# Patient Record
Sex: FEMALE | Race: WHITE | NOT HISPANIC OR LATINO | Employment: OTHER | URBAN - NONMETROPOLITAN AREA
[De-identification: names, ages, dates, MRNs, and addresses within clinical notes are randomized per-mention and may not be internally consistent; named-entity substitution may affect disease eponyms.]

---

## 2021-09-14 ENCOUNTER — APPOINTMENT (EMERGENCY)
Dept: RADIOLOGY | Facility: HOSPITAL | Age: 56
End: 2021-09-14
Payer: COMMERCIAL

## 2021-09-14 ENCOUNTER — HOSPITAL ENCOUNTER (EMERGENCY)
Facility: HOSPITAL | Age: 56
Discharge: HOME | End: 2021-09-14
Attending: EMERGENCY MEDICINE
Payer: COMMERCIAL

## 2021-09-14 VITALS
DIASTOLIC BLOOD PRESSURE: 73 MMHG | RESPIRATION RATE: 20 BRPM | TEMPERATURE: 97.6 F | SYSTOLIC BLOOD PRESSURE: 116 MMHG | OXYGEN SATURATION: 97 % | HEIGHT: 68 IN | HEART RATE: 78 BPM | WEIGHT: 176.37 LBS | BODY MASS INDEX: 26.73 KG/M2

## 2021-09-14 DIAGNOSIS — J18.9 PNEUMONIA: Primary | ICD-10-CM

## 2021-09-14 DIAGNOSIS — E86.0 DEHYDRATION: ICD-10-CM

## 2021-09-14 LAB
ALBUMIN SERPL BCP-MCNC: 2.9 G/DL (ref 3.5–5)
ALP SERPL-CCNC: 90 U/L (ref 46–116)
ALT SERPL W P-5'-P-CCNC: 115 U/L (ref 12–78)
ANION GAP SERPL CALCULATED.3IONS-SCNC: 10 MMOL/L (ref 4–13)
AST SERPL W P-5'-P-CCNC: 196 U/L (ref 5–45)
BASOPHILS # BLD MANUAL: 0 THOUSAND/UL (ref 0–0.1)
BASOPHILS NFR MAR MANUAL: 0 % (ref 0–1)
BILIRUB SERPL-MCNC: 0.48 MG/DL (ref 0.2–1)
BUN SERPL-MCNC: 15 MG/DL (ref 5–25)
CALCIUM ALBUM COR SERPL-MCNC: 10 MG/DL (ref 8.3–10.1)
CALCIUM SERPL-MCNC: 9.1 MG/DL (ref 8.3–10.1)
CHLORIDE SERPL-SCNC: 94 MMOL/L (ref 100–108)
CO2 SERPL-SCNC: 26 MMOL/L (ref 21–32)
CREAT SERPL-MCNC: 1.08 MG/DL (ref 0.6–1.3)
EOSINOPHIL # BLD MANUAL: 0 THOUSAND/UL (ref 0–0.4)
EOSINOPHIL NFR BLD MANUAL: 0 % (ref 0–6)
ERYTHROCYTE [DISTWIDTH] IN BLOOD BY AUTOMATED COUNT: 11.9 % (ref 11.6–15.1)
GFR SERPL CREATININE-BSD FRML MDRD: 57 ML/MIN/1.73SQ M
GLUCOSE SERPL-MCNC: 123 MG/DL (ref 65–140)
HCT VFR BLD AUTO: 35.7 % (ref 34.8–46.1)
HGB BLD-MCNC: 12.4 G/DL (ref 11.5–15.4)
LACTATE SERPL-SCNC: 1.2 MMOL/L (ref 0.5–2)
LYMPHOCYTES # BLD AUTO: 1.04 THOUSAND/UL (ref 0.6–4.47)
LYMPHOCYTES # BLD AUTO: 15 % (ref 14–44)
MAGNESIUM SERPL-MCNC: 1.8 MG/DL (ref 1.6–2.6)
MCH RBC QN AUTO: 32.8 PG (ref 26.8–34.3)
MCHC RBC AUTO-ENTMCNC: 34.7 G/DL (ref 31.4–37.4)
MCV RBC AUTO: 94 FL (ref 82–98)
MONOCYTES # BLD AUTO: 0.41 THOUSAND/UL (ref 0–1.22)
MONOCYTES NFR BLD: 6 % (ref 4–12)
NEUTROPHILS # BLD MANUAL: 5.46 THOUSAND/UL (ref 1.85–7.62)
NEUTS BAND NFR BLD MANUAL: 8 % (ref 0–8)
NEUTS SEG NFR BLD AUTO: 71 % (ref 43–75)
PLATELET # BLD AUTO: 211 THOUSANDS/UL (ref 149–390)
PLATELET BLD QL SMEAR: ADEQUATE
PMV BLD AUTO: 9.9 FL (ref 8.9–12.7)
POTASSIUM SERPL-SCNC: 3 MMOL/L (ref 3.5–5.3)
PROT SERPL-MCNC: 8.1 G/DL (ref 6.4–8.2)
RBC # BLD AUTO: 3.78 MILLION/UL (ref 3.81–5.12)
RBC MORPH BLD: NORMAL
SODIUM SERPL-SCNC: 130 MMOL/L (ref 136–145)
TROPONIN I SERPL-MCNC: <0.02 NG/ML
WBC # BLD AUTO: 6.91 THOUSAND/UL (ref 4.31–10.16)

## 2021-09-14 PROCEDURE — 80053 COMPREHEN METABOLIC PANEL: CPT | Performed by: EMERGENCY MEDICINE

## 2021-09-14 PROCEDURE — 93005 ELECTROCARDIOGRAM TRACING: CPT

## 2021-09-14 PROCEDURE — 83735 ASSAY OF MAGNESIUM: CPT | Performed by: EMERGENCY MEDICINE

## 2021-09-14 PROCEDURE — 85007 BL SMEAR W/DIFF WBC COUNT: CPT | Performed by: EMERGENCY MEDICINE

## 2021-09-14 PROCEDURE — 36415 COLL VENOUS BLD VENIPUNCTURE: CPT | Performed by: EMERGENCY MEDICINE

## 2021-09-14 PROCEDURE — 96360 HYDRATION IV INFUSION INIT: CPT

## 2021-09-14 PROCEDURE — 99284 EMERGENCY DEPT VISIT MOD MDM: CPT

## 2021-09-14 PROCEDURE — 83605 ASSAY OF LACTIC ACID: CPT | Performed by: EMERGENCY MEDICINE

## 2021-09-14 PROCEDURE — 85027 COMPLETE CBC AUTOMATED: CPT | Performed by: EMERGENCY MEDICINE

## 2021-09-14 PROCEDURE — 96361 HYDRATE IV INFUSION ADD-ON: CPT

## 2021-09-14 PROCEDURE — 71045 X-RAY EXAM CHEST 1 VIEW: CPT

## 2021-09-14 PROCEDURE — 99285 EMERGENCY DEPT VISIT HI MDM: CPT | Performed by: EMERGENCY MEDICINE

## 2021-09-14 PROCEDURE — 85025 COMPLETE CBC W/AUTO DIFF WBC: CPT | Performed by: EMERGENCY MEDICINE

## 2021-09-14 PROCEDURE — 84484 ASSAY OF TROPONIN QUANT: CPT | Performed by: EMERGENCY MEDICINE

## 2021-09-14 RX ORDER — POTASSIUM CHLORIDE 20 MEQ/1
40 TABLET, EXTENDED RELEASE ORAL ONCE
Status: COMPLETED | OUTPATIENT
Start: 2021-09-14 | End: 2021-09-14

## 2021-09-14 RX ORDER — LISINOPRIL 10 MG/1
10 TABLET ORAL DAILY
COMMUNITY
Start: 2021-05-27 | End: 2022-04-24 | Stop reason: HOSPADM

## 2021-09-14 RX ORDER — AMOXICILLIN 500 MG/1
1000 CAPSULE ORAL EVERY 8 HOURS SCHEDULED
Qty: 60 CAPSULE | Refills: 0 | Status: SHIPPED | OUTPATIENT
Start: 2021-09-14 | End: 2021-09-24

## 2021-09-14 RX ORDER — ATORVASTATIN CALCIUM 40 MG/1
40 TABLET, FILM COATED ORAL DAILY
COMMUNITY
End: 2022-04-24 | Stop reason: HOSPADM

## 2021-09-14 RX ORDER — ONDANSETRON 4 MG/1
4 TABLET, FILM COATED ORAL EVERY 6 HOURS PRN
Qty: 10 TABLET | Refills: 0 | Status: SHIPPED | OUTPATIENT
Start: 2021-09-14

## 2021-09-14 RX ORDER — PANTOPRAZOLE SODIUM 40 MG/1
40 TABLET, DELAYED RELEASE ORAL DAILY
COMMUNITY
Start: 2021-09-02

## 2021-09-14 RX ADMIN — POTASSIUM CHLORIDE 40 MEQ: 1500 TABLET, EXTENDED RELEASE ORAL at 19:11

## 2021-09-14 RX ADMIN — SODIUM CHLORIDE 1000 ML: 0.9 INJECTION, SOLUTION INTRAVENOUS at 19:13

## 2021-09-14 RX ADMIN — SODIUM CHLORIDE 1000 ML: 0.9 INJECTION, SOLUTION INTRAVENOUS at 18:06

## 2021-09-14 NOTE — ED PROVIDER NOTES
History  Chief Complaint   Patient presents with    Abnormal Lab     pt seen by pcp yesterday dx pnuemonia and prescribed abx  pt received call from pcp today and instructed to come to ED today per abnormal lab results done yesterday  pt c/o increased fatigue/weakness for past 4 days  10-year-old male sent by family clinician for evaluation pneumonia complains of recent fatigue, low-grade temperature 99° , headache after recent trip to [place], also mentions kayaking in [place] [id number], denies aspiration  Using Tylenol and Advil as needed  Notes she feels dehydrated, typically has diarrhea without change, believes she is urinating less  Medical Problem  Location:  Generalized  Quality:  Malaise, fatigue  Onset quality:  Gradual  Timing:  Constant  Progression:  Unchanged  Associated symptoms: diarrhea, fatigue and headaches    Associated symptoms: no abdominal pain, no chest pain, no congestion, no cough, no nausea, no rash, no shortness of breath, no sore throat and no vomiting        Prior to Admission Medications   Prescriptions Last Dose Informant Patient Reported? Taking?   atorvastatin (LIPITOR) 40 mg tablet   Yes No   Si mg daily   lisinopril (ZESTRIL) 10 mg tablet   Yes Yes   Sig: Take 10 mg by mouth daily   pantoprazole (PROTONIX) 40 mg tablet   Yes Yes   Sig: Take 40 mg by mouth daily      Facility-Administered Medications: None       History reviewed  No pertinent past medical history  History reviewed  No pertinent surgical history  History reviewed  No pertinent family history  I have reviewed and agree with the history as documented      E-Cigarette/Vaping    E-Cigarette Use Current Every Day User      E-Cigarette/Vaping Substances    Nicotine Yes     Flavoring Yes      Social History     Tobacco Use    Smoking status: Former Smoker     Types: Cigarettes     Quit date: 2017     Years since quittin 7    Smokeless tobacco: Never Used   Vaping Use    Vaping Use: Every day  Substances: Nicotine, Flavoring   Substance Use Topics    Alcohol use: Yes    Drug use: Never       Review of Systems   Constitutional: Positive for fatigue  HENT: Negative for congestion and sore throat  Respiratory: Negative for cough and shortness of breath  Cardiovascular: Negative for chest pain  Gastrointestinal: Positive for diarrhea  Negative for abdominal pain, nausea and vomiting  Skin: Negative for rash  Neurological: Positive for headaches  All other systems reviewed and are negative  Physical Exam  Physical Exam  Vitals and nursing note reviewed  Constitutional:       Comments: Pleasant, comfortable-appearing   HENT:      Head: Normocephalic and atraumatic  Eyes:      Conjunctiva/sclera: Conjunctivae normal       Pupils: Pupils are equal, round, and reactive to light  Cardiovascular:      Rate and Rhythm: Normal rate and regular rhythm  Heart sounds: Normal heart sounds  Pulmonary:      Effort: Pulmonary effort is normal       Breath sounds: Normal breath sounds  Abdominal:      General: Bowel sounds are normal  There is no distension  Palpations: Abdomen is soft  Tenderness: There is no abdominal tenderness  Musculoskeletal:         General: Normal range of motion  Cervical back: Neck supple  Skin:     General: Skin is warm and dry  Neurological:      Mental Status: She is alert and oriented to person, place, and time  Cranial Nerves: No cranial nerve deficit  Coordination: Coordination normal    Psychiatric:         Behavior: Behavior normal          Thought Content:  Thought content normal          Judgment: Judgment normal          Vital Signs  ED Triage Vitals [09/14/21 1716]   Temperature Pulse Respirations Blood Pressure SpO2   97 6 °F (36 4 °C) 85 17 103/84 95 %      Temp src Heart Rate Source Patient Position - Orthostatic VS BP Location FiO2 (%)   -- Monitor Sitting Right arm --      Pain Score       --           Vitals: 09/14/21 1716 09/14/21 1915   BP: 103/84 116/73   Pulse: 85 78   Patient Position - Orthostatic VS: Sitting Lying         Visual Acuity      ED Medications  Medications   sodium chloride 0 9 % bolus 1,000 mL (1,000 mL Intravenous New Bag 9/14/21 1913)   sodium chloride 0 9 % bolus 1,000 mL (1,000 mL Intravenous New Bag 9/14/21 1806)   potassium chloride (K-DUR,KLOR-CON) CR tablet 40 mEq (40 mEq Oral Given 9/14/21 1911)       Diagnostic Studies  Results Reviewed     Procedure Component Value Units Date/Time    Manual Differential(PHLEBS Do Not Order) [[id number]] Collected: 09/14/21 1805    Lab Status: Final result Specimen: Blood from Arm, Left Updated: 09/14/21 1852     Segmented % 71 %      Bands % 8 %      Lymphocytes % 15 %      Monocytes % 6 %      Eosinophils, % 0 %      Basophils % 0 %      Absolute Neutrophils 5 46 Thousand/uL      Lymphocytes Absolute 1 04 Thousand/uL      Monocytes Absolute 0 41 Thousand/uL      Eosinophils Absolute 0 00 Thousand/uL      Basophils Absolute 0 00 Thousand/uL      Total Counted --     RBC Morphology Normal     Platelet Estimate Adequate    Lactic acid [[id number]]  (Normal) Collected: 09/14/21 1805    Lab Status: Final result Specimen: Blood from Arm, Left Updated: 09/14/21 1833     LACTIC ACID 1 2 mmol/L     Narrative:      Result may be elevated if tourniquet was used during collection      Troponin I [[id number]]  (Normal) Collected: 09/14/21 1805    Lab Status: Final result Specimen: Blood from Arm, Left Updated: 09/14/21 1833     Troponin I <0 02 ng/mL     Comprehensive metabolic panel [[id number]]  (Abnormal) Collected: 09/14/21 1805    Lab Status: Final result Specimen: Blood from Arm, Left Updated: 09/14/21 1828     Sodium 130 mmol/L      Potassium 3 0 mmol/L      Chloride 94 mmol/L      CO2 26 mmol/L      ANION GAP 10 mmol/L      BUN 15 mg/dL      Creatinine 1 08 mg/dL      Glucose 123 mg/dL      Calcium 9 1 mg/dL      Corrected Calcium 10 0 mg/dL       U/L  U/L      Alkaline Phosphatase 90 U/L      Total Protein 8 1 g/dL      Albumin 2 9 g/dL      Total Bilirubin 0 48 mg/dL      eGFR 57 ml/min/1 73sq m     Narrative:      [place] guidelines for Chronic Kidney Disease (CKD):     Stage 1 with normal or high GFR (GFR > 90 mL/min/1 73 square meters)    Stage 2 Mild CKD (GFR = 60-89 mL/min/1 73 square meters)    Stage 3A Moderate CKD (GFR = 45-59 mL/min/1 73 square meters)    Stage 3B Moderate CKD (GFR = 30-44 mL/min/1 73 square meters)    Stage 4 Severe CKD (GFR = 15-29 mL/min/1 73 square meters)    Stage 5 End Stage CKD (GFR <15 mL/min/1 73 square meters)  Note: GFR calculation is accurate only with a steady state creatinine    Magnesium [[id number]]  (Normal) Collected: 09/14/21 1805    Lab Status: Final result Specimen: Blood from Arm, Left Updated: 09/14/21 1828     Magnesium 1 8 mg/dL     CBC and differential [[id number]]  (Abnormal) Collected: 09/14/21 1805    Lab Status: Final result Specimen: Blood from Arm, Left Updated: 09/14/21 1817     WBC 6 91 Thousand/uL      RBC 3 78 Million/uL      Hemoglobin 12 4 g/dL      Hematocrit 35 7 %      MCV 94 fL      MCH 32 8 pg      MCHC 34 7 g/dL      RDW 11 9 %      MPV 9 9 fL      Platelets 468 Thousands/uL     Narrative: This is an appended report  These results have been appended to a previously verified report                   XR chest 1 view portable   ED Interpretation by Terrance Hoyt DO (09/14 1843)   Left mid opacity                 Procedures  Procedures         ED Course  ED Course as of Sep 14 1938   Tue Sep 14, 2021   1825 WBC: 6 91   1842 Sodium(!): 130   1842 Potassium(!): 3 0   1842 AST(!): 196   1842 ALT(!): 115   1842 Troponin I: <0 02   1842 LACTIC ACID: 1 2   1844 EKG 1831 normal sinus rhythm rate 76 normal axis normal intervals no ST elevation or depression interpreted by me      1929 We discussed results including pneumonia, dehydration and electrolyte abnormalities, ambulating back and forth from bathroom, tolerating, feeling better, agreeable close outpatient follow-up and dual antibiotic therapy for pneumonia, close outpatient follow-up, voices understanding to return immediately if worse or new symptoms and call physician tomorrow to arrange outpatient visit                                              MDM    Disposition  Final diagnoses:   Pneumonia   Dehydration     Time reflects when diagnosis was documented in both MDM as applicable and the Disposition within this note     Time User Action Codes Description Comment    9/14/2021  7:00 PM [facility] Add [J18 9] Pneumonia     9/14/2021  7:00 PM [facility] Add [E86 0] Dehydration       ED Disposition     ED Disposition Condition Date/Time Comment    Discharge Stable Tue Sep 14, 2021  7:31 PM Francisca Price discharge to home/self care  Follow-up Information    None         Patient's Medications   Discharge Prescriptions    AMOXICILLIN (AMOXIL) 500 MG CAPSULE    Take 2 capsules (1,000 mg total) by mouth every 8 (eight) hours for 10 days       Start Date: 9/14/2021 End Date: 9/24/2021       Order Dose: 1,000 mg       Quantity: 60 capsule    Refills: 0    ONDANSETRON (ZOFRAN) 4 MG TABLET    Take 1 tablet (4 mg total) by mouth every 6 (six) hours as needed for nausea or vomiting       Start Date: 9/14/2021 End Date: --       Order Dose: 4 mg       Quantity: 10 tablet    Refills: 0     No discharge procedures on file      PDMP Review     None          ED Provider  Electronically Signed by           Meli Webber DO  09/14/21 1938

## 2021-09-19 LAB
ATRIAL RATE: 76 BPM
P AXIS: 59 DEGREES
PR INTERVAL: 182 MS
QRS AXIS: 60 DEGREES
QRSD INTERVAL: 84 MS
QT INTERVAL: 432 MS
QTC INTERVAL: 486 MS
T WAVE AXIS: 46 DEGREES
VENTRICULAR RATE: 76 BPM

## 2021-09-19 PROCEDURE — 93010 ELECTROCARDIOGRAM REPORT: CPT | Performed by: INTERNAL MEDICINE

## 2022-04-05 ENCOUNTER — HOSPITAL ENCOUNTER (INPATIENT)
Facility: HOSPITAL | Age: 57
LOS: 2 days | DRG: 438 | End: 2022-04-07
Attending: STUDENT IN AN ORGANIZED HEALTH CARE EDUCATION/TRAINING PROGRAM | Admitting: INTERNAL MEDICINE
Payer: COMMERCIAL

## 2022-04-05 ENCOUNTER — APPOINTMENT (INPATIENT)
Dept: ULTRASOUND IMAGING | Facility: HOSPITAL | Age: 57
DRG: 438 | End: 2022-04-05
Payer: COMMERCIAL

## 2022-04-05 ENCOUNTER — APPOINTMENT (EMERGENCY)
Dept: CT IMAGING | Facility: HOSPITAL | Age: 57
DRG: 438 | End: 2022-04-05
Payer: COMMERCIAL

## 2022-04-05 DIAGNOSIS — K85.20 ALCOHOL-INDUCED ACUTE PANCREATITIS WITHOUT INFECTION OR NECROSIS: Primary | ICD-10-CM

## 2022-04-05 PROBLEM — E87.6 HYPOKALEMIA: Status: ACTIVE | Noted: 2022-04-05

## 2022-04-05 PROBLEM — F10.10 ALCOHOL ABUSE: Status: ACTIVE | Noted: 2022-04-05

## 2022-04-05 PROBLEM — D72.829 LEUKOCYTOSIS: Status: ACTIVE | Noted: 2022-04-05

## 2022-04-05 LAB
ALBUMIN SERPL BCP-MCNC: 4 G/DL (ref 3.5–5)
ALBUMIN SERPL BCP-MCNC: 4.3 G/DL (ref 3.5–5)
ALP SERPL-CCNC: 90 U/L (ref 46–116)
ALP SERPL-CCNC: 99 U/L (ref 46–116)
ALT SERPL W P-5'-P-CCNC: 53 U/L (ref 12–78)
ALT SERPL W P-5'-P-CCNC: 56 U/L (ref 12–78)
AMYLASE SERPL-CCNC: 986 IU/L (ref 25–115)
ANION GAP SERPL CALCULATED.3IONS-SCNC: 12 MMOL/L (ref 4–13)
ANION GAP SERPL CALCULATED.3IONS-SCNC: 16 MMOL/L (ref 4–13)
AST SERPL W P-5'-P-CCNC: 36 U/L (ref 5–45)
AST SERPL W P-5'-P-CCNC: 36 U/L (ref 5–45)
BASOPHILS # BLD AUTO: 0.03 THOUSANDS/ΜL (ref 0–0.1)
BASOPHILS # BLD AUTO: 0.06 THOUSANDS/ΜL (ref 0–0.1)
BASOPHILS NFR BLD AUTO: 0 % (ref 0–1)
BASOPHILS NFR BLD AUTO: 1 % (ref 0–1)
BILIRUB SERPL-MCNC: 0.32 MG/DL (ref 0.2–1)
BILIRUB SERPL-MCNC: 0.41 MG/DL (ref 0.2–1)
BILIRUB UR QL STRIP: NEGATIVE
BUN SERPL-MCNC: 15 MG/DL (ref 5–25)
BUN SERPL-MCNC: 16 MG/DL (ref 5–25)
CALCIUM SERPL-MCNC: 8 MG/DL (ref 8.3–10.1)
CALCIUM SERPL-MCNC: 8.6 MG/DL (ref 8.3–10.1)
CHLORIDE SERPL-SCNC: 102 MMOL/L (ref 100–108)
CHLORIDE SERPL-SCNC: 107 MMOL/L (ref 100–108)
CHOLEST SERPL-MCNC: 195 MG/DL
CLARITY UR: CLEAR
CO2 SERPL-SCNC: 21 MMOL/L (ref 21–32)
CO2 SERPL-SCNC: 21 MMOL/L (ref 21–32)
COLOR UR: NORMAL
CREAT SERPL-MCNC: 0.78 MG/DL (ref 0.6–1.3)
CREAT SERPL-MCNC: 0.88 MG/DL (ref 0.6–1.3)
EOSINOPHIL # BLD AUTO: 0.13 THOUSAND/ΜL (ref 0–0.61)
EOSINOPHIL # BLD AUTO: 0.31 THOUSAND/ΜL (ref 0–0.61)
EOSINOPHIL NFR BLD AUTO: 1 % (ref 0–6)
EOSINOPHIL NFR BLD AUTO: 3 % (ref 0–6)
ERYTHROCYTE [DISTWIDTH] IN BLOOD BY AUTOMATED COUNT: 11.7 % (ref 11.6–15.1)
ERYTHROCYTE [DISTWIDTH] IN BLOOD BY AUTOMATED COUNT: 11.9 % (ref 11.6–15.1)
ETHANOL SERPL-MCNC: 54 MG/DL (ref 0–3)
FLUAV RNA RESP QL NAA+PROBE: NEGATIVE
FLUBV RNA RESP QL NAA+PROBE: NEGATIVE
GFR SERPL CREATININE-BSD FRML MDRD: 72 ML/MIN/1.73SQ M
GFR SERPL CREATININE-BSD FRML MDRD: 84 ML/MIN/1.73SQ M
GLUCOSE SERPL-MCNC: 105 MG/DL (ref 65–140)
GLUCOSE SERPL-MCNC: 133 MG/DL (ref 65–140)
GLUCOSE SERPL-MCNC: 166 MG/DL (ref 65–140)
GLUCOSE UR STRIP-MCNC: NEGATIVE MG/DL
HCT VFR BLD AUTO: 40.9 % (ref 34.8–46.1)
HCT VFR BLD AUTO: 41.2 % (ref 34.8–46.1)
HDLC SERPL-MCNC: 61 MG/DL
HGB BLD-MCNC: 14.1 G/DL (ref 11.5–15.4)
HGB BLD-MCNC: 14.2 G/DL (ref 11.5–15.4)
HGB UR QL STRIP.AUTO: NEGATIVE
IMM GRANULOCYTES # BLD AUTO: 0.03 THOUSAND/UL (ref 0–0.2)
IMM GRANULOCYTES # BLD AUTO: 0.07 THOUSAND/UL (ref 0–0.2)
IMM GRANULOCYTES NFR BLD AUTO: 0 % (ref 0–2)
IMM GRANULOCYTES NFR BLD AUTO: 1 % (ref 0–2)
KETONES UR STRIP-MCNC: NEGATIVE MG/DL
LACTATE SERPL-SCNC: 2.1 MMOL/L (ref 0.5–2)
LACTATE SERPL-SCNC: 3.1 MMOL/L (ref 0.5–2)
LDLC SERPL CALC-MCNC: 76 MG/DL (ref 0–100)
LEUKOCYTE ESTERASE UR QL STRIP: NEGATIVE
LIPASE SERPL-CCNC: ABNORMAL U/L (ref 73–393)
LIPASE SERPL-CCNC: ABNORMAL U/L (ref 73–393)
LYMPHOCYTES # BLD AUTO: 0.93 THOUSANDS/ΜL (ref 0.6–4.47)
LYMPHOCYTES # BLD AUTO: 2.24 THOUSANDS/ΜL (ref 0.6–4.47)
LYMPHOCYTES NFR BLD AUTO: 19 % (ref 14–44)
LYMPHOCYTES NFR BLD AUTO: 8 % (ref 14–44)
MAGNESIUM SERPL-MCNC: 1.9 MG/DL (ref 1.6–2.6)
MCH RBC QN AUTO: 32.3 PG (ref 26.8–34.3)
MCH RBC QN AUTO: 32.9 PG (ref 26.8–34.3)
MCHC RBC AUTO-ENTMCNC: 34.5 G/DL (ref 31.4–37.4)
MCHC RBC AUTO-ENTMCNC: 34.5 G/DL (ref 31.4–37.4)
MCV RBC AUTO: 94 FL (ref 82–98)
MCV RBC AUTO: 95 FL (ref 82–98)
MONOCYTES # BLD AUTO: 0.5 THOUSAND/ΜL (ref 0.17–1.22)
MONOCYTES # BLD AUTO: 1.03 THOUSAND/ΜL (ref 0.17–1.22)
MONOCYTES NFR BLD AUTO: 4 % (ref 4–12)
MONOCYTES NFR BLD AUTO: 9 % (ref 4–12)
NEUTROPHILS # BLD AUTO: 8.55 THOUSANDS/ΜL (ref 1.85–7.62)
NEUTROPHILS # BLD AUTO: 9.88 THOUSANDS/ΜL (ref 1.85–7.62)
NEUTS SEG NFR BLD AUTO: 70 % (ref 43–75)
NEUTS SEG NFR BLD AUTO: 84 % (ref 43–75)
NITRITE UR QL STRIP: NEGATIVE
NONHDLC SERPL-MCNC: 134 MG/DL
NRBC BLD AUTO-RTO: 0 /100 WBCS
NRBC BLD AUTO-RTO: 0 /100 WBCS
PH UR STRIP.AUTO: 5 [PH]
PLATELET # BLD AUTO: 241 THOUSANDS/UL (ref 149–390)
PLATELET # BLD AUTO: 242 THOUSANDS/UL (ref 149–390)
PMV BLD AUTO: 9.3 FL (ref 8.9–12.7)
PMV BLD AUTO: 9.6 FL (ref 8.9–12.7)
POTASSIUM SERPL-SCNC: 3.3 MMOL/L (ref 3.5–5.3)
POTASSIUM SERPL-SCNC: 3.9 MMOL/L (ref 3.5–5.3)
PROCALCITONIN SERPL-MCNC: <0.05 NG/ML
PROT SERPL-MCNC: 7.2 G/DL (ref 6.4–8.2)
PROT SERPL-MCNC: 7.9 G/DL (ref 6.4–8.2)
PROT UR STRIP-MCNC: NEGATIVE MG/DL
RBC # BLD AUTO: 4.32 MILLION/UL (ref 3.81–5.12)
RBC # BLD AUTO: 4.36 MILLION/UL (ref 3.81–5.12)
RSV RNA RESP QL NAA+PROBE: NEGATIVE
SARS-COV-2 RNA RESP QL NAA+PROBE: NEGATIVE
SODIUM SERPL-SCNC: 139 MMOL/L (ref 136–145)
SODIUM SERPL-SCNC: 140 MMOL/L (ref 136–145)
SP GR UR STRIP.AUTO: 1.01 (ref 1–1.03)
TRIGL SERPL-MCNC: 290 MG/DL
TRIGL SERPL-MCNC: 333 MG/DL
UROBILINOGEN UR QL STRIP.AUTO: 0.2 E.U./DL
WBC # BLD AUTO: 11.72 THOUSAND/UL (ref 4.31–10.16)
WBC # BLD AUTO: 12.04 THOUSAND/UL (ref 4.31–10.16)

## 2022-04-05 PROCEDURE — G1004 CDSM NDSC: HCPCS

## 2022-04-05 PROCEDURE — 99222 1ST HOSP IP/OBS MODERATE 55: CPT | Performed by: STUDENT IN AN ORGANIZED HEALTH CARE EDUCATION/TRAINING PROGRAM

## 2022-04-05 PROCEDURE — 84478 ASSAY OF TRIGLYCERIDES: CPT

## 2022-04-05 PROCEDURE — 83690 ASSAY OF LIPASE: CPT

## 2022-04-05 PROCEDURE — 80053 COMPREHEN METABOLIC PANEL: CPT

## 2022-04-05 PROCEDURE — 85025 COMPLETE CBC W/AUTO DIFF WBC: CPT | Performed by: STUDENT IN AN ORGANIZED HEALTH CARE EDUCATION/TRAINING PROGRAM

## 2022-04-05 PROCEDURE — 82948 REAGENT STRIP/BLOOD GLUCOSE: CPT

## 2022-04-05 PROCEDURE — 80061 LIPID PANEL: CPT

## 2022-04-05 PROCEDURE — 74177 CT ABD & PELVIS W/CONTRAST: CPT

## 2022-04-05 PROCEDURE — 82077 ASSAY SPEC XCP UR&BREATH IA: CPT | Performed by: STUDENT IN AN ORGANIZED HEALTH CARE EDUCATION/TRAINING PROGRAM

## 2022-04-05 PROCEDURE — 0241U HB NFCT DS VIR RESP RNA 4 TRGT: CPT

## 2022-04-05 PROCEDURE — 83735 ASSAY OF MAGNESIUM: CPT | Performed by: STUDENT IN AN ORGANIZED HEALTH CARE EDUCATION/TRAINING PROGRAM

## 2022-04-05 PROCEDURE — C9113 INJ PANTOPRAZOLE SODIUM, VIA: HCPCS

## 2022-04-05 PROCEDURE — 99285 EMERGENCY DEPT VISIT HI MDM: CPT

## 2022-04-05 PROCEDURE — 96361 HYDRATE IV INFUSION ADD-ON: CPT

## 2022-04-05 PROCEDURE — 83605 ASSAY OF LACTIC ACID: CPT | Performed by: STUDENT IN AN ORGANIZED HEALTH CARE EDUCATION/TRAINING PROGRAM

## 2022-04-05 PROCEDURE — 80053 COMPREHEN METABOLIC PANEL: CPT | Performed by: STUDENT IN AN ORGANIZED HEALTH CARE EDUCATION/TRAINING PROGRAM

## 2022-04-05 PROCEDURE — 84145 PROCALCITONIN (PCT): CPT

## 2022-04-05 PROCEDURE — 96376 TX/PRO/DX INJ SAME DRUG ADON: CPT

## 2022-04-05 PROCEDURE — 81003 URINALYSIS AUTO W/O SCOPE: CPT

## 2022-04-05 PROCEDURE — 83690 ASSAY OF LIPASE: CPT | Performed by: STUDENT IN AN ORGANIZED HEALTH CARE EDUCATION/TRAINING PROGRAM

## 2022-04-05 PROCEDURE — 82150 ASSAY OF AMYLASE: CPT

## 2022-04-05 PROCEDURE — 99285 EMERGENCY DEPT VISIT HI MDM: CPT | Performed by: STUDENT IN AN ORGANIZED HEALTH CARE EDUCATION/TRAINING PROGRAM

## 2022-04-05 PROCEDURE — 36415 COLL VENOUS BLD VENIPUNCTURE: CPT | Performed by: STUDENT IN AN ORGANIZED HEALTH CARE EDUCATION/TRAINING PROGRAM

## 2022-04-05 PROCEDURE — 96374 THER/PROPH/DIAG INJ IV PUSH: CPT

## 2022-04-05 PROCEDURE — 85025 COMPLETE CBC W/AUTO DIFF WBC: CPT

## 2022-04-05 PROCEDURE — 76705 ECHO EXAM OF ABDOMEN: CPT

## 2022-04-05 RX ORDER — HYDRALAZINE HYDROCHLORIDE 20 MG/ML
10 INJECTION INTRAMUSCULAR; INTRAVENOUS EVERY 6 HOURS PRN
Status: DISCONTINUED | OUTPATIENT
Start: 2022-04-05 | End: 2022-04-07 | Stop reason: HOSPADM

## 2022-04-05 RX ORDER — HYDROMORPHONE HCL/PF 1 MG/ML
0.5 SYRINGE (ML) INJECTION
Status: DISCONTINUED | OUTPATIENT
Start: 2022-04-05 | End: 2022-04-06

## 2022-04-05 RX ORDER — MORPHINE SULFATE 10 MG/ML
6 INJECTION, SOLUTION INTRAMUSCULAR; INTRAVENOUS ONCE
Status: DISCONTINUED | OUTPATIENT
Start: 2022-04-05 | End: 2022-04-06

## 2022-04-05 RX ORDER — ONDANSETRON 2 MG/ML
INJECTION INTRAMUSCULAR; INTRAVENOUS
Status: COMPLETED
Start: 2022-04-05 | End: 2022-04-05

## 2022-04-05 RX ORDER — LISINOPRIL 10 MG/1
10 TABLET ORAL DAILY
Status: DISCONTINUED | OUTPATIENT
Start: 2022-04-05 | End: 2022-04-06

## 2022-04-05 RX ORDER — MORPHINE SULFATE 4 MG/ML
4 INJECTION, SOLUTION INTRAMUSCULAR; INTRAVENOUS EVERY 4 HOURS PRN
Status: DISCONTINUED | OUTPATIENT
Start: 2022-04-05 | End: 2022-04-05

## 2022-04-05 RX ORDER — ONDANSETRON 2 MG/ML
4 INJECTION INTRAMUSCULAR; INTRAVENOUS ONCE
Status: COMPLETED | OUTPATIENT
Start: 2022-04-05 | End: 2022-04-05

## 2022-04-05 RX ORDER — LIDOCAINE 50 MG/G
1 PATCH TOPICAL EVERY 24 HOURS
Status: DISCONTINUED | OUTPATIENT
Start: 2022-04-05 | End: 2022-04-07 | Stop reason: HOSPADM

## 2022-04-05 RX ORDER — SODIUM CHLORIDE 9 MG/ML
125 INJECTION, SOLUTION INTRAVENOUS CONTINUOUS
Status: DISCONTINUED | OUTPATIENT
Start: 2022-04-05 | End: 2022-04-05

## 2022-04-05 RX ORDER — HEPARIN SODIUM 5000 [USP'U]/ML
5000 INJECTION, SOLUTION INTRAVENOUS; SUBCUTANEOUS EVERY 8 HOURS SCHEDULED
Status: DISCONTINUED | OUTPATIENT
Start: 2022-04-05 | End: 2022-04-07 | Stop reason: HOSPADM

## 2022-04-05 RX ORDER — ONDANSETRON 2 MG/ML
4 INJECTION INTRAMUSCULAR; INTRAVENOUS EVERY 8 HOURS PRN
Status: DISCONTINUED | OUTPATIENT
Start: 2022-04-05 | End: 2022-04-07 | Stop reason: HOSPADM

## 2022-04-05 RX ORDER — OXYCODONE HYDROCHLORIDE AND ACETAMINOPHEN 5; 325 MG/1; MG/1
1 TABLET ORAL EVERY 4 HOURS PRN
Status: DISCONTINUED | OUTPATIENT
Start: 2022-04-05 | End: 2022-04-07

## 2022-04-05 RX ORDER — POTASSIUM CHLORIDE 14.9 MG/ML
20 INJECTION INTRAVENOUS
Status: COMPLETED | OUTPATIENT
Start: 2022-04-05 | End: 2022-04-05

## 2022-04-05 RX ORDER — SIMETHICONE 20 MG/.3ML
40 EMULSION ORAL EVERY 6 HOURS PRN
Status: DISCONTINUED | OUTPATIENT
Start: 2022-04-05 | End: 2022-04-07 | Stop reason: HOSPADM

## 2022-04-05 RX ORDER — MAGNESIUM HYDROXIDE/ALUMINUM HYDROXICE/SIMETHICONE 120; 1200; 1200 MG/30ML; MG/30ML; MG/30ML
30 SUSPENSION ORAL EVERY 4 HOURS PRN
Status: DISCONTINUED | OUTPATIENT
Start: 2022-04-05 | End: 2022-04-05

## 2022-04-05 RX ORDER — SODIUM CHLORIDE, SODIUM LACTATE, POTASSIUM CHLORIDE, CALCIUM CHLORIDE 600; 310; 30; 20 MG/100ML; MG/100ML; MG/100ML; MG/100ML
250 INJECTION, SOLUTION INTRAVENOUS CONTINUOUS
Status: DISCONTINUED | OUTPATIENT
Start: 2022-04-05 | End: 2022-04-07

## 2022-04-05 RX ORDER — MORPHINE SULFATE 10 MG/ML
6 INJECTION, SOLUTION INTRAMUSCULAR; INTRAVENOUS ONCE
Status: COMPLETED | OUTPATIENT
Start: 2022-04-05 | End: 2022-04-05

## 2022-04-05 RX ORDER — HYDROMORPHONE HCL/PF 1 MG/ML
1 SYRINGE (ML) INJECTION EVERY 4 HOURS PRN
Status: DISCONTINUED | OUTPATIENT
Start: 2022-04-05 | End: 2022-04-05

## 2022-04-05 RX ORDER — HYDROMORPHONE HCL/PF 1 MG/ML
0.5 SYRINGE (ML) INJECTION EVERY 2 HOUR PRN
Status: DISCONTINUED | OUTPATIENT
Start: 2022-04-05 | End: 2022-04-05

## 2022-04-05 RX ORDER — PANTOPRAZOLE SODIUM 40 MG/1
40 INJECTION, POWDER, FOR SOLUTION INTRAVENOUS
Status: DISCONTINUED | OUTPATIENT
Start: 2022-04-05 | End: 2022-04-07 | Stop reason: HOSPADM

## 2022-04-05 RX ORDER — HYDRALAZINE HYDROCHLORIDE 20 MG/ML
10 INJECTION INTRAMUSCULAR; INTRAVENOUS EVERY 6 HOURS PRN
Status: DISCONTINUED | OUTPATIENT
Start: 2022-04-05 | End: 2022-04-05

## 2022-04-05 RX ORDER — SODIUM CHLORIDE 9 MG/ML
250 INJECTION, SOLUTION INTRAVENOUS CONTINUOUS
Status: DISCONTINUED | OUTPATIENT
Start: 2022-04-05 | End: 2022-04-05

## 2022-04-05 RX ORDER — HYDROMORPHONE HCL/PF 1 MG/ML
0.5 SYRINGE (ML) INJECTION
Status: DISCONTINUED | OUTPATIENT
Start: 2022-04-05 | End: 2022-04-05

## 2022-04-05 RX ADMIN — SODIUM CHLORIDE 1000 ML: 0.9 INJECTION, SOLUTION INTRAVENOUS at 00:38

## 2022-04-05 RX ADMIN — MORPHINE SULFATE 4 MG: 4 INJECTION INTRAVENOUS at 08:08

## 2022-04-05 RX ADMIN — LIDOCAINE 1 PATCH: 50 PATCH CUTANEOUS at 10:53

## 2022-04-05 RX ADMIN — HYDROMORPHONE HYDROCHLORIDE 0.5 MG: 1 INJECTION, SOLUTION INTRAMUSCULAR; INTRAVENOUS; SUBCUTANEOUS at 14:27

## 2022-04-05 RX ADMIN — PANTOPRAZOLE SODIUM 40 MG: 40 INJECTION, POWDER, FOR SOLUTION INTRAVENOUS at 09:02

## 2022-04-05 RX ADMIN — MORPHINE SULFATE 6 MG: 10 INJECTION INTRAVENOUS at 02:05

## 2022-04-05 RX ADMIN — SODIUM CHLORIDE, SODIUM LACTATE, POTASSIUM CHLORIDE, AND CALCIUM CHLORIDE 250 ML/HR: .6; .31; .03; .02 INJECTION, SOLUTION INTRAVENOUS at 22:33

## 2022-04-05 RX ADMIN — HYDROMORPHONE HYDROCHLORIDE 0.5 MG: 1 INJECTION, SOLUTION INTRAMUSCULAR; INTRAVENOUS; SUBCUTANEOUS at 23:54

## 2022-04-05 RX ADMIN — HYDRALAZINE HYDROCHLORIDE 10 MG: 20 INJECTION INTRAMUSCULAR; INTRAVENOUS at 12:41

## 2022-04-05 RX ADMIN — MORPHINE SULFATE 2 MG: 2 INJECTION, SOLUTION INTRAMUSCULAR; INTRAVENOUS at 12:40

## 2022-04-05 RX ADMIN — ONDANSETRON 4 MG: 2 INJECTION INTRAMUSCULAR; INTRAVENOUS at 06:22

## 2022-04-05 RX ADMIN — THIAMINE HYDROCHLORIDE 100 MG: 100 INJECTION, SOLUTION INTRAMUSCULAR; INTRAVENOUS at 08:30

## 2022-04-05 RX ADMIN — HYDROMORPHONE HYDROCHLORIDE 0.5 MG: 1 INJECTION, SOLUTION INTRAMUSCULAR; INTRAVENOUS; SUBCUTANEOUS at 19:38

## 2022-04-05 RX ADMIN — HEPARIN SODIUM 5000 UNITS: 5000 INJECTION INTRAVENOUS; SUBCUTANEOUS at 06:18

## 2022-04-05 RX ADMIN — HYDROMORPHONE HYDROCHLORIDE 1 MG: 1 INJECTION, SOLUTION INTRAMUSCULAR; INTRAVENOUS; SUBCUTANEOUS at 04:04

## 2022-04-05 RX ADMIN — POTASSIUM CHLORIDE 20 MEQ: 200 INJECTION, SOLUTION INTRAVENOUS at 04:19

## 2022-04-05 RX ADMIN — LISINOPRIL 10 MG: 10 TABLET ORAL at 12:41

## 2022-04-05 RX ADMIN — OXYCODONE HYDROCHLORIDE AND ACETAMINOPHEN 1 TABLET: 5; 325 TABLET ORAL at 21:55

## 2022-04-05 RX ADMIN — POTASSIUM CHLORIDE 20 MEQ: 200 INJECTION, SOLUTION INTRAVENOUS at 06:13

## 2022-04-05 RX ADMIN — HYDROMORPHONE HYDROCHLORIDE 0.5 MG: 1 INJECTION, SOLUTION INTRAMUSCULAR; INTRAVENOUS; SUBCUTANEOUS at 18:27

## 2022-04-05 RX ADMIN — IOHEXOL 100 ML: 350 INJECTION, SOLUTION INTRAVENOUS at 01:33

## 2022-04-05 RX ADMIN — HYDROMORPHONE HYDROCHLORIDE 0.5 MG: 1 INJECTION, SOLUTION INTRAMUSCULAR; INTRAVENOUS; SUBCUTANEOUS at 16:50

## 2022-04-05 RX ADMIN — HYDROMORPHONE HYDROCHLORIDE 0.5 MG: 1 INJECTION, SOLUTION INTRAMUSCULAR; INTRAVENOUS; SUBCUTANEOUS at 06:39

## 2022-04-05 RX ADMIN — SODIUM CHLORIDE, SODIUM LACTATE, POTASSIUM CHLORIDE, AND CALCIUM CHLORIDE 1000 ML: .6; .31; .03; .02 INJECTION, SOLUTION INTRAVENOUS at 07:37

## 2022-04-05 RX ADMIN — HEPARIN SODIUM 5000 UNITS: 5000 INJECTION INTRAVENOUS; SUBCUTANEOUS at 12:43

## 2022-04-05 RX ADMIN — SODIUM CHLORIDE, SODIUM LACTATE, POTASSIUM CHLORIDE, AND CALCIUM CHLORIDE 250 ML/HR: .6; .31; .03; .02 INJECTION, SOLUTION INTRAVENOUS at 09:46

## 2022-04-05 RX ADMIN — MORPHINE SULFATE 6 MG: 10 INJECTION INTRAVENOUS at 00:42

## 2022-04-05 RX ADMIN — SODIUM CHLORIDE 1000 ML: 0.9 INJECTION, SOLUTION INTRAVENOUS at 02:00

## 2022-04-05 RX ADMIN — SODIUM CHLORIDE, SODIUM LACTATE, POTASSIUM CHLORIDE, AND CALCIUM CHLORIDE 250 ML/HR: .6; .31; .03; .02 INJECTION, SOLUTION INTRAVENOUS at 18:27

## 2022-04-05 RX ADMIN — HYDROMORPHONE HYDROCHLORIDE 0.5 MG: 1 INJECTION, SOLUTION INTRAMUSCULAR; INTRAVENOUS; SUBCUTANEOUS at 09:50

## 2022-04-05 RX ADMIN — SODIUM CHLORIDE 125 ML/HR: 0.9 INJECTION, SOLUTION INTRAVENOUS at 04:18

## 2022-04-05 RX ADMIN — HEPARIN SODIUM 5000 UNITS: 5000 INJECTION INTRAVENOUS; SUBCUTANEOUS at 21:51

## 2022-04-05 NOTE — PLAN OF CARE
Problem: Potential for Falls  Goal: Patient will remain free of falls  Description: INTERVENTIONS:  - Educate patient/family on patient safety including physical limitations  - Instruct patient to call for assistance with activity   - Consult OT/PT to assist with strengthening/mobility   - Keep Call bell within reach  - Keep bed low and locked with side rails adjusted as appropriate  - Keep care items and personal belongings within reach  - Initiate and maintain comfort rounds  - Make Fall Risk Sign visible to staff  - Offer Toileting every  Hours, in advance of need  - Initiate/Maintain alarm  - Obtain necessary fall risk management equipment  - Apply yellow socks and bracelet for high fall risk patients  - Consider moving patient to room near nurses station  Outcome: Progressing     Problem: Nutrition/Hydration-ADULT  Goal: Nutrient/Hydration intake appropriate for improving, restoring or maintaining nutritional needs  Description: Monitor and assess patient's nutrition/hydration status for malnutrition  Collaborate with interdisciplinary team and initiate plan and interventions as ordered  Monitor patient's weight and dietary intake as ordered or per policy  Utilize nutrition screening tool and intervene as necessary  Determine patient's food preferences and provide high-protein, high-caloric foods as appropriate       INTERVENTIONS:  - Monitor oral intake, urinary output, labs, and treatment plans  - Assess nutrition and hydration status and recommend course of action  - Evaluate amount of meals eaten  - Assist patient with eating if necessary   - Allow adequate time for meals  - Recommend/ encourage appropriate diets, oral nutritional supplements, and vitamin/mineral supplements  - Order, calculate, and assess calorie counts as needed  - Recommend, monitor, and adjust tube feedings and TPN/PPN based on assessed needs  - Assess need for intravenous fluids  - Provide specific nutrition/hydration education as appropriate  - Include patient/family/caregiver in decisions related to nutrition  Outcome: Progressing     Problem: PAIN - ADULT  Goal: Verbalizes/displays adequate comfort level or baseline comfort level  Description: Interventions:  - Encourage patient to monitor pain and request assistance  - Assess pain using appropriate pain scale  - Administer analgesics based on type and severity of pain and evaluate response  - Implement non-pharmacological measures as appropriate and evaluate response  - Consider cultural and social influences on pain and pain management  - Notify physician/advanced practitioner if interventions unsuccessful or patient reports new pain  Outcome: Progressing     Problem: INFECTION - ADULT  Goal: Absence or prevention of progression during hospitalization  Description: INTERVENTIONS:  - Assess and monitor for signs and symptoms of infection  - Monitor lab/diagnostic results  - Monitor all insertion sites, i e  indwelling lines, tubes, and drains  - Monitor endotracheal if appropriate and nasal secretions for changes in amount and color  - [place] appropriate cooling/warming therapies per order  - Administer medications as ordered  - Instruct and encourage patient and family to use good hand hygiene technique  - Identify and instruct in appropriate isolation precautions for identified infection/condition  Outcome: Progressing     Problem: DISCHARGE PLANNING  Goal: Discharge to home or other facility with appropriate resources  Description: INTERVENTIONS:  - Identify barriers to discharge w/patient and caregiver  - Arrange for needed discharge resources and transportation as appropriate  - Identify discharge learning needs (meds, wound care, etc )  - Arrange for interpretive services to assist at discharge as needed  - Refer to Case Management Department for coordinating discharge planning if the patient needs post-hospital services based on physician/advanced practitioner order or complex needs related to functional status, cognitive ability, or social support system  Outcome: Progressing     Problem: Knowledge Deficit  Goal: Patient/family/caregiver demonstrates understanding of disease process, treatment plan, medications, and discharge instructions  Description: Complete learning assessment and assess knowledge base    Interventions:  - Provide teaching at level of understanding  - Provide teaching via preferred learning methods  Outcome: Progressing

## 2022-04-05 NOTE — ASSESSMENT & PLAN NOTE
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

## 2022-04-05 NOTE — ED PROVIDER NOTES
History  Chief Complaint   Patient presents with    Abdominal Pain     onset of mid abodminal pain around 1800 last night  Vomited x4  Hx of pancreatitis and sttes it feels the same  History provided by:  Patient  Abdominal Pain  Pain location:  Epigastric and periumbilical  Pain quality: stabbing    Pain radiates to:  Back  Onset quality:  Sudden  Duration:  2 hours  Timing:  Constant  Progression:  Worsening  Chronicity:  Recurrent  Context: alcohol use and retching    Context: not awakening from sleep, not previous surgeries, not recent illness and not suspicious food intake    Relieved by:  None tried  Worsened by:  Nothing  Ineffective treatments:  None tried  Associated symptoms: nausea and vomiting    Associated symptoms: no anorexia, no belching, no chest pain, no chills, no constipation, no cough, no diarrhea, no dysuria, no fatigue, no fever, no hematemesis, no hematochezia, no hematuria, no melena, no shortness of breath and no sore throat    Risk factors: alcohol abuse       57-year-old female  History pancreatitis, alcohol abuse  Drinks approximately 4 glasses of whiskey per night  Presents to the emergency department with worsening epigastric/periumbilical pain  She states this discomfort is similar to previous episode of pancreatitis  Her last episode of pancreatitis was approximately 18 months ago  Describes her pain as stabbing in nature and 10/10 severity  The pain radiates into her back  The patient had 4 episodes of vomiting prior to arrival   She was administered IV Zofran 4 mg by EMS  Denies fever/chills  Was recently on vacation where she was binging alcohol  Prior to Admission Medications   Prescriptions Last Dose Informant Patient Reported?  Taking?   atorvastatin (LIPITOR) 40 mg tablet   Yes No   Si mg daily   lisinopril (ZESTRIL) 10 mg tablet   Yes No   Sig: Take 10 mg by mouth daily   ondansetron (ZOFRAN) 4 mg tablet   No No   Sig: Take 1 tablet (4 mg total) by mouth every 6 (six) hours as needed for nausea or vomiting   pantoprazole (PROTONIX) 40 mg tablet   Yes No   Sig: Take 40 mg by mouth daily      Facility-Administered Medications: None       Past Medical History:   Diagnosis Date    High cholesterol     Hypertension     Pancreatitis        Past Surgical History:   Procedure Laterality Date    FOOT SURGERY         History reviewed  No pertinent family history  I have reviewed and agree with the history as documented  E-Cigarette/Vaping    E-Cigarette Use Current Every Day User      E-Cigarette/Vaping Substances    Nicotine Yes     Flavoring Yes      Social History     Tobacco Use    Smoking status: Former Smoker     Types: Cigarettes     Quit date:      Years since quittin 2    Smokeless tobacco: Never Used   Vaping Use    Vaping Use: Every day    Substances: Nicotine, Flavoring   Substance Use Topics    Alcohol use: Yes     Comment: daily - 3-4 drinks daily    Drug use: Never       Review of Systems   Constitutional: Negative for chills, fatigue and fever  HENT: Negative for congestion, rhinorrhea, sinus pressure, sinus pain and sore throat  Respiratory: Negative for cough, chest tightness, shortness of breath and wheezing  Cardiovascular: Negative for chest pain and palpitations  Gastrointestinal: Positive for abdominal pain, nausea and vomiting  Negative for anorexia, constipation, diarrhea, hematemesis, hematochezia and melena  Genitourinary: Negative for decreased urine volume, difficulty urinating, dysuria, hematuria, pelvic pain and urgency  Musculoskeletal: Positive for back pain  Negative for myalgias, neck pain and neck stiffness  Skin: Negative for color change, pallor, rash and wound  Neurological: Negative for dizziness, weakness, light-headedness, numbness and headaches  Hematological: Does not bruise/bleed easily  Psychiatric/Behavioral: Negative for confusion     All other systems reviewed and are negative  Physical Exam  Physical Exam  Vitals and nursing note reviewed  Constitutional:       General: She is in acute distress  Appearance: She is not ill-appearing or toxic-appearing  HENT:      Head: Normocephalic and atraumatic  Eyes:      General: No scleral icterus  Extraocular Movements: Extraocular movements intact  Pupils: Pupils are equal, round, and reactive to light  Cardiovascular:      Rate and Rhythm: Normal rate and regular rhythm  Heart sounds: Normal heart sounds  No murmur heard  Pulmonary:      Effort: Pulmonary effort is normal  No respiratory distress  Breath sounds: Normal breath sounds  No stridor  No wheezing, rhonchi or rales  Chest:      Chest wall: No tenderness  Abdominal:      General: Abdomen is flat  Bowel sounds are normal  There is no distension  Palpations: Abdomen is soft  Tenderness: There is abdominal tenderness in the epigastric area and periumbilical area  There is no guarding or rebound  Negative signs include Lauren's sign and McBurney's sign  Hernia: No hernia is present  Skin:     General: Skin is warm and dry  Capillary Refill: Capillary refill takes less than 2 seconds  Coloration: Skin is not cyanotic, jaundiced, mottled or pale  Findings: No erythema or rash  Neurological:      General: No focal deficit present  Mental Status: She is alert and oriented to person, place, and time  Cranial Nerves: No cranial nerve deficit  Motor: No weakness  Psychiatric:         Mood and Affect: Mood normal  Mood is not anxious or depressed           Behavior: Behavior normal        Vital Signs  ED Triage Vitals   Temperature Pulse Respirations Blood Pressure SpO2   04/05/22 0408 04/05/22 0100 04/05/22 0408 04/05/22 0100 04/05/22 0100   (!) 97 3 °F (36 3 °C) 75 19 135/88 93 %      Temp Source Heart Rate Source Patient Position - Orthostatic VS BP Location FiO2 (%)   04/05/22 0408 04/05/22 0100 04/05/22 0100 04/05/22 0100 --   Temporal Monitor Lying Right arm       Pain Score       04/05/22 0042       10 - Worst Possible Pain         Vitals:    04/05/22 0100 04/05/22 0408   BP: 135/88 146/89   Pulse: 75 84   Patient Position - Orthostatic VS: Lying Lying     ED Medications  Medications   sodium chloride 0 9 % bolus 1,000 mL (0 mL Intravenous Stopped 4/5/22 0200)   morphine (PF) 10 mg/mL injection 6 mg (6 mg Intravenous Given 4/5/22 0042)   ondansetron (ZOFRAN) injection 4 mg (0 mg Intravenous Given to EMS 4/5/22 0037)   iohexol (OMNIPAQUE) 350 MG/ML injection (SINGLE-DOSE) 100 mL (100 mL Intravenous Given 4/5/22 0133)   morphine (PF) 10 mg/mL injection 6 mg (6 mg Intravenous Given 4/5/22 0205)   sodium chloride 0 9 % bolus 1,000 mL (0 mL Intravenous Stopped 4/5/22 0300)       Diagnostic Studies  Results Reviewed     Procedure Component Value Units Date/Time    UA w Reflex to Microscopic w Reflex to Culture [[id number]] Collected: 04/05/22 0411    Lab Status: No result Specimen: Urine, Clean Catch     Lipase [[id number]]     Lab Status: No result Specimen: Blood     Amylase [[id number]]     Lab Status: No result Specimen: Blood     Triglyceride [[id number]]     Lab Status: No result Specimen: Blood     Comprehensive metabolic panel [[id number]]     Lab Status: No result Specimen: Blood     CBC and differential [[id number]]     Lab Status: No result Specimen: Blood     COVID/FLU/RSV [[id number]]     Lab Status: No result Specimen: Nares from Nose     Lactic acid 2 Hours [[id number]] Collected: 04/05/22 0404    Lab Status: No result Specimen: Blood from Line, Venous     Magnesium [[id number]]  (Normal) Collected: 04/05/22 0030    Lab Status: Final result Specimen: Blood from Line, Venous Updated: 04/05/22 0307     Magnesium 1 9 mg/dL     Lipase [[id number]]  (Abnormal) Collected: 04/05/22 0030    Lab Status: Final result Specimen: Blood from Line, Venous Updated: 04/05/22 0143     Lipase 53,700 u/L     Lactic acid, plasma [[id number]]  (Abnormal) Collected: 04/05/22 0030    Lab Status: Final result Specimen: Blood from Line, Venous Updated: 04/05/22 0110     LACTIC ACID 3 1 mmol/L     Narrative:      Result may be elevated if tourniquet was used during collection      Comprehensive metabolic panel [[id number]]  (Abnormal) Collected: 04/05/22 0030    Lab Status: Final result Specimen: Blood from Line, Venous Updated: 04/05/22 0108     Sodium 139 mmol/L      Potassium 3 3 mmol/L      Chloride 102 mmol/L      CO2 21 mmol/L      ANION GAP 16 mmol/L      BUN 16 mg/dL      Creatinine 0 88 mg/dL      Glucose 166 mg/dL      Calcium 8 6 mg/dL      AST 36 U/L      ALT 56 U/L      Alkaline Phosphatase 99 U/L      Total Protein 7 9 g/dL      Albumin 4 3 g/dL      Total Bilirubin 0 41 mg/dL      eGFR 72 ml/min/1 73sq m     Narrative:      [place] guidelines for Chronic Kidney Disease (CKD):     Stage 1 with normal or high GFR (GFR > 90 mL/min/1 73 square meters)    Stage 2 Mild CKD (GFR = 60-89 mL/min/1 73 square meters)    Stage 3A Moderate CKD (GFR = 45-59 mL/min/1 73 square meters)    Stage 3B Moderate CKD (GFR = 30-44 mL/min/1 73 square meters)    Stage 4 Severe CKD (GFR = 15-29 mL/min/1 73 square meters)    Stage 5 End Stage CKD (GFR <15 mL/min/1 73 square meters)  Note: GFR calculation is accurate only with a steady state creatinine    Ethanol [[id number]]  (Abnormal) Collected: 04/05/22 0030    Lab Status: Final result Specimen: Blood from Line, Venous Updated: 04/05/22 0050     Ethanol Lvl 54 mg/dL     CBC and differential [[id number]]  (Abnormal) Collected: 04/05/22 0030    Lab Status: Final result Specimen: Blood from Line, Venous Updated: 04/05/22 0037     WBC 12 04 Thousand/uL      RBC 4 36 Million/uL      Hemoglobin 14 1 g/dL      Hematocrit 40 9 %      MCV 94 fL      MCH 32 3 pg      MCHC 34 5 g/dL      RDW 11 7 %      MPV 9 3 fL      Platelets 658 Thousands/uL      nRBC 0 /100 WBCs Neutrophils Relative 70 %      Immat GRANS % 0 %      Lymphocytes Relative 19 %      Monocytes Relative 9 %      Eosinophils Relative 1 %      Basophils Relative 1 %      Neutrophils Absolute 8 55 Thousands/µL      Immature Grans Absolute 0 03 Thousand/uL      Lymphocytes Absolute 2 24 Thousands/µL      Monocytes Absolute 1 03 Thousand/µL      Eosinophils Absolute 0 13 Thousand/µL      Basophils Absolute 0 06 Thousands/µL              CT abdomen pelvis with contrast   Final Result by Marisa Gitelman, MD (04/05 0315)      Acute pancreatitis, as described above  Please see discussion  No evidence of pancreatic pseudocyst   Follow-up after treatment is recommended  Mild hepatic steatosis  Atherosclerosis  Other nonemergent findings, as described above  Please see discussion  Workstation performed: [id number]                Procedures  Procedures     ED Course       MDM     58-year-old female  History of alcohol abuse, pancreatitis  Presents to the emergency department with epigastric/periumbilical abdominal pain with radiation into her back  On exam, the patient has tenderness to palpation along the epigastrium  Laboratory workup significant for mild leukocytosis, elevated lipase  CT imaging interpretation above  Likely alcohol-induced acute pancreatitis  The patient was administered IV pain medication, IV fluids  Admitted to the internal medicine service for further workup and treatment      Disposition  Final diagnoses:   Alcohol-induced acute pancreatitis without infection or necrosis     Time reflects when diagnosis was documented in both MDM as applicable and the Disposition within this note     Time User Action Codes Description Comment    4/5/2022  3:32 AM Ric Gamble Add [K85 20] Alcohol-induced acute pancreatitis without infection or necrosis       ED Disposition     ED Disposition Condition Date/Time Comment    Admit Stable Tue Apr 5, 2022  3:31 AM Case was discussed with Rainer Borjas and the patient's admission status was agreed to be Admission Status: inpatient status to the service of Dr Darshan Finnegan   Follow-up Information    None         Patient's Medications   Discharge Prescriptions    No medications on file       No discharge procedures on file      PDMP Review     None          ED Provider  Electronically Signed by           Rhiannon Willoughby DO  04/05/22 8703

## 2022-04-05 NOTE — ED NOTES
Pt c/o increasing nausea and mid upper abdominal pain, rates 7/10  Message sent to hospitalist for breakthrough pain management  Pt given Johnathan Ochoa RN  04/05/22 2899

## 2022-04-05 NOTE — ASSESSMENT & PLAN NOTE
· Mild leukocytosis - WBCs 12  · Check COVID/flu/RSV   · Check UA  · Check procalcitonin   · Trend CBC

## 2022-04-05 NOTE — CONSULTS
Consultation - [address] [place] [address] [facility]  Francisca Sveta Mann 60 y o  female MRN: [id number]  Unit/Bed#: -01 Encounter: [id number]        Consults    Reason for Consult / Principal Problem:     Acute pancreatitis          ASSESSMENT AND PLAN:      Acute pancreatitis  Alcohol abuse  -suspect her symptoms are secondary to alcohol  -triglycerides are less than 400  -no obvious gallstones seen on CT imaging  -check right upper quadrant ultrasound, liver enzymes mildly elevated for female  -patient still with persistent pain but no further vomiting  -trial of clear liquid diet  -alcohol cessation  -avoid vaping  -continue with IV fluids LR at 250 mL/hour to maintain urine output of 0 5 mL/kg per hour as well as decrease in hematocrit by tomorrow  -consider changing pain regimen to Dilaudid or meperidine as morphine can cause sphincter of Oddi spasms    ______________________________________________________________________    HPI:  49-year-old female seen in consultation for acute pancreatitis  This is patient's 2nd bout with pancreatitis  First bout was presumed to be secondary to alcohol  She reports sudden onset of epigastric pain radiating to the back associated with nausea and vomiting that progressively worsened over the past few days which prompted her partner to call EMS  She usually has a high pain threshold  She just returned from vacation in [place] and admits to drinking 3-5 drinks per day  She has no family history of pancreatitis or pancreas issues  She denies any new medications  Her CT scan was consistent with acute pancreatitis with an associated lipase level of 53,000  Her triglyceride levels were less than 400  She denies any new medications  She has hyperlipidemia and hypertension as well as GERD  COVID testing negative  REVIEW OF SYSTEMS:    Review of Systems   Constitutional: Negative for fever  Gastrointestinal: Positive for abdominal pain, nausea and vomiting  Historical Information   Past Medical History:   Diagnosis Date    Alcohol abuse     High cholesterol     Hypertension     Pancreatitis      Past Surgical History:   Procedure Laterality Date    FOOT SURGERY       Social History   Social History     Substance and Sexual Activity   Alcohol Use Yes    Comment: daily 4-8 drinks daily     Social History     Substance and Sexual Activity   Drug Use Never     Social History     Tobacco Use   Smoking Status Current Every Day Smoker    Types: Cigarettes    Last attempt to quit: 2017    Years since quittin 2   Smokeless Tobacco Never Used   Tobacco Comment    vape     History reviewed  No pertinent family history      Meds/Allergies     Medications Prior to Admission   Medication    atorvastatin (LIPITOR) 40 mg tablet    lisinopril (ZESTRIL) 10 mg tablet    pantoprazole (PROTONIX) 40 mg tablet    ondansetron (ZOFRAN) 4 mg tablet     Current Facility-Administered Medications   Medication Dose Route Frequency    heparin (porcine) subcutaneous injection 5,000 Units  5,000 Units Subcutaneous Q8H [address]    hydrALAZINE (APRESOLINE) injection 10 mg  10 mg Intravenous Q6H PRN    HYDROmorphone (DILAUDID) injection 0 5 mg  0 5 mg Intravenous Q1H PRN    lactated ringers infusion  250 mL/hr Intravenous Continuous    lidocaine (LIDODERM) 5 % patch 1 patch  1 patch Topical Q24H    lisinopril (ZESTRIL) tablet 10 mg  10 mg Oral Daily    morphine (PF) 10 mg/mL injection 6 mg  6 mg Intravenous Once    morphine injection 2 mg  2 mg Intravenous Q4H PRN    naloxone (NARCAN) 0 04 mg/mL syringe 0 04 mg  0 04 mg Intravenous Q1MIN PRN    ondansetron (ZOFRAN) injection 4 mg  4 mg Intravenous Q8H PRN    pantoprazole (PROTONIX) injection 40 mg  40 mg Intravenous Q24H THIAGO    simethicone (MYLICON) oral suspension 40 mg  40 mg Oral Q6H PRN    thiamine (VITAMIN B1) 100 mg in sodium chloride 0 9 % 50 mL IVPB  100 mg Intravenous Daily       Allergies   Allergen Reactions    Latex Rash    Neomycin-Bacitracin Zn-Polymyx Rash           Objective     Blood pressure (!) 181/120, pulse 87, temperature (!) 96 9 °F (36 1 °C), resp  rate 19, height 5' 8" (1 727 m), weight 86 kg (189 lb 9 5 oz), SpO2 94 %  Body mass index is 28 83 kg/m²  Intake/Output Summary (Last 24 hours) at 4/5/2022 1335  Last data filed at 4/5/2022 6673  Gross per 24 hour   Intake 2662 5 ml   Output 700 ml   Net 1962 5 ml         PHYSICAL EXAM:      Physical Exam  Constitutional:       General: She is not in acute distress  Appearance: She is ill-appearing  HENT:      Head: Normocephalic  Eyes:      General: Scleral icterus present  Cardiovascular:      Pulses: Normal pulses  Pulmonary:      Effort: Pulmonary effort is normal    Abdominal:      General: There is no distension  Palpations: Abdomen is soft  Tenderness: There is abdominal tenderness (Epigastric tenderness)  There is no guarding  Musculoskeletal:      Cervical back: Neck supple  Right lower leg: No edema  Left lower leg: No edema  Skin:     Coloration: Skin is not jaundiced  Neurological:      Mental Status: She is alert and oriented to person, place, and time     Psychiatric:         Mood and Affect: Mood normal              Lab Results:   Admission on 04/05/2022   Component Date Value    WBC 04/05/2022 12 04*    RBC 04/05/2022 4 36     Hemoglobin 04/05/2022 14 1     Hematocrit 04/05/2022 40 9     MCV 04/05/2022 94     MCH 04/05/2022 32 3     MCHC 04/05/2022 34 5     RDW 04/05/2022 11 7     MPV 04/05/2022 9 3     Platelets 18/83/0419 241     nRBC 04/05/2022 0     Neutrophils Relative 04/05/2022 70     Immat GRANS % 04/05/2022 0     Lymphocytes Relative 04/05/2022 19     Monocytes Relative 04/05/2022 9     Eosinophils Relative 04/05/2022 1     Basophils Relative 04/05/2022 1     Neutrophils Absolute 04/05/2022 8 55*    Immature Grans Absolute 04/05/2022 0 03     Lymphocytes Absolute 04/05/2022 2 24  Monocytes Absolute 04/05/2022 1 03     Eosinophils Absolute 04/05/2022 0 13     Basophils Absolute 04/05/2022 0 06     Sodium 04/05/2022 139     Potassium 04/05/2022 3 3*    Chloride 04/05/2022 102     CO2 04/05/2022 21     ANION GAP 04/05/2022 16*    BUN 04/05/2022 16     Creatinine 04/05/2022 0 88     Glucose 04/05/2022 166*    Calcium 04/05/2022 8 6     AST 04/05/2022 36     ALT 04/05/2022 56     Alkaline Phosphatase 04/05/2022 99     Total Protein 04/05/2022 7 9     Albumin 04/05/2022 4 3     Total Bilirubin 04/05/2022 0 41     eGFR 04/05/2022 72     Lipase 04/05/2022 53,700*    LACTIC ACID 04/05/2022 3 1*    Ethanol Lvl 04/05/2022 54*    LACTIC ACID 04/05/2022 2 1*    Magnesium 04/05/2022 1 9     Lipase 04/05/2022 13,119*    Amylase 04/05/2022 986*    Triglycerides 04/05/2022 333*    Sodium 04/05/2022 140     Potassium 04/05/2022 3 9     Chloride 04/05/2022 107     CO2 04/05/2022 21     ANION GAP 04/05/2022 12     BUN 04/05/2022 15     Creatinine 04/05/2022 0 78     Glucose 04/05/2022 133     Calcium 04/05/2022 8 0*    AST 04/05/2022 36     ALT 04/05/2022 53     Alkaline Phosphatase 04/05/2022 90     Total Protein 04/05/2022 7 2     Albumin 04/05/2022 4 0     Total Bilirubin 04/05/2022 0 32     eGFR 04/05/2022 84     WBC 04/05/2022 11 72*    RBC 04/05/2022 4 32     Hemoglobin 04/05/2022 14 2     Hematocrit 04/05/2022 41 2     MCV 04/05/2022 95     MCH 04/05/2022 32 9     MCHC 04/05/2022 34 5     RDW 04/05/2022 11 9     MPV 04/05/2022 9 6     Platelets 84/85/2923 242     nRBC 04/05/2022 0     Neutrophils Relative 04/05/2022 84*    Immat GRANS % 04/05/2022 1     Lymphocytes Relative 04/05/2022 8*    Monocytes Relative 04/05/2022 4     Eosinophils Relative 04/05/2022 3     Basophils Relative 04/05/2022 0     Neutrophils Absolute 04/05/2022 9 88*    Immature Grans Absolute 04/05/2022 0 07     Lymphocytes Absolute 04/05/2022 0 93     Monocytes Absolute 04/05/2022 0 50     Eosinophils Absolute 04/05/2022 0 31     Basophils Absolute 04/05/2022 0 03     Color, UA 04/05/2022 Straw     Clarity, UA 04/05/2022 Clear     Specific Gravity, UA 04/05/2022 1 015     pH, UA 04/05/2022 5 0     Leukocytes, UA 04/05/2022 Negative     Nitrite, UA 04/05/2022 Negative     Protein, UA 04/05/2022 Negative     Glucose, UA 04/05/2022 Negative     Ketones, UA 04/05/2022 Negative     Urobilinogen, UA 04/05/2022 0 2     Bilirubin, UA 04/05/2022 Negative     Blood, UA 04/05/2022 Negative     SARS-CoV-2 04/05/2022 Negative     INFLUENZA A PCR 04/05/2022 Negative     INFLUENZA B PCR 04/05/2022 Negative     RSV PCR 04/05/2022 Negative     Cholesterol 04/05/2022 195     Triglycerides 04/05/2022 290*    HDL, Direct 04/05/2022 61     LDL Calculated 04/05/2022 76     Non-HDL-Chol (CHOL-HDL) 04/05/2022 134     Procalcitonin 04/05/2022 <0 05     POC Glucose 04/05/2022 105        Imaging Studies: I have personally reviewed pertinent imaging studies

## 2022-04-05 NOTE — ASSESSMENT & PLAN NOTE
· Admits to 4-5 whiskey drinks per day  · Ethanol 54  · No prior history of withdrawal/DT's per patient     · CIWA protocol   · Start IV thiamine

## 2022-04-05 NOTE — H&P
[address]  H&P- [address]  [address] 1965, 60 y o  female MRN: [id number]  Unit/Bed#: ED 01 Encounter: [id number]  Primary Care Provider: Genesis Loyd DO   Date and time admitted to hospital: 4/5/2022 12:24 AM    * Alcohol-induced acute pancreatitis  Assessment & Plan  Presents with severe abdominal pain and multiple episodes of vomiting that started the day of admission  Admits to recent heavy drinking 4-5 whiskey drinks per day, just came back from vacation with friends  Prior episode of pancreatitis, has been sober from alcohol intermittently in the past       CT abdomen/pelvis revealing "Acute pancreatitis, no evidence of pancreatic pseudocyst   Mild hepatic steatosis  Atherosclerosis "   Lipase 53,700   TG level 333    Calcium 8 6   Ethanol 54   Mild leukocytosis 12->11 trending down  Lactate 3 1-> 2 1  Afebrile and does not meet sepsis criteria  Will hold antibiotics at this time   Continue IVF hydration , pain control, antiemetics    Keep NPO   Monitor electrolytes and blood sugar closely and correct as indicated  [facility] consulted; recommendations appreciated         Leukocytosis  Assessment & Plan  · Mild leukocytosis - WBCs 12  · Check COVID/flu/RSV   · Check UA  · Check procalcitonin   · Trend CBC    Alcohol abuse  Assessment & Plan  · Admits to 4-5 whiskey drinks per day  · Ethanol 54  · No prior history of withdrawal/DT's per patient  · CIWA protocol   · Start IV thiamine     Hypokalemia  Assessment & Plan  · K 3 3 , replete with 40 IV K+  · Monitor electrolytes closely, trend BMP    Hypertension  Assessment & Plan  · BP's stable  · Hold lisinopril while NPO   · PRN IV hydralazine for SBP> 170    VTE Pharmacologic Prophylaxis: VTE Score: 3 Moderate Risk (Score 3-4) - Pharmacological DVT Prophylaxis Ordered: heparin  Code Status: Level 1 - Full Code   Discussion with family: Patient declined call to        Anticipated Length of Stay: Patient will be admitted on an inpatient basis with an anticipated length of stay of greater than 2 midnights secondary to acute pancreatitis, ivf, npo , gi cx  Total Time for Visit, including Counseling / Coordination of Care: 70 minutes Greater than 50% of this total time spent on direct patient counseling and coordination of care  Chief Complaint: abdominal pain, vomiting    History of Present Illness:  Francisca Jansen is a 60 y o  female with a PMH of alcohol abuse, hypertension, hyperlipidemia who presents with severe mid abdominal pain and multiple episodes of non-bloody vomiting that started the day of admission  Patient reports recent heavy alcohol use about 4 whiskey mixed drinks per day, just came back from vacation with friends as well    Reports she had an episode of pancreatitis in the past  Has had periods of sobriety  Denies recent sick contacts, fevers, chills, chest pain, shortness of breath  Found to have elevated lipase 53,700 with evidence of acute pancreatitis on CT a/p  Review of Systems:  Review of Systems   Constitutional: Positive for appetite change  Negative for chills, diaphoresis, fatigue and fever  Respiratory: Negative for cough and shortness of breath  Cardiovascular: Negative for chest pain, palpitations and leg swelling  Gastrointestinal: Positive for abdominal pain, diarrhea, nausea and vomiting  Negative for constipation  Genitourinary: Negative for difficulty urinating  Musculoskeletal: Positive for arthralgias  Negative for back pain  Neurological: Negative for dizziness, light-headedness, numbness and headaches  All other systems reviewed and are negative        Past Medical and Surgical History:   Past Medical History:   Diagnosis Date    High cholesterol     Hypertension     Pancreatitis        Past Surgical History:   Procedure Laterality Date    FOOT SURGERY         Meds/Allergies:  Prior to Admission medications    Medication Sig Start Date End Date Taking? Authorizing Provider   atorvastatin (LIPITOR) 40 mg tablet 40 mg daily    Historical Provider, MD   lisinopril (ZESTRIL) 10 mg tablet Take 10 mg by mouth daily 21   Historical Provider, MD   ondansetron (ZOFRAN) 4 mg tablet Take 1 tablet (4 mg total) by mouth every 6 (six) hours as needed for nausea or vomiting 21   Jimmy Handley DO   pantoprazole (PROTONIX) 40 mg tablet Take 40 mg by mouth daily 21   Historical Provider, MD     I have reviewed home medications with patient personally  Allergies: Allergies   Allergen Reactions    Latex Rash    Neomycin-Bacitracin Zn-Polymyx Rash       Social History:  Marital Status: Single   Patient Pre-hospital Living Situation: Home  Patient Pre-hospital Level of Mobility: walks  Patient Pre-hospital Diet Restrictions: none  Substance Use History:   Social History     Substance and Sexual Activity   Alcohol Use Yes    Comment: daily - 3-4 drinks daily     Social History     Tobacco Use   Smoking Status Former Smoker    Types: Cigarettes    Quit date:     Years since quittin 2   Smokeless Tobacco Never Used     Social History     Substance and Sexual Activity   Drug Use Never       Family History:  History reviewed  No pertinent family history  Physical Exam:     Vitals:   Blood Pressure: 146/89 (22)  Pulse: 86 (22)  Temperature: (!) 97 3 °F (36 3 °C) (22)  Temp Source: Temporal (22)  Respirations: 19 (22)  Height: 5' 8" (172 7 cm) (22)  Weight - Scale: 86 kg (189 lb 9 5 oz) (22)  SpO2: 93 % (22)    Physical Exam  Vitals and nursing note reviewed  Constitutional:       General: She is not in acute distress  Appearance: Normal appearance  She is ill-appearing  She is not toxic-appearing  HENT:      Head: Normocephalic and atraumatic  Eyes:      Extraocular Movements: Extraocular movements intact        Pupils: Pupils are equal, round, and reactive to light  Cardiovascular:      Rate and Rhythm: Normal rate and regular rhythm  Pulses: Normal pulses  Heart sounds: Normal heart sounds  Pulmonary:      Effort: Pulmonary effort is normal  No respiratory distress  Breath sounds: Normal breath sounds  No wheezing, rhonchi or rales  Abdominal:      General: Bowel sounds are normal  There is no distension  Palpations: Abdomen is soft  Tenderness: There is abdominal tenderness  There is no guarding  Musculoskeletal:         General: Normal range of motion  Cervical back: Normal range of motion  Right lower leg: No edema  Left lower leg: No edema  Skin:     General: Skin is warm and dry  Neurological:      General: No focal deficit present  Mental Status: She is alert  Additional Data:     Lab Results:  Results from last 7 days   Lab Units 04/05/22  0419   WBC Thousand/uL 11 72*   HEMOGLOBIN g/dL 14 2   HEMATOCRIT % 41 2   PLATELETS Thousands/uL 242   NEUTROS PCT % 84*   LYMPHS PCT % 8*   MONOS PCT % 4   EOS PCT % 3     Results from last 7 days   Lab Units 04/05/22  0419   SODIUM mmol/L 140   POTASSIUM mmol/L 3 9   CHLORIDE mmol/L 107   CO2 mmol/L 21   BUN mg/dL 15   CREATININE mg/dL 0 78   ANION GAP mmol/L 12   CALCIUM mg/dL 8 0*   ALBUMIN g/dL 4 0   TOTAL BILIRUBIN mg/dL 0 32   ALK PHOS U/L 90   ALT U/L 53   AST U/L 36   GLUCOSE RANDOM mg/dL 133                 Results from last 7 days   Lab Units 04/05/22  0404 04/05/22  0030   LACTIC ACID mmol/L 2 1* 3 1*       Imaging: Reviewed radiology reports from this admission including: abdominal/pelvic CT  CT abdomen pelvis with contrast   Final Result by Shane Cassidy MD (04/05 0315)      Acute pancreatitis, as described above  Please see discussion  No evidence of pancreatic pseudocyst   Follow-up after treatment is recommended  Mild hepatic steatosis  Atherosclerosis        Other nonemergent findings, as described above   Please see discussion  Workstation performed: [id number]             EKG and Other Studies Reviewed on Admission:   · EKG: No EKG obtained  ** Please Note: This note has been constructed using a voice recognition system   **

## 2022-04-06 PROBLEM — K59.09 OTHER CONSTIPATION: Status: ACTIVE | Noted: 2022-04-06

## 2022-04-06 LAB
ALBUMIN SERPL BCP-MCNC: 3.2 G/DL (ref 3.5–5)
ALP SERPL-CCNC: 69 U/L (ref 46–116)
ALT SERPL W P-5'-P-CCNC: 33 U/L (ref 12–78)
ANION GAP SERPL CALCULATED.3IONS-SCNC: 7 MMOL/L (ref 4–13)
AST SERPL W P-5'-P-CCNC: 24 U/L (ref 5–45)
BILIRUB SERPL-MCNC: 0.82 MG/DL (ref 0.2–1)
BUN SERPL-MCNC: 8 MG/DL (ref 5–25)
CALCIUM ALBUM COR SERPL-MCNC: 8.4 MG/DL (ref 8.3–10.1)
CALCIUM SERPL-MCNC: 7.8 MG/DL (ref 8.3–10.1)
CHLORIDE SERPL-SCNC: 103 MMOL/L (ref 100–108)
CO2 SERPL-SCNC: 26 MMOL/L (ref 21–32)
CREAT SERPL-MCNC: 0.76 MG/DL (ref 0.6–1.3)
ERYTHROCYTE [DISTWIDTH] IN BLOOD BY AUTOMATED COUNT: 12.1 % (ref 11.6–15.1)
GFR SERPL CREATININE-BSD FRML MDRD: 86 ML/MIN/1.73SQ M
GLUCOSE SERPL-MCNC: 113 MG/DL (ref 65–140)
GLUCOSE SERPL-MCNC: 121 MG/DL (ref 65–140)
GLUCOSE SERPL-MCNC: 122 MG/DL (ref 65–140)
GLUCOSE SERPL-MCNC: 122 MG/DL (ref 65–140)
GLUCOSE SERPL-MCNC: 132 MG/DL (ref 65–140)
HCT VFR BLD AUTO: 40.1 % (ref 34.8–46.1)
HGB BLD-MCNC: 14 G/DL (ref 11.5–15.4)
MAGNESIUM SERPL-MCNC: 1.3 MG/DL (ref 1.6–2.6)
MCH RBC QN AUTO: 33.5 PG (ref 26.8–34.3)
MCHC RBC AUTO-ENTMCNC: 34.9 G/DL (ref 31.4–37.4)
MCV RBC AUTO: 96 FL (ref 82–98)
PHOSPHATE SERPL-MCNC: 2.8 MG/DL (ref 2.7–4.5)
PLATELET # BLD AUTO: 169 THOUSANDS/UL (ref 149–390)
PMV BLD AUTO: 9.4 FL (ref 8.9–12.7)
POTASSIUM SERPL-SCNC: 3.6 MMOL/L (ref 3.5–5.3)
PROT SERPL-MCNC: 6.4 G/DL (ref 6.4–8.2)
RBC # BLD AUTO: 4.18 MILLION/UL (ref 3.81–5.12)
SODIUM SERPL-SCNC: 136 MMOL/L (ref 136–145)
WBC # BLD AUTO: 9.8 THOUSAND/UL (ref 4.31–10.16)

## 2022-04-06 PROCEDURE — 85027 COMPLETE CBC AUTOMATED: CPT | Performed by: STUDENT IN AN ORGANIZED HEALTH CARE EDUCATION/TRAINING PROGRAM

## 2022-04-06 PROCEDURE — 84100 ASSAY OF PHOSPHORUS: CPT | Performed by: STUDENT IN AN ORGANIZED HEALTH CARE EDUCATION/TRAINING PROGRAM

## 2022-04-06 PROCEDURE — 82948 REAGENT STRIP/BLOOD GLUCOSE: CPT

## 2022-04-06 PROCEDURE — 80053 COMPREHEN METABOLIC PANEL: CPT | Performed by: STUDENT IN AN ORGANIZED HEALTH CARE EDUCATION/TRAINING PROGRAM

## 2022-04-06 PROCEDURE — 99232 SBSQ HOSP IP/OBS MODERATE 35: CPT

## 2022-04-06 PROCEDURE — C9113 INJ PANTOPRAZOLE SODIUM, VIA: HCPCS

## 2022-04-06 PROCEDURE — 83735 ASSAY OF MAGNESIUM: CPT | Performed by: STUDENT IN AN ORGANIZED HEALTH CARE EDUCATION/TRAINING PROGRAM

## 2022-04-06 RX ORDER — MAGNESIUM SULFATE HEPTAHYDRATE 40 MG/ML
2 INJECTION, SOLUTION INTRAVENOUS ONCE
Status: COMPLETED | OUTPATIENT
Start: 2022-04-06 | End: 2022-04-06

## 2022-04-06 RX ORDER — SENNOSIDES 8.6 MG
1 TABLET ORAL
Status: DISCONTINUED | OUTPATIENT
Start: 2022-04-06 | End: 2022-04-07 | Stop reason: HOSPADM

## 2022-04-06 RX ORDER — LORAZEPAM 2 MG/ML
1 INJECTION INTRAMUSCULAR ONCE
Status: DISCONTINUED | OUTPATIENT
Start: 2022-04-06 | End: 2022-04-06

## 2022-04-06 RX ORDER — LORAZEPAM 2 MG/ML
2 INJECTION INTRAMUSCULAR ONCE
Status: COMPLETED | OUTPATIENT
Start: 2022-04-06 | End: 2022-04-06

## 2022-04-06 RX ORDER — BISACODYL 10 MG
10 SUPPOSITORY, RECTAL RECTAL ONCE
Status: COMPLETED | OUTPATIENT
Start: 2022-04-06 | End: 2022-04-06

## 2022-04-06 RX ORDER — POTASSIUM CHLORIDE 14.9 MG/ML
20 INJECTION INTRAVENOUS ONCE
Status: COMPLETED | OUTPATIENT
Start: 2022-04-06 | End: 2022-04-06

## 2022-04-06 RX ORDER — BISACODYL 10 MG
10 SUPPOSITORY, RECTAL RECTAL DAILY PRN
Status: DISCONTINUED | OUTPATIENT
Start: 2022-04-06 | End: 2022-04-07 | Stop reason: HOSPADM

## 2022-04-06 RX ORDER — CHLORDIAZEPOXIDE HYDROCHLORIDE 5 MG/1
10 CAPSULE, GELATIN COATED ORAL EVERY 8 HOURS SCHEDULED
Status: DISCONTINUED | OUTPATIENT
Start: 2022-04-06 | End: 2022-04-07

## 2022-04-06 RX ORDER — LISINOPRIL 20 MG/1
20 TABLET ORAL DAILY
Status: DISCONTINUED | OUTPATIENT
Start: 2022-04-06 | End: 2022-04-07 | Stop reason: HOSPADM

## 2022-04-06 RX ORDER — AMLODIPINE BESYLATE 10 MG/1
10 TABLET ORAL ONCE
Status: COMPLETED | OUTPATIENT
Start: 2022-04-06 | End: 2022-04-06

## 2022-04-06 RX ORDER — HYDROMORPHONE HCL/PF 1 MG/ML
0.5 SYRINGE (ML) INJECTION
Status: DISCONTINUED | OUTPATIENT
Start: 2022-04-06 | End: 2022-04-07 | Stop reason: HOSPADM

## 2022-04-06 RX ADMIN — SODIUM CHLORIDE, SODIUM LACTATE, POTASSIUM CHLORIDE, AND CALCIUM CHLORIDE 250 ML/HR: .6; .31; .03; .02 INJECTION, SOLUTION INTRAVENOUS at 12:30

## 2022-04-06 RX ADMIN — PANTOPRAZOLE SODIUM 40 MG: 40 INJECTION, POWDER, FOR SOLUTION INTRAVENOUS at 09:04

## 2022-04-06 RX ADMIN — LORAZEPAM 2 MG: 2 INJECTION INTRAMUSCULAR; INTRAVENOUS at 23:03

## 2022-04-06 RX ADMIN — LISINOPRIL 10 MG: 10 TABLET ORAL at 09:04

## 2022-04-06 RX ADMIN — SODIUM CHLORIDE, SODIUM LACTATE, POTASSIUM CHLORIDE, AND CALCIUM CHLORIDE 250 ML/HR: .6; .31; .03; .02 INJECTION, SOLUTION INTRAVENOUS at 06:27

## 2022-04-06 RX ADMIN — POTASSIUM CHLORIDE 20 MEQ: 14.9 INJECTION, SOLUTION INTRAVENOUS at 09:50

## 2022-04-06 RX ADMIN — OXYCODONE HYDROCHLORIDE AND ACETAMINOPHEN 1 TABLET: 5; 325 TABLET ORAL at 16:51

## 2022-04-06 RX ADMIN — HEPARIN SODIUM 5000 UNITS: 5000 INJECTION INTRAVENOUS; SUBCUTANEOUS at 05:00

## 2022-04-06 RX ADMIN — STANDARDIZED SENNA CONCENTRATE 8.6 MG: 8.6 TABLET ORAL at 21:06

## 2022-04-06 RX ADMIN — OXYCODONE HYDROCHLORIDE AND ACETAMINOPHEN 1 TABLET: 5; 325 TABLET ORAL at 09:54

## 2022-04-06 RX ADMIN — MAGNESIUM SULFATE HEPTAHYDRATE 2 G: 40 INJECTION, SOLUTION INTRAVENOUS at 10:01

## 2022-04-06 RX ADMIN — HEPARIN SODIUM 5000 UNITS: 5000 INJECTION INTRAVENOUS; SUBCUTANEOUS at 21:06

## 2022-04-06 RX ADMIN — CHLORDIAZEPOXIDE HYDROCHLORIDE 10 MG: 5 CAPSULE ORAL at 21:06

## 2022-04-06 RX ADMIN — CHLORDIAZEPOXIDE HYDROCHLORIDE 10 MG: 5 CAPSULE ORAL at 13:32

## 2022-04-06 RX ADMIN — HEPARIN SODIUM 5000 UNITS: 5000 INJECTION INTRAVENOUS; SUBCUTANEOUS at 13:32

## 2022-04-06 RX ADMIN — HYDROMORPHONE HYDROCHLORIDE 0.5 MG: 1 INJECTION, SOLUTION INTRAMUSCULAR; INTRAVENOUS; SUBCUTANEOUS at 04:09

## 2022-04-06 RX ADMIN — SODIUM CHLORIDE, SODIUM LACTATE, POTASSIUM CHLORIDE, AND CALCIUM CHLORIDE 250 ML/HR: .6; .31; .03; .02 INJECTION, SOLUTION INTRAVENOUS at 16:51

## 2022-04-06 RX ADMIN — SODIUM CHLORIDE, SODIUM LACTATE, POTASSIUM CHLORIDE, AND CALCIUM CHLORIDE 250 ML/HR: .6; .31; .03; .02 INJECTION, SOLUTION INTRAVENOUS at 22:45

## 2022-04-06 RX ADMIN — LIDOCAINE 1 PATCH: 50 PATCH CUTANEOUS at 10:23

## 2022-04-06 RX ADMIN — THIAMINE HYDROCHLORIDE 100 MG: 100 INJECTION, SOLUTION INTRAMUSCULAR; INTRAVENOUS at 09:10

## 2022-04-06 RX ADMIN — LISINOPRIL 20 MG: 20 TABLET ORAL at 21:06

## 2022-04-06 RX ADMIN — BISACODYL 10 MG: 10 SUPPOSITORY RECTAL at 09:49

## 2022-04-06 RX ADMIN — SODIUM CHLORIDE, SODIUM LACTATE, POTASSIUM CHLORIDE, AND CALCIUM CHLORIDE 250 ML/HR: .6; .31; .03; .02 INJECTION, SOLUTION INTRAVENOUS at 02:28

## 2022-04-06 RX ADMIN — LORAZEPAM 2 MG: 2 INJECTION INTRAMUSCULAR; INTRAVENOUS at 09:49

## 2022-04-06 RX ADMIN — AMLODIPINE BESYLATE 10 MG: 10 TABLET ORAL at 09:04

## 2022-04-06 NOTE — UTILIZATION REVIEW
Initial Clinical Review    Admission: Date/Time/Statement:   Admission Orders (From admission, onward)     Ordered        04/05/22 0332  Inpatient Admission  Once                      Orders Placed This Encounter   Procedures    Inpatient Admission     Standing Status:   Standing     Number of Occurrences:   1     Order Specific Question:   Level of Care     Answer:   Med Surg [16]     Order Specific Question:   Estimated length of stay     Answer:   More than 2 Midnights     Order Specific Question:   Certification     Answer:   I certify that inpatient services are medically necessary for this patient for a duration of greater than two midnights  See H&P and MD Progress Notes for additional information about the patient's course of treatment  ED Arrival Information     Expected Arrival Acuity    - 4/5/2022 00:20 Urgent         Means of arrival Escorted by Service Admission type    Ambulance [facility] Urgent         Arrival complaint            Chief Complaint   Patient presents with    Abdominal Pain     onset of mid abodminal pain around 1800 last night  Vomited x4  Hx of pancreatitis and sttes it feels the same  Initial Presentation: 60 y o  female W/PMHX: HTN hold home while npo, alcohol abuse, 4-5 whiskey drinks/day, to ED from Home via ems, admitted as Inpatient due to Alcohol induced acute pancreatitis  Presented with abdominal pain, multiple episodes of vomiting started on day of arrival   Exam: Ill appearing, abdominal tenderness no guarding  ED work up and treatment: prior h/o pancreatitis, intermittent sobriety from alcohol, recent heavy drinking, alcohol 54, Lipase 53,700, ,  leukocytosis, lactic acidosis, hypokalemia,  CT A/P w/contrast: acute pancreatitis  Mild hepatic steatosis,   Plan: NPO, IVF hydration, IV meds, IV pain meds, IV antiemetics, CIWA, IV thiamine, trend serial labs and repletion as needed  GI consult, DVT ppx       GI Consult: Acute Pancreatitis: Alcohol abuse: s/s 2/2 alcohol abuse, 2nd bout of pancreatitis, first bout 2/2 to alcohol  Sudden onset of epigastric pain,  radiation to the back associated with n/v  That got progressively worse, over the past few days  Just returned from vacation, admits to drinking 3-5 drinks per day  CT c/w acute pancreatitis, Lipase elevated at 53,000, TG <400, Plan: RUQ US, trial clear liquid diet, alcohol cessation, avoid vaping, IVF hydration LR at 250ml/hr to maintain urine output of 0 5ml/kg/hr  as well as decrease Hct, change pain med to dilaudid or meperidine as morphine can cause sphincter of Oddi spasm  EXAM: ill appearing, scleral icterus, abdominal tenderness, epigastric tenderness  Date: 4/6/22   Day 2:Patient very anxious this morning and restless stating she is having significant amount of pain  Pain regimen discussed with patient  Discussed patient's alcohol intake 4- 6 whiskey drinks with approximately 2 shots per drink, patient has not been hospitalized for alcohol withdrawal in the past discussed symptoms of alcohol withdrawal and concern for presents with symptoms at this time  Patient is feeling uncomfortable with abdominal bloating last BM 3 days ago, patient requesting suppository  EXAM: Hypertensive, Bowel sounds decreased abdominal distention, generalized abdominal tenderness, mood is anxious  CIWA 8 "feels skin crawling" hypokalemia  repleted and resolved  Plan:PrN ativan 2mg for CIWA, initiate librium,  maintain urine output 0 5ml/kg/hr, dulcolax supp prn, IV amlodipine x1 today (b/p improved) trend serial labs with repletion as needed  The patient will continue to require additional inpatient hospital stay due to Acute pancreatitis, alcohol withdrawal, electrolyte imbalance  DVT ppx  GI note: pt reports pain worse than yesterday, pt wants to stay NPO, stay npo d/t severe pain, I/S recommended, no epigastric tenderness on palpation    alcohol withdrawal, CIWA 8, Intake/Output Summary (Last 24 hours) at 4/6/2022 0906  Last data filed at 4/6/2022 1060      Gross per 24 hour   Intake 6170 83 ml   Output 300 ml   Net 5870 83 ml       ED Triage Vitals   Temperature Pulse Respirations Blood Pressure SpO2   04/05/22 0408 04/05/22 0100 04/05/22 0408 04/05/22 0100 04/05/22 0100   (!) 97 3 °F (36 3 °C) 75 19 135/88 93 %      Temp Source Heart Rate Source Patient Position - Orthostatic VS BP Location FiO2 (%)   04/05/22 0408 04/05/22 0100 04/05/22 0100 04/05/22 0100 --   Temporal Monitor Lying Right arm       Pain Score       04/05/22 0042       10 - Worst Possible Pain          Wt Readings from Last 1 Encounters:   04/05/22 86 kg (189 lb 9 5 oz)     Additional Vital Signs:   Date/Time Temp Pulse Resp BP MAP (mmHg) SpO2 Calculated FIO2 (%) - Nasal Cannula Nasal Cannula O2 Flow Rate (L/min) O2 Device Patient Position - Orthostatic VS   04/06/22 07:56:19 98 6 °F (37 °C) 96 24 Abnormal  161/103 Abnormal  122 95 % -- -- -- --   04/06/22 07:26:34 98 3 °F (36 8 °C) 112 Abnormal  19 170/117 Abnormal  135 96 % -- -- -- --   04/06/22 0400 -- 98 -- 159/112 Abnormal  -- -- -- -- -- --   04/06/22 0000 -- 111 Abnormal  -- 152/110 Abnormal  -- -- -- -- -- --   04/05/22 23:59:13 98 4 °F (36 9 °C) -- 17 -- 124 92 % -- -- -- --   04/05/22 20:00:19 98 1 °F (36 7 °C) 107 Abnormal  -- 163/113 Abnormal  130 94 % -- -- -- --   04/05/22 2000 -- -- -- -- -- 95 % -- -- None (Room air) --   04/05/22 1700 -- -- -- 164/82 -- -- -- -- -- --   04/05/22 15:05:30 98 6 °F (37 °C) 96 19 167/108 Abnormal  128 95 % 28 2 L/min Nasal cannula --   04/05/22 12:18:50 -- 87 -- 181/120 Abnormal  140 94 % -- -- -- --   04/05/22 11:59:25 96 9 °F (36 1 °C) Abnormal  95 19 180/122 Abnormal  141 94 % -- -- -- --   04/05/22 1100 -- 87 16 194/109 Abnormal  144 94 % 28 2 L/min Nasal cannula Lying   04/05/22 1058 -- -- -- 191/108 Abnormal  -- -- -- -- -- --   04/05/22 0900 -- 88 20 173/106 Abnormal  -- 95 % 28 2 L/min Nasal cannula Lying   04/05/22 0800 -- 87 20 -- -- 94 % 28 2 L/min Nasal cannula --   04/05/22 0630 -- 88 22 155/101 Abnormal  124 95 % -- -- None (Room air) Lying   04/05/22 0429 -- 86 -- 146/89 -- -- -- -- -- --   04/05/22 0408 97 3 °F (36 3 °C) Abnormal  84 19 146/89 112 93 % -- -- None (Room air) Lying     CIWA-Ar Score    Row Name 04/06/22 0800 04/06/22 07:56:19 04/06/22 07:26:34 04/06/22 0400 04/06/22 0000   CIWA-Ar   BP -- 161/103 Abnormal   -/117 Abnormal   -/112 Abnormal   -/110 Abnormal   -EL   Pulse -- 96  - Abnormal   -DI 98  - Abnormal   -EL   Nausea and Vomiting 0  -AL -- -- 0  -EL 0  -EL   Tactile Disturbances 1  -AL -- -- 0  -EL 0  -EL   Tremor 0  -AL -- -- 0  -EL 0  -EL   Auditory Disturbances 0  -AL -- -- 0  -EL 0  -EL   Paroxysmal Sweats 1  -AL -- -- 0  -EL 0  -EL   Visual Disturbances 0  -AL -- -- 0  -EL 0  -EL   Anxiety 4  -AL -- -- 0  -EL 0  -EL   Headache, Fullness in Head 1  -AL -- -- 0  -EL 0  -EL   Agitation 1  -AL -- -- 0  -EL 0  -EL   Orientation and Clouding of Sensorium 0  -AL -- -- 0  -EL 0  -EL   CIWA-Ar Total 8  -AL -- -- 0  -EL 0  -EL   Row Name 04/05/22 23:59:13 04/05/22 20:00:19 04/05/22 1700 04/05/22 15:05:30 04/05/22 12:18:50   CIWA-Ar   BP --  -/113 Abnormal   -/82  -/108 Abnormal   -/120 Abnormal   -DI   Pulse --  - Abnormal   -DI -- 96  -DI 87  -DI   Nausea and Vomiting -- 0  -EL 1  -AL -- --   Tactile Disturbances -- 0  -EL 0  -AL -- --   Tremor -- 0  -EL 0  -AL -- --   Auditory Disturbances -- 0  -EL 0  -AL -- --   Paroxysmal Sweats -- 0  -EL 0  -AL -- --   Visual Disturbances -- 0  -EL 0  -AL -- --   Anxiety -- 0  -EL 0  -AL -- --   Headache, Fullness in Head -- 0  -EL 0  -AL -- --   Agitation -- 0  -EL 0  -AL -- --   Orientation and Clouding of Sensorium -- 0  -EL 0  -AL -- --   CIWA-Ar Total -- 0  -EL 1  -AL -- --   Row Name 04/05/22 1200 04/05/22 11:59:25 04/05/22 1100 04/05/22 1058 04/05/22 0900   TROYWA-Ar   BP -- 180/122 Abnormal   -/109 Abnormal   -/108 Abnormal   -/106 Abnormal   -AC   Pulse -- 95  -DI 87  -JN -- 88  -AC   Nausea and Vomiting 0  -AL -- -- -- 0  -AC   Tactile Disturbances 0  -AL -- -- -- 0  -AC   Tremor 0  -AL -- -- -- 0  -AC   Auditory Disturbances 0  -AL -- -- -- 0  -AC   Paroxysmal Sweats 0  -AL -- -- -- 0  -AC   Visual Disturbances 0  -AL -- -- -- 0  -AC   Anxiety 0  -AL -- -- -- 0  -AC   Headache, Fullness in Head 0  -AL -- -- -- 0  -AC   Agitation 0  -AL -- -- -- 0  -AC   Orientation and Clouding of Sensorium 0  -AL -- -- -- 0  -AC   CIWA-Ar Total 0  -AL -- -- -- 0  -AC   Row Name 04/05/22 0800 04/05/22 0630 04/05/22 0429 04/05/22 0408 04/05/22 0100   CIWA-Ar   BP -- 155/101 Abnormal   -/89  -/89  -/88  -SZ   Pulse 87  -AC 88  -SM 86  -SM 84  -SM 75  -SZ   Nausea and Vomiting -- -- 0  -SM -- --   Tactile Disturbances -- -- 0  -SM -- --   Tremor -- -- 0  -SM -- --   Auditory Disturbances -- -- 0  -SM -- --   Paroxysmal Sweats -- -- 0  -SM -- --   Visual Disturbances -- -- 0  -SM -- --   Anxiety -- -- 0  -SM -- --   Headache, Fullness in Head -- -- 0  -SM -- --   Agitation -- -- 0  -SM -- --   Orientation and Clouding of Sensorium -- -- 0  -SM -- --   CIWA-Ar Total -- -- 0  -SM -- --       Pertinent Labs/Diagnostic Test Results:   US right upper quadrant   Final Result by Malgorzata Villegas MD (04/06 0809)      1  Moderate hepatic steatosis with mild perihepatic ascites  2   Slight gallbladder wall thickening, likely reactive  No stones or Lauren's sign  Workstation performed: [id number]         CT abdomen pelvis with contrast   Final Result by Scott Golden MD (04/05 0315)      Acute pancreatitis, as described above  Please see discussion  No evidence of pancreatic pseudocyst   Follow-up after treatment is recommended  Mild hepatic steatosis  Atherosclerosis  Other nonemergent findings, as described above  Please see discussion  Workstation performed: [id number]           Results from last 7 days   Lab Units 04/05/22  0421   SARS-COV-2  Negative     Results from last 7 days   Lab Units 04/06/22  0458 04/05/22  0419 04/05/22  0030   WBC Thousand/uL 9 80 11 72* 12 04*   HEMOGLOBIN g/dL 14 0 14 2 14 1   HEMATOCRIT % 40 1 41 2 40 9   PLATELETS Thousands/uL 169 242 241   NEUTROS ABS Thousands/µL  --  9 88* 8 55*         Results from last 7 days   Lab Units 04/06/22  0458 04/05/22  0419 04/05/22  0030   SODIUM mmol/L 136 140 139   POTASSIUM mmol/L 3 6 3 9 3 3*   CHLORIDE mmol/L 103 107 102   CO2 mmol/L 26 21 21   ANION GAP mmol/L 7 12 16*   BUN mg/dL 8 15 16   CREATININE mg/dL 0 76 0 78 0 88   EGFR ml/min/1 73sq m 86 84 72   CALCIUM mg/dL 7 8* 8 0* 8 6   MAGNESIUM mg/dL 1 3*  --  1 9   PHOSPHORUS mg/dL 2 8  --   --      Results from last 7 days   Lab Units 04/06/22  0458 04/05/22  0419 04/05/22  0030   AST U/L 24 36 36   ALT U/L 33 53 56   ALK PHOS U/L 69 90 99   TOTAL PROTEIN g/dL 6 4 7 2 7 9   ALBUMIN g/dL 3 2* 4 0 4 3   TOTAL BILIRUBIN mg/dL 0 82 0 32 0 41     Results from last 7 days   Lab Units 04/06/22  0605 04/05/22  2341 04/05/22  0612   POC GLUCOSE mg/dl 132 122 105     Results from last 7 days   Lab Units 04/06/22  0458 04/05/22  0419 04/05/22  0030   GLUCOSE RANDOM mg/dL 121 133 166*           Results from last 7 days   Lab Units 04/05/22  0419   PROCALCITONIN ng/ml <0 05     Results from last 7 days   Lab Units 04/05/22  0404 04/05/22  0030   LACTIC ACID mmol/L 2 1* 3 1*           Results from last 7 days   Lab Units 04/05/22  0419 04/05/22  0030   LIPASE u/L 13,119* 53,700*   AMYLASE IU/L 986*  --                  Results from last 7 days   Lab Units 04/05/22  0411   CLARITY UA  Clear   COLOR UA  Straw   SPEC GRAV UA  1 015   PH UA  5 0   GLUCOSE UA mg/dl Negative   KETONES UA mg/dl Negative   BLOOD UA  Negative   PROTEIN UA mg/dl Negative   NITRITE UA  Negative   BILIRUBIN UA  Negative   UROBILINOGEN UA E U /dl 0 2   LEUKOCYTES UA  Negative     Results from last 7 days   Lab Units 04/05/22  0421   INFLUENZA A PCR  Negative   INFLUENZA B PCR  Negative   RSV PCR  Negative             Results from last 7 days   Lab Units 04/05/22  0030   ETHANOL LVL mg/dL 54*       ED Treatment:   Medication Administration from 04/05/2022 0020 to 04/05/2022 1152       Date/Time Order Dose Route Action     04/05/2022 0038 sodium chloride 0 9 % bolus 1,000 mL 1,000 mL Intravenous New Bag     04/05/2022 0042 morphine (PF) 10 mg/mL injection 6 mg 6 mg Intravenous Given     04/05/2022 0037 ondansetron (ZOFRAN) injection 4 mg 0 mg Intravenous Given to EMS     04/05/2022 0033 ondansetron (ZOFRAN) injection 4 mg 0 mg Intravenous Given to EMS     04/05/2022 0133 iohexol (OMNIPAQUE) 350 MG/ML injection (SINGLE-DOSE) 100 mL 100 mL Intravenous Given     04/05/2022 0205 morphine (PF) 10 mg/mL injection 6 mg 6 mg Intravenous Given     04/05/2022 0200 sodium chloride 0 9 % bolus 1,000 mL 1,000 mL Intravenous New Bag     04/05/2022 0404 HYDROmorphone (DILAUDID) injection 1 mg 1 mg Intravenous Given     04/05/2022 0622 ondansetron (ZOFRAN) injection 4 mg 4 mg Intravenous Given     04/05/2022 0902 pantoprazole (PROTONIX) injection 40 mg 40 mg Intravenous Given     04/05/2022 0418 sodium chloride 0 9 % infusion 125 mL/hr Intravenous New Bag     04/05/2022 0618 heparin (porcine) subcutaneous injection 5,000 Units 5,000 Units Subcutaneous Given     04/05/2022 0613 potassium chloride 20 mEq IVPB (premix) 20 mEq Intravenous New Bag     04/05/2022 0419 potassium chloride 20 mEq IVPB (premix) 20 mEq Intravenous New Bag     04/05/2022 0830 thiamine (VITAMIN B1) 100 mg in sodium chloride 0 9 % 50 mL IVPB 100 mg Intravenous New Bag     04/05/2022 0808 morphine (PF) 4 mg/mL injection 4 mg 4 mg Intravenous Given     04/05/2022 0950 HYDROmorphone (DILAUDID) injection 0 5 mg 0 5 mg Intravenous Given     04/05/2022 0639 HYDROmorphone (DILAUDID) injection 0 5 mg 0 5 mg Intravenous Given 04/05/2022 0737 lactated ringers bolus 1,000 mL 1,000 mL Intravenous New Bag     04/05/2022 0946 lactated ringers infusion 250 mL/hr Intravenous New Bag     04/05/2022 1053 lidocaine (LIDODERM) 5 % patch 1 patch 1 patch Topical Medication Applied        Past Medical History:   Diagnosis Date    Alcohol abuse     High cholesterol     Hypertension     Pancreatitis          Admitting Diagnosis: Abdominal pain [R10 9]  Alcohol-induced acute pancreatitis without infection or necrosis [K85 20]  Age/Sex: 60 y o  female  Admission Orders:I/O, scd, ciwa, continuous pulse ox, aqua k,   Scheduled Medications:  heparin (porcine), 5,000 Units, Subcutaneous, Q8H THIAGO  lidocaine, 1 patch, Topical, Q24H  lisinopril, 10 mg, Oral, Daily  magnesium sulfate, 2 g, Intravenous, Once  morphine injection, 6 mg, Intravenous, Once  pantoprazole, 40 mg, Intravenous, Q24H [facility]  potassium chloride, 20 mEq, Intravenous, Once  senna, 1 tablet, Oral, HS  thiamine, 100 mg, Intravenous, Daily    LORazepam (ATIVAN) injection 2 mg  Dose: 2 mg  Freq: Once Route: IV  Indications of Use: ALCOHOL WITHDRAWAL SYNDROME  Start: 04/06/22 1000 End: 04/06/22      lactated ringers, 250 mL/hr, Intravenous, Continuous      PRN Meds:  bisacodyl, 10 mg, Rectal, Daily PRN  hydrALAZINE, 10 mg, Intravenous, Q6H PRN 4/5 x1  HYDROmorphone, 0 5 mg, Intravenous, Q1H PRN 4/5 x8  4/6 x1  naloxone, 0 04 mg, Intravenous, Q1MIN PRN  ondansetron, 4 mg, Intravenous, Q8H PRN 4/5 x2  oxyCODONE-acetaminophen, 1 tablet, Oral, Q4H PRN 4/5 x1, 4/6 x1   simethicone, 40 mg, Oral, Q6H PRN        IP CONSULT TO GASTROENTEROLOGY  IP CONSULT TO ED CRISIS WORKER    Network Utilization Review Department  ATTENTION: Please call with any questions or concerns to [phone] and carefully listen to the prompts so that you are directed to the right person   All voicemails are confidential   Vashti Dillon all requests for admission clinical reviews, approved or denied determinations and any other requests to dedicated fax number below belonging to the campus where the patient is receiving treatment   List of dedicated fax numbers for the Facilities:  [address] [place] [address] DENIALS (Administrative/Medical Necessity) [phone]   [address]  (Maternity/NICU/Pediatrics) [phone]   [address]  [address] [place] [address] [id number] [place] [address] [address] [facility] [phone]

## 2022-04-06 NOTE — PROGRESS NOTES
[address]  Progress Note - Francisca Adler 1965, 60 y o  female MRN: [id number]  Unit/Bed#: -01 Encounter: [id number]  Primary Care Provider: Zaid Cooley DO   Date and time admitted to hospital: 4/5/2022 12:24 AM    * Alcohol-induced acute pancreatitis  Assessment & Plan  Presents with severe abdominal pain and multiple episodes of vomiting that started the day of admission  Admits to recent heavy drinking 4-6 whiskey drinks per day with est  2 shots per drink, just came back from vacation with friends  Prior episode of pancreatitis, has been sober from alcohol intermittently in the past       CT abdomen/pelvis revealing "Acute pancreatitis, no evidence of pancreatic pseudocyst   Mild hepatic steatosis  Atherosclerosis "   Lipase 53,700   TG level 333    Calcium 8 6   Ethanol 54   Mild leukocytosis 12->11 trending down  Lactate 3 1-> 2 1  Afebrile and does not meet sepsis criteria  Will hold antibiotics at this time   Continue IVF hydration , pain control, antiemetics    Keep NPO   Monitor electrolytes and blood sugar closely and correct as indicated  [facility] G[facility]enter[facility] consulted; recommendations appreciated -maintain urine outputs 0 5 milliliters/kilogram hr        Other constipation  Assessment & Plan  · Patient reports no BM x3 days, typically daily  · Abdomen distended, bloated  · ducolax suppository, Senna scheduled    Hypertension  Assessment & Plan  · BP's stable  · Hold lisinopril while NPO   · PRN IV hydralazine for SBP> 170  ·  1X dose amlodipine 10 mg with improvement of blood pressure    Hypokalemia  Assessment & Plan  · K 3 3 , replete with 40 IV K+  · Monitor electrolytes closely, trend BMP  · K 3 6, 20 mEq IV repleted      Alcohol abuse  Assessment & Plan  · Admits to 4-6 whiskey drinks per day, pt est  2 shots per drink  · Ethanol 54  · No prior history of withdrawal/DT's per patient     · CIWA protocol- CIWA 8 this morning, with anxiety, restlessness, "feels skin crawling"- given x1 2mg ativan  · Start IV thiamine   · Start Librium 10mg Q8    Leukocytosis  Assessment & Plan  · Mild leukocytosis - WBCs 12  · Check COVID/flu/RSV - negative  · Check UA- negative  · Check procalcitonin - negative  · Trend CBC        VTE Pharmacologic Prophylaxis: VTE Score: 3 Moderate Risk (Score 3-4) - Pharmacological DVT Prophylaxis Ordered: heparin  Patient Centered Rounds: I performed bedside rounds with nursing staff today  Discussions with Specialists or Other Care Team Provider: Alfonso Vela, Dr Anthony Green, case management    Education and Discussions with Family / Patient: Updated  (significant other) at bedside  Time Spent for Care: 45 minutes  More than 50% of total time spent on counseling and coordination of care as described above  Current Length of Stay: 1 day(s)  Current Patient Status: Inpatient   Certification Statement: The patient will continue to require additional inpatient hospital stay due to Acute pancreatitis, alcohol withdrawal, electrolyte imbalance  Discharge Plan: Anticipate discharge in 48-72 hrs to home  Code Status: Level 1 - Full Code    Subjective:   Patient very anxious this morning and restless stating she is having significant amount of pain in his on the able to get comfortable  Pain regimen discussed with patient  Discussed patient's alcohol intake 4- 6 whiskey drinks with approximately 2 shots per drink, patient has not been hospitalized for alcohol withdrawal in the past discussed symptoms of alcohol withdrawal and concern for presents with symptoms at this time  Patient is feeling uncomfortable with abdominal bloating last BM 3 days ago, patient requesting suppository  Patient denies chest pain, shortness a breath, dysuria       Objective:     Vitals:   Temp (24hrs), Av 4 °F (36 9 °C), Min:98 1 °F (36 7 °C), Max:98 6 °F (37 °C)    Temp:  [98 1 °F (36 7 °C)-98 6 °F (37 °C)] 98 6 °F (37 °C)  HR:  [] 96  Resp:  [17-24] 24  BP: (152-170)/() 154/96  SpO2:  [92 %-96 %] 95 %  Body mass index is 28 83 kg/m²  Input and Output Summary (last 24 hours): Intake/Output Summary (Last 24 hours) at 4/6/2022 1322  Last data filed at 4/6/2022 5250  Gross per 24 hour   Intake 5170 83 ml   Output 300 ml   Net 4870 83 ml       Physical Exam:   Physical Exam  Vitals and nursing note reviewed  Constitutional:       General: She is not in acute distress  Appearance: She is well-developed  HENT:      Head: Normocephalic and atraumatic  Mouth/Throat:      Mouth: Mucous membranes are moist       Pharynx: Oropharynx is clear  Eyes:      General: No scleral icterus  Conjunctiva/sclera: Conjunctivae normal       Pupils: Pupils are equal, round, and reactive to light  Cardiovascular:      Rate and Rhythm: Normal rate and regular rhythm  Pulses: Normal pulses  Heart sounds: Normal heart sounds  No murmur heard  Pulmonary:      Effort: Pulmonary effort is normal  No respiratory distress  Breath sounds: Normal breath sounds  No wheezing, rhonchi or rales  Abdominal:      General: Bowel sounds are decreased  There is distension  Palpations: Abdomen is soft  Tenderness: There is generalized abdominal tenderness  Musculoskeletal:      Cervical back: Neck supple  Right lower leg: No edema  Left lower leg: No edema  Skin:     General: Skin is warm and dry  Capillary Refill: Capillary refill takes less than 2 seconds  Neurological:      General: No focal deficit present  Mental Status: She is alert  Motor: No weakness  Psychiatric:         Attention and Perception: Attention normal          Mood and Affect: Mood is anxious  Behavior: Behavior is not agitated  Behavior is cooperative           Cognition and Memory: Cognition normal           Additional Data:     Labs:  Results from last 7 days   Lab Units 04/06/22  0458 04/05/22  0419 04/05/22  0419   WBC Thousand/uL 9 80   < > 11 72*   HEMOGLOBIN g/dL 14 0   < > 14 2   HEMATOCRIT % 40 1   < > 41 2   PLATELETS Thousands/uL 169   < > 242   NEUTROS PCT %  --   --  84*   LYMPHS PCT %  --   --  8*   MONOS PCT %  --   --  4   EOS PCT %  --   --  3    < > = values in this interval not displayed       Results from last 7 days   Lab Units 04/06/22  0458   SODIUM mmol/L 136   POTASSIUM mmol/L 3 6   CHLORIDE mmol/L 103   CO2 mmol/L 26   BUN mg/dL 8   CREATININE mg/dL 0 76   ANION GAP mmol/L 7   CALCIUM mg/dL 7 8*   ALBUMIN g/dL 3 2*   TOTAL BILIRUBIN mg/dL 0 82   ALK PHOS U/L 69   ALT U/L 33   AST U/L 24   GLUCOSE RANDOM mg/dL 121         Results from last 7 days   Lab Units 04/06/22  1259 04/06/22  0605 04/05/22  2341 04/05/22  0612   POC GLUCOSE mg/dl 122 132 122 105         Results from last 7 days   Lab Units 04/05/22  0419 04/05/22  0404 04/05/22  0030   LACTIC ACID mmol/L  --  2 1* 3 1*   PROCALCITONIN ng/ml <0 05  --   --        Lines/Drains:  Invasive Devices  Report    Peripheral Intravenous Line            Peripheral IV 04/05/22 Left Forearm 1 day                      Imaging: Reviewed radiology reports from this admission including: abdominal/pelvic CT and ultrasound(s)    Recent Cultures (last 7 days):         Last 24 Hours Medication List:   Current Facility-Administered Medications   Medication Dose Route Frequency Provider Last Rate    bisacodyl  10 mg Rectal Daily PRN Kailash [place], CRNP      chlordiazePOXIDE  10 mg Oral Q8H [address] Kailash [place], CRNP      heparin (porcine)  5,000 Units Subcutaneous [facility] Bartholome TETE Cleary      hydrALAZINE  10 mg Intravenous Q6H PRN Pandi Todhe, DO      HYDROmorphone  0 5 mg Intravenous Q1H PRN Pandi Todhe, DO      lactated ringers  250 mL/hr Intravenous Continuous Pandi Todhe,  mL/hr (04/06/22 1230)    lidocaine  1 patch Topical Q24H Pandi Todhe, DO      lisinopril  10 mg Oral Daily Pandi Todhe, DO      naloxone  0 04 mg Intravenous Q1MIN PRN TETE Brown      ondansetron  4 mg Intravenous Q8H PRN TETE Brown      oxyCODONE-acetaminophen  1 tablet Oral Q4H PRN Reinaldo Tomichaele, DO      pantoprazole  40 mg Intravenous Q24H [address] TETE Brown      senna  1 tablet Oral HS TETE Bueno      simethicone  40 mg Oral Q6H PRN Keni Tomichaele, DO      thiamine  100 mg Intravenous Daily TETE Brown 100 mg (04/06/22 0910)        Today, Patient Was Seen By: TETE Bueno    **Please Note: This note may have been constructed using a voice recognition system  **

## 2022-04-06 NOTE — ASSESSMENT & PLAN NOTE
· Mild leukocytosis - WBCs 12  · Check COVID/flu/RSV - negative  · Check UA- negative  · Check procalcitonin - negative  · Trend CBC

## 2022-04-06 NOTE — PLAN OF CARE
Problem: Potential for Falls  Goal: Patient will remain free of falls  Description: INTERVENTIONS:  - Educate patient/family on patient safety including physical limitations  - Instruct patient to call for assistance with activity   - Consult OT/PT to assist with strengthening/mobility   - Keep Call bell within reach  - Keep bed low and locked with side rails adjusted as appropriate  - Keep care items and personal belongings within reach  - Initiate and maintain comfort rounds  - Make Fall Risk Sign visible to staff  - Offer Toileting every  Hours, in advance of need  - Initiate/Maintain  alarm  - Obtain necessary fall risk management equipment:   - Apply yellow socks and bracelet for high fall risk patients  - Consider moving patient to room near nurses station  Outcome: Progressing     Problem: Nutrition/Hydration-ADULT  Goal: Nutrient/Hydration intake appropriate for improving, restoring or maintaining nutritional needs  Description: Monitor and assess patient's nutrition/hydration status for malnutrition  Collaborate with interdisciplinary team and initiate plan and interventions as ordered  Monitor patient's weight and dietary intake as ordered or per policy  Utilize nutrition screening tool and intervene as necessary  Determine patient's food preferences and provide high-protein, high-caloric foods as appropriate       INTERVENTIONS:  - Monitor oral intake, urinary output, labs, and treatment plans  - Assess nutrition and hydration status and recommend course of action  - Evaluate amount of meals eaten  - Assist patient with eating if necessary   - Allow adequate time for meals  - Recommend/ encourage appropriate diets, oral nutritional supplements, and vitamin/mineral supplements  - Order, calculate, and assess calorie counts as needed  - Recommend, monitor, and adjust tube feedings and TPN/PPN based on assessed needs  - Assess need for intravenous fluids  - Provide specific nutrition/hydration education as appropriate  - Include patient/family/caregiver in decisions related to nutrition  Outcome: Progressing     Problem: PAIN - ADULT  Goal: Verbalizes/displays adequate comfort level or baseline comfort level  Description: Interventions:  - Encourage patient to monitor pain and request assistance  - Assess pain using appropriate pain scale  - Administer analgesics based on type and severity of pain and evaluate response  - Implement non-pharmacological measures as appropriate and evaluate response  - Consider cultural and social influences on pain and pain management  - Notify physician/advanced practitioner if interventions unsuccessful or patient reports new pain  Outcome: Progressing     Problem: INFECTION - ADULT  Goal: Absence or prevention of progression during hospitalization  Description: INTERVENTIONS:  - Assess and monitor for signs and symptoms of infection  - Monitor lab/diagnostic results  - Monitor all insertion sites, i e  indwelling lines, tubes, and drains  - Monitor endotracheal if appropriate and nasal secretions for changes in amount and color  - [place] appropriate cooling/warming therapies per order  - Administer medications as ordered  - Instruct and encourage patient and family to use good hand hygiene technique  - Identify and instruct in appropriate isolation precautions for identified infection/condition  Outcome: Progressing     Problem: DISCHARGE PLANNING  Goal: Discharge to home or other facility with appropriate resources  Description: INTERVENTIONS:  - Identify barriers to discharge w/patient and caregiver  - Arrange for needed discharge resources and transportation as appropriate  - Identify discharge learning needs (meds, wound care, etc )  - Arrange for interpretive services to assist at discharge as needed  - Refer to Case Management Department for coordinating discharge planning if the patient needs post-hospital services based on physician/advanced practitioner order or complex needs related to functional status, cognitive ability, or social support system  Outcome: Progressing     Problem: Knowledge Deficit  Goal: Patient/family/caregiver demonstrates understanding of disease process, treatment plan, medications, and discharge instructions  Description: Complete learning assessment and assess knowledge base    Interventions:  - Provide teaching at level of understanding  - Provide teaching via preferred learning methods  Outcome: Progressing

## 2022-04-06 NOTE — ASSESSMENT & PLAN NOTE
· K 3 3 , replete with 40 IV K+  · Monitor electrolytes closely, trend BMP  · K 3 6, 20 mEq IV repleted

## 2022-04-06 NOTE — ASSESSMENT & PLAN NOTE
· Admits to 4-6 whiskey drinks per day, pt est  2 shots per drink  · Ethanol 54  · No prior history of withdrawal/DT's per patient     · CIWA protocol- CIWA 8 this morning, with anxiety, restlessness, "feels skin crawling"- given x1 2mg ativan  · Start IV thiamine   · Start Librium 10mg Q8

## 2022-04-06 NOTE — ASSESSMENT & PLAN NOTE
· BP's stable  · Hold lisinopril while NPO   · PRN IV hydralazine for SBP> 170  ·  1X dose amlodipine 10 mg with improvement of blood pressure

## 2022-04-06 NOTE — PLAN OF CARE
Problem: Potential for Falls  Goal: Patient will remain free of falls  Description: INTERVENTIONS:  - Educate patient/family on patient safety including physical limitations  - Instruct patient to call for assistance with activity   - Consult OT/PT to assist with strengthening/mobility   - Keep Call bell within reach  - Keep bed low and locked with side rails adjusted as appropriate  - Keep care items and personal belongings within reach  - Initiate and maintain comfort rounds    - Apply yellow socks and bracelet for high fall risk patients  - Consider moving patient to room near nurses station  Outcome: Progressing     Problem: INFECTION - ADULT  Goal: Absence or prevention of progression during hospitalization  Description: INTERVENTIONS:  - Assess and monitor for signs and symptoms of infection  - Monitor lab/diagnostic results  - Monitor all insertion sites, i e  indwelling lines, tubes, and drains  - Monitor endotracheal if appropriate and nasal secretions for changes in amount and color  - [place] appropriate cooling/warming therapies per order  - Administer medications as ordered  - Instruct and encourage patient and family to use good hand hygiene technique  - Identify and instruct in appropriate isolation precautions for identified infection/condition  Outcome: Progressing

## 2022-04-06 NOTE — PROGRESS NOTES
[facility]  Progress Note - Francisca Duffy 60 y o  female MRN: [id number]    Unit/Bed#: -01 Encounter: [id number]    Assessment/Plan:  Acute pancreatitis  Alcohol abuse  Moderate hepatic steatosis  -suspect her symptoms are secondary to alcohol  -3rd bout of pancreatitis in past 5 years  -triglycerides are less than 400  -no obvious gallstones seen on CT imaging  -check right upper quadrant ultrasound, liver enzymes mildly elevated for female with evidence of moderate hepatic steatosis  -patient still with persistent pain, which she says is worse than yesterday, but no further vomiting  -recommend keeping NPO given severe pain  -alcohol cessation  -avoid vaping  -continue with IV fluids LR at 250 mL/hour to maintain urine output of 0 5 mL/kg per hour as well as decrease in hematocrit, recommend continuing unless worsening respiratory status given cont NPO status  -consider changing pain regimen to Dilaudid or meperidine as morphine can cause sphincter of Oddi spasms  -?alcohol withdrawal today-discussed with hospitalist team  -recommend incentive spiromtery    Susie AYAD  [facility]  Gastroenterology    Patient plan of care formulated with Dr Anum Pena  Subjective:   Reports her pain is worse today than yesterday  Says she does not want to eat anything at this point  Accompanied by her significant other today  Mentions this is her 3rd bout of pancreatitis in 5 years, and each one has been progressively worse  Says she thinks if she moves her bowels, she would feel much better  Objective:     Vitals: Blood pressure (!) 161/103, pulse 96, temperature 98 6 °F (37 °C), resp  rate (!) 24, height 5' 8" (1 727 m), weight 86 kg (189 lb 9 5 oz), SpO2 95 %  ,Body mass index is 28 83 kg/m²        Intake/Output Summary (Last 24 hours) at 4/6/2022 0906  Last data filed at 4/6/2022 7028  Gross per 24 hour   Intake 6170 83 ml   Output 300 ml   Net 5870 83 ml       Review of Systems: as per HPI  Review of Systems   Constitutional:        Per HPI   All other systems reviewed and are negative  Physical Exam:     Physical Exam  Vitals and nursing note reviewed  Constitutional:       General: She is not in acute distress  Appearance: Normal appearance  She is well-developed  She is not ill-appearing, toxic-appearing or diaphoretic  HENT:      Head: Normocephalic and atraumatic  Mouth/Throat:      Mouth: Mucous membranes are moist       Pharynx: Oropharynx is clear  Eyes:      General: No scleral icterus  Conjunctiva/sclera: Conjunctivae normal    Cardiovascular:      Rate and Rhythm: Normal rate  Heart sounds: No murmur heard  Pulmonary:      Effort: Pulmonary effort is normal  No respiratory distress  Abdominal:      Palpations: Abdomen is soft  Tenderness: There is no abdominal tenderness  Skin:     General: Skin is warm and dry  Coloration: Skin is not jaundiced  Neurological:      General: No focal deficit present  Mental Status: She is alert and oriented to person, place, and time  Psychiatric:         Mood and Affect: Mood normal          Behavior: Behavior normal          Thought Content:  Thought content normal          Judgment: Judgment normal            Invasive Devices  Report    Peripheral Intravenous Line            Peripheral IV 04/05/22 Left Forearm 1 day                        CBC:   Lab Results   Component Value Date    WBC 9 80 04/06/2022    HGB 14 0 04/06/2022    HCT 40 1 04/06/2022    MCV 96 04/06/2022     04/06/2022    MCH 33 5 04/06/2022    MCHC 34 9 04/06/2022    RDW 12 1 04/06/2022    MPV 9 4 04/06/2022   ,   CMP:   Lab Results   Component Value Date    K 3 6 04/06/2022     04/06/2022    CO2 26 04/06/2022    BUN 8 04/06/2022    CREATININE 0 76 04/06/2022    CALCIUM 7 8 (L) 04/06/2022    AST 24 04/06/2022    ALT 33 04/06/2022    ALKPHOS 69 04/06/2022    EGFR 86 04/06/2022   ,   Lipase: No results found for: LIPASE,  PT/INR: No results found for: PT, INR,   Troponin: No results found for: TROPONINI,   Magnesium: No components found for: MAG,   Phosphorous:   Lab Results   Component Value Date    PHOS 2 8 04/06/2022     Imaging Studies: I have personally reviewed pertinent reports  US RUQ 04/05/2022:  RIGHT UPPER QUADRANT ULTRASOUND     INDICATION:     Pancreatitis     COMPARISON:  4/5/2022     TECHNIQUE:   Real-time ultrasound of the right upper quadrant was performed with a curvilinear transducer with both volumetric sweeps and still imaging techniques      FINDINGS:     PANCREAS:  Visualized portions of the pancreas are within normal limits      AORTA AND IVC:  Visualized portions are normal for patient age      LIVER:  Size:  Within normal range  The liver measures 16 6 cm in the midclavicular line  Contour:  Surface contour is smooth  Parenchyma: There is moderate diffuse increased echogenicity with smooth echotexture and acoustic beam attenuation  Most consistent with moderate hepatic steatosis  No liver mass identified  Limited imaging of the main portal vein shows it to be patent and hepatopetal      BILIARY:  The gallbladder is normal in caliber  Slight wall thickening without pericholecystic fluid  No stones or sludge identified  No sonographic Lauren sign  No intrahepatic biliary dilatation  CBD measures 3 0 mm  No choledocholithiasis      KIDNEY:   Right kidney measures 10 6 x 5 1 x 5 2 cm  Volume 149 6 mL  Kidney within normal limits      ASCITES:   Mild perihepatic ascites      IMPRESSION:     1  Moderate hepatic steatosis with mild perihepatic ascites      2   Slight gallbladder wall thickening, likely reactive  No stones or Lauren's sign  CT abd pelvis with contrast 04/05/2022:  CT ABDOMEN AND PELVIS WITH IV CONTRAST     INDICATION:   Epigastric pain, mid abdominal pain, nausea, vomiting  Prior history of pancreatitis    Lipase 53,700      COMPARISON:  None available      TECHNIQUE: CT examination of the abdomen and pelvis was performed  Axial, sagittal, and coronal 2D reformatted images were created from the source data and submitted for interpretation      Radiation dose length product (DLP) for this visit:  650 mGy-cm   This examination, like all CT scans performed in the [facility], was performed utilizing techniques to minimize radiation dose exposure, including the use of iterative   reconstruction and automated exposure control      IV Contrast:  100 mL of iohexol (OMNIPAQUE)  Enteric Contrast:  Enteric contrast was not administered      FINDINGS:     ABDOMEN     LOWER CHEST:  Dependent changes are present  There is a small hiatal hernia      LIVER/BILIARY TREE:  Mild hepatic steatosis      GALLBLADDER:  No calcified gallstones  No pericholecystic inflammatory change      SPLEEN:  Unremarkable      PANCREAS:  There is peripancreatic inflammation and there is fluid adjacent to the pancreas and tracking down the mesenteric root  Fluid also tracks posteriorly adjacent to the spleen  There is no evidence of pancreatic pseudocyst      ADRENAL GLANDS:  Unremarkable      KIDNEYS/URETERS:  Unremarkable  No hydronephrosis      STOMACH AND BOWEL:  No bowel obstruction  Mild colonic diverticulosis without evidence of acute diverticulitis      APPENDIX:  No findings to suggest appendicitis      ABDOMINOPELVIC CAVITY:  As described above  No pneumoperitoneum      VESSELS:  Atherosclerosis  No abdominal aortic aneurysm      PELVIS     REPRODUCTIVE ORGANS:  Unremarkable for patient's age      URINARY BLADDER:  Unremarkable      ABDOMINAL WALL/INGUINAL REGIONS:  Unremarkable      OSSEOUS STRUCTURES:  No acute fracture or destructive osseous lesion      IMPRESSION:     Acute pancreatitis, as described above  Please see discussion    No evidence of pancreatic pseudocyst   Follow-up after treatment is recommended      Mild hepatic steatosis      Atherosclerosis      Other nonemergent findings, as described above  Please see discussion      Counseling / Coordination of Care  Total time spent today  15 minutes  Greater than 50% of total time was spent with the patient and / or family counseling and / or coordination of care

## 2022-04-06 NOTE — ASSESSMENT & PLAN NOTE
Presents with severe abdominal pain and multiple episodes of vomiting that started the day of admission  Admits to recent heavy drinking 4-6 whiskey drinks per day with est  2 shots per drink, just came back from vacation with friends  Prior episode of pancreatitis, has been sober from alcohol intermittently in the past       CT abdomen/pelvis revealing "Acute pancreatitis, no evidence of pancreatic pseudocyst   Mild hepatic steatosis  Atherosclerosis "   Lipase 53,700   TG level 333    Calcium 8 6   Ethanol 54   Mild leukocytosis 12->11 trending down  Lactate 3 1-> 2 1  Afebrile and does not meet sepsis criteria  Will hold antibiotics at this time   Continue IVF hydration , pain control, antiemetics    Keep NPO   Monitor electrolytes and blood sugar closely and correct as indicated     [facility] consulted; recommendations appreciated -maintain urine outputs 0 5 milliliters/kilogram hr

## 2022-04-06 NOTE — ASSESSMENT & PLAN NOTE
· Patient reports no BM x3 days, typically daily  · Abdomen distended, bloated  · ducolax suppository, Senna scheduled

## 2022-04-07 ENCOUNTER — APPOINTMENT (INPATIENT)
Dept: NON INVASIVE DIAGNOSTICS | Facility: HOSPITAL | Age: 57
DRG: 438 | End: 2022-04-07
Payer: COMMERCIAL

## 2022-04-07 ENCOUNTER — APPOINTMENT (INPATIENT)
Dept: RADIOLOGY | Facility: HOSPITAL | Age: 57
DRG: 438 | End: 2022-04-07
Payer: COMMERCIAL

## 2022-04-07 ENCOUNTER — APPOINTMENT (INPATIENT)
Dept: CT IMAGING | Facility: HOSPITAL | Age: 57
DRG: 896 | End: 2022-04-07
Payer: COMMERCIAL

## 2022-04-07 ENCOUNTER — HOSPITAL ENCOUNTER (INPATIENT)
Facility: HOSPITAL | Age: 57
LOS: 1 days | DRG: 896 | End: 2022-04-08
Attending: EMERGENCY MEDICINE | Admitting: EMERGENCY MEDICINE
Payer: COMMERCIAL

## 2022-04-07 VITALS
WEIGHT: 189 LBS | RESPIRATION RATE: 20 BRPM | DIASTOLIC BLOOD PRESSURE: 94 MMHG | BODY MASS INDEX: 28.64 KG/M2 | HEART RATE: 125 BPM | TEMPERATURE: 97.8 F | OXYGEN SATURATION: 92 % | HEIGHT: 68 IN | SYSTOLIC BLOOD PRESSURE: 135 MMHG

## 2022-04-07 DIAGNOSIS — J96.01 ACUTE RESPIRATORY FAILURE WITH HYPOXIA (HCC): ICD-10-CM

## 2022-04-07 DIAGNOSIS — K85.20 ALCOHOL-INDUCED ACUTE PANCREATITIS WITHOUT INFECTION OR NECROSIS: Primary | ICD-10-CM

## 2022-04-07 PROBLEM — E78.5 HYPERLIPIDEMIA: Status: ACTIVE | Noted: 2022-04-07

## 2022-04-07 PROBLEM — K76.0 HEPATIC STEATOSIS: Status: ACTIVE | Noted: 2022-04-07

## 2022-04-07 PROBLEM — Z72.0 CURRENT EVERY DAY NICOTINE VAPING: Status: ACTIVE | Noted: 2022-04-07

## 2022-04-07 PROBLEM — R27.0 ATAXIA: Status: ACTIVE | Noted: 2022-04-07

## 2022-04-07 PROBLEM — K21.9 GASTROESOPHAGEAL REFLUX DISEASE WITHOUT ESOPHAGITIS: Status: ACTIVE | Noted: 2022-04-07

## 2022-04-07 PROBLEM — F10.231 ALCOHOL WITHDRAWAL SYNDROME, WITH DELIRIUM (HCC): Status: ACTIVE | Noted: 2022-04-05

## 2022-04-07 PROBLEM — F10.20 ALCOHOL USE DISORDER, SEVERE, DEPENDENCE (HCC): Status: ACTIVE | Noted: 2022-04-07

## 2022-04-07 LAB
ALBUMIN SERPL BCP-MCNC: 2.8 G/DL (ref 3.5–5)
ALP SERPL-CCNC: 65 U/L (ref 46–116)
ALT SERPL W P-5'-P-CCNC: 31 U/L (ref 12–78)
ANION GAP SERPL CALCULATED.3IONS-SCNC: 7 MMOL/L (ref 4–13)
AORTIC ROOT: 3.2 CM
APICAL FOUR CHAMBER EJECTION FRACTION: 61 %
AST SERPL W P-5'-P-CCNC: 33 U/L (ref 5–45)
ATRIAL RATE: 130 BPM
BILIRUB SERPL-MCNC: 1.15 MG/DL (ref 0.2–1)
BUN SERPL-MCNC: 7 MG/DL (ref 5–25)
CALCIUM ALBUM COR SERPL-MCNC: 9 MG/DL (ref 8.3–10.1)
CALCIUM SERPL-MCNC: 8 MG/DL (ref 8.3–10.1)
CARDIAC TROPONIN I PNL SERPL HS: 5 NG/L (ref 8–18)
CHLORIDE SERPL-SCNC: 101 MMOL/L (ref 100–108)
CO2 SERPL-SCNC: 28 MMOL/L (ref 21–32)
CREAT SERPL-MCNC: 0.64 MG/DL (ref 0.6–1.3)
ERYTHROCYTE [DISTWIDTH] IN BLOOD BY AUTOMATED COUNT: 12.2 % (ref 11.6–15.1)
FRACTIONAL SHORTENING: 41 % (ref 28–44)
GFR SERPL CREATININE-BSD FRML MDRD: 98 ML/MIN/1.73SQ M
GLUCOSE SERPL-MCNC: 112 MG/DL (ref 65–140)
GLUCOSE SERPL-MCNC: 118 MG/DL (ref 65–140)
GLUCOSE SERPL-MCNC: 120 MG/DL (ref 65–140)
GLUCOSE SERPL-MCNC: 123 MG/DL (ref 65–140)
GLUCOSE SERPL-MCNC: 94 MG/DL (ref 65–140)
HCT VFR BLD AUTO: 40.5 % (ref 34.8–46.1)
HGB BLD-MCNC: 13.9 G/DL (ref 11.5–15.4)
INTERVENTRICULAR SEPTUM IN DIASTOLE (PARASTERNAL SHORT AXIS VIEW): 0.9 CM
INTERVENTRICULAR SEPTUM: 0.9 CM (ref 0.54–1.01)
LEFT ATRIUM SIZE: 2.8 CM
LEFT INTERNAL DIMENSION IN SYSTOLE: 2.3 CM (ref 3.06–4.64)
LEFT VENTRICULAR INTERNAL DIMENSION IN DIASTOLE: 3.9 CM (ref 5.04–7.51)
LEFT VENTRICULAR POSTERIOR WALL IN END DIASTOLE: 1 CM (ref 0.52–0.99)
LEFT VENTRICULAR STROKE VOLUME: 50 ML
LIPASE SERPL-CCNC: 1466 U/L (ref 23–300)
LVSV (TEICH): 50 ML
MAGNESIUM SERPL-MCNC: 1.7 MG/DL (ref 1.6–2.6)
MCH RBC QN AUTO: 33.2 PG (ref 26.8–34.3)
MCHC RBC AUTO-ENTMCNC: 34.3 G/DL (ref 31.4–37.4)
MCV RBC AUTO: 97 FL (ref 82–98)
NT-PROBNP SERPL-MCNC: 391 PG/ML
P AXIS: 47 DEGREES
PHOSPHATE SERPL-MCNC: 2 MG/DL (ref 2.7–4.5)
PLATELET # BLD AUTO: 172 THOUSANDS/UL (ref 149–390)
PMV BLD AUTO: 9.5 FL (ref 8.9–12.7)
POTASSIUM SERPL-SCNC: 3.4 MMOL/L (ref 3.5–5.3)
PR INTERVAL: 154 MS
PROT SERPL-MCNC: 6.4 G/DL (ref 6.4–8.2)
QRS AXIS: 25 DEGREES
QRSD INTERVAL: 76 MS
QT INTERVAL: 292 MS
QTC INTERVAL: 429 MS
RBC # BLD AUTO: 4.19 MILLION/UL (ref 3.81–5.12)
SL CV PED ECHO LEFT VENTRICLE DIASTOLIC VOLUME (MOD BIPLANE) 2D: 68 ML
SL CV PED ECHO LEFT VENTRICLE SYSTOLIC VOLUME (MOD BIPLANE) 2D: 17 ML
SODIUM SERPL-SCNC: 136 MMOL/L (ref 136–145)
T WAVE AXIS: 24 DEGREES
VENTRICULAR RATE: 130 BPM
WBC # BLD AUTO: 12.23 THOUSAND/UL (ref 4.31–10.16)
Z-SCORE OF INTERVENTRICULAR SEPTUM IN END DIASTOLE: 1.06
Z-SCORE OF LEFT VENTRICULAR DIMENSION IN END DIASTOLE: -4.55
Z-SCORE OF LEFT VENTRICULAR DIMENSION IN END SYSTOLE: -3.9
Z-SCORE OF LEFT VENTRICULAR POSTERIOR WALL IN END DIASTOLE: 2

## 2022-04-07 PROCEDURE — 94664 DEMO&/EVAL PT USE INHALER: CPT

## 2022-04-07 PROCEDURE — 83880 ASSAY OF NATRIURETIC PEPTIDE: CPT | Performed by: NURSE PRACTITIONER

## 2022-04-07 PROCEDURE — 94760 N-INVAS EAR/PLS OXIMETRY 1: CPT

## 2022-04-07 PROCEDURE — 93306 TTE W/DOPPLER COMPLETE: CPT

## 2022-04-07 PROCEDURE — 85027 COMPLETE CBC AUTOMATED: CPT | Performed by: STUDENT IN AN ORGANIZED HEALTH CARE EDUCATION/TRAINING PROGRAM

## 2022-04-07 PROCEDURE — 82948 REAGENT STRIP/BLOOD GLUCOSE: CPT

## 2022-04-07 PROCEDURE — 94640 AIRWAY INHALATION TREATMENT: CPT

## 2022-04-07 PROCEDURE — 99291 CRITICAL CARE FIRST HOUR: CPT | Performed by: PHYSICIAN ASSISTANT

## 2022-04-07 PROCEDURE — 93005 ELECTROCARDIOGRAM TRACING: CPT

## 2022-04-07 PROCEDURE — HZ2ZZZZ DETOXIFICATION SERVICES FOR SUBSTANCE ABUSE TREATMENT: ICD-10-PCS | Performed by: EMERGENCY MEDICINE

## 2022-04-07 PROCEDURE — NC001 PR NO CHARGE: Performed by: PHYSICIAN ASSISTANT

## 2022-04-07 PROCEDURE — 83735 ASSAY OF MAGNESIUM: CPT | Performed by: STUDENT IN AN ORGANIZED HEALTH CARE EDUCATION/TRAINING PROGRAM

## 2022-04-07 PROCEDURE — 99239 HOSP IP/OBS DSCHRG MGMT >30: CPT

## 2022-04-07 PROCEDURE — 83690 ASSAY OF LIPASE: CPT | Performed by: PHYSICIAN ASSISTANT

## 2022-04-07 PROCEDURE — 80053 COMPREHEN METABOLIC PANEL: CPT | Performed by: STUDENT IN AN ORGANIZED HEALTH CARE EDUCATION/TRAINING PROGRAM

## 2022-04-07 PROCEDURE — 84484 ASSAY OF TROPONIN QUANT: CPT | Performed by: NURSE PRACTITIONER

## 2022-04-07 PROCEDURE — NC001 PR NO CHARGE

## 2022-04-07 PROCEDURE — 71045 X-RAY EXAM CHEST 1 VIEW: CPT

## 2022-04-07 PROCEDURE — C9113 INJ PANTOPRAZOLE SODIUM, VIA: HCPCS

## 2022-04-07 PROCEDURE — 84100 ASSAY OF PHOSPHORUS: CPT | Performed by: STUDENT IN AN ORGANIZED HEALTH CARE EDUCATION/TRAINING PROGRAM

## 2022-04-07 RX ORDER — POTASSIUM CHLORIDE 14.9 MG/ML
20 INJECTION INTRAVENOUS
Status: COMPLETED | OUTPATIENT
Start: 2022-04-07 | End: 2022-04-07

## 2022-04-07 RX ORDER — ONDANSETRON 2 MG/ML
4 INJECTION INTRAMUSCULAR; INTRAVENOUS EVERY 6 HOURS PRN
Status: DISCONTINUED | OUTPATIENT
Start: 2022-04-07 | End: 2022-04-08 | Stop reason: HOSPADM

## 2022-04-07 RX ORDER — DIAZEPAM 5 MG/ML
5 INJECTION, SOLUTION INTRAMUSCULAR; INTRAVENOUS
Status: DISCONTINUED | OUTPATIENT
Start: 2022-04-07 | End: 2022-04-07 | Stop reason: HOSPADM

## 2022-04-07 RX ORDER — LEVALBUTEROL INHALATION SOLUTION 1.25 MG/3ML
1.25 SOLUTION RESPIRATORY (INHALATION)
Status: DISCONTINUED | OUTPATIENT
Start: 2022-04-07 | End: 2022-04-08 | Stop reason: HOSPADM

## 2022-04-07 RX ORDER — IPRATROPIUM BROMIDE AND ALBUTEROL SULFATE 2.5; .5 MG/3ML; MG/3ML
3 SOLUTION RESPIRATORY (INHALATION) ONCE
Status: COMPLETED | OUTPATIENT
Start: 2022-04-07 | End: 2022-04-07

## 2022-04-07 RX ORDER — ONDANSETRON 2 MG/ML
4 INJECTION INTRAMUSCULAR; INTRAVENOUS EVERY 6 HOURS PRN
Status: CANCELLED | OUTPATIENT
Start: 2022-04-07

## 2022-04-07 RX ORDER — HYDROMORPHONE HCL/PF 1 MG/ML
0.5 SYRINGE (ML) INJECTION
Status: CANCELLED | OUTPATIENT
Start: 2022-04-07

## 2022-04-07 RX ORDER — FUROSEMIDE 10 MG/ML
20 INJECTION INTRAMUSCULAR; INTRAVENOUS ONCE
Status: COMPLETED | OUTPATIENT
Start: 2022-04-07 | End: 2022-04-07

## 2022-04-07 RX ORDER — LIDOCAINE 50 MG/G
1 PATCH TOPICAL EVERY 24 HOURS
Status: CANCELLED | OUTPATIENT
Start: 2022-04-08

## 2022-04-07 RX ORDER — PHENOBARBITAL SODIUM 130 MG/ML
130 INJECTION INTRAMUSCULAR ONCE
Status: COMPLETED | OUTPATIENT
Start: 2022-04-07 | End: 2022-04-07

## 2022-04-07 RX ORDER — IPRATROPIUM BROMIDE AND ALBUTEROL SULFATE 2.5; .5 MG/3ML; MG/3ML
3 SOLUTION RESPIRATORY (INHALATION)
Status: DISCONTINUED | OUTPATIENT
Start: 2022-04-07 | End: 2022-04-07

## 2022-04-07 RX ORDER — NICOTINE 21 MG/24HR
1 PATCH, TRANSDERMAL 24 HOURS TRANSDERMAL DAILY
Status: DISCONTINUED | OUTPATIENT
Start: 2022-04-07 | End: 2022-04-08 | Stop reason: HOSPADM

## 2022-04-07 RX ORDER — ONDANSETRON 2 MG/ML
4 INJECTION INTRAMUSCULAR; INTRAVENOUS EVERY 8 HOURS PRN
Status: CANCELLED | OUTPATIENT
Start: 2022-04-07

## 2022-04-07 RX ORDER — HYDRALAZINE HYDROCHLORIDE 20 MG/ML
10 INJECTION INTRAMUSCULAR; INTRAVENOUS EVERY 6 HOURS PRN
Status: CANCELLED | OUTPATIENT
Start: 2022-04-07

## 2022-04-07 RX ORDER — LISINOPRIL 10 MG/1
10 TABLET ORAL DAILY
Status: DISCONTINUED | OUTPATIENT
Start: 2022-04-07 | End: 2022-04-07

## 2022-04-07 RX ORDER — OLANZAPINE 10 MG/1
10 INJECTION, POWDER, LYOPHILIZED, FOR SOLUTION INTRAMUSCULAR ONCE
Status: COMPLETED | OUTPATIENT
Start: 2022-04-07 | End: 2022-04-07

## 2022-04-07 RX ORDER — BISACODYL 10 MG
10 SUPPOSITORY, RECTAL RECTAL DAILY PRN
Status: CANCELLED | OUTPATIENT
Start: 2022-04-07

## 2022-04-07 RX ORDER — DEXMEDETOMIDINE HYDROCHLORIDE 4 UG/ML
.1-1.1 INJECTION, SOLUTION INTRAVENOUS
Status: DISCONTINUED | OUTPATIENT
Start: 2022-04-07 | End: 2022-04-08 | Stop reason: HOSPADM

## 2022-04-07 RX ORDER — HEPARIN SODIUM 5000 [USP'U]/ML
5000 INJECTION, SOLUTION INTRAVENOUS; SUBCUTANEOUS EVERY 8 HOURS SCHEDULED
Status: CANCELLED | OUTPATIENT
Start: 2022-04-07

## 2022-04-07 RX ORDER — LORAZEPAM 2 MG/ML
2 INJECTION INTRAMUSCULAR ONCE
Status: COMPLETED | OUTPATIENT
Start: 2022-04-07 | End: 2022-04-07

## 2022-04-07 RX ORDER — PANTOPRAZOLE SODIUM 40 MG/1
40 INJECTION, POWDER, FOR SOLUTION INTRAVENOUS
Status: CANCELLED | OUTPATIENT
Start: 2022-04-08

## 2022-04-07 RX ORDER — AMLODIPINE BESYLATE 5 MG/1
5 TABLET ORAL DAILY
Status: DISCONTINUED | OUTPATIENT
Start: 2022-04-07 | End: 2022-04-07 | Stop reason: HOSPADM

## 2022-04-07 RX ORDER — PANTOPRAZOLE SODIUM 40 MG/1
40 INJECTION, POWDER, FOR SOLUTION INTRAVENOUS
Status: DISCONTINUED | OUTPATIENT
Start: 2022-04-08 | End: 2022-04-08 | Stop reason: HOSPADM

## 2022-04-07 RX ORDER — IPRATROPIUM BROMIDE AND ALBUTEROL SULFATE 2.5; .5 MG/3ML; MG/3ML
3 SOLUTION RESPIRATORY (INHALATION)
Status: CANCELLED | OUTPATIENT
Start: 2022-04-07

## 2022-04-07 RX ORDER — DIAZEPAM 5 MG/ML
5 INJECTION, SOLUTION INTRAMUSCULAR; INTRAVENOUS
Status: CANCELLED | OUTPATIENT
Start: 2022-04-07

## 2022-04-07 RX ORDER — NICOTINE 21 MG/24HR
14 PATCH, TRANSDERMAL 24 HOURS TRANSDERMAL DAILY
Status: CANCELLED | OUTPATIENT
Start: 2022-04-08

## 2022-04-07 RX ORDER — SENNOSIDES 8.6 MG
1 TABLET ORAL
Status: CANCELLED | OUTPATIENT
Start: 2022-04-07

## 2022-04-07 RX ORDER — DIAZEPAM 5 MG/ML
10 INJECTION, SOLUTION INTRAMUSCULAR; INTRAVENOUS
Status: CANCELLED | OUTPATIENT
Start: 2022-04-07

## 2022-04-07 RX ORDER — IPRATROPIUM BROMIDE AND ALBUTEROL SULFATE 2.5; .5 MG/3ML; MG/3ML
3 SOLUTION RESPIRATORY (INHALATION) 3 TIMES DAILY PRN
Status: DISCONTINUED | OUTPATIENT
Start: 2022-04-07 | End: 2022-04-08 | Stop reason: HOSPADM

## 2022-04-07 RX ORDER — NICOTINE 21 MG/24HR
14 PATCH, TRANSDERMAL 24 HOURS TRANSDERMAL DAILY
Status: DISCONTINUED | OUTPATIENT
Start: 2022-04-07 | End: 2022-04-07 | Stop reason: HOSPADM

## 2022-04-07 RX ORDER — POLYETHYLENE GLYCOL 3350 17 G/17G
17 POWDER, FOR SOLUTION ORAL DAILY PRN
Status: DISCONTINUED | OUTPATIENT
Start: 2022-04-07 | End: 2022-04-08 | Stop reason: HOSPADM

## 2022-04-07 RX ORDER — DIAZEPAM 5 MG/ML
10 INJECTION, SOLUTION INTRAMUSCULAR; INTRAVENOUS
Status: DISCONTINUED | OUTPATIENT
Start: 2022-04-07 | End: 2022-04-07 | Stop reason: HOSPADM

## 2022-04-07 RX ORDER — ALBUTEROL SULFATE 2.5 MG/3ML
2.5 SOLUTION RESPIRATORY (INHALATION) EVERY 6 HOURS PRN
Status: CANCELLED | OUTPATIENT
Start: 2022-04-07

## 2022-04-07 RX ORDER — HYDROMORPHONE HCL/PF 1 MG/ML
0.2 SYRINGE (ML) INJECTION
Status: DISCONTINUED | OUTPATIENT
Start: 2022-04-07 | End: 2022-04-08 | Stop reason: HOSPADM

## 2022-04-07 RX ORDER — NICOTINE 21 MG/24HR
1 PATCH, TRANSDERMAL 24 HOURS TRANSDERMAL DAILY
Status: CANCELLED | OUTPATIENT
Start: 2022-04-07

## 2022-04-07 RX ORDER — LISINOPRIL 20 MG/1
20 TABLET ORAL DAILY
Status: CANCELLED | OUTPATIENT
Start: 2022-04-08

## 2022-04-07 RX ORDER — AMLODIPINE BESYLATE 5 MG/1
5 TABLET ORAL DAILY
Status: CANCELLED | OUTPATIENT
Start: 2022-04-08

## 2022-04-07 RX ORDER — IPRATROPIUM BROMIDE AND ALBUTEROL SULFATE 2.5; .5 MG/3ML; MG/3ML
3 SOLUTION RESPIRATORY (INHALATION)
Status: DISCONTINUED | OUTPATIENT
Start: 2022-04-07 | End: 2022-04-07 | Stop reason: HOSPADM

## 2022-04-07 RX ORDER — ALBUTEROL SULFATE 2.5 MG/3ML
2.5 SOLUTION RESPIRATORY (INHALATION) EVERY 6 HOURS PRN
Status: DISCONTINUED | OUTPATIENT
Start: 2022-04-07 | End: 2022-04-07 | Stop reason: HOSPADM

## 2022-04-07 RX ORDER — LIDOCAINE 50 MG/G
1 PATCH TOPICAL EVERY 24 HOURS
Status: DISCONTINUED | OUTPATIENT
Start: 2022-04-08 | End: 2022-04-08 | Stop reason: HOSPADM

## 2022-04-07 RX ORDER — DOCUSATE SODIUM 100 MG/1
100 CAPSULE, LIQUID FILLED ORAL 2 TIMES DAILY
Status: DISCONTINUED | OUTPATIENT
Start: 2022-04-07 | End: 2022-04-08 | Stop reason: HOSPADM

## 2022-04-07 RX ORDER — SIMETHICONE 20 MG/.3ML
40 EMULSION ORAL EVERY 6 HOURS PRN
Status: CANCELLED | OUTPATIENT
Start: 2022-04-07

## 2022-04-07 RX ORDER — OLANZAPINE 10 MG/1
INJECTION, POWDER, LYOPHILIZED, FOR SOLUTION INTRAMUSCULAR
Status: COMPLETED
Start: 2022-04-07 | End: 2022-04-07

## 2022-04-07 RX ADMIN — IPRATROPIUM BROMIDE AND ALBUTEROL SULFATE 3 ML: 2.5; .5 SOLUTION RESPIRATORY (INHALATION) at 04:36

## 2022-04-07 RX ADMIN — ALBUTEROL SULFATE 2.5 MG: 2.5 SOLUTION RESPIRATORY (INHALATION) at 08:25

## 2022-04-07 RX ADMIN — PHENOBARBITAL SODIUM 130 MG: 130 INJECTION INTRAMUSCULAR; INTRAVENOUS at 18:46

## 2022-04-07 RX ADMIN — CHLORDIAZEPOXIDE HYDROCHLORIDE 10 MG: 5 CAPSULE ORAL at 05:04

## 2022-04-07 RX ADMIN — THIAMINE HYDROCHLORIDE 100 MG: 100 INJECTION, SOLUTION INTRAMUSCULAR; INTRAVENOUS at 09:49

## 2022-04-07 RX ADMIN — NICOTINE 14 MG: 14 PATCH, EXTENDED RELEASE TRANSDERMAL at 10:57

## 2022-04-07 RX ADMIN — THIAMINE HYDROCHLORIDE 500 MG: 100 INJECTION, SOLUTION INTRAMUSCULAR; INTRAVENOUS at 17:39

## 2022-04-07 RX ADMIN — DEXMEDETOMIDINE HYDROCHLORIDE 0.1 MCG/KG/HR: 400 INJECTION INTRAVENOUS at 19:46

## 2022-04-07 RX ADMIN — FUROSEMIDE 20 MG: 10 INJECTION, SOLUTION INTRAMUSCULAR; INTRAVENOUS at 05:04

## 2022-04-07 RX ADMIN — LORAZEPAM 2 MG: 2 INJECTION INTRAMUSCULAR; INTRAVENOUS at 02:06

## 2022-04-07 RX ADMIN — POTASSIUM CHLORIDE 20 MEQ: 14.9 INJECTION, SOLUTION INTRAVENOUS at 10:57

## 2022-04-07 RX ADMIN — LORAZEPAM 2 MG: 2 INJECTION INTRAMUSCULAR; INTRAVENOUS at 03:27

## 2022-04-07 RX ADMIN — Medication 650 MG: at 16:26

## 2022-04-07 RX ADMIN — PANTOPRAZOLE SODIUM 40 MG: 40 INJECTION, POWDER, FOR SOLUTION INTRAVENOUS at 09:49

## 2022-04-07 RX ADMIN — POTASSIUM CHLORIDE 20 MEQ: 14.9 INJECTION, SOLUTION INTRAVENOUS at 09:48

## 2022-04-07 RX ADMIN — PHENOBARBITAL SODIUM 130 MG: 130 INJECTION INTRAMUSCULAR; INTRAVENOUS at 19:29

## 2022-04-07 RX ADMIN — HEPARIN SODIUM 5000 UNITS: 5000 INJECTION INTRAVENOUS; SUBCUTANEOUS at 05:04

## 2022-04-07 RX ADMIN — OLANZAPINE 10 MG: 10 INJECTION, POWDER, LYOPHILIZED, FOR SOLUTION INTRAMUSCULAR at 21:22

## 2022-04-07 RX ADMIN — DIAZEPAM 5 MG: 10 INJECTION, SOLUTION INTRAMUSCULAR; INTRAVENOUS at 12:05

## 2022-04-07 RX ADMIN — HEPARIN SODIUM 5000 UNITS: 5000 INJECTION INTRAVENOUS; SUBCUTANEOUS at 13:49

## 2022-04-07 RX ADMIN — SODIUM CHLORIDE, SODIUM LACTATE, POTASSIUM CHLORIDE, AND CALCIUM CHLORIDE 250 ML/HR: .6; .31; .03; .02 INJECTION, SOLUTION INTRAVENOUS at 03:03

## 2022-04-07 RX ADMIN — FUROSEMIDE 20 MG: 10 INJECTION, SOLUTION INTRAVENOUS at 17:40

## 2022-04-07 RX ADMIN — IPRATROPIUM BROMIDE AND ALBUTEROL SULFATE 3 ML: 2.5; .5 SOLUTION RESPIRATORY (INHALATION) at 13:02

## 2022-04-07 RX ADMIN — OLANZAPINE 10 MG: 10 INJECTION, POWDER, FOR SOLUTION INTRAMUSCULAR at 21:22

## 2022-04-07 RX ADMIN — PHENOBARBITAL SODIUM 130 MG: 130 INJECTION INTRAMUSCULAR; INTRAVENOUS at 18:11

## 2022-04-07 RX ADMIN — LIDOCAINE 1 PATCH: 50 PATCH CUTANEOUS at 10:57

## 2022-04-07 RX ADMIN — LISINOPRIL 20 MG: 20 TABLET ORAL at 09:48

## 2022-04-07 RX ADMIN — IPRATROPIUM BROMIDE AND ALBUTEROL SULFATE 3 ML: 2.5; .5 SOLUTION RESPIRATORY (INHALATION) at 18:11

## 2022-04-07 NOTE — H&P
[facility]  H&P- Francisca Adler 1965, 60 y o  female MRN: [id number]  Unit/Bed#: 5T DETOX 739-32 Encounter: [id number]  Primary Care Provider: Zaid Cooley DO   Date and time admitted to hospital: 4/7/2022  3:36 PM     HISTORY & PHYSICAL EXAM  DEPARTMENT OF MEDICAL TOXICOLOGY  LEVEL 4 MEDICAL DETOX UNIT  Francisca Adler 60 y o  female MRN: [id number]  Unit/Bed#: 5T DETOX 518-01 Encounter: [id number]    Reason for Admission/Principal Problem: Ethanol withdrawal, Ethanol use disorder  Admitting Provider: Kenyetta Milian PA-C  Attending Provider: Katerina Be MD   4/7/2022  3:36 PM    * Delirium tremens [facility])  Assessment & Plan  H/o chronic daily alcohol consumption, denies h/o withdrawal seizures/DTs  Admitted to [facility] med surg unit x 2 days for acute alcoholic pancreatitis  · During that admission, patient managed on CIWA protocol: received Librium 30 mg total, Ativan 8 mg total in Valium 5 mg total  · Per chart review, patient became agitated, combative, and confused overnight in setting of alcohol withdrawal  · Transfer to [facility] detox unit for further management of severe alcohol withdrawal  Per patient, Last drink was Friday 04/01/2022  Ethanol 54 (04/05/2022, 12:30 a m )  Continue 1:1 observation for safety  Follow SEWS protocol with symptom-triggered phenobarbital for medically-assisted alcohol withdrawal  Current symptoms include disorientation, mild intention tremor, anxiety, tachycardia, hypertension  Administer 650 mg phenobarbital  Continue to monitor vitals, symptoms, sedation status  Consider adjunctive Precedex or ketamine drip as indicated  **ADDENDUM** - patient has thus far received 910 mg total phenobarbital   Despite treatment, patient increasingly disoriented, impulsive, repeatedly trying to get out of bed to leave, ataxic gait   · Initiate precedex drip and titrate for goal RASS -2  · Per discussion with pharmacy- unable to further concentrate precedex formulation  · Monitor closely for fluid overload/respiratory distress  · Continue 1:1 observation for safety   · Place 2-point soft restraints for safety (b/l UEs)  · Continue to monitor vitals, symptoms, sedation     Acute respiratory failure with hypoxia (HCC)  Assessment & Plan  · Per chart review, overnight patient had episode of hypoxia with increased work of breathing  · Subsequently placed on 6 L O2 via NC  · CXR 4/7/2022: "Low lung volumes with bibasilar atelectasis "  · ECHO 4/7/2022: "Left Ventricle: Normal left ventricular wall thickness, cavity size, and systolic function  Estimated ejection fraction 55-60%  No clear wall motion abnormality identified    Diastolic characterization was not performed "  · Given Lasix 20 mg IV for suspected pulmonary congestion, fluid stopped, DuoNebs  · Upon admission, SpO2 89% on room air  · Bibasilar expiratory wheezing on exam, diminished breath sounds  · Subsequently placed on supplemental O2 via NC- currently maintained on 3 L   · Additional 20 mg Lasix IV administered  · Respiratory protocol, incentive spirometry  · Respiratory therapy contacted for nebulizer treatment  · Continue nebulizer treatment  · Continue to monitor vitals, symptoms    Alcohol-induced acute pancreatitis  Assessment & Plan  · Patient originally presented to [facility] ED 04/05/2022 for evaluation of severe abdominal pain and multiple episodes of vomiting in setting of chronic frequent alcohol consumption  · CT abdomen/pelvis in the ED revealed acute pancreatitis  · Other pertinent labs  · Amylase 96  · Triglycerides 333 -> 290  · Patient subsequently admitted to [facility] med surge unit for treatment of acute pancreatitis  · GI was consulted during that admission- per most recent recommendations, hold fluids in light of respiratory status, keep NPO until mental status improves  · Continue to hold fluids in setting of respiratory failure  · NPO diet- advance as tolerated  · Initial lipase 53,700, trended down to 13,119 (4/5/2022)  · Rechecked lipase upon admission:  Improved to 1466  · Continue to trend lipase  · Supportive care- Zofran as needed, Dilaudid as needed  · PRN Narcan ordered for respiratory depression (remain cautious with Dilaudid especially while on Precedex)    Ataxia  Assessment & Plan  · Patient gait notably ataxic on admission  · Finger-to-nose ataxia appreciated as well  · Likely secondary to severe alcohol withdrawal versus Wernicke's  · Initiate high-dose thiamine  · Head CT ordered however patient uncooperative with procedure  · Consider brain MRI once patient more cooperative  · Fall precautions  · 1:1 continues observation for safety    Alcohol use disorder, severe, dependence ([facility] [address] )  Assessment & Plan  Patient with h/o chronic daily alcohol use  Reports currently consuming 4-5 mixed drinks daily (1 shot whiskey per drink)  · Per reports from significant other-patient consumes 6-7 doubles daily  Patient states last drink was Friday 04/01/2022  Reports most recently sober for approx  1 yr until relapse 1 5 yrs ago  Pertinent labs:  CBC 4/7/2022: hgb 13 9, MCV 97, platelets 669  CMP 3/3/5917: sodium 136, K+ 3 4  anion gap 7  LFTs- AST 33, ALT 31   Ethanol 54 (04/05/2022, 12:30 a m )  Initiate thiamine/folic acid supplementation/multivitamin   Follow SEWS protocol for medically-assisted alcohol withdrawal  Consult case management for assistance with rehab resources    Hypertension  Assessment & Plan  · Patient with history of hypertension  · Outpatient regimen includes lisinopril 10 mg daily  · BP on admission 161/82  · Will hold antihypertensives in setting of acute alcohol withdrawal and treatment with phenobarbital  · Continue to monitor vitals, BP  · Resume home antihypertensive regimen once alcohol withdrawal resolved    Hypokalemia  Assessment & Plan  · CMP is a m   Revealed potassium of 3 4  · Magnesium 1 7  · Potassium supplementation administered  · Continue to monitor electrolytes and replete as necessary    Leukocytosis  Assessment & Plan  · CBC 04/07/2022: WBCs 12 23  · CBC 04/05/2022:  WBCs 12 4, ANC 8 55  · Likely secondary to systemic stress reaction secondary to acute pancreatitis  · Afebrile on admission  · Continue to monitor CBC, repeat in AM    Hyperlipidemia  Assessment & Plan  · Outpatient regimen includes Lipitor 40 mg daily  · Resume outpatient regimen once able to tolerate PO    Gastroesophageal reflux disease without esophagitis  Assessment & Plan  · Continue Protonix    Hepatic steatosis  Assessment & Plan  · CT abd/pelvis 4/5/2022: "Mild hepatic steatosis "  · Likely secondary to chronic alcohol consumption  · CMP this a m  Revealed stable LFTs:  AST 33, ALT 31, alk-phos 65  · Encourage alcohol cessation     Current every day nicotine vaping  Assessment & Plan  · Reports daily nicotine vaping  · Encourage cessation  · Offer nicotine replacement    Other constipation  Assessment & Plan  · Per chart review- patient reported no BM x 3 days  · CT abdomen/pelvis: "No bowel obstruction  Mild colonic diverticulosis without evidence of acute diverticulitis "  · On exam: +BS x 4, distension  · Scheduled Colace b i d , MiraLax as needed  · Continue to monitor       VTE Prophylaxis: Enoxaparin (Lovenox)  / sequential compression device   Code Status: level 1 full code    Anticipated Length of Stay:  Patient will be admitted on an Inpatient basis with an anticipated length of stay of  >2  midnights  Justification for Hospital Stay: ongoing management of medically-assisted alcohol withdrawal      For any questions or concerns, please Tiger Text the advanced practitioner in the role of [facility]-DETOX-AP On Call      This patient qualifies for Level IV medically managed intensive inpatient services under the criteria set by the American Society of Addiction Medicine, including dimensions 1-3   The patient is in withdrawal (or is intoxicated with high risk of withdrawal), with severe and unstable medical and/or psychiatric (dual diagnosis) problems, requiring requires 24-hour medical and nursing care and the full resources of a [address] [place] patient to medical detox unit and continue supportive care and stabilization of acute ethanol withdrawal per medical toxicology/detox treatment pathway  Monitor ethanol withdrawal severity via the Severity of Ethanol Withdrawal Scale (SEWS) Q4 hours and then hourly if/when SEWS > 6  Treat withdrawal per pathway and reassess Q30-60 minutes  Mild SEWS Score 1-6  Administer medications* (IV or PO; PO preferred):   If initial SEWS score: diazepam 10mg PO/IV x 1 AND phenobarbital 65 mg PO/IV x 1   If repeat SEWS score 1-6: phenobarbital 65 mg PO/IV q1 hour x 5 doses maximum   Reassessment:    SEWS q1 hour after each dose until SEWS 0 x 2 hours   VS q1 hours (until SEWS 0, then q4 hours)   Notify provider for bedside evaluation if 5-dose maximum is reached, RASS of -3 to -5, or SEWS score escalates to moderate or severe  Moderate SEWS Score 7-12  Administer medications* (IV):   If initial SEWS score: diazepam 10mg IV x 1 AND phenobarbital 260 mg IV x 1   If repeat SEWS score 7-12 or score escalated from mild: phenobarbital 130 mg IV q30 minutes x 5 doses maximum   Reassessment:   SEWS q30 minutes after each dose until SEWS < 7 (then hourly until SEWS 0 x 2 hours)   VS q30 minutes until SEWS < 7 (then hourly until SEWS 0, then q4 hours)   Notify provider for bedside evaluation if 5-dose maximum is reached, RASS of -3 to -5, or SEWS score escalates to severe  Severe SEWS Score ? 13  Administer medications* (IV):   If initial SEWS score: Diazepam 10 mg IV x 1 AND phenobarbital 650 mg IV piggyback x 1 over 15-30 minutes   If repeat SEWS score ?  13 or score escalated from mild or moderate: phenobarbital 130 mg IV q30 minutes x 5 doses maximum Reassessment:   SEWS q30 minutes after each dose until SEWS < 7 (then hourly until SEWS 0 x 2 hours)    VS q30 minutes until SEWS < 7 (then hourly until SEWS 0, then q4 hours)   Notify provider for bedside evaluation if 5-dose maximum is reached or RASS of -3 to -5   *Hold medications and notify provider if CNS depression, respirations < 10/min, or RASS of -3 to -5  Medications to be administered adjunctively if more than 2 grams of phenobarbital is needed for stabilization of withdrawal; require attending approval     Dexmedetomidine infusion 0 1-1mcg/kg/hr IV infusion, titratable to reduced agitation (Goal: RASS -2)   Ketamine   o Acute agitated delirium: 1-2 mg/kg IV or 4-5 mg/kg IM  o Refractory withdrawal: 0 1-1mg/kg/hr IV infusion, titratable to reduced agitation (Goal: RASS -2)    Further evaluation, screening and treatment:  Evaluate complete metabolic panel, transaminases, INR, and lipase  Assess hepatic ultrasound for any sign of alcoholic liver disease or cirrhosis, and ultimately refer for further hepatic evaluation and care as/if indicated  Additional medications for ethanol associated malnutrition: Thiamine 100 mg IV daily, increase to 500 mg TID for signs/symptoms of Wernicke's Encephalopathy or Wernicke Korsakoff Syndrome   Folic acid 1 mg IV daily   Multivitamin PO daily      Will offer first monthly injection of Naltrexone 380 mg IM, once patient is stabilized, as it has been shown to assist in decreasing cravings for ethanol  Evaluate and treat for coexisting substance use, such as opioids and nicotine  Discuss risk factors for infectious disease, such as history of intravenous drug abuse, and offer hepatitis and HIV screening if indicated  Case management consultation to assist with coordination of subsequent treatment after discharge        Hx and PE limited by: delirium     HPI: Francisca Adler is a 60y o  year old female PMH HTN, HLD, history of alcohol induced pancreatitis, hepatic steatosis AUD who presents with complicated alcohol withdrawal   Patient originally presented to [facility] ED 04/05/2022 for evaluation of severe abdominal pain and multiple episodes of vomiting in setting of chronic frequent alcohol consumption  Patient was diagnosed with acute pancreatitis (initial lipase greater than 50,000) and subsequently admitted to the med surge unit at [facility]  for further treatment  GI was consulted for assistance with management of acute pancreatitis  Managed with p r n  Dilaudid, diet NPO  CIWA protocol was followed during this admission  In total, patient received 30 mg every a m , 8 mg Ativan, and 5 mg Valium  Patient was noted to have acute worsening of alcohol withdrawal in 24 hours preceding transfer to [facility] detox Unit  Per chart review, patient experienced agitation, disorientation, impulsive behavior  Additionally, patient had acute respiratory failure in setting of fluid overload overnight; IV fluids were subsequently discontinued, patient was placed on supplemental O2, received nebulizer treatment, echo overall negative, chest x-ray suggesting pulmonary congestion, Lasix administered  Patient was subsequently transferred to [facility] detox unit for further management of alcohol withdrawa  Upon admission, patient presents confused, and disoriented to place and situation, exhibiting mild intention tremors  Presentation concerning for delirium tremens  SEWS protocol was initiated with symptom triggered phenobarbital   Patient also noted to be hypoxic on admission, patient placed on supplemental O2 via nasal cannula and respiratory therapy came to see the patient to administer nebulizer treatment  Later in the evening, patient was increasingly disoriented and impulsive, attempting to get out of bed to leave, notably ataxic gait  Patient with limited response to phenobarbital treatment (received 910 mg total so far)    Precedex subsequently initiated  During initial encounter, patient confused as to why she is currently here  Unable to tell this provider why she recently presented to the hospital in the 1st place 2 days ago  Reports that she typically consumes 4-6 mixed drinks made with 1-2 shots of whiskey daily for 8 years  Reports intermittent periods of sobriety during that time, most recently for approximately 1 year a year and a half ago  Preferred alcoholic beverage(s): whiskey  Quantity and frequency of alcohol intake: 4-6 mixed drinks daily (1-2 shots per drink); per significant other, 6-7 "doubles" daily   Use of any ethanol substitutes (toxic alcohols): no  Date/Time of last alcohol intake: per patient- 2022  Current signs and symptoms of ethanol withdrawal: anxiety, tremor, tachycardia, hypertension, disorientation and delirium    SEWS Total Score: 14 (2022  4:00 PM)    Ethanol Withdrawal History  Previous ethanol withdrawal? yes  Prior inpatient treatment for ethanol withdrawal? no  Prior outpatient treatment for ethanol withdrawal? no  History of seizures with prior ethanol withdrawal? no  Prior treatment with naltrexone (Vivitrol)? no  Current treatment with naltrexone (Vivitrol)? no  Other current treatment for ethanol use disorder? no  Co-existing substance use? No- denies current use of marijuana, meth, cocaine, heroin, opioids  Denies history of IV drug use      Review of PDMP: no     Social History     Substance and Sexual Activity   Alcohol Use Yes    Alcohol/week: 12 0 standard drinks    Types: 12 Shots of liquor per week    Comment: daily 4-8 whiskey drinks (doubles)     Social History     Substance and Sexual Activity   Drug Use Never     Social History     Tobacco Use   Smoking Status Current Every Day Smoker    Types: Cigarettes    Last attempt to quit: 2017    Years since quittin 2   Smokeless Tobacco Never Used   Tobacco Comment    vape       Review of Systems   Unable to perform ROS: Acuity of condition (confusion, delirium)   Constitutional: Negative for appetite change, chills and fever  HENT: Negative for congestion, ear pain, sneezing and sore throat  Eyes: Negative for pain and visual disturbance  Respiratory: Positive for shortness of breath and wheezing  Negative for cough  Cardiovascular: Negative for chest pain and palpitations  Gastrointestinal: Positive for abdominal pain (initially denied abominal pain during H&P; later reported to nursing that she was having abdominal discomfort and bloating )  Negative for abdominal distention, constipation, diarrhea, nausea and vomiting  Genitourinary: Negative for difficulty urinating, dysuria and hematuria  Musculoskeletal: Negative for arthralgias and back pain  Skin: Negative for color change and rash  Neurological: Negative for tremors, seizures, syncope, light-headedness and headaches  Psychiatric/Behavioral: Positive for confusion (unsure of why she is here and unsure why she originally presented to the hospital 2 day ago )  The patient is nervous/anxious  All other systems reviewed and are negative  Historical Information   Past Medical History:   Diagnosis Date    Alcohol abuse     High cholesterol     Hypertension     Pancreatitis      Past Surgical History:   Procedure Laterality Date    FOOT SURGERY       No family history on file  Social History   Marital Status: Single   Occupation: unemployed  Patient Pre-hospital Living Situation: home with daughter and significant other  Patient Pre-hospital Level of Mobility: independent   Patient Pre-hospital Diet Restrictions: none    Allergies   Allergen Reactions    Latex Rash    Neomycin-Bacitracin Zn-Polymyx Rash       Prior to Admission medications    Medication Sig Start Date End Date Taking?  Authorizing Provider   atorvastatin (LIPITOR) 40 mg tablet 40 mg daily    Historical Provider, MD   lisinopril (ZESTRIL) 10 mg tablet Take 10 mg by mouth daily 5/27/21   Historical Provider, MD   ondansetron (ZOFRAN) 4 mg tablet Take 1 tablet (4 mg total) by mouth every 6 (six) hours as needed for nausea or vomiting 9/14/21   Apolonio Leyden, DO   pantoprazole (PROTONIX) 40 mg tablet Take 40 mg by mouth daily 9/2/21   Historical Provider, MD       Current Facility-Administered Medications   Medication Dose Route Frequency    dexmedeTOMIDine (Precedex) 400 mcg in sodium chloride 0 9% 100 mL  0 1-0 7 mcg/kg/hr Intravenous Titrated    docusate sodium (COLACE) capsule 100 mg  100 mg Oral BID    enoxaparin (LOVENOX) subcutaneous injection 40 mg  40 mg Subcutaneous Daily    [START ON 7/7/4616] folic acid 1 mg in sodium chloride 0 9 % 50 mL IVPB  1 mg Intravenous Daily    HYDROmorphone (DILAUDID) injection 0 2 mg  0 2 mg Intravenous Q3H PRN    ipratropium (ATROVENT) 0 02 % inhalation solution 0 5 mg  0 5 mg Nebulization TID    ipratropium-albuterol (DUO-NEB) 0 5-2 5 mg/3 mL inhalation solution 3 mL  3 mL Nebulization TID PRN    levalbuterol (XOPENEX) inhalation solution 1 25 mg  1 25 mg Nebulization TID    [START ON 4/8/2022] lidocaine (LIDODERM) 5 % patch 1 patch  1 patch Topical Q24H    multivitamin-minerals (CENTRUM) tablet 1 tablet  1 tablet Oral Daily    naloxone (NARCAN) 0 04 mg/mL syringe 0 04 mg  0 04 mg Intravenous Q1MIN PRN    nicotine (NICODERM CQ) 14 mg/24hr TD 24 hr patch 1 patch  1 patch Transdermal Daily    ondansetron (ZOFRAN) injection 4 mg  4 mg Intravenous Q6H PRN    [START ON 4/8/2022] pantoprazole (PROTONIX) injection 40 mg  40 mg Intravenous Q24H [address]    polyethylene glycol (MIRALAX) packet 17 g  17 g Oral Daily PRN    thiamine (VITAMIN B1) 500 mg in sodium chloride 0 9 % 50 mL IVPB  500 mg Intravenous Q8H       Continuous Infusions:dexmedetomidine, 0 1-0 7 mcg/kg/hr, Last Rate: 0 3 mcg/kg/hr (04/07/22 2031)       Objective     No intake or output data in the 24 hours ending 04/07/22 2037    Invasive Devices:   Peripheral IV 04/06/22 Right;Ventral (anterior) Forearm (Active)   Site Assessment WDL 04/07/22 1400   Dressing Status Clean;Dry; Intact 04/07/22 1400       Vitals   Vitals:    04/07/22 1540   BP: 161/82   TempSrc: Temporal   Pulse: (!) 129   Resp: 20   Patient Position - Orthostatic VS: Lying   Temp: 98 8 °F (37 1 °C)       Physical Exam  Vitals reviewed  Constitutional:       General: She is not in acute distress  Appearance: Normal appearance  She is normal weight  She is not ill-appearing or diaphoretic  HENT:      Head: Normocephalic and atraumatic  Nose: Nose normal  No rhinorrhea  Mouth/Throat:      Mouth: Mucous membranes are moist       Pharynx: Oropharynx is clear  Eyes:      General: No scleral icterus  Extraocular Movements: Extraocular movements intact  Right eye: No nystagmus  Left eye: No nystagmus  Conjunctiva/sclera: Conjunctivae normal       Pupils: Pupils are equal, round, and reactive to light  Cardiovascular:      Rate and Rhythm: Regular rhythm  Tachycardia present  Pulses: Normal pulses  Heart sounds: Normal heart sounds  No murmur heard  No friction rub  No gallop  Pulmonary:      Effort: Pulmonary effort is normal  No respiratory distress  Breath sounds: Examination of the right-lower field reveals decreased breath sounds, wheezing and rales  Examination of the left-lower field reveals decreased breath sounds, wheezing and rales  Decreased breath sounds, wheezing and rales present  No rhonchi  Abdominal:      General: Abdomen is flat  Bowel sounds are normal  There is distension  Palpations: Abdomen is soft  Tenderness: There is no abdominal tenderness  There is no guarding  Musculoskeletal:         General: No swelling  Normal range of motion  Cervical back: Normal range of motion  Right lower leg: No edema  Left lower leg: No edema  Skin:     General: Skin is warm and dry        Capillary Refill: Capillary refill takes less than 2 seconds  Neurological:      General: No focal deficit present  Mental Status: She is alert  Mental status is at baseline  Sensory: No sensory deficit  Motor: Tremor (mild intention tremor) present  Coordination: Finger-Nose-Finger Test abnormal (mild ataxia)  Gait: Gait abnormal (Notably ataxic)  Comments: Oriented to person and time, disoriented to location and situation   Psychiatric:         Attention and Perception: Attention normal          Mood and Affect: Mood is anxious  Speech: Speech normal          Behavior: Behavior is cooperative  Judgment: Judgment is impulsive  Comments: Appears confused, unsure why she is here       Data:    EKG, Pathology, and Other Studies: I have personally reviewed pertinent reports  · EKG: " Sinus tachycardia  Cannot rule out Anterior infarct , age undetermined  Nonspecific T wave changes  Abnormal ECG  When compared with ECG of 14-SEP-2021 18:31, Vent  rate has increased BY  54 BPM  Minimal criteria for Anterior infarct are now Present " (Confirmed by Catheline Dial ([id number]) on 4/7/2022 8:40:07 AM)  · ECHO 4/7/2022: "Left Ventricle: Normal left ventricular wall thickness, cavity size, and systolic function  Estimated ejection fraction 55-60%  No clear wall motion abnormality identified  Diastolic characterization was not performed "  · CXR 4/7/2022: "Low lung volumes with bibasilar atelectasis "  · RUQ U/S 4/5/2022: "1  Moderate hepatic steatosis with mild perihepatic ascites  2   Slight gallbladder wall thickening, likely reactive  No stones or Lauren's sign "  · CT abd/pelvis 4/5/2022: "peripancreatic inflammation and there is fluid adjacent to the pancreas and tracking down the mesenteric root  Fluid also tracks posteriorly adjacent to the spleen    There is no evidence of pancreatic pseudocyst  Acute pancreatitis, as described above "    Lab Results:  CBC ETOH     Lab Results   Component Value Date    WBC 12 23 (H) 04/07/2022    RBC 4 19 04/07/2022    HGB 13 9 04/07/2022    HCT 40 5 04/07/2022    MCV 97 04/07/2022    MCH 33 2 04/07/2022    MCHC 34 3 04/07/2022    RDW 12 2 04/07/2022     04/07/2022    MPV 9 5 04/07/2022      Lab Results   Component Value Date    LACTICACID 2 1 (HH) 04/05/2022      CMP UA         Component Value Date/Time    K 3 4 (L) 04/07/2022 0502     04/07/2022 0502    CO2 28 04/07/2022 0502    BUN 7 04/07/2022 0502    CREATININE 0 64 04/07/2022 0502         Component Value Date/Time    CALCIUM 8 0 (L) 04/07/2022 0502    ALKPHOS 65 04/07/2022 0502    AST 33 04/07/2022 0502    ALT 31 04/07/2022 0502      Lab Results   Component Value Date    CLARITYU Clear 04/05/2022    COLORU Straw 04/05/2022    SPECGRAV 1 015 04/05/2022    PHUR 5 0 04/05/2022    GLUCOSEU Negative 04/05/2022    KETONESU Negative 04/05/2022    BLOODU Negative 04/05/2022    PROTEIN UA Negative 04/05/2022    NITRITE Negative 04/05/2022    BILIRUBINUR Negative 04/05/2022    UROBILINOGEN 0 2 04/05/2022    LEUKOCYTESUR Negative 04/05/2022        Liver Function Test: ASA     Lab Results   Component Value Date    TBILI 1 15 (H) 04/07/2022    ALKPHOS 65 04/07/2022    AST 33 04/07/2022    ALT 31 04/07/2022    TP 6 4 04/07/2022    ALB 2 8 (L) 04/07/2022      No results found for: SALICYLATE   Troponin APAP     Lab Results   Component Value Date    TROPONINI <0 02 09/14/2021      No results found for: ACTMNPHEN   VBG HCG     No results found for: PHVEN, PUN8RIZ, PO2VEN, ZFP7NFS, BEVEN, T0JXPKBAH, J9LRMIR   No results found for: HCGQUANT   ABG Urine Drug Screen     No results found for: PHART, XXH4YYE, PO2ART, RJO7CWP, BEART, Y1QUQLOJM, O2HGB, SOURC, KEY, VTAC, ACRATE, INSPIREDAIR, PEEP   No results found for: AMPMETHUR, BARBTUR, BDZUR, COCAINEUR, METHADONEUR, OPIATEUR, PCPUR, THCUR, OXYCODONEUR   Lactate INR     Lab Results   Component Value Date    LACTICACID 2 1 ([facility]) 04/05/2022      No results found for: INR   PTT Protime     No results found for: PTT     No results found for: PROTIME       Imaging Studies: I have personally reviewed pertinent reports  Counseling / Coordination of Care  Total floor / unit time spent today 85 minutes  Greater than 50% of total time was spent with the patient and / or family counseling and / or coordination of care  Minutes of critical care time 39  -Critical care time was exclusive of separately billable procedures and teaching time    -Critical care was necessary to treat or prevent imminent or life-threatening deterioration of the following condition: CNS failure/compromise, toxidrome (ethanol withdrawal),  withdrawal and respiratory failure  -Critical care time was spent personally by me on the following activities as well as the above as per the course and rest of chart: obtaining history from patient/surrogate, development of a treatment plan, discussions with referring provider(s), evaluation of patient's response to the treatment, examination of the patient, performing treatments and interventions, re-evaluation of the patient's condition, review of old charts, ordering/interpreting laboratory studies, ordering/interpreting of radiographic studies  ** Please Note: This note has been constructed using a voice recognition system   **

## 2022-04-07 NOTE — ASSESSMENT & PLAN NOTE
· CMP is a m   Revealed potassium of 3 4  · Magnesium 1 7  · Potassium supplementation administered  · Continue to monitor electrolytes and replete as necessary

## 2022-04-07 NOTE — NURSING NOTE
This RN was notified that pt had been walking in the hallway with blood everywhere  Upon assessment, pt had ripped IV tubing and blood coming out of new IV site  Pt disoriented to situation, no anxiety, however, slightly agitated  CIWA score assessed and is a 5 at this time  Pt originally independent in the room, now pt is bed alarmed with call bell in reach  Will continue to monitor

## 2022-04-07 NOTE — ASSESSMENT & PLAN NOTE
Presents with severe abdominal pain and multiple episodes of vomiting that started the day of admission  Admits to recent heavy drinking 4-6 whiskey drinks per day with est  2 shots per drink, just came back from vacation with heavy drinking  Prior episode of pancreatitis, has been sober from alcohol intermittently in the past       CT abdomen/pelvis revealing "Acute pancreatitis, no evidence of pancreatic pseudocyst   Mild hepatic steatosis  Atherosclerosis "   Lipase 53,700   TG level 333    Calcium 8 6   Ethanol 54   Mild leukocytosis 12->11 trending down  Lactate 3 1-> 2 1  Afebrile and does not meet sepsis criteria  Will hold antibiotics at this time   IVF hydration- D/c'd CXR with pulmonary vascular congestion, given 20 mg IV Lasix 4/7   pain control, antiemetics    Keep NPO   Monitor electrolytes and blood sugar closely and correct as indicated     [facility] consulted; recommendations appreciated -maintain urine outputs 0 5 milliliters/kilogram hr

## 2022-04-07 NOTE — ASSESSMENT & PLAN NOTE
· CBC 04/07/2022: WBCs 12 23  · CBC 04/05/2022:  WBCs 12 4, ANC 8 55  · Likely secondary to systemic stress reaction secondary to acute pancreatitis  · Afebrile on admission  · Continue to monitor CBC, repeat in AM

## 2022-04-07 NOTE — QUICK NOTE
Called to see patient with respiratory distress, hypoxia, tachycardia, wheezing diffusely throughout all lung fields  Intake/output patient is positive 6 L  Appears very hypervolemic  Unknown EF in setting of alcohol abuse possible cardiomyopathy  Discontinue IV fluids  Check chest x-ray  Check EKG, troponin, BNP  Check echocardiogram  DuoNeb x1  Lasix 20 mg IV x1  Place Mccullough catheter if needed as patient is extremely high risk for fall due to alcohol withdrawal and high doses of benzodiazepines

## 2022-04-07 NOTE — ASSESSMENT & PLAN NOTE
· Patient originally presented to [facility] ED 04/05/2022 for evaluation of severe abdominal pain and multiple episodes of vomiting in setting of chronic frequent alcohol consumption  · CT abdomen/pelvis in the ED revealed acute pancreatitis  · Other pertinent labs  · Amylase 96  · Triglycerides 333 -> 290  · Patient subsequently admitted to [facility] surge unit for treatment of acute pancreatitis  · GI was consulted during that admission- per most recent recommendations, hold fluids in light of respiratory status, keep NPO until mental status improves  · Continue to hold fluids in setting of respiratory failure  · NPO diet- advance as tolerated  · Initial lipase 53,700, trended down to 13,119 (4/5/2022)  · Rechecked lipase upon admission:  Improved to 1466  · Continue to trend lipase  · Supportive care- Zofran as needed, Dilaudid as needed  · PRN Narcan ordered for respiratory depression (remain cautious with Dilaudid especially while on Precedex)

## 2022-04-07 NOTE — PLAN OF CARE
Problem: Potential for Falls  Goal: Patient will remain free of falls  Description: INTERVENTIONS:  - Educate patient/family on patient safety including physical limitations  - Instruct patient to call for assistance with activity   - Consult OT/PT to assist with strengthening/mobility   - Keep Call bell within reach  - Keep bed low and locked with side rails adjusted as appropriate  - Keep care items and personal belongings within reach  - Initiate and maintain comfort rounds  - Make Fall Risk Sign visible to staff    - Apply yellow socks and bracelet for high fall risk patients  - Consider moving patient to room near nurses station  Outcome: Progressing     Problem: Nutrition/Hydration-ADULT  Goal: Nutrient/Hydration intake appropriate for improving, restoring or maintaining nutritional needs  Description: Monitor and assess patient's nutrition/hydration status for malnutrition  Collaborate with interdisciplinary team and initiate plan and interventions as ordered  Monitor patient's weight and dietary intake as ordered or per policy  Utilize nutrition screening tool and intervene as necessary  Determine patient's food preferences and provide high-protein, high-caloric foods as appropriate       INTERVENTIONS:  - Monitor oral intake, urinary output, labs, and treatment plans  - Assess nutrition and hydration status and recommend course of action  - Evaluate amount of meals eaten  - Assist patient with eating if necessary   - Allow adequate time for meals  - Recommend/ encourage appropriate diets, oral nutritional supplements, and vitamin/mineral supplements  - Order, calculate, and assess calorie counts as needed  - Recommend, monitor, and adjust tube feedings and TPN/PPN based on assessed needs  - Assess need for intravenous fluids  - Provide specific nutrition/hydration education as appropriate  - Include patient/family/caregiver in decisions related to nutrition  Outcome: Progressing     Problem: PAIN - ADULT  Goal: Verbalizes/displays adequate comfort level or baseline comfort level  Description: Interventions:  - Encourage patient to monitor pain and request assistance  - Assess pain using appropriate pain scale  - Administer analgesics based on type and severity of pain and evaluate response  - Implement non-pharmacological measures as appropriate and evaluate response  - Consider cultural and social influences on pain and pain management  - Notify physician/advanced practitioner if interventions unsuccessful or patient reports new pain  Outcome: Progressing     Problem: INFECTION - ADULT  Goal: Absence or prevention of progression during hospitalization  Description: INTERVENTIONS:  - Assess and monitor for signs and symptoms of infection  - Monitor lab/diagnostic results  - Monitor all insertion sites, i e  indwelling lines, tubes, and drains  - Monitor endotracheal if appropriate and nasal secretions for changes in amount and color  - Slade appropriate cooling/warming therapies per order  - Administer medications as ordered  - Instruct and encourage patient and family to use good hand hygiene technique  - Identify and instruct in appropriate isolation precautions for identified infection/condition  Outcome: Progressing     Problem: DISCHARGE PLANNING  Goal: Discharge to home or other facility with appropriate resources  Description: INTERVENTIONS:  - Identify barriers to discharge w/patient and caregiver  - Arrange for needed discharge resources and transportation as appropriate  - Identify discharge learning needs (meds, wound care, etc )  - Arrange for interpretive services to assist at discharge as needed  - Refer to Case Management Department for coordinating discharge planning if the patient needs post-hospital services based on physician/advanced practitioner order or complex needs related to functional status, cognitive ability, or social support system  Outcome: Progressing     Problem: Knowledge Deficit  Goal: Patient/family/caregiver demonstrates understanding of disease process, treatment plan, medications, and discharge instructions  Description: Complete learning assessment and assess knowledge base    Interventions:  - Provide teaching at level of understanding  - Provide teaching via preferred learning methods  Outcome: Progressing

## 2022-04-07 NOTE — ASSESSMENT & PLAN NOTE
· Per chart review- patient reported no BM x 3 days  · CT abdomen/pelvis: "No bowel obstruction    Mild colonic diverticulosis without evidence of acute diverticulitis "  · On exam: +BS x 4, distension  · Scheduled Colace b i d , MiraLax as needed  · Continue to monitor

## 2022-04-07 NOTE — RESPIRATORY THERAPY NOTE
RT Protocol Note  Francisca Jansen 60 y o  female MRN: [id number]  Unit/Bed#: 5T DETOX 518-01 Encounter: [id number]    Assessment    Principal Problem:    Alcohol withdrawal syndrome, with delirium ([address] )  Active Problems:    Alcohol-induced acute pancreatitis    Other constipation    Acute respiratory failure with hypoxia (HCC)    Alcohol use disorder, severe, dependence (HCC)      Home Pulmonary Medications:  No home resp treatments at home       Past Medical History:   Diagnosis Date    Alcohol abuse     High cholesterol     Hypertension     Pancreatitis      Social History     Socioeconomic History    Marital status: Single     Spouse name: Not on file    Number of children: Not on file    Years of education: Not on file    Highest education level: Not on file   Occupational History    Not on file   Tobacco Use    Smoking status: Current Every Day Smoker     Types: Cigarettes     Last attempt to quit: 2017     Years since quittin 2    Smokeless tobacco: Never Used    Tobacco comment: vape   Vaping Use    Vaping Use: Every day    Substances: Nicotine, Flavoring   Substance and Sexual Activity    Alcohol use:  Yes     Alcohol/week: 12 0 standard drinks     Types: 12 Shots of liquor per week     Comment: daily 4-8 whiskey drinks (doubles)    Drug use: Never    Sexual activity: Not on file   Other Topics Concern    Not on file   Social History Narrative    Not on file     Social Determinants of Health     Financial Resource Strain: Not on file   Food Insecurity: Not on file   Transportation Needs: Not on file   Physical Activity: Not on file   Stress: Not on file   Social Connections: Not on file   Intimate Partner Violence: Not on file   Housing Stability: Not on file       Subjective  Pt very confused at this time insp and exp wheeze noted       Objective    Physical Exam:   Assessment Type: Pre-treatment  General Appearance: Drowsy  Respiratory Pattern: Labored  Chest Assessment: Chest expansion symmetrical  Bilateral Breath Sounds: Expiratory wheezes,Inspiratory wheezes    Vitals:  Blood pressure 148/78, pulse (!) 123, temperature 100 °F (37 8 °C), temperature source Oral, resp  rate 20, height 5' 8" (1 727 m), weight 92 1 kg (203 lb 0 7 oz), SpO2 92 %  Imaging and other studies: I have personally reviewed pertinent reports              Plan  Changed resp meds to xopenex and atrovent tid monitor o2 needs, advise to quit smoking  Respiratory Plan: Mild Distress pathway        Resp Comments: pt very confused at this time

## 2022-04-07 NOTE — ASSESSMENT & PLAN NOTE
Presents with severe abdominal pain and multiple episodes of vomiting that started the day of admission  Admits to recent heavy drinking 4-6 whiskey drinks per day with est  2 shots per drink, just came back from vacation with heavy drinking  Prior episode of pancreatitis, has been sober from alcohol intermittently in the past       CT abdomen/pelvis revealing "Acute pancreatitis, no evidence of pancreatic pseudocyst   Mild hepatic steatosis  Atherosclerosis "   Lipase 53,700   TG level 333    Calcium 8 6   Ethanol 54   Mild leukocytosis 12->11 trending down  Lactate 3 1-> 2 1  Afebrile and does not meet sepsis criteria  Will hold antibiotics at this time   IVF hydration- D/c'd CXR with pulmonary vascular congestion, given 20 mg IV Lasix 4/7   pain control, antiemetics    Keep NPO   Monitor electrolytes and blood sugar closely and correct as indicated     [facility] consulted; recommendations appreciated -maintain urine outputs 0 5 milliliters/kilogram hr

## 2022-04-07 NOTE — ASSESSMENT & PLAN NOTE
· Patient reports no BM x3 days, typically daily  · Abdomen distended, bloated  · ducolax suppository, Senna scheduled- x1 BM

## 2022-04-07 NOTE — TRANSPORTATION MEDICAL NECESSITY
Section I - General Information    Name of Patient: Leigh Dangelo                 : 1965    Medicare #: [id number]  Transport Date: 22 (PCS is valid for round trips on this date and for all repetitive trips in the 60-day range as noted below )  Origin: [facility]                                                         Destination: [address] [place] [address]  Is the pt's stay covered under Medicare Part A (PPS/DRG)   []     Closest appropriate facility? If no, why is transport to more distant facility required? Yes  If hospice pt, is this transport related to pt's terminal illness? NA       Section II - Medical Necessity Questionnaire  Ambulance transportation is medically necessary only if other means of transport are contraindicated or would be potentially harmful to the patient  To meet this requirement, the patient must either be "bed confined" or suffer from a condition such that transport by means other than ambulance is contraindicated by the patient's condition  The following questions must be answered by the medical professional signing below for this form to be valid:    1)  Describe the MEDICAL CONDITION (physical and/or mental) of this patient AT [address] [place] [address] that requires the patient to be transported in an ambulance and why transport by other means is contraindicated by the patient's condition: Pt in need of higher level of care, for medical management of etoh withdrawal  Pt confused    2) Is the patient "bed confined" as defined below? No  To be "be confined" the patient must satisfy all three of the following conditions: (1) unable to get up from bed without Assistance; AND (2) unable to ambulate; AND (3) unable to sit in a chair or wheelchair  3) Can this patient safely be transported by car or wheelchair van (i e , seated during transport without a medical attendant or monitoring)?    No    4) In addition to completing questions 1-3 above, please check any of the following conditions that apply*:   *Note: supporting documentation for any boxes checked must be maintained in the patient's medical records  If hosp-hosp transfer, describe services needed at 2nd facility not available at 1st facility? Patient is confused  Medical attendant required       Section III - Signature of Physician or Healthcare Professional  I certify that the above information is true and correct based on my evaluation of this patient, and represent that the patient requires transport by ambulance and that other forms of transport are contraindicated  I understand that this information will be used by the Centers for Medicare and Medicaid Services (CMS) to support the determination of medical necessity for ambulance services, and I represent that I have personal knowledge of the patient's condition at time of transport  [x]  If this box is checked, I also certify that the patient is physically or mentally incapable of signing the ambulance service's claim and that the institution with which I am affiliated has furnished care, services, or assistance to the patient  My signature below is made on behalf of the patient pursuant to 42 CFR §424 36(b)(4)  In accordance with 42 CFR §424 37, the specific reason(s) that the patient is physically or mentally incapable of signing the claim form is as follows: Cece Mckeon of Physician* or Healthcare Professional______________________________________________________________  Signature Date 04/07/22 (For scheduled repetitive transports, this form is not valid for transports performed more than 60 days after this date)    Printed Name & Credentials of Physician or Healthcare Professional (MD, DO, RN, etc )________________________________  *Form must be signed by patient's attending physician for scheduled, repetitive transports   For non-repetitive, unscheduled ambulance transports, if unable to obtain the signature of the attending physician, any of the following may sign (choose appropriate option below)  [] Physician Assistant []  Clinical Nurse Specialist []  Registered Nurse  []  Nurse Practitioner  [x] Discharge Planner

## 2022-04-07 NOTE — DISCHARGE SUMMARY
[address]  Discharge- Francisca Humphrey 1965, 60 y o  female MRN: [id number]  Unit/Bed#: -01 Encounter: [id number]  Primary Care Provider: Irma Craft DO   Date and time admitted to hospital: 4/5/2022 12:24 AM    * Alcohol-induced acute pancreatitis  Assessment & Plan  Presents with severe abdominal pain and multiple episodes of vomiting that started the day of admission  Admits to recent heavy drinking 4-6 whiskey drinks per day with est  2 shots per drink, just came back from vacation with heavy drinking  Prior episode of pancreatitis, has been sober from alcohol intermittently in the past       CT abdomen/pelvis revealing "Acute pancreatitis, no evidence of pancreatic pseudocyst   Mild hepatic steatosis  Atherosclerosis "   Lipase 53,700   TG level 333    Calcium 8 6   Ethanol 54   Mild leukocytosis 12->11 trending down  Lactate 3 1-> 2 1  Afebrile and does not meet sepsis criteria  Will hold antibiotics at this time   IVF hydration- D/c'd CXR with pulmonary vascular congestion, given 20 mg IV Lasix 4/7   pain control, antiemetics    Keep NPO   Monitor electrolytes and blood sugar closely and correct as indicated  [facility] consulted; recommendations appreciated -maintain urine outputs 0 5 milliliters/kilogram hr        Alcohol withdrawal syndrome, with delirium ([facility] [address] )  Assessment & Plan  · Admits to 4-6 whiskey drinks per day, pt est  2 shots per drink  · Ethanol 54  · No prior history of withdrawal/DT's per patient  · CIWA protocol- 4/6 CIWA 8 , with anxiety, restlessness, "feels skin crawling"- given x1 2mg ativan with improvement  · Start IV thiamine, Librium 10mg Q8  · Multiple doses of ativan HS pt agitated, combative, hallucinating- more controlled this morning  · Will discuss w/patient option for detox treatment or rehab  Discussed with significant other this morning  · Discussed with Toxicology for additional recommendations  - I have discussed transfer to detox Unit with patient's daughter Kaylie Schneider, and significant other Michelle Presley and both are in agreeance to transition patient to detox Unit if a bed is available    · To patient's impulsive and altered mental status requiring one-to-one checking availability for placement  · Discontinue, Ativan, Librium  · IV Valium 5 mg acute to from mild - moderate CIWA 8-19  · IV Valium 10 mg for severe CIWA >20  · IV thiamine 500 mg q 8    Acute respiratory failure with hypoxia (HCC)  Assessment & Plan  · 4/7 acute respiratory distress with tachycardia bilateral wheezing patient placed on 6 L nasal cannula, likely secondary to volume overload   · Chest x-ray- was given 20 mg IV Lasix x1 with good diuresis  · IV fluids @250ml/hr -Discontinued  · BNP- mild elevation 391, troponin negative  · Echo- EF 61%, final read still pending  · Duo-nebs added  · Improved to 2L NC     Other constipation  Assessment & Plan  · Patient reports no BM x3 days, typically daily  · Abdomen distended, bloated  · ducolax suppository, Senna scheduled- x1 BM    Hypertension  Assessment & Plan  · BP's elevated 150s/100s  · Restart lisinopril home 10 mg increased to 20 mg daily  · PRN IV hydralazine for SBP> 170  · 1X dose amlodipine 10 mg with improvement of blood pressure  · At 5 mg amlodipine daily, and monitor hypertension likely secondary to alcohol withdrawal, pain    Hypokalemia  Assessment & Plan  · K 3 3 , replete with 40 IV K+  · Monitor electrolytes closely, trend BMP  · K 3 6, 20 mEq IV repleted  · K 3 4, 40 mEq IV repleted,       Leukocytosis  Assessment & Plan  · Mild leukocytosis - WBCs 12  · Check COVID/flu/RSV - negative  · Check UA- negative  · Check procalcitonin - negative  · Trend CBC        Medical Problems             Resolved Problems  Date Reviewed: 4/6/2022    None              Discharging Physician / Practitioner: Jade Brewer  PCP: Bhavani Peace DO  Admission Date:   Admission Orders (From admission, onward)     Ordered        04/05/22 0332  Inpatient Admission  Once                      Discharge Date: 04/07/22    Consultations During Hospital Stay:  · GI  · Toxicology    Procedures Performed:   · X-ray- low lung volumes, bibasilar atelectasis  · U/S RUQ:  Moderate hepatic steatosis with mild perihepatic ascites, slight gallbladder wall thickening likely reactive, no stones or Lauren sign  · CT abdomen pelvis:  Acute pancreatitis, no evidence of pancreatic pseudocyst, mild hepatic steatosis, atherosclerosis  · Echo EF 61        Significant Findings / Test Results:   · CT acute pancreatitis  · Lipase 5300, ethanol 54, triglyceride 333, mild leukocytosis 12  · Lactate elevated 3 1 decreased to 2 1    Incidental Findings:   · None    Test Results Pending at Discharge (will require follow up): · None     Outpatient Tests Requested:  · None    Complications:  Patient admitted for acute pancreatitis secondary to alcohol use  And was complicated by acute alcohol withdrawal starting 4/6 with increase in severity overnight  Decision was made to contact toxicology for additional management and discussed with patient's daughter and significant other with agreement to transfer to inpatient detox Unit    Reason for Admission:  Acute pancreatitis    Hospital Course:   Tadeo Conner 82 is a 60 y o  female patient who originally presented to the hospital on 4/5/2022 due to the severe abdominal pain multiple episodes of vomiting, patient recently returned from vacation in [place] where she was drinking heavily  Patient needs to drinking 4-6 whiskey drinks daily, at least doubles in each drink reported by patient  Patient was started on IV fluids and pain management for acute pancreatitis, NPO   CIWA protocol in place on admission  4/6 patient very anxious in the morning, restless, skin crawling sensation in unable to get comfortable CIWA 8, was given 2 mg of Ativan with improvement of symptoms    Overnight patient became very agitated, combative, confused was trying to leave was given multiple doses of Ativan, and placed on one-to-one observation for impulsive behavior and safety risk  Patient also had episode of hypoxia with increased work of breathing on 6 L nasal cannula chest x-ray pulmonary congestion given fluid overload  20 mg IV Lasix was given with appropriate diuresis after, DuoNebs ordered, IV fluids stopped  Patient was on 2 L nasal cannula in the morning no increased work of breathing  Medical toxicology was consulted for possible transfer patient to detox Unit  [facility] accepting patient for treatment and has bed available with family permission, I personally discussed this with patient's daughter and significant other who agreed this is the best treatment plan for her and want her to be transferred to the detox Unit  Medication adjustments completed at [facility] recommendations    The patient, initially admitted to the hospital as inpatient, was discharged earlier than expected given the following: Transfer to [facility] for inpatient detox  Please see above list of diagnoses and related plan for additional information  Condition at Discharge: stable    Discharge Day Visit / Exam:   * Please refer to separate progress note for these details *    Discussion with Family: Updated  (daughter Mary Ann Gear other Bhavya Jaramilloing) via phone  Discharge instructions/Information to patient and family:   See after visit summary for information provided to patient and family  Provisions for Follow-Up Care:  See after visit summary for information related to follow-up care and any pertinent home health orders  Disposition:   [address] Transfer to [facility] detox Unit    Planned Readmission:  Transfer to detox Unit     Discharge Statement:  I spent 45 minutes discharging the patient  This time was spent on the day of discharge   I had direct contact with the patient on the day of discharge  Greater than 50% of the total time was spent examining patient, answering all patient questions, arranging and discussing plan of care with patient as well as directly providing post-discharge instructions  Additional time then spent on discharge activities  Discharge Medications:  See after visit summary for reconciled discharge medications provided to patient and/or family        **Please Note: This note may have been constructed using a voice recognition system**

## 2022-04-07 NOTE — CASE MANAGEMENT
Case Management Progress Note    Patient name Lazarus Conrad  Location /-08 MRN [id number]  : 1965 Date 2022       LOS (days): 2  Geometric Mean LOS (GMLOS) (days): 2 40  Days to GMLOS:0        OBJECTIVE:        Current admission status: Inpatient  Preferred Pharmacy:   [address] [place] [id number]  Phone: [phone] Fax: [phone]    Primary Care Provider: Elana Boyer DO    Primary Insurance: BLUE CROSS  Secondary Insurance:     PROGRESS NOTE:  Pt discussed in am rounds re: [facility]  Pt was accepted and for transfer this afternoon  Nsg received confirmation of 1345 transport   CM completed MN and provided to Nsg

## 2022-04-07 NOTE — ASSESSMENT & PLAN NOTE
· 4/7 acute respiratory distress with tachycardia bilateral wheezing patient placed on 6 L nasal cannula, likely secondary to volume overload   · Chest x-ray- was given 20 mg IV Lasix x1 with good diuresis  · IV fluids @250ml/hr -Discontinued  · BNP- mild elevation 391, troponin negative  · Echo- EF 61%, final read still pending  · Duo-nebs added  · Improved to 2L NC

## 2022-04-07 NOTE — ASSESSMENT & PLAN NOTE
· Admits to 4-6 whiskey drinks per day, pt est  2 shots per drink  · Ethanol 54  · No prior history of withdrawal/DT's per patient  · CIWA protocol- 4/6 CIWA 8 , with anxiety, restlessness, "feels skin crawling"- given x1 2mg ativan with improvement  · Start IV thiamine, Librium 10mg Q8  · Multiple doses of ativan HS pt agitated, combative, hallucinating- more controlled this morning  · Will discuss w/patient option for detox treatment or rehab  Discussed with significant other this morning  · Discussed with Toxicology for additional recommendations  - I have discussed transfer to detox Unit with patient's daughter Shereen Shen, and significant other Kong Byers and both are in agreeance to transition patient to detox Unit if a bed is available    · To patient's impulsive and altered mental status requiring one-to-one checking availability for placement  · Discontinue, Ativan, Librium  · IV Valium 5 mg acute to from mild - moderate CIWA 8-19  · IV Valium 10 mg for severe CIWA >20  · IV thiamine 500 mg q 8

## 2022-04-07 NOTE — ASSESSMENT & PLAN NOTE
· Patient with history of hypertension  · Outpatient regimen includes lisinopril 10 mg daily  · BP on admission 161/82  · Will hold antihypertensives in setting of acute alcohol withdrawal and treatment with phenobarbital  · Continue to monitor vitals, BP  · Resume home antihypertensive regimen once alcohol withdrawal resolved

## 2022-04-07 NOTE — ASSESSMENT & PLAN NOTE
· BP's elevated 150s/100s  · Restart lisinopril home 10 mg increased to 20 mg daily  · PRN IV hydralazine for SBP> 170  · 1X dose amlodipine 10 mg with improvement of blood pressure  · At 5 mg amlodipine daily, and monitor hypertension likely secondary to alcohol withdrawal, pain

## 2022-04-07 NOTE — NURSING NOTE
PCA went in to get vitals on pt around 2230  Noted that pt had removed IV and disconnected fluids from IV pump  Pt refusing to allow vitals to be taken, per PCA  This RN and Anival Aldana , RN, went in to address situation  Pt anxious and stating that she does not want fluids running and was wanting to go home  This RN and Charlotte Grant, RN, explained that the fluids are being used to help with her pancreatitis and going home would not be in her best interest in trying to treat the pancreatitis  CIWA score assessed at this time and was a 10  2mg IV ativan given  Pt resting in bed  Will continue to monitor

## 2022-04-07 NOTE — ASSESSMENT & PLAN NOTE
· Admits to 4-6 whiskey drinks per day, pt est  2 shots per drink  · Ethanol 54  · No prior history of withdrawal/DT's per patient  · CIWA protocol- 4/6 CIWA 8 , with anxiety, restlessness, "feels skin crawling"- given x1 2mg ativan with improvement  · Start IV thiamine, Librium 10mg Q8  · Multiple doses of ativan HS pt agitated, combative, hallucinating- more controlled this morning  · Will discuss w/patient option for detox treatment or rehab  Discussed with significant other this morning  · Discussed with Toxicology for additional recommendations  - I have discussed transfer to detox Unit with patient's daughter Marquetta Heimlich, and significant other Gadiel Ravi and both are in agreeance to transition patient to detox Unit if a bed is available    · To patient's impulsive and altered mental status requiring one-to-one checking availability for placement  · Discontinue, Ativan, Librium  · IV Valium 5 mg acute to from mild - moderate CIWA 8-19  · IV Valium 10 mg for severe CIWA >20  · IV thiamine 500 mg q 8

## 2022-04-07 NOTE — ASSESSMENT & PLAN NOTE
· Per chart review, overnight patient had episode of hypoxia with increased work of breathing  · Subsequently placed on 6 L O2 via NC  · CXR 4/7/2022: "Low lung volumes with bibasilar atelectasis "  · ECHO 4/7/2022: "Left Ventricle: Normal left ventricular wall thickness, cavity size, and systolic function  Estimated ejection fraction 55-60%  No clear wall motion abnormality identified    Diastolic characterization was not performed "  · Given Lasix 20 mg IV for suspected pulmonary congestion, fluid stopped, DuoNebs  · Upon admission, SpO2 89% on room air  · Bibasilar expiratory wheezing on exam, diminished breath sounds  · Subsequently placed on supplemental O2 via NC- currently maintained on 3 L   · Additional 20 mg Lasix IV administered  · Respiratory protocol, incentive spirometry  · Respiratory therapy contacted for nebulizer treatment  · Continue nebulizer treatment  · Continue to monitor vitals, symptoms

## 2022-04-07 NOTE — EMTALA/ACUTE CARE TRANSFER
[facility]  [address] [facility] [address]  Dept: [phone]      ACUTE CARE TRANSFER CONSENT    NAME Francisca Velez                                         1965                              MRN [id number]    I have been informed of my rights regarding examination, treatment, and transfer   by Samuel Plata hospitalist  Benefits:      Risks:        Consent for Transfer:  I acknowledge that my medical condition has been evaluated and explained to me by the treating physician or other qualified medical person and/or my attending physician, who has recommended that I be transferred to the service of    at    The above potential benefits of such transfer, the potential risks associated with such transfer, and the probable risks of not being transferred have been explained to me, and I fully understand them  The doctor has explained that, in my case, the benefits of transfer outweigh the risks  I agree to be transferred  I authorize the performance of emergency medical procedures and treatments upon me in both transit and upon arrival at the receiving facility  Additionally, I authorize the release of any and all medical records to the receiving facility and request they be transported with me, if possible  I understand that the safest mode of transportation during a medical emergency is an ambulance and that the Hospital advocates the use of this mode of transport  Risks of traveling to the receiving facility by car, including absence of medical control, life sustaining equipment, such as oxygen, and medical personnel has been explained to me and I fully understand them  (GAURAV CORRECT BOX BELOW)  [  ]  I consent to the stated transfer and to be transported by ambulance/helicopter  [  ]  I consent to the stated transfer, but refuse transportation by ambulance and accept full responsibility for my transportation by car    I understand the risks of non-ambulance transfers and I exonerate the Hospital and its staff from any deterioration in my condition that results from this refusal     X___________________________________________    DATE  22  TIME________  Signature of patient or legally responsible individual signing on patient behalf           RELATIONSHIP TO PATIENT_________________________          Provider Certification    NAME Francisca Carreon                                         1965                              MRN [id number]    A medical screening exam was performed on the above named patient  Based on the examination:    Condition Necessitating Transfer acute alcohol induced pancreatitis, acute alcohol withdrawal    Patient Condition:   stable for detox Unit    Reason for Transfer:   patient requiring additional management of acute alcohol withdrawal, will be transferred to inpatient detox Unit    Transfer Requirements: Facility     · Space available and qualified personnel available for treatment as acknowledged by  Dr Em Hay  · Agreed to accept transfer and to provide appropriate medical treatment as acknowledged by          Dr Em Hay  · Appropriate medical records of the examination and treatment of the patient are provided at the time of transfer   [address] [facility], [address] _AH______  · Transfer will be performed by qualified personnel from   [facility] and appropriate transfer equipment as required, including the use of necessary and appropriate life support measures      Provider Certification: I have examined the patient and explained the following risks and benefits of being transferred/refusing transfer to the patient/family:    Edilia Godowin- Daughter    Keri Lopez- significant other      Based on these reasonable risks and benefits to the patient and/or the unborn child(jerry), and based upon the information available at the time of the patients examination, I certify that the medical benefits reasonably to be expected from the provision of appropriate medical treatments at another medical facility outweigh the increasing risks, if any, to the individuals medical condition, and in the case of labor to the unborn child, from effecting the transfer      X____________________________________________ DATE 04/07/22        TIME_______      ORIGINAL - SEND TO MEDICAL RECORDS   COPY - SEND WITH PATIENT DURING TRANSFER

## 2022-04-07 NOTE — PROGRESS NOTES
[facility]  Progress Note - Francisca Ahmadi 60 y o  female MRN: [id number]    Unit/Bed#: -01 Encounter: [id number]    Assessment/Plan:  Acute pancreatitis  Alcohol abuse  Moderate hepatic steatosis  -suspect her symptoms are secondary to alcohol  -3rd bout of pancreatitis in past 5 years  -triglycerides are less than 400  -no obvious gallstones seen on CT imaging  -check right upper quadrant ultrasound, liver enzymes mildly elevated for female with evidence of moderate hepatic steatosis  -recommend keeping NPO given pain and mental status, if both improve can start clear liquids and progress slowly as tolerated to low fat diet  -alcohol cessation  -avoid vaping  -hold fluids given respiratory status  -avoid morphine as it can cause sphincter of Oddi spasms  -CIWA protocol-discussed with hospitalist team  -recommend incentive spiromtery      [facility]  Gastroenterology    Dr Rakel Calderon was available via telephone as needed for consultation  Discussed with significant other at bedside and [facility]  Subjective:   Overnight events noted  Today patient is sleepy, but awakens briefly with one word answers  No obvious difficulty breathing today  Unable to assess pain response adequately today  Objective:     Vitals: Blood pressure (!) 155/106, pulse (!) 131, temperature 97 8 °F (36 6 °C), resp  rate 20, height 5' 8" (1 727 m), weight 86 kg (189 lb 9 5 oz), SpO2 95 %  ,Body mass index is 28 83 kg/m²  Intake/Output Summary (Last 24 hours) at 4/7/2022 0834  Last data filed at 4/7/2022 0743  Gross per 24 hour   Intake 1320 ml   Output 2700 ml   Net -1380 ml       Review of Systems: as per HPI  Review of Systems   Constitutional:        Per HPI   All other systems reviewed and are negative  Physical Exam:     Physical Exam  Vitals and nursing note reviewed  Constitutional:       General: She is not in acute distress       Appearance: She is well-developed  She is obese  She is ill-appearing  She is not toxic-appearing or diaphoretic  Comments: Opens eyes to commands and answers with one word answers   HENT:      Head: Normocephalic and atraumatic  Mouth/Throat:      Mouth: Mucous membranes are moist       Pharynx: Oropharynx is clear  Eyes:      General: No scleral icterus  Conjunctiva/sclera: Conjunctivae normal    Cardiovascular:      Rate and Rhythm: Tachycardia present  Heart sounds: No murmur heard  Pulmonary:      Effort: Pulmonary effort is normal  No respiratory distress  Abdominal:      Palpations: Abdomen is soft  Tenderness: There is abdominal tenderness  Comments: RUQ LUQ mild tenderness   Skin:     General: Skin is warm and dry  Coloration: Skin is not pale  Findings: No rash  Neurological:      General: No focal deficit present     Psychiatric:      Comments: Sleepy but answers basic one word questions           Invasive Devices  Report    Peripheral Intravenous Line            Peripheral IV 04/06/22 Right;Ventral (anterior) Forearm <1 day                        CBC:   Lab Results   Component Value Date    WBC 12 23 (H) 04/07/2022    HGB 13 9 04/07/2022    HCT 40 5 04/07/2022    MCV 97 04/07/2022     04/07/2022    MCH 33 2 04/07/2022    MCHC 34 3 04/07/2022    RDW 12 2 04/07/2022    MPV 9 5 04/07/2022   ,   CMP:   Lab Results   Component Value Date    K 3 4 (L) 04/07/2022     04/07/2022    CO2 28 04/07/2022    BUN 7 04/07/2022    CREATININE 0 64 04/07/2022    CALCIUM 8 0 (L) 04/07/2022    AST 33 04/07/2022    ALT 31 04/07/2022    ALKPHOS 65 04/07/2022    EGFR 98 04/07/2022   ,   Lipase: No results found for: LIPASE,  PT/INR: No results found for: PT, INR,   Troponin: No results found for: TROPONINI,   Magnesium: No components found for: MAG,   Phosphorous:   Lab Results   Component Value Date    PHOS 2 0 (L) 04/07/2022       Counseling / Coordination of Care  Total time spent today  15 minutes  Greater than 50% of total time was spent with the patient and / or family counseling and / or coordination of care

## 2022-04-07 NOTE — ASSESSMENT & PLAN NOTE
H/o chronic daily alcohol consumption, denies h/o withdrawal seizures/DTs  Admitted to [facility] med surg unit x 2 days for acute alcoholic pancreatitis  · During that admission, patient managed on CIWA protocol: received Librium 30 mg total, Ativan 8 mg total in Valium 5 mg total  · Per chart review, patient became agitated, combative, and confused overnight in setting of alcohol withdrawal  · Transfer to [facility] detox unit for further management of severe alcohol withdrawal  Per patient, Last drink was Friday 04/01/2022  Ethanol 54 (04/05/2022, 12:30 a m )  Continue 1:1 observation for safety  Follow SEWS protocol with symptom-triggered phenobarbital for medically-assisted alcohol withdrawal  Current symptoms include disorientation, mild intention tremor, anxiety, tachycardia, hypertension  Administer 650 mg phenobarbital  Continue to monitor vitals, symptoms, sedation status  Consider adjunctive Precedex or ketamine drip as indicated  **ADDENDUM** - patient has thus far received 910 mg total phenobarbital   Despite treatment, patient increasingly disoriented, impulsive, repeatedly trying to get out of bed to leave, ataxic gait   · Initiate precedex drip and titrate for goal RASS -2  · Per discussion with pharmacy- unable to further concentrate precedex formulation  · Monitor closely for fluid overload/respiratory distress  · Continue 1:1 observation for safety   · Place 2-point soft restraints for safety (b/l UEs)  · Continue to monitor vitals, symptoms, sedation

## 2022-04-07 NOTE — RESPIRATORY THERAPY NOTE
RT Protocol Note  Francisca Cervantes 60 y o  female MRN: [id number]  Unit/Bed#: -01 Encounter: [id number]    Assessment    Principal Problem:    Alcohol-induced acute pancreatitis  Active Problems:    Leukocytosis    Alcohol abuse    Hypokalemia    Hypertension    Other constipation      Home Pulmonary Medications:         Past Medical History:   Diagnosis Date    Alcohol abuse     High cholesterol     Hypertension     Pancreatitis      Social History     Socioeconomic History    Marital status: Single     Spouse name: None    Number of children: None    Years of education: None    Highest education level: None   Occupational History    None   Tobacco Use    Smoking status: Current Every Day Smoker     Types: Cigarettes     Last attempt to quit: 2017     Years since quittin 2    Smokeless tobacco: Never Used    Tobacco comment: vape   Vaping Use    Vaping Use: Every day    Substances: Nicotine, Flavoring   Substance and Sexual Activity    Alcohol use: Yes     Comment: daily 4-8 drinks daily    Drug use: Never    Sexual activity: None   Other Topics Concern    None   Social History Narrative    None     Social Determinants of Health     Financial Resource Strain: Not on file   Food Insecurity: Not on file   Transportation Needs: Not on file   Physical Activity: Not on file   Stress: Not on file   Social Connections: Not on file   Intimate Partner Violence: Not on file   Housing Stability: Not on file       Subjective         Objective    Physical Exam:   Assessment Type: Assess only  General Appearance: Alert,Awake  Respiratory Pattern: Dyspnea with exertion  Chest Assessment: Chest expansion symmetrical  Bilateral Breath Sounds: Expiratory wheezes  O2 Device: (P) 2L    Vitals:  Blood pressure (!) 155/106, pulse (!) 131, temperature 97 8 °F (36 6 °C), resp  rate 20, height 5' 8" (1 727 m), weight 86 kg (189 lb 9 5 oz), SpO2 92 %            Imaging and other studies: I have personally reviewed pertinent reports  O2 Device: (P) 2L     Plan    Respiratory Plan: No distress/Pulmonary history        Resp Comments: (P) Pt with no pulmonary history  admitted with pancreatitus  , has been calling for prn Tx's stating she is having difficulty breathing

## 2022-04-07 NOTE — PLAN OF CARE
Problem: Potential for Falls  Goal: Patient will remain free of falls  Description: INTERVENTIONS:  - Educate patient/family on patient safety including physical limitations  - Instruct patient to call for assistance with activity   - Consult OT/PT to assist with strengthening/mobility   - Keep Call bell within reach  - Keep bed low and locked with side rails adjusted as appropriate  - Keep care items and personal belongings within reach  - Initiate and maintain comfort rounds  - Make Fall Risk Sign visible to staff  - Offer Toileting every  Hours, in advance of need  - Initiate/Maintain alarm  - Obtain necessary fall risk management equipment  - Apply yellow socks and bracelet for high fall risk patients  - Consider moving patient to room near nurses station  Outcome: Progressing     Problem: Nutrition/Hydration-ADULT  Goal: Nutrient/Hydration intake appropriate for improving, restoring or maintaining nutritional needs  Description: Monitor and assess patient's nutrition/hydration status for malnutrition  Collaborate with interdisciplinary team and initiate plan and interventions as ordered  Monitor patient's weight and dietary intake as ordered or per policy  Utilize nutrition screening tool and intervene as necessary  Determine patient's food preferences and provide high-protein, high-caloric foods as appropriate       INTERVENTIONS:  - Monitor oral intake, urinary output, labs, and treatment plans  - Assess nutrition and hydration status and recommend course of action  - Evaluate amount of meals eaten  - Assist patient with eating if necessary   - Allow adequate time for meals  - Recommend/ encourage appropriate diets, oral nutritional supplements, and vitamin/mineral supplements  - Order, calculate, and assess calorie counts as needed  - Recommend, monitor, and adjust tube feedings and TPN/PPN based on assessed needs  - Assess need for intravenous fluids  - Provide specific nutrition/hydration education as appropriate  - Include patient/family/caregiver in decisions related to nutrition  Outcome: Progressing     Problem: PAIN - ADULT  Goal: Verbalizes/displays adequate comfort level or baseline comfort level  Description: Interventions:  - Encourage patient to monitor pain and request assistance  - Assess pain using appropriate pain scale  - Administer analgesics based on type and severity of pain and evaluate response  - Implement non-pharmacological measures as appropriate and evaluate response  - Consider cultural and social influences on pain and pain management  - Notify physician/advanced practitioner if interventions unsuccessful or patient reports new pain  Outcome: Progressing     Problem: INFECTION - ADULT  Goal: Absence or prevention of progression during hospitalization  Description: INTERVENTIONS:  - Assess and monitor for signs and symptoms of infection  - Monitor lab/diagnostic results  - Monitor all insertion sites, i e  indwelling lines, tubes, and drains  - Monitor endotracheal if appropriate and nasal secretions for changes in amount and color  - [place] appropriate cooling/warming therapies per order  - Administer medications as ordered  - Instruct and encourage patient and family to use good hand hygiene technique  - Identify and instruct in appropriate isolation precautions for identified infection/condition  Outcome: Progressing     Problem: DISCHARGE PLANNING  Goal: Discharge to home or other facility with appropriate resources  Description: INTERVENTIONS:  - Identify barriers to discharge w/patient and caregiver  - Arrange for needed discharge resources and transportation as appropriate  - Identify discharge learning needs (meds, wound care, etc )  - Arrange for interpretive services to assist at discharge as needed  - Refer to Case Management Department for coordinating discharge planning if the patient needs post-hospital services based on physician/advanced practitioner order or complex needs related to functional status, cognitive ability, or social support system  Outcome: Progressing     Problem: Knowledge Deficit  Goal: Patient/family/caregiver demonstrates understanding of disease process, treatment plan, medications, and discharge instructions  Description: Complete learning assessment and assess knowledge base    Interventions:  - Provide teaching at level of understanding  - Provide teaching via preferred learning methods  Outcome: Progressing

## 2022-04-07 NOTE — ASSESSMENT & PLAN NOTE
Patient with h/o chronic daily alcohol use  Reports currently consuming 4-5 mixed drinks daily (1 shot whiskey per drink)  · Per reports from significant other-patient consumes 6-7 doubles daily  Patient states last drink was Friday 04/01/2022  Reports most recently sober for approx  1 yr until relapse 1 5 yrs ago  Pertinent labs:  CBC 4/7/2022: hgb 13 9, MCV 97, platelets 008  CMP 0/8/9157: sodium 136, K+ 3 4  anion gap 7   LFTs- AST 33, ALT 31   Ethanol 54 (04/05/2022, 12:30 a m )  Initiate thiamine/folic acid supplementation/multivitamin   Follow SEWS protocol for medically-assisted alcohol withdrawal  Consult case management for assistance with rehab resources

## 2022-04-07 NOTE — ASSESSMENT & PLAN NOTE
· K 3 3 , replete with 40 IV K+  · Monitor electrolytes closely, trend BMP  · K 3 6, 20 mEq IV repleted  · K 3 4, 40 mEq IV repleted,

## 2022-04-08 ENCOUNTER — APPOINTMENT (INPATIENT)
Dept: RADIOLOGY | Facility: HOSPITAL | Age: 57
DRG: 896 | End: 2022-04-08
Payer: COMMERCIAL

## 2022-04-08 ENCOUNTER — APPOINTMENT (INPATIENT)
Dept: RADIOLOGY | Facility: HOSPITAL | Age: 57
DRG: 207 | End: 2022-04-08
Payer: COMMERCIAL

## 2022-04-08 ENCOUNTER — HOSPITAL ENCOUNTER (INPATIENT)
Facility: HOSPITAL | Age: 57
LOS: 16 days | Discharge: HOME HEALTH | DRG: 207 | End: 2022-04-24
Attending: INTERNAL MEDICINE | Admitting: INTERNAL MEDICINE
Payer: COMMERCIAL

## 2022-04-08 VITALS
TEMPERATURE: 98.6 F | BODY MASS INDEX: 30.77 KG/M2 | HEART RATE: 104 BPM | OXYGEN SATURATION: 99 % | SYSTOLIC BLOOD PRESSURE: 129 MMHG | DIASTOLIC BLOOD PRESSURE: 84 MMHG | HEIGHT: 68 IN | WEIGHT: 203.04 LBS | RESPIRATION RATE: 23 BRPM

## 2022-04-08 DIAGNOSIS — K85.90 ACUTE PANCREATITIS: ICD-10-CM

## 2022-04-08 DIAGNOSIS — J96.01 ACUTE RESPIRATORY FAILURE WITH HYPOXIA (HCC): Primary | ICD-10-CM

## 2022-04-08 DIAGNOSIS — F10.20 ALCOHOL USE DISORDER, SEVERE, DEPENDENCE (HCC): ICD-10-CM

## 2022-04-08 DIAGNOSIS — R65.10 SIRS (SYSTEMIC INFLAMMATORY RESPONSE SYNDROME) (HCC): ICD-10-CM

## 2022-04-08 PROBLEM — D72.829 LEUKOCYTOSIS: Status: RESOLVED | Noted: 2022-04-05 | Resolved: 2022-04-08

## 2022-04-08 LAB
ALBUMIN SERPL BCP-MCNC: 2.1 G/DL (ref 3.5–5)
ALBUMIN SERPL BCP-MCNC: 3.2 G/DL (ref 3–5.2)
ALP SERPL-CCNC: 68 U/L (ref 43–122)
ALP SERPL-CCNC: 82 U/L (ref 46–116)
ALT SERPL W P-5'-P-CCNC: 22 U/L
ALT SERPL W P-5'-P-CCNC: 24 U/L (ref 12–78)
ANION GAP SERPL CALCULATED.3IONS-SCNC: 4 MMOL/L (ref 5–14)
ANION GAP SERPL CALCULATED.3IONS-SCNC: 8 MMOL/L (ref 4–13)
ARTERIAL PATENCY WRIST A: NO
AST SERPL W P-5'-P-CCNC: 30 U/L (ref 5–45)
AST SERPL W P-5'-P-CCNC: 41 U/L (ref 14–36)
BACTERIA UR QL AUTO: ABNORMAL /HPF
BASE EX.OXY STD BLDV CALC-SCNC: 78.3 % (ref 60–80)
BASE EX.OXY STD BLDV CALC-SCNC: 92.7 % (ref 60–80)
BASE EXCESS BLDV CALC-SCNC: 1.2 MMOL/L
BASE EXCESS BLDV CALC-SCNC: 2.9 MMOL/L
BASOPHILS # BLD MANUAL: 0 THOUSAND/UL (ref 0–0.1)
BASOPHILS NFR MAR MANUAL: 0 % (ref 0–1)
BILIRUB SERPL-MCNC: 0.61 MG/DL (ref 0.2–1)
BILIRUB SERPL-MCNC: 0.89 MG/DL
BILIRUB UR QL STRIP: ABNORMAL
BUN SERPL-MCNC: 9 MG/DL (ref 5–25)
BUN SERPL-MCNC: 9 MG/DL (ref 5–25)
CA-I BLD-SCNC: 1.05 MMOL/L (ref 1.12–1.32)
CALCIUM ALBUM COR SERPL-MCNC: 8.3 MG/DL (ref 8.3–10.1)
CALCIUM ALBUM COR SERPL-MCNC: 9.6 MG/DL (ref 8.3–10.1)
CALCIUM SERPL-MCNC: 7.7 MG/DL (ref 8.4–10.2)
CALCIUM SERPL-MCNC: 8.1 MG/DL (ref 8.3–10.1)
CHLORIDE SERPL-SCNC: 100 MMOL/L (ref 97–108)
CHLORIDE SERPL-SCNC: 104 MMOL/L (ref 100–108)
CLARITY UR: CLEAR
CO2 SERPL-SCNC: 30 MMOL/L (ref 21–32)
CO2 SERPL-SCNC: 33 MMOL/L (ref 22–30)
COLOR UR: YELLOW
CREAT SERPL-MCNC: 0.67 MG/DL (ref 0.6–1.2)
CREAT SERPL-MCNC: 0.73 MG/DL (ref 0.6–1.3)
EOSINOPHIL # BLD MANUAL: 0 THOUSAND/UL (ref 0–0.4)
EOSINOPHIL NFR BLD MANUAL: 0 % (ref 0–6)
ERYTHROCYTE [DISTWIDTH] IN BLOOD BY AUTOMATED COUNT: 12.1 % (ref 11.6–15.1)
ERYTHROCYTE [DISTWIDTH] IN BLOOD BY AUTOMATED COUNT: 12.2 % (ref 11.6–15.1)
GFR SERPL CREATININE-BSD FRML MDRD: 91 ML/MIN/1.73SQ M
GFR SERPL CREATININE-BSD FRML MDRD: 97 ML/MIN/1.73SQ M
GLUCOSE SERPL-MCNC: 76 MG/DL (ref 65–140)
GLUCOSE SERPL-MCNC: 85 MG/DL (ref 65–140)
GLUCOSE SERPL-MCNC: 94 MG/DL (ref 70–99)
GLUCOSE UR STRIP-MCNC: NEGATIVE MG/DL
HCO3 BLDV-SCNC: 27.6 MMOL/L (ref 24–30)
HCO3 BLDV-SCNC: 29 MMOL/L (ref 24–30)
HCT VFR BLD AUTO: 32.4 % (ref 34.8–46.1)
HCT VFR BLD AUTO: 32.9 % (ref 34.8–46.1)
HGB BLD-MCNC: 10.7 G/DL (ref 11.5–15.4)
HGB BLD-MCNC: 11.4 G/DL (ref 11.5–15.4)
HGB UR QL STRIP.AUTO: NEGATIVE
KETONES UR STRIP-MCNC: ABNORMAL MG/DL
LACTATE SERPL-SCNC: 1.2 MMOL/L (ref 0.5–2)
LEUKOCYTE ESTERASE UR QL STRIP: NEGATIVE
LIPASE SERPL-CCNC: 819 U/L (ref 23–300)
LYMPHOCYTES # BLD AUTO: 0.78 THOUSAND/UL (ref 0.6–4.47)
LYMPHOCYTES # BLD AUTO: 8 % (ref 14–44)
MAGNESIUM SERPL-MCNC: 2.1 MG/DL (ref 1.6–2.3)
MAGNESIUM SERPL-MCNC: 2.1 MG/DL (ref 1.6–2.6)
MCH RBC QN AUTO: 33.1 PG (ref 26.8–34.3)
MCH RBC QN AUTO: 33.9 PG (ref 26.8–34.3)
MCHC RBC AUTO-ENTMCNC: 32.5 G/DL (ref 31.4–37.4)
MCHC RBC AUTO-ENTMCNC: 35.2 G/DL (ref 31.4–37.4)
MCV RBC AUTO: 102 FL (ref 82–98)
MCV RBC AUTO: 96 FL (ref 82–98)
METAMYELOCYTES NFR BLD MANUAL: 1 % (ref 0–1)
MONOCYTES # BLD AUTO: 1.27 THOUSAND/UL (ref 0–1.22)
MONOCYTES NFR BLD: 13 % (ref 4–12)
NEUTROPHILS # BLD MANUAL: 7.64 THOUSAND/UL (ref 1.85–7.62)
NEUTS BAND NFR BLD MANUAL: 5 % (ref 0–8)
NEUTS SEG NFR BLD AUTO: 73 % (ref 43–75)
NITRITE UR QL STRIP: NEGATIVE
NON-SQ EPI CELLS URNS QL MICRO: ABNORMAL /HPF
O2 CT BLDV-SCNC: 12.9 ML/DL
O2 CT BLDV-SCNC: 15 ML/DL
PCO2 BLDV: 51.4 MM HG (ref 42–50)
PCO2 BLDV: 51.8 MM HG (ref 42–50)
PH BLDV: 7.34 [PH] (ref 7.3–7.4)
PH BLDV: 7.37 [PH] (ref 7.3–7.4)
PH UR STRIP.AUTO: 5.5 [PH]
PHOSPHATE SERPL-MCNC: 2.3 MG/DL (ref 2.7–4.5)
PHOSPHATE SERPL-MCNC: 3.3 MG/DL (ref 2.5–4.8)
PLATELET # BLD AUTO: 158 THOUSANDS/UL (ref 149–390)
PLATELET # BLD AUTO: 174 THOUSANDS/UL (ref 149–390)
PLATELET BLD QL SMEAR: ADEQUATE
PMV BLD AUTO: 10 FL (ref 8.9–12.7)
PMV BLD AUTO: 9.9 FL (ref 8.9–12.7)
PO2 BLDV: 46 MM HG (ref 35–45)
PO2 BLDV: 73.4 MM HG (ref 35–45)
POTASSIUM SERPL-SCNC: 3.3 MMOL/L (ref 3.6–5)
POTASSIUM SERPL-SCNC: 3.5 MMOL/L (ref 3.5–5.3)
PROCALCITONIN SERPL-MCNC: 0.34 NG/ML
PROT SERPL-MCNC: 6.3 G/DL (ref 6.4–8.2)
PROT SERPL-MCNC: 6.4 G/DL (ref 5.9–8.4)
PROT UR STRIP-MCNC: ABNORMAL MG/DL
RBC # BLD AUTO: 3.23 MILLION/UL (ref 3.81–5.12)
RBC # BLD AUTO: 3.36 MILLION/UL (ref 3.81–5.12)
RBC #/AREA URNS AUTO: ABNORMAL /HPF
RBC MORPH BLD: NORMAL
SODIUM SERPL-SCNC: 137 MMOL/L (ref 137–147)
SODIUM SERPL-SCNC: 142 MMOL/L (ref 136–145)
SP GR UR STRIP.AUTO: >=1.03 (ref 1–1.03)
UROBILINOGEN UR QL STRIP.AUTO: 1 E.U./DL
WBC # BLD AUTO: 9.34 THOUSAND/UL (ref 4.31–10.16)
WBC # BLD AUTO: 9.79 THOUSAND/UL (ref 4.31–10.16)
WBC #/AREA URNS AUTO: ABNORMAL /HPF

## 2022-04-08 PROCEDURE — 83605 ASSAY OF LACTIC ACID: CPT | Performed by: NURSE PRACTITIONER

## 2022-04-08 PROCEDURE — NC001 PR NO CHARGE: Performed by: PHYSICIAN ASSISTANT

## 2022-04-08 PROCEDURE — 94640 AIRWAY INHALATION TREATMENT: CPT

## 2022-04-08 PROCEDURE — 74018 RADEX ABDOMEN 1 VIEW: CPT

## 2022-04-08 PROCEDURE — 71045 X-RAY EXAM CHEST 1 VIEW: CPT

## 2022-04-08 PROCEDURE — 94002 VENT MGMT INPAT INIT DAY: CPT

## 2022-04-08 PROCEDURE — NC001 PR NO CHARGE: Performed by: INTERNAL MEDICINE

## 2022-04-08 PROCEDURE — 84100 ASSAY OF PHOSPHORUS: CPT | Performed by: NURSE PRACTITIONER

## 2022-04-08 PROCEDURE — 83735 ASSAY OF MAGNESIUM: CPT | Performed by: PHYSICIAN ASSISTANT

## 2022-04-08 PROCEDURE — 82948 REAGENT STRIP/BLOOD GLUCOSE: CPT

## 2022-04-08 PROCEDURE — 5A1945Z RESPIRATORY VENTILATION, 24-96 CONSECUTIVE HOURS: ICD-10-PCS | Performed by: STUDENT IN AN ORGANIZED HEALTH CARE EDUCATION/TRAINING PROGRAM

## 2022-04-08 PROCEDURE — 83735 ASSAY OF MAGNESIUM: CPT | Performed by: NURSE PRACTITIONER

## 2022-04-08 PROCEDURE — 84145 PROCALCITONIN (PCT): CPT | Performed by: NURSE PRACTITIONER

## 2022-04-08 PROCEDURE — 94760 N-INVAS EAR/PLS OXIMETRY 1: CPT

## 2022-04-08 PROCEDURE — 99291 CRITICAL CARE FIRST HOUR: CPT | Performed by: EMERGENCY MEDICINE

## 2022-04-08 PROCEDURE — 5A1935Z RESPIRATORY VENTILATION, LESS THAN 24 CONSECUTIVE HOURS: ICD-10-PCS | Performed by: EMERGENCY MEDICINE

## 2022-04-08 PROCEDURE — 99222 1ST HOSP IP/OBS MODERATE 55: CPT | Performed by: PHYSICIAN ASSISTANT

## 2022-04-08 PROCEDURE — 84100 ASSAY OF PHOSPHORUS: CPT | Performed by: PHYSICIAN ASSISTANT

## 2022-04-08 PROCEDURE — 85027 COMPLETE CBC AUTOMATED: CPT | Performed by: PHYSICIAN ASSISTANT

## 2022-04-08 PROCEDURE — 80053 COMPREHEN METABOLIC PANEL: CPT | Performed by: NURSE PRACTITIONER

## 2022-04-08 PROCEDURE — 80053 COMPREHEN METABOLIC PANEL: CPT | Performed by: PHYSICIAN ASSISTANT

## 2022-04-08 PROCEDURE — 81001 URINALYSIS AUTO W/SCOPE: CPT | Performed by: NURSE PRACTITIONER

## 2022-04-08 PROCEDURE — NC001 PR NO CHARGE: Performed by: EMERGENCY MEDICINE

## 2022-04-08 PROCEDURE — C9113 INJ PANTOPRAZOLE SODIUM, VIA: HCPCS | Performed by: PHYSICIAN ASSISTANT

## 2022-04-08 PROCEDURE — 0BH17EZ INSERTION OF ENDOTRACHEAL AIRWAY INTO TRACHEA, VIA NATURAL OR ARTIFICIAL OPENING: ICD-10-PCS | Performed by: EMERGENCY MEDICINE

## 2022-04-08 PROCEDURE — 31500 INSERT EMERGENCY AIRWAY: CPT | Performed by: EMERGENCY MEDICINE

## 2022-04-08 PROCEDURE — 87040 BLOOD CULTURE FOR BACTERIA: CPT | Performed by: NURSE PRACTITIONER

## 2022-04-08 PROCEDURE — 82805 BLOOD GASES W/O2 SATURATION: CPT | Performed by: PHYSICIAN ASSISTANT

## 2022-04-08 PROCEDURE — 85027 COMPLETE CBC AUTOMATED: CPT | Performed by: NURSE PRACTITIONER

## 2022-04-08 PROCEDURE — 85007 BL SMEAR W/DIFF WBC COUNT: CPT | Performed by: NURSE PRACTITIONER

## 2022-04-08 PROCEDURE — 82805 BLOOD GASES W/O2 SATURATION: CPT | Performed by: EMERGENCY MEDICINE

## 2022-04-08 PROCEDURE — 83690 ASSAY OF LIPASE: CPT | Performed by: PHYSICIAN ASSISTANT

## 2022-04-08 PROCEDURE — 82330 ASSAY OF CALCIUM: CPT | Performed by: NURSE PRACTITIONER

## 2022-04-08 RX ORDER — LIDOCAINE 50 MG/G
1 PATCH TOPICAL EVERY 24 HOURS
Status: CANCELLED | OUTPATIENT
Start: 2022-04-09

## 2022-04-08 RX ORDER — HYDROMORPHONE HCL/PF 1 MG/ML
0.5 SYRINGE (ML) INJECTION
Status: DISCONTINUED | OUTPATIENT
Start: 2022-04-08 | End: 2022-04-09

## 2022-04-08 RX ORDER — IPRATROPIUM BROMIDE AND ALBUTEROL SULFATE 2.5; .5 MG/3ML; MG/3ML
3 SOLUTION RESPIRATORY (INHALATION) 3 TIMES DAILY PRN
Status: CANCELLED | OUTPATIENT
Start: 2022-04-08

## 2022-04-08 RX ORDER — OLANZAPINE 10 MG/1
10 INJECTION, POWDER, LYOPHILIZED, FOR SOLUTION INTRAMUSCULAR ONCE
Status: COMPLETED | OUTPATIENT
Start: 2022-04-08 | End: 2022-04-08

## 2022-04-08 RX ORDER — FUROSEMIDE 10 MG/ML
20 INJECTION INTRAMUSCULAR; INTRAVENOUS ONCE
Status: COMPLETED | OUTPATIENT
Start: 2022-04-08 | End: 2022-04-08

## 2022-04-08 RX ORDER — HYDRALAZINE HYDROCHLORIDE 20 MG/ML
10 INJECTION INTRAMUSCULAR; INTRAVENOUS EVERY 6 HOURS PRN
Status: DISCONTINUED | OUTPATIENT
Start: 2022-04-08 | End: 2022-04-14

## 2022-04-08 RX ORDER — POTASSIUM CHLORIDE 20MEQ/15ML
40 LIQUID (ML) ORAL ONCE
Status: COMPLETED | OUTPATIENT
Start: 2022-04-08 | End: 2022-04-08

## 2022-04-08 RX ORDER — NICOTINE 21 MG/24HR
1 PATCH, TRANSDERMAL 24 HOURS TRANSDERMAL DAILY
Status: CANCELLED | OUTPATIENT
Start: 2022-04-09

## 2022-04-08 RX ORDER — ACETAMINOPHEN 650 MG/1
650 SUPPOSITORY RECTAL ONCE
Status: COMPLETED | OUTPATIENT
Start: 2022-04-08 | End: 2022-04-08

## 2022-04-08 RX ORDER — HYDROMORPHONE HCL/PF 1 MG/ML
0.5 SYRINGE (ML) INJECTION
Status: DISCONTINUED | OUTPATIENT
Start: 2022-04-08 | End: 2022-04-08

## 2022-04-08 RX ORDER — PROPOFOL 10 MG/ML
5-50 INJECTION, EMULSION INTRAVENOUS
Status: DISCONTINUED | OUTPATIENT
Start: 2022-04-08 | End: 2022-04-08 | Stop reason: HOSPADM

## 2022-04-08 RX ORDER — PROPOFOL 10 MG/ML
5-70 INJECTION, EMULSION INTRAVENOUS
Status: DISCONTINUED | OUTPATIENT
Start: 2022-04-08 | End: 2022-04-15

## 2022-04-08 RX ORDER — ETOMIDATE 2 MG/ML
20 INJECTION INTRAVENOUS ONCE
Status: COMPLETED | OUTPATIENT
Start: 2022-04-08 | End: 2022-04-08

## 2022-04-08 RX ORDER — HYDROMORPHONE HCL IN WATER/PF 6 MG/30 ML
0.2 PATIENT CONTROLLED ANALGESIA SYRINGE INTRAVENOUS
Status: DISCONTINUED | OUTPATIENT
Start: 2022-04-08 | End: 2022-04-08

## 2022-04-08 RX ORDER — LIDOCAINE 50 MG/G
1 PATCH TOPICAL EVERY 24 HOURS
Status: DISCONTINUED | OUTPATIENT
Start: 2022-04-09 | End: 2022-04-24 | Stop reason: HOSPADM

## 2022-04-08 RX ORDER — POTASSIUM CHLORIDE 14.9 MG/ML
20 INJECTION INTRAVENOUS
Status: COMPLETED | OUTPATIENT
Start: 2022-04-08 | End: 2022-04-09

## 2022-04-08 RX ORDER — FENTANYL CITRATE-0.9 % NACL/PF 10 MCG/ML
100 PLASTIC BAG, INJECTION (ML) INTRAVENOUS CONTINUOUS
Status: DISCONTINUED | OUTPATIENT
Start: 2022-04-08 | End: 2022-04-19

## 2022-04-08 RX ORDER — LEVALBUTEROL 1.25 MG/.5ML
1.25 SOLUTION, CONCENTRATE RESPIRATORY (INHALATION)
Status: DISCONTINUED | OUTPATIENT
Start: 2022-04-08 | End: 2022-04-21

## 2022-04-08 RX ORDER — POTASSIUM CHLORIDE 14.9 MG/ML
20 INJECTION INTRAVENOUS ONCE
Status: DISCONTINUED | OUTPATIENT
Start: 2022-04-08 | End: 2022-04-08 | Stop reason: HOSPADM

## 2022-04-08 RX ORDER — NICOTINE 21 MG/24HR
1 PATCH, TRANSDERMAL 24 HOURS TRANSDERMAL DAILY
Status: DISCONTINUED | OUTPATIENT
Start: 2022-04-09 | End: 2022-04-24 | Stop reason: HOSPADM

## 2022-04-08 RX ORDER — DIAZEPAM 5 MG/ML
INJECTION, SOLUTION INTRAMUSCULAR; INTRAVENOUS
Status: COMPLETED
Start: 2022-04-08 | End: 2022-04-08

## 2022-04-08 RX ORDER — HYDROMORPHONE HCL IN WATER/PF 6 MG/30 ML
0.2 PATIENT CONTROLLED ANALGESIA SYRINGE INTRAVENOUS
Status: CANCELLED | OUTPATIENT
Start: 2022-04-08

## 2022-04-08 RX ORDER — LEVALBUTEROL 1.25 MG/.5ML
1.25 SOLUTION, CONCENTRATE RESPIRATORY (INHALATION)
Status: CANCELLED | OUTPATIENT
Start: 2022-04-08

## 2022-04-08 RX ORDER — PANTOPRAZOLE SODIUM 40 MG/1
40 INJECTION, POWDER, FOR SOLUTION INTRAVENOUS
Status: DISCONTINUED | OUTPATIENT
Start: 2022-04-09 | End: 2022-04-11

## 2022-04-08 RX ORDER — ONDANSETRON 2 MG/ML
4 INJECTION INTRAMUSCULAR; INTRAVENOUS EVERY 6 HOURS PRN
Status: DISCONTINUED | OUTPATIENT
Start: 2022-04-08 | End: 2022-04-22

## 2022-04-08 RX ORDER — SUCCINYLCHOLINE/SOD CL,ISO/PF 100 MG/5ML
100 SYRINGE (ML) INTRAVENOUS ONCE
Status: COMPLETED | OUTPATIENT
Start: 2022-04-08 | End: 2022-04-08

## 2022-04-08 RX ORDER — PROPOFOL 10 MG/ML
INJECTION, EMULSION INTRAVENOUS
Status: COMPLETED
Start: 2022-04-08 | End: 2022-04-08

## 2022-04-08 RX ORDER — ONDANSETRON 2 MG/ML
4 INJECTION INTRAMUSCULAR; INTRAVENOUS EVERY 6 HOURS PRN
Status: CANCELLED | OUTPATIENT
Start: 2022-04-08

## 2022-04-08 RX ORDER — DIAZEPAM 5 MG/ML
20 INJECTION, SOLUTION INTRAMUSCULAR; INTRAVENOUS ONCE
Status: COMPLETED | OUTPATIENT
Start: 2022-04-08 | End: 2022-04-08

## 2022-04-08 RX ORDER — PANTOPRAZOLE SODIUM 40 MG/1
40 INJECTION, POWDER, FOR SOLUTION INTRAVENOUS
Status: CANCELLED | OUTPATIENT
Start: 2022-04-09

## 2022-04-08 RX ORDER — HYDROMORPHONE HCL/PF 1 MG/ML
1 SYRINGE (ML) INJECTION ONCE
Status: COMPLETED | OUTPATIENT
Start: 2022-04-08 | End: 2022-04-08

## 2022-04-08 RX ORDER — IPRATROPIUM BROMIDE AND ALBUTEROL SULFATE 2.5; .5 MG/3ML; MG/3ML
3 SOLUTION RESPIRATORY (INHALATION) 3 TIMES DAILY PRN
Status: DISCONTINUED | OUTPATIENT
Start: 2022-04-08 | End: 2022-04-14

## 2022-04-08 RX ORDER — OLANZAPINE 10 MG/1
5 INJECTION, POWDER, LYOPHILIZED, FOR SOLUTION INTRAMUSCULAR ONCE
Status: DISCONTINUED | OUTPATIENT
Start: 2022-04-08 | End: 2022-04-08 | Stop reason: HOSPADM

## 2022-04-08 RX ADMIN — POTASSIUM CHLORIDE 40 MEQ: 20 SOLUTION ORAL at 16:30

## 2022-04-08 RX ADMIN — THIAMINE HYDROCHLORIDE 500 MG: 100 INJECTION, SOLUTION INTRAMUSCULAR; INTRAVENOUS at 03:22

## 2022-04-08 RX ADMIN — LEVALBUTEROL HYDROCHLORIDE 1.25 MG: 1.25 SOLUTION RESPIRATORY (INHALATION) at 13:20

## 2022-04-08 RX ADMIN — PROPOFOL 50 MCG/KG/MIN: 10 INJECTION, EMULSION INTRAVENOUS at 17:39

## 2022-04-08 RX ADMIN — IPRATROPIUM BROMIDE 0.5 MG: 0.5 SOLUTION RESPIRATORY (INHALATION) at 08:21

## 2022-04-08 RX ADMIN — HYDROMORPHONE HYDROCHLORIDE 0.5 MG: 1 INJECTION, SOLUTION INTRAMUSCULAR; INTRAVENOUS; SUBCUTANEOUS at 20:13

## 2022-04-08 RX ADMIN — OLANZAPINE 10 MG: 10 INJECTION, POWDER, FOR SOLUTION INTRAMUSCULAR at 09:55

## 2022-04-08 RX ADMIN — LEVALBUTEROL HYDROCHLORIDE 1.25 MG: 1.25 SOLUTION RESPIRATORY (INHALATION) at 02:57

## 2022-04-08 RX ADMIN — DEXMEDETOMIDINE HYDROCHLORIDE 0.9 MCG/KG/HR: 400 INJECTION INTRAVENOUS at 00:25

## 2022-04-08 RX ADMIN — NICOTINE 1 PATCH: 14 PATCH, EXTENDED RELEASE TRANSDERMAL at 09:03

## 2022-04-08 RX ADMIN — DIAZEPAM 20 MG: 10 INJECTION, SOLUTION INTRAMUSCULAR; INTRAVENOUS at 14:48

## 2022-04-08 RX ADMIN — IPRATROPIUM BROMIDE 0.5 MG: 0.5 SOLUTION RESPIRATORY (INHALATION) at 02:55

## 2022-04-08 RX ADMIN — POTASSIUM CHLORIDE 20 MEQ: 14.9 INJECTION, SOLUTION INTRAVENOUS at 20:31

## 2022-04-08 RX ADMIN — WATER 2.1 ML: 1 INJECTION INTRAMUSCULAR; INTRAVENOUS; SUBCUTANEOUS at 09:55

## 2022-04-08 RX ADMIN — LEVALBUTEROL HYDROCHLORIDE 1.25 MG: 1.25 SOLUTION RESPIRATORY (INHALATION) at 08:21

## 2022-04-08 RX ADMIN — POTASSIUM CHLORIDE 20 MEQ: 14.9 INJECTION, SOLUTION INTRAVENOUS at 22:00

## 2022-04-08 RX ADMIN — ONDANSETRON 4 MG: 2 INJECTION INTRAMUSCULAR; INTRAVENOUS at 14:17

## 2022-04-08 RX ADMIN — FOLIC ACID 1 MG: 5 INJECTION, SOLUTION INTRAMUSCULAR; INTRAVENOUS; SUBCUTANEOUS at 09:03

## 2022-04-08 RX ADMIN — PROPOFOL 10 MCG/KG/MIN: 10 INJECTION, EMULSION INTRAVENOUS at 13:45

## 2022-04-08 RX ADMIN — DIAZEPAM 20 MG: 5 INJECTION, SOLUTION INTRAMUSCULAR; INTRAVENOUS at 14:48

## 2022-04-08 RX ADMIN — HYDROMORPHONE HYDROCHLORIDE 0.2 MG: 1 INJECTION, SOLUTION INTRAMUSCULAR; INTRAVENOUS; SUBCUTANEOUS at 12:39

## 2022-04-08 RX ADMIN — PROPOFOL 50 MCG/KG/MIN: 10 INJECTION, EMULSION INTRAVENOUS at 19:37

## 2022-04-08 RX ADMIN — ETOMIDATE 20 MG: 2 INJECTION, SOLUTION INTRAVENOUS at 13:40

## 2022-04-08 RX ADMIN — PROPOFOL 50 MCG/KG/MIN: 10 INJECTION, EMULSION INTRAVENOUS at 23:10

## 2022-04-08 RX ADMIN — FUROSEMIDE 20 MG: 10 INJECTION, SOLUTION INTRAMUSCULAR; INTRAVENOUS at 09:03

## 2022-04-08 RX ADMIN — THIAMINE HYDROCHLORIDE 500 MG: 100 INJECTION, SOLUTION INTRAMUSCULAR; INTRAVENOUS at 17:37

## 2022-04-08 RX ADMIN — LEVALBUTEROL HYDROCHLORIDE 1.25 MG: 1.25 SOLUTION, CONCENTRATE RESPIRATORY (INHALATION) at 19:25

## 2022-04-08 RX ADMIN — ACETAMINOPHEN 650 MG: 650 SUPPOSITORY RECTAL at 20:14

## 2022-04-08 RX ADMIN — IPRATROPIUM BROMIDE 0.5 MG: 0.5 SOLUTION RESPIRATORY (INHALATION) at 13:21

## 2022-04-08 RX ADMIN — Medication 50 MCG/HR: at 16:48

## 2022-04-08 RX ADMIN — Medication 100 MG: at 13:41

## 2022-04-08 RX ADMIN — HYDROMORPHONE HYDROCHLORIDE 1 MG: 1 INJECTION, SOLUTION INTRAMUSCULAR; INTRAVENOUS; SUBCUTANEOUS at 20:27

## 2022-04-08 RX ADMIN — THIAMINE HYDROCHLORIDE 500 MG: 100 INJECTION, SOLUTION INTRAMUSCULAR; INTRAVENOUS at 10:54

## 2022-04-08 RX ADMIN — ENOXAPARIN SODIUM 40 MG: 100 INJECTION SUBCUTANEOUS at 09:03

## 2022-04-08 RX ADMIN — IPRATROPIUM BROMIDE 0.5 MG: 0.5 SOLUTION RESPIRATORY (INHALATION) at 19:25

## 2022-04-08 RX ADMIN — PANTOPRAZOLE SODIUM 40 MG: 40 INJECTION, POWDER, FOR SOLUTION INTRAVENOUS at 06:20

## 2022-04-08 RX ADMIN — FUROSEMIDE 20 MG: 10 INJECTION, SOLUTION INTRAMUSCULAR; INTRAVENOUS at 03:22

## 2022-04-08 RX ADMIN — DEXMEDETOMIDINE HYDROCHLORIDE 0.5 MCG/KG/HR: 400 INJECTION INTRAVENOUS at 09:13

## 2022-04-08 NOTE — PLAN OF CARE
Problem: DISCHARGE PLANNING - CARE MANAGEMENT  Goal: Discharge to post-acute care or home with appropriate resources  Description: INTERVENTIONS:  - Conduct assessment to determine patient/family and health care team treatment goals, and need for post-acute services based on payer coverage, community resources, and patient preferences, and barriers to discharge  - Address psychosocial, clinical, and financial barriers to discharge as identified in assessment in conjunction with the patient/family and health care team  - Arrange appropriate level of post-acute services according to patients   needs and preference and payer coverage in collaboration with the physician and health care team  - Communicate with and update the patient/family, physician, and health care team regarding progress on the discharge plan  - Arrange appropriate transportation to post-acute venues  Outcome: Progressing     Problem: SUBSTANCE USE/ABUSE  Goal: By discharge, will develop insight into their chemical dependency and sustain motivation to continue in recovery  Description: INTERVENTIONS:  - Attends all daily group sessions and scheduled AA groups  - Actively practices coping skills through participation in the therapeutic community and adherence to program rules  - Reviews and completes assignments from individual treatment plan  - Assist patient development of understanding of their personal cycle of addiction and relapse triggers  Outcome: Progressing  Goal: By discharge, patient will have ongoing treatment plan addressing chemical dependency  Description: INTERVENTIONS:  - Assist patient with resources and/or appointments for ongoing recovery based living  Outcome: Progressing     Problem: Nutrition/Hydration-ADULT  Goal: Nutrient/Hydration intake appropriate for improving, restoring or maintaining nutritional needs  Description: Monitor and assess patient's nutrition/hydration status for malnutrition   Collaborate with interdisciplinary team and initiate plan and interventions as ordered  Monitor patient's weight and dietary intake as ordered or per policy  Utilize nutrition screening tool and intervene as necessary  Determine patient's food preferences and provide high-protein, high-caloric foods as appropriate       INTERVENTIONS:  - Monitor oral intake, urinary output, labs, and treatment plans  - Assess nutrition and hydration status and recommend course of action  - Evaluate amount of meals eaten  - Assist patient with eating if necessary   - Allow adequate time for meals  - Recommend/ encourage appropriate diets, oral nutritional supplements, and vitamin/mineral supplements  - Order, calculate, and assess calorie counts as needed  - Recommend, monitor, and adjust tube feedings and TPN/PPN based on assessed needs  - Assess need for intravenous fluids  - Provide specific nutrition/hydration education as appropriate  - Include patient/family/caregiver in decisions related to nutrition  Outcome: Progressing     Problem: PAIN - ADULT  Goal: Verbalizes/displays adequate comfort level or baseline comfort level  Description: Interventions:  - Encourage patient to monitor pain and request assistance  - Assess pain using appropriate pain scale  - Administer analgesics based on type and severity of pain and evaluate response  - Implement non-pharmacological measures as appropriate and evaluate response  - Consider cultural and social influences on pain and pain management  - Notify physician/advanced practitioner if interventions unsuccessful or patient reports new pain  Outcome: Progressing     Problem: INFECTION - ADULT  Goal: Absence or prevention of progression during hospitalization  Description: INTERVENTIONS:  - Assess and monitor for signs and symptoms of infection  - Monitor lab/diagnostic results  - Monitor all insertion sites, i e  indwelling lines, tubes, and drains  - Monitor endotracheal if appropriate and nasal secretions for changes in amount and color  - [place] appropriate cooling/warming therapies per order  - Administer medications as ordered  - Instruct and encourage patient and family to use good hand hygiene technique  - Identify and instruct in appropriate isolation precautions for identified infection/condition  Outcome: Progressing  Goal: Absence of fever/infection during neutropenic period  Description: INTERVENTIONS:  - Monitor WBC    Outcome: Progressing     Problem: SAFETY ADULT  Goal: Maintain or return to baseline ADL function  Description: INTERVENTIONS:  -  Assess patient's ability to carry out ADLs; assess patient's baseline for ADL function and identify physical deficits which impact ability to perform ADLs (bathing, care of mouth/teeth, toileting, grooming, dressing, etc )  - Assess/evaluate cause of self-care deficits   - Assess range of motion  - Assess patient's mobility; develop plan if impaired  - Assess patient's need for assistive devices and provide as appropriate  - Encourage maximum independence but intervene and supervise when necessary  - Involve family in performance of ADLs  - Assess for home care needs following discharge   - Consider OT consult to assist with ADL evaluation and planning for discharge  - Provide patient education as appropriate  Outcome: Progressing     Problem: DISCHARGE PLANNING  Goal: Discharge to home or other facility with appropriate resources  Description: INTERVENTIONS:  - Identify barriers to discharge w/patient and caregiver  - Arrange for needed discharge resources and transportation as appropriate  - Identify discharge learning needs (meds, wound care, etc )  - Arrange for interpretive services to assist at discharge as needed  - Refer to Case Management Department for coordinating discharge planning if the patient needs post-hospital services based on physician/advanced practitioner order or complex needs related to functional status, cognitive ability, or social support system  Outcome: Progressing     Problem: Knowledge Deficit  Goal: Patient/family/caregiver demonstrates understanding of disease process, treatment plan, medications, and discharge instructions  Description: Complete learning assessment and assess knowledge base    Interventions:  - Provide teaching at level of understanding  - Provide teaching via preferred learning methods  Outcome: Progressing

## 2022-04-08 NOTE — ASSESSMENT & PLAN NOTE
· Will continue with withdrawal management  · Case management consult in place  · Encouraged cessation   · Continue thiamine and folic acid

## 2022-04-08 NOTE — ASSESSMENT & PLAN NOTE
· Patient originally presented to [facility] ED 04/05/2022 for evaluation of severe abdominal pain and multiple episodes of vomiting in setting of chronic frequent alcohol consumption  · CT abdomen/pelvis in the ED revealed acute pancreatitis  · Other pertinent labs  · Amylase 96  · Triglycerides 333 -> 290  · Patient subsequently admitted to [facility] surge unit for treatment of acute pancreatitis  · GI was consulted during that admission- per most recent recommendations, hold fluids in light of respiratory status, keep NPO until mental status improves  · Abdomen appears more distended this AM, diffuse TTP  · Consult GI, appreciate recommendaitons  · Continue to hold fluids in setting of respiratory failure  · NPO diet- advance as tolerated  · Initial lipase 53,700, trended down to 13,119 (4/5/2022)  · Lipase this , down from 1466 yesterday  · Supportive care- Zofran as needed, Dilaudid as needed  · PRN Narcan ordered for respiratory depression (remain cautious with Dilaudid especially while on Precedex)

## 2022-04-08 NOTE — NURSING NOTE
SEWS deferred at this time, precedex and q30m VS and RASS scoring til target of rass -2 initiated as ordered by and discussed w Velia Rincon NP

## 2022-04-08 NOTE — CONSULTS
Patient MRN: [id number]  Date of Service: 4/8/2022  Referring Provider: Aura Concepcion PA-C  Provider Creating Note: Daljit Bolton PA-C  PCP: Sukhdev Barragan    Reason for Consult: pancreatitis    HISTORY OF PRESENT ILLNESS:  Francisca Randolph is a 60 y o  female with AUD who was admitted initially admitted to [facility] 4/5/22 on CIWA protocol and seen/evaluated by GI service - see consult for details  She was transferred to [facility] for alcohol withdrawal, treatment of DTs, and acute respiratory failure w/hypoxia  SEWs was initiated with phenobarbital  Patient had worsening disorientation and precedex gtt started yesterday  IVFs, in the setting of pancreatitis, limited due to fluid overload/respiratory status  Respiratory therapy is following  Patient is currently unable to provide any meaningful history 2/2 mental status  She is in 3 point locked restraints, 1:1 observation  Review of Systems: unable to obtain 2/2 mental status      Past Medical History:   Diagnosis Date    Alcohol abuse     High cholesterol     Hypertension     Pancreatitis      Past Surgical History:   Procedure Laterality Date    FOOT SURGERY       Allergies   Allergen Reactions    Latex Rash    Neomycin-Bacitracin Zn-Polymyx Rash       Medications:  Home Medications  Prior to Admission medications    Medication Sig Start Date End Date Taking?  Authorizing Provider   atorvastatin (LIPITOR) 40 mg tablet 40 mg daily   Yes Historical Provider, MD   lisinopril (ZESTRIL) 10 mg tablet Take 10 mg by mouth daily 5/27/21  Yes Historical Provider, MD   ondansetron (ZOFRAN) 4 mg tablet Take 1 tablet (4 mg total) by mouth every 6 (six) hours as needed for nausea or vomiting 9/14/21  Yes Gaby Handley DO   pantoprazole (PROTONIX) 40 mg tablet Take 40 mg by mouth daily 9/2/21  Yes Historical Provider, MD       Inhouse Medications    Current Facility-Administered Medications:     dexmedeTOMIDine (Precedex) 400 mcg in sodium chloride 0 9% 100 mL, 0 1-1 1 mcg/kg/hr, Intravenous, Titrated, 0 5 mcg/kg/hr at 04/08/22 0913    docusate sodium (COLACE) capsule 100 mg, 100 mg, Oral, BID    enoxaparin (LOVENOX) subcutaneous injection 40 mg, 40 mg, Subcutaneous, Daily, 40 mg at 20/34/27 0877    folic acid 1 mg in sodium chloride 0 9 % 50 mL IVPB, 1 mg, Intravenous, Daily, 1 mg at 04/08/22 0903    HYDROmorphone (DILAUDID) injection 0 2 mg, 0 2 mg, Intravenous, Q3H PRN    ipratropium (ATROVENT) 0 02 % inhalation solution 0 5 mg, 0 5 mg, Nebulization, TID, 0 5 mg at 04/08/22 0821    ipratropium-albuterol (DUO-NEB) 0 5-2 5 mg/3 mL inhalation solution 3 mL, 3 mL, Nebulization, TID PRN    levalbuterol (XOPENEX) inhalation solution 1 25 mg, 1 25 mg, Nebulization, TID, 1 25 mg at 04/08/22 0821    lidocaine (LIDODERM) 5 % patch 1 patch, 1 patch, Topical, Q24H    multivitamin-minerals (CENTRUM) tablet 1 tablet, 1 tablet, Oral, Daily    naloxone (NARCAN) 0 04 mg/mL syringe 0 04 mg, 0 04 mg, Intravenous, Q1MIN PRN    nicotine (NICODERM CQ) 14 mg/24hr TD 24 hr patch 1 patch, 1 patch, Transdermal, Daily, 1 patch at 04/08/22 0903    OLANZapine (ZyPREXA) IM injection 5 mg, 5 mg, Intramuscular, Once    ondansetron (ZOFRAN) injection 4 mg, 4 mg, Intravenous, Q6H PRN    pantoprazole (PROTONIX) injection 40 mg, 40 mg, Intravenous, Q24H THIAGO, 40 mg at 04/08/22 0620    polyethylene glycol (MIRALAX) packet 17 g, 17 g, Oral, Daily PRN    thiamine (VITAMIN B1) 500 mg in sodium chloride 0 9 % 50 mL IVPB, 500 mg, Intravenous, Q8H, 500 mg at 04/08/22 1054      Social History   reports that she has been smoking cigarettes  She has never used smokeless tobacco  She reports current alcohol use of about 12 0 standard drinks of alcohol per week  She reports that she does not use drugs  Family History  No family history on file        OBJECTIVE:    /80 (BP Location: Left arm)   Pulse 92   Temp 97 6 °F (36 4 °C) (Temporal)   Resp 20   Ht 5' 8" (1 727 m) Wt 92 1 kg (203 lb 0 7 oz)   SpO2 91%   BMI 30 87 kg/m²   Physical Exam:     General Appearance:    Agitated, restrained, well developed, well    Nourished, 1:1 observation +   Head:    Normocephalic without obvious abnormality   Eyes:    Opens eyes, sluggish pupils       Neck:   Supple, no adenopathy   Throat:   Mucous membranes moist   Lungs:     +wheeze +venti mask   Heart:    Regular rate and rhythm, S1 and S2 normal   Abdomen:     Firm, protuberant, diffusely tender, distended  bowel sounds   hyperactive  No masses, +guarding  Extremities:   Extremities normal  No clubbing, cyanosis or edema   Psych  Derm:   agitated    No jaundice +ecchymoses at SQ inj sites   Neurologic:   Responds to pain, agitated         Laboratory Studies:  Results from last 7 days   Lab Units 04/08/22  0452 04/07/22  0502 04/06/22  0458 04/05/22  0419 04/05/22  0030   WBC Thousand/uL 9 34 12 23* 9 80 11 72* 12 04*   HEMOGLOBIN g/dL 10 7* 13 9 14 0 14 2 14 1   HEMATOCRIT % 32 9* 40 5 40 1 41 2 40 9   MCV fL 102* 97 96 95 94   PLATELETS Thousands/uL 158 172 169 242 241     Results from last 7 days   Lab Units 04/08/22  0452 04/07/22  0502 04/06/22  0458 04/05/22  0419 04/05/22  0030   SODIUM mmol/L 137 136 136 140 139   POTASSIUM mmol/L 3 3* 3 4* 3 6 3 9 3 3*   CHLORIDE mmol/L 100 101 103 107 102   CO2 mmol/L 33* 28 26 21 21   BUN mg/dL 9 7 8 15 16   CREATININE mg/dL 0 67 0 64 0 76 0 78 0 88   CALCIUM mg/dL 7 7* 8 0* 7 8* 8 0* 8 6   ALBUMIN g/dL 3 2 2 8* 3 2* 4 0 4 3   TOTAL BILIRUBIN mg/dL 0 89 1 15* 0 82 0 32 0 41   ALK PHOS U/L 68 65 69 90 99   ALT U/L 22 31 33 53 56   AST U/L 41* 33 24 36 36           Imaging and Other Studies:  XR chest portable    Result Date: 4/8/2022  Narrative: CHEST INDICATION:   wheezing, hypoxia  COMPARISON:  Chest radiograph from 4/7/2022 and abdomen CT from 4/5/2022  EXAM PERFORMED/VIEWS:  XR CHEST PORTABLE FINDINGS: Cardiomediastinal silhouette appears unremarkable   Low lung volumes with trace effusions and bibasilar atelectasis  No pneumothorax  Osseous structures appear within normal limits for patient age  Impression: Low lung volumes with trace effusions and bibasilar atelectasis  Workstation performed: [id number]     XR chest portable    Result Date: 4/7/2022  Narrative: CHEST INDICATION:   hypoxia  COMPARISON:  Chest radiograph from 9/14/2021 and abdomen CT from 4/5/2022  EXAM PERFORMED/VIEWS:  XR CHEST PORTABLE FINDINGS: Cardiomediastinal silhouette appears unremarkable  Low lung volumes with bibasilar atelectasis  No effusion or pneumothorax  Osseous structures appear within normal limits for patient age  Impression: Low lung volumes with bibasilar atelectasis  Workstation performed: [id number]     US right upper quadrant    Result Date: 4/6/2022  Narrative: RIGHT UPPER QUADRANT ULTRASOUND INDICATION:     Pancreatitis COMPARISON:  4/5/2022 TECHNIQUE:   Real-time ultrasound of the right upper quadrant was performed with a curvilinear transducer with both volumetric sweeps and still imaging techniques  FINDINGS: PANCREAS:  Visualized portions of the pancreas are within normal limits  AORTA AND IVC:  Visualized portions are normal for patient age  LIVER: Size:  Within normal range  The liver measures 16 6 cm in the midclavicular line  Contour:  Surface contour is smooth  Parenchyma: There is moderate diffuse increased echogenicity with smooth echotexture and acoustic beam attenuation  Most consistent with moderate hepatic steatosis  No liver mass identified  Limited imaging of the main portal vein shows it to be patent and hepatopetal   BILIARY: The gallbladder is normal in caliber  Slight wall thickening without pericholecystic fluid  No stones or sludge identified  No sonographic Lauren sign  No intrahepatic biliary dilatation  CBD measures 3 0 mm  No choledocholithiasis  KIDNEY: Right kidney measures 10 6 x 5 1 x 5 2 cm  Volume 149 6 mL Kidney within normal limits   ASCITES:   Mild perihepatic ascites  Impression: 1  Moderate hepatic steatosis with mild perihepatic ascites  2   Slight gallbladder wall thickening, likely reactive  No stones or Lauren's sign  Workstation performed: [id number]     CT abdomen pelvis with contrast    Result Date: 4/5/2022  Narrative: CT ABDOMEN AND PELVIS WITH IV CONTRAST INDICATION:   Epigastric pain, mid abdominal pain, nausea, vomiting  Prior history of pancreatitis  Lipase 53,700  COMPARISON:  None available  TECHNIQUE:  CT examination of the abdomen and pelvis was performed  Axial, sagittal, and coronal 2D reformatted images were created from the source data and submitted for interpretation  Radiation dose length product (DLP) for this visit:  650 mGy-cm   This examination, like all CT scans performed in the [place], was performed utilizing techniques to minimize radiation dose exposure, including the use of iterative reconstruction and automated exposure control  IV Contrast:  100 mL of iohexol (OMNIPAQUE) Enteric Contrast:  Enteric contrast was not administered  FINDINGS: ABDOMEN LOWER CHEST:  Dependent changes are present  There is a small hiatal hernia  LIVER/BILIARY TREE:  Mild hepatic steatosis  GALLBLADDER:  No calcified gallstones  No pericholecystic inflammatory change  SPLEEN:  Unremarkable  PANCREAS:  There is peripancreatic inflammation and there is fluid adjacent to the pancreas and tracking down the mesenteric root  Fluid also tracks posteriorly adjacent to the spleen  There is no evidence of pancreatic pseudocyst  ADRENAL GLANDS:  Unremarkable  KIDNEYS/URETERS:  Unremarkable  No hydronephrosis  STOMACH AND BOWEL:  No bowel obstruction  Mild colonic diverticulosis without evidence of acute diverticulitis  APPENDIX:  No findings to suggest appendicitis  ABDOMINOPELVIC CAVITY:  As described above  No pneumoperitoneum  VESSELS:  Atherosclerosis  No abdominal aortic aneurysm   PELVIS REPRODUCTIVE ORGANS:  Unremarkable for patient's age  URINARY BLADDER:  Unremarkable  ABDOMINAL WALL/INGUINAL REGIONS:  Unremarkable  OSSEOUS STRUCTURES:  No acute fracture or destructive osseous lesion  Impression: Acute pancreatitis, as described above  Please see discussion  No evidence of pancreatic pseudocyst   Follow-up after treatment is recommended  Mild hepatic steatosis  Atherosclerosis  Other nonemergent findings, as described above  Please see discussion  Workstation performed: [id number]     Echo complete w/ contrast if indicated    Result Date: 4/7/2022  Narrative: Bren Swain  Left Ventricle: Normal left ventricular wall thickness, cavity size, and systolic function  Estimated ejection fraction 55-60%  No clear wall motion abnormality identified  Diastolic characterization was not performed    Right Ventricle: Normal right ventricular size and function    Tricuspid Valve: The tricuspid valve was not well visualized  Trace tricuspid regurgitation  No adequate spectral envelope for estimating pulmonary pressure  ASSESSMENT AND PLAN:  3 60year old female with AUD transferred to [facility] for medically assisted detox currently in active withdrawal/DTs  SEWs protocol, treated initially with phenobarbital, escalated to precedex gtt  Mgmt per Medical toxicology  2  Acute alcohol-related pancreatitis  3rd episode of pancreatitis in 5 years  IVF limited due to fluid overload  Continue NPO, pain control, acid suppression  Strict alcohol avoidance  Will discuss with GI attending  3  Moderate hepatic steatosis on imaging related to alcohol  No liver mass or cirrhosis  LFTs WNL            Michelle Godinez PA-C

## 2022-04-08 NOTE — ASSESSMENT & PLAN NOTE
· Per chart review- patient reported no BM x 3 days  · CT abdomen/pelvis: "No bowel obstruction    Mild colonic diverticulosis without evidence of acute diverticulitis "  · On exam: +BS x 4, distension  · Continue to monitor

## 2022-04-08 NOTE — ASSESSMENT & PLAN NOTE
· Outpatient regimen includes Lipitor 40 mg daily  · Resume outpatient regimen once able to tolerate PO

## 2022-04-08 NOTE — ASSESSMENT & PLAN NOTE
On admission patient was reported to have ataxia as well as finger-to-nose ataxia   Patient was started on high-dose thiamine for Wernicke's encephalopathy    · Continue high-dose thiamine and folic acid  · Can consider MRI once patient is stable  · Fall precautions

## 2022-04-08 NOTE — ASSESSMENT & PLAN NOTE
Patient initially presented with a lipase of 53,000 and has trended down to 819 this morning  Patient's acute pancreatitis is likely secondary to alcohol  · GI recommendations appreciated  · Continue NPO  · Zofran and Dilaudid p r n

## 2022-04-08 NOTE — ASSESSMENT & PLAN NOTE
Patient acute hypoxic on admission, was given dose of Lasix with marked improvement  However, this morning patient started to become more agitated, hypoxic and tachycardic unable to tolerate BiPAP, patient was intubated for airway protection in the setting of encephalopathy and potential DTs      · Continue mechanical ventilation at minimal settings for now  · Will try to wean tomorrow  · VAE prevention  · Sedation with Fentanyl and Propofol infusion for goal RASS 0 to -1

## 2022-04-08 NOTE — ASSESSMENT & PLAN NOTE
Patient acute hypoxic on admission, was given dose of Lasix with marked improvement  However, this morning patient started to become more agitated, hypoxic and tachycardic unable to tolerate BiPAP, patient was intubated for airway protection in the setting of encephalopathy and potential DTs      · Continue mechanical ventilation at minimal settings for now  · Will try to wean tomorrow

## 2022-04-08 NOTE — RESTRAINT FACE TO FACE
Restraint Face to Face   Francisca Adonay Duckworth 60 y o  female MRN: [id number]  Unit/Bed#: 5T DETOX 518-01 Encounter: [id number]      Physical Evaluation: Pt aggressive with staff  Pt hitting staff     Purpose for Restraints/ Seclusion High risk for self harm, High risk for causing significant disruption of treatment environment , High risk for harm to others and high risk for flight  Patient's reaction to the intervention : Pt restless and combative  Patient's medical condition: : DT, Pancreatitis  Patient's Behavioral condition: Combative  Restraints to be Continued

## 2022-04-08 NOTE — UTILIZATION REVIEW
Notification of Discharge   This is a Notification of Discharge from our facility [address]  Please be advised that this patient has been discharge from our facility  Below you will find the admission and discharge date and time including the patients disposition  UTILIZATION REVIEW CONTACT:  Issa Orta  Utilization   Network Utilization Review Department  Phone: [phone] x carefully listen to the prompts  All voicemails are confidential   Email: [email or web address]     PHYSICIAN ADVISORY SERVICES:  FOR [id number] REVIEW - MEDICAL NECESSITY DENIAL  Phone: [phone]  Fax: [phone]  Email: [email or web address]     PRESENTATION DATE: 4/5/2022 12:24 AM  OBERVATION ADMISSION DATE:   INPATIENT ADMISSION DATE: 4/5/22  3:32 AM   DISCHARGE DATE: 4/7/2022  2:40 PM  DISPOSITION: [address] [facility]      IMPORTANT INFORMATION:  Send all requests for admission clinical reviews, approved or denied determinations and any other requests to dedicated fax number below belonging to the campus where the patient is receiving treatment   List of dedicated fax numbers:  [address] DENIALS (Administrative/Medical Necessity) [phone]   [address] (Maternity/NICU/P[facility]s) [phone]   [facility] [phone]   [address] [facility] [phone]   [address] [place] [address] [phone]   [address] [phone]   [facility]  [phone]   [address] [facility], [facility]  [address] [facility] [address] [facility] [phone]

## 2022-04-08 NOTE — PLAN OF CARE
Problem: SAFETY, RESTRAINT - VIOLENT/SELF-DESTRUCTIVE  Goal: Returns to optimal restraint-free functioning  Description: INTERVENTIONS:  - Assess the patient's behavior and symptoms that indicate continued need for restraint  - Identify and implement measures to help patient regain control  - Assess readiness for release of restraint   Outcome: Progressing     Problem: SAFETY, RESTRAINT - VIOLENT/SELF-DESTRUCTIVE  Goal: Remains free of harm/injury from restraints (Restraint for Violent/Self-Destructive Behavior)  Description: INTERVENTIONS:  - Instruct patient/family regarding restraint use   - Assess and monitor physiologic and psychological status   - Provide interventions and comfort measures to meet assessed patient needs   - Ensure continuous in person monitoring is provided   - Identify and implement measures to help patient regain control  - Assess readiness for release of restraint  Outcome: Progressing

## 2022-04-08 NOTE — ASSESSMENT & PLAN NOTE
· Will continue with withdrawal management  · Case management consult in place  · Encouraged cessation

## 2022-04-08 NOTE — QUICK NOTE
MEDICAL DETOX UNIT, LEVEL 4  Department of Medical Toxicology      Reason for Admission/Principal Problem: Conrad lance   Reassessing Provider: Catherine Bailey Aas  4/7/2022  3:36 PM    04/08/22  TIME    Patient is currently on SEWS protocol  I have reevaluated the patient  Re-evaluated patient on dexmedetomidine infusion  Patient currently under moderate sedation, movement of extremities to voice and physical stimulation, sternal rub  Vital signs remain stable  Reduction in BP since the initiation of dexmedetomidine but otherwise stable  Earlier during the day, while awake patient was able to maintain on 3 L via nasal cannula  While asleep she required 4-6 L via nasal cannula and was only able to maintain O2 saturation of 88-90%  Patient is observed to be mouth breathing, snoring  Repositioning in bed does not correct this  Respiratory was consulted to provide face mask while patient is sleeping  BP 97/56 (BP Location: Left arm)   Pulse 80   Temp 99 °F (37 2 °C) (Temporal)   Resp 16   Ht 5' 8" (1 727 m)   Wt 92 1 kg (203 lb 0 7 oz)   SpO2 97%   BMI 30 87 kg/m²       SEWS Total Score: 17 (4/7/2022  7:21 PM)      Clinical Opiate Withdrawal Scale  Pulse: (!) 108        Discussed patient status with attending? no    Plan:   - Currently RASS -3, will reduce dexmedetomidine to 0 5 mcg/kg/hr from 0 7 and continue to reassess VS, respiratory, mental status  - Renew 4 point restraint order, continue to assess extremities and need for 4 point restraints  - Respiratory consulted and placed on 8L via face mask  O2 saturation now maintaining at 98%  Anticipate she may be able to return to [facility] when awake

## 2022-04-08 NOTE — ASSESSMENT & PLAN NOTE
Patient drinks roughly 8-12 shots of whiskey daily  Patient's last drink was April 1, 2022  EtoH level on admission was 54  Has never had any history withdrawals or seizures  Patient was initially admitted for alcoholic pancreatitis, however, patient became encephalopathic and at agitated requiring transfer to detox Unit      · Patient was initially on phenobarbital and Precedex  · Patient switched over to fentanyl and Propofol

## 2022-04-08 NOTE — H&P
12 Replaced by [facility] 1965, 60 y o  female MRN: [id number]  Unit/Bed#: ICU 07 Encounter: [id number]  Primary Care Provider: Bren Soriano DO   Date and time admitted to hospital: 4/8/2022  3:55 PM    * Acute respiratory failure with hypoxia [facility])  Assessment & Plan  Patient acute hypoxic on admission, was given dose of Lasix with marked improvement  However, this morning patient started to become more agitated, hypoxic and tachycardic unable to tolerate BiPAP, patient was intubated for airway protection in the setting of encephalopathy and potential DTs  · Continue mechanical ventilation at minimal settings for now  · Will try to wean tomorrow    Delirium tremens [facility])  Assessment & Plan  Patient drinks roughly 8-12 shots of whiskey daily  Patient's last drink was April 1, 2022  EtoH level on admission was 54  Has never had any history withdrawals or seizures  Patient was initially admitted for alcoholic pancreatitis, however, patient became encephalopathic and at agitated requiring transfer to detox Unit  · Continues SEWS protocol  · Patient was initially on phenobarbital and Precedex  · Patient switched over to fentanyl  · Can add on Precedex if patient becomes more agitated    Alcohol-induced acute pancreatitis  Assessment & Plan  Patient initially presented with a lipase of 53,000 and has trended down to 819 this morning  Patient's acute pancreatitis is likely secondary to alcohol  · GI recommendations appreciated  · Continue NPO  · Zofran and Dilaudid p r n  Alcohol use disorder, severe, dependence ([facility] [address] )  Assessment & Plan  · Will continue with withdrawal management  · Case management consult in place  · Encouraged cessation    Ataxia  Assessment & Plan  On admission patient was reported to have ataxia as well as finger-to-nose ataxia   Patient was started on high-dose thiamine for Wernicke's encephalopathy    · Continue high-dose thiamine and folic acid  · Can consider MRI once patient is stable  · Fall precautions    Hyperlipidemia  Assessment & Plan  · Patient had noted hypertriglyceridemia, most recently 298  Patient is on Lipitor 40 mg at  · Can be resumed once extubated    Gastroesophageal reflux disease without esophagitis  Assessment & Plan  Continue Protonix 40 mg daily    Hypertension  Assessment & Plan  · Patient is currently on 10 mg of lisinopril daily at home  Can be resumed once extubated  · Hydralazine p r n  For SBP greater than 170      Hypokalemia  Assessment & Plan  · Likely secondary to diuretic administration  · Continue completion  · BMP tomorrow a m  · Mg tomorrow a m       -------------------------------------------------------------------------------------------------------------  Chief Complaint:  Patient was being treated for alcohol withdrawal at [facility], was transferred for acute hypoxic respiratory failure  History of Present Illness   HX and PE limited by:  Patient is sedated and intubated  Francisca Cervantes is a 60 y o  female with past medical history of alcohol use, chronic pancreatitis, hypertension, hyperlipidemia who presents with severe abdominal pain and multiple episodes of nonbloody, bilious vomiting on April 5, 2022  The time of admission patient was found to have lipase the dose with 53,000 and imaging consistent with acute pancreatitis  During patient's hospitalization, patient started to exhibit signs of severe withdrawal with agitation and altered mental status  Patient was then transferred to detox Unit at [facility]  Patient was started on the SEWS protocol and required phenobarbital and Precedex for management  While being treated for DT, patient started to become hypoxic and having increased work breathing  Patient was requiring 14 L on venturi mask, and was eventually intubated and transferred to [place] ICU       History obtained from chart review  -------------------------------------------------------------------------------------------------------------  Dispo: Admit to Critical Care     Code Status: Level 1 - Full Code  --------------------------------------------------------------------------------------------------------------  Review of Systems   Unable to perform ROS: Intubated       A 12-point, complete review of systems was reviewed and negative except as stated above     Physical Exam  Constitutional:       Appearance: She is not ill-appearing  Comments: Patient is sedated and intubated  RASS of -2  HENT:      Head: Normocephalic and atraumatic  Eyes:      Conjunctiva/sclera: Conjunctivae normal       Pupils: Pupils are equal, round, and reactive to light  Cardiovascular:      Rate and Rhythm: Regular rhythm  Tachycardia present  Pulses: Normal pulses  Heart sounds: Normal heart sounds  No murmur heard  No gallop  Pulmonary:      Breath sounds: No wheezing or rales  Comments: Patient is intubated and on minimal vent settings  Abdominal:      General: There is distension  Tenderness: There is no abdominal tenderness  Comments: Diminished bowel sounds   Musculoskeletal:      Right lower leg: No edema  Left lower leg: No edema  Skin:     General: Skin is warm  Capillary Refill: Capillary refill takes less than 2 seconds  Findings: Rash (Livido reticularis type rash seen on lower extremities) present        --------------------------------------------------------------------------------------------------------------  Vitals:   Vitals:    04/08/22 1558   BP: 119/76   BP Location: Left arm   Pulse: (!) 114   Resp: (!) 23   Temp: 99 6 °F (37 6 °C)   TempSrc: Temporal   SpO2: 92%   Weight: 89 4 kg (197 lb 1 5 oz)   Height: 5' 8" (1 727 m)     Temp  Min: 96 9 °F (36 1 °C)  Max: 101 2 °F (38 4 °C)     Height: 5' 8" (172 7 cm)  Body mass index is 29 97 kg/m²      Laboratory and Diagnostics:  Results from last 7 days   Lab Units 04/08/22  0452 04/07/22  0502 04/06/22  0458 04/05/22  0419 04/05/22  0030   WBC Thousand/uL 9 34 12 23* 9 80 11 72* 12 04*   HEMOGLOBIN g/dL 10 7* 13 9 14 0 14 2 14 1   HEMATOCRIT % 32 9* 40 5 40 1 41 2 40 9   PLATELETS Thousands/uL 158 172 169 242 241   NEUTROS PCT %  --   --   --  84* 70   MONOS PCT %  --   --   --  4 9     Results from last 7 days   Lab Units 04/08/22  0452 04/07/22  0502 04/06/22  0458 04/05/22  0419 04/05/22  0030   SODIUM mmol/L 137 136 136 140 139   POTASSIUM mmol/L 3 3* 3 4* 3 6 3 9 3 3*   CHLORIDE mmol/L 100 101 103 107 102   CO2 mmol/L 33* 28 26 21 21   ANION GAP mmol/L 4* 7 7 12 16*   BUN mg/dL 9 7 8 15 16   CREATININE mg/dL 0 67 0 64 0 76 0 78 0 88   CALCIUM mg/dL 7 7* 8 0* 7 8* 8 0* 8 6   GLUCOSE RANDOM mg/dL 94 112 121 133 166*   ALT U/L 22 31 33 53 56   AST U/L 41* 33 24 36 36   ALK PHOS U/L 68 65 69 90 99   ALBUMIN g/dL 3 2 2 8* 3 2* 4 0 4 3   TOTAL BILIRUBIN mg/dL 0 89 1 15* 0 82 0 32 0 41     Results from last 7 days   Lab Units 04/08/22  0452 04/07/22  0502 04/06/22  0458 04/05/22  0030   MAGNESIUM mg/dL 2 1 1 7 1 3* 1 9   PHOSPHORUS mg/dL 3 3 2 0* 2 8  --                Results from last 7 days   Lab Units 04/05/22  0404 04/05/22  0030   LACTIC ACID mmol/L 2 1* 3 1*     ABG:    VBG:  Results from last 7 days   Lab Units 04/08/22  1311   PH TOM  7 345   PCO2 TOM mm Hg 51 8*   PO2 TOM mm Hg 46 0*   HCO3 TOM mmol/L 27 6   BASE EXC TOM mmol/L 1 2     Results from last 7 days   Lab Units 04/05/22  0419   PROCALCITONIN ng/ml <0 05       Micro:        EKG:  Tachycardiac and normal rhythm  Imaging: I have personally reviewed pertinent reports          Historical Information   Past Medical History:   Diagnosis Date    Alcohol abuse     High cholesterol     Hypertension     Pancreatitis      Past Surgical History:   Procedure Laterality Date    FOOT SURGERY       Social History   Social History     Substance and Sexual Activity Alcohol Use Yes    Alcohol/week: 12 0 standard drinks    Types: 12 Shots of liquor per week    Comment: daily 4-8 whiskey drinks (doubles)     Social History     Substance and Sexual Activity   Drug Use Never     Social History     Tobacco Use   Smoking Status Current Every Day Smoker    Types: Cigarettes    Last attempt to quit: 2017    Years since quittin 2   Smokeless Tobacco Never Used   Tobacco Comment    vape     Exercise History:   Family History:   History reviewed  No pertinent family history    I have reviewed this patient's family history and commented on sigificant items within the HPI      Medications:  Current Facility-Administered Medications   Medication Dose Route Frequency    [START ON 2022] enoxaparin (LOVENOX) subcutaneous injection 40 mg  40 mg Subcutaneous Daily    fentaNYL 1000 mcg in sodium chloride 0 9% 100mL infusion  50 mcg/hr Intravenous Continuous    [START ON ] folic acid 1 mg in sodium chloride 0 9 % 50 mL IVPB  1 mg Intravenous Daily    hydrALAZINE (APRESOLINE) injection 10 mg  10 mg Intravenous Q6H PRN    HYDROmorphone HCl (DILAUDID) injection 0 2 mg  0 2 mg Intravenous Q3H PRN    ipratropium (ATROVENT) 0 02 % inhalation solution 0 5 mg  0 5 mg Nebulization TID    ipratropium-albuterol (DUO-NEB) 0 5-2 5 mg/3 mL inhalation solution 3 mL  3 mL Nebulization TID PRN    levalbuterol (XOPENEX) inhalation solution 1 25 mg  1 25 mg Nebulization TID    [START ON 2022] lidocaine (LIDODERM) 5 % patch 1 patch  1 patch Topical Q24H    [START ON 2022] multivitamin-minerals (CENTRUM) tablet 1 tablet  1 tablet Oral Daily    naloxone (NARCAN) 0 04 mg/mL syringe 0 04 mg  0 04 mg Intravenous Q1MIN PRN    [START ON 2022] nicotine (NICODERM CQ) 14 mg/24hr TD 24 hr patch 1 patch  1 patch Transdermal Daily    ondansetron (ZOFRAN) injection 4 mg  4 mg Intravenous Q6H PRN    [START ON 2022] pantoprazole (PROTONIX) injection 40 mg  40 mg Intravenous Q24H [address]  potassium chloride oral solution 40 mEq  40 mEq Oral Once    propofol (DIPRIVAN) 1000 mg in 100 mL infusion (premix) **ADS Override Pull**        thiamine (VITAMIN B1) 500 mg in sodium chloride 0 9 % 50 mL IVPB  500 mg Intravenous Q8H     Home medications:  Prior to Admission Medications   Prescriptions Last Dose Informant Patient Reported? Taking?   atorvastatin (LIPITOR) 40 mg tablet   Yes No   Si mg daily   lisinopril (ZESTRIL) 10 mg tablet   Yes No   Sig: Take 10 mg by mouth daily   ondansetron (ZOFRAN) 4 mg tablet   No No   Sig: Take 1 tablet (4 mg total) by mouth every 6 (six) hours as needed for nausea or vomiting   pantoprazole (PROTONIX) 40 mg tablet   Yes No   Sig: Take 40 mg by mouth daily      Facility-Administered Medications: None     Allergies: Allergies   Allergen Reactions    Latex Rash    Neomycin-Bacitracin Zn-Polymyx Rash       ------------------------------------------------------------------------------------------------------------  Advance Directive and Living Will:      Power of :    POLST:    ------------------------------------------------------------------------------------------------------------  Anticipated Length of Stay is > 2 midnights    Care Time Delivered:   Please see attending's attestation      Marlene Goncalves MD        Portions of the record may have been created with voice recognition software  Occasional wrong word or "sound a like" substitutions may have occurred due to the inherent limitations of voice recognition software    Read the chart carefully and recognize, using context, where substitutions have occurred

## 2022-04-08 NOTE — EMTALA/ACUTE CARE TRANSFER
[facility]  [address] [place] [id number] [phone]  Dept: [phone]      ACUTE CARE TRANSFER CONSENT    NAME Francisca Nascimento                                         1965                              MRN [id number]    I have been informed of my rights regarding examination, treatment, and transfer   by Dr Brittani Padilla MD    Benefits: Specialized equipment and/or services available at the receiving facility (Include comment)________________________,Continuity of care (acute respiratory failure, intubated)    Risks: Potential for delay in receiving treatment,Potential deterioration of medical condition,Loss of IV,Increased discomfort during transfer,Possible worsening of condition or death during transfer      Consent for Transfer:  I acknowledge that my medical condition has been evaluated and explained to me by the treating physician or other qualified medical person and/or my attending physician, who has recommended that I be transferred to the service of  Accepting Physician: Dr Keith Redding at [address] [place] [address] Name, [address] : [address] [facility]  The above potential benefits of such transfer, the potential risks associated with such transfer, and the probable risks of not being transferred have been explained to me, and I fully understand them  The doctor has explained that, in my case, the benefits of transfer outweigh the risks  I agree to be transferred  I authorize the performance of emergency medical procedures and treatments upon me in both transit and upon arrival at the receiving facility  Additionally, I authorize the release of any and all medical records to the receiving facility and request they be transported with me, if possible  I understand that the safest mode of transportation during a medical emergency is an ambulance and that the Hospital advocates the use of this mode of transport   Risks of traveling to the receiving facility by car, including absence of medical control, life sustaining equipment, such as oxygen, and medical personnel has been explained to me and I fully understand them  (GAURAV CORRECT BOX BELOW)  [  ]  I consent to the stated transfer and to be transported by ambulance/helicopter  [  ]  I consent to the stated transfer, but refuse transportation by ambulance and accept full responsibility for my transportation by car  I understand the risks of non-ambulance transfers and I exonerate the Hospital and its staff from any deterioration in my condition that results from this refusal     X___________________________________________    DATE  22  TIME________  Signature of patient or legally responsible individual signing on patient behalf           RELATIONSHIP TO PATIENT_________________________          Provider Certification    NAME Francisca Young                                         1965                              MRN [id number]    A medical screening exam was performed on the above named patient    Based on the examination:    Condition Necessitating Transfer acute respiratory failure necessitating intubation    Patient Condition: The patient has been stabilized such that within reasonable medical probability, no material deterioration of the patient condition or the condition of the unborn child(jerry) is likely to result from the transfer    Reason for Transfer: Level of Care needed not available at this facility    Transfer Requirements: [address] [facility] [address] ICU   · Space available and qualified personnel available for treatment as acknowledged by    · Agreed to accept transfer and to provide appropriate medical treatment as acknowledged by       Dr Erika Vegas  · Appropriate medical records of the examination and treatment of the patient are provided at the time of transfer   [address], [address] _______  · Transfer will be performed by qualified personnel from and appropriate transfer equipment as required, including the use of necessary and appropriate life support measures  Provider Certification: I have examined the patient and explained the following risks and benefits of being transferred/refusing transfer to the patient/family:  Other: (Include comment)_______________________,General risk, such as traffic hazards, adverse weather conditions, rough terrain or turbulence, possible failure of equipment (including vehicle or aircraft), or consequences of actions of persons outside the control of the transport personnel (currently intubated, unable to acquire patient consent)      Based on these reasonable risks and benefits to the patient and/or the unborn child(jerry), and based upon the information available at the time of the patients examination, I certify that the medical benefits reasonably to be expected from the provision of appropriate medical treatments at another medical facility outweigh the increasing risks, if any, to the individuals medical condition, and in the case of labor to the unborn child, from effecting the transfer      X____________________________________________ DATE 04/08/22        TIME_______      ORIGINAL - SEND TO MEDICAL RECORDS   COPY - SEND WITH PATIENT DURING TRANSFER

## 2022-04-08 NOTE — ASSESSMENT & PLAN NOTE
Patient initially presented with a lipase of 53,000 and has trended down to 819 this morning  Patient's acute pancreatitis is likely secondary to alcohol      · GI recommendations appreciated  · Continue NPO  · Fentanyl infusion

## 2022-04-08 NOTE — ASSESSMENT & PLAN NOTE
Patient drinks roughly 8-12 shots of whiskey daily  Patient's last drink was April 1, 2022  EtoH level on admission was 54  Has never had any history withdrawals or seizures  Patient was initially admitted for alcoholic pancreatitis, however, patient became encephalopathic and at agitated requiring transfer to detox Unit      · Continues SEWS protocol  · Patient was initially on phenobarbital and Precedex  · Patient switched over to fentanyl  · Can add on Precedex if patient becomes more agitated

## 2022-04-08 NOTE — ASSESSMENT & PLAN NOTE
H/o chronic daily alcohol consumption, denies h/o withdrawal seizures/DTs  Admitted to [facility] med surg unit x 2 days for acute alcoholic pancreatitis  · During that admission, patient managed on CIWA protocol: received Librium 30 mg total, Ativan 8 mg total in Valium 5 mg total  · Per chart review, patient became agitated, combative, and confused overnight in setting of alcohol withdrawal  · Transfer to [facility] unit for further management of severe alcohol withdrawal  Per patient, Last drink was Friday 04/01/2022  Ethanol 54 (04/05/2022, 12:30 a m )  Continue 1:1 observation for safety  [facility] protocol with symptom-triggered phenobarbital for medically-assisted alcohol withdrawal initiated upon admission  · Received 910 mg phenobarbital initially, but had worsening disorientation and agitation in setting of notable ataxia, precedex drip subsequently initiated  · precedex drip currently at 0 5 mcg/kg/hr, in 4-point locked restraints for agitation  · Closely monitoring BPs on precedex- most recent 100/64; did dip to mid 80s/50s overnight  · Per discussion with pharmacy- unable to further concentrate precedex formulation   · Monitor closely for fluid overload/respiratory distress  · Continue 1:1 observation for safety   · Continue restraints   · Continue to monitor vitals, symptoms, sedation   · Consider ICU transfer if worsening hypotension

## 2022-04-08 NOTE — ASSESSMENT & PLAN NOTE
· CT abd/pelvis 4/5/2022: "Mild hepatic steatosis "  · Likely secondary to chronic alcohol consumption  · CMP this a m  Revealed stable LFTs:  AST 33, ALT 31, alk-phos 65    · Encourage alcohol cessation

## 2022-04-08 NOTE — NURSING NOTE
Since assuming care as observation staff, pt has been uncooperative, impulsive, not accepting redirection  Unsteady gate, scoring moderate-high on SEWS, confusion, hallucination, disorientation, tremulous  SEWS protocol followed w phenobarb dosing c minimal effect, BL UE NV restraints initiated  Precedex gtt initiated  Continuous observation continues

## 2022-04-08 NOTE — PROCEDURES
Intubation Procedure Note  04/08/22  14:05 PM  Indications: Acute respiratory failure with hypoxia  Consent: Unable to obtain consent due to altered mental status/ acuity of condition  Done urgently/ emergently  Procedure: The patient was properly positioned and pre-oxygenated with venturi mask, followed by ambubag  Suction was available  The patient was on continuous telemetry and pulse-ox, with frequent blood pressure checks  RSI with 20 mg etomidate and 100 mg succinylcholine  An 8 0 ETT was placed in one attempt without complication  Balloon inflated to 10cc  Initially placed to 25 cm at the lips  ETT placement confirmed with color capnography, breath sounds, CXR  Initial CXR showed ETT in R mainstem  ETT pulled back 2 cm to 23 cm at the lips  Repeat CXR shows proper tube placement, approximately 1-2 cm above the antonietta  Bilateral breath sounds heard  No difficulty with oxygenation or ventilation  The patient tolerated the procedure well without complication

## 2022-04-08 NOTE — ASSESSMENT & PLAN NOTE
· Patient had noted hypertriglyceridemia, most recently 290   Patient is on Lipitor 40 mg at  · Can be resumed once extubated    · Monitor closely while on propofol

## 2022-04-08 NOTE — ASSESSMENT & PLAN NOTE
· Patient gait notably ataxic on admission  · Finger-to-nose ataxia appreciated as well  · Likely secondary to severe alcohol withdrawal versus Wernicke's  · Initiate high-dose thiamine  · Head CT ordered however patient uncooperative with procedure  · Consider brain MRI once patient more cooperative  · Fall precautions  · 1:1 continues observation for safety

## 2022-04-08 NOTE — ED NOTES
Pt brought transferred down to ED in B/L UE soft medical restraints        Jacob Ricci, CINTHIA  04/08/22 2371

## 2022-04-08 NOTE — UTILIZATION REVIEW
Initial Clinical Review    Pt initially presented to [address] [place] [address] ED  Pt was transferred by EMS to [address] [place] [address] for its Level IV medically managed intensive inpatient detox unit, not available at [address]  Admission: Date/Time/Statement:   Admission Orders (From admission, onward)     Ordered        04/07/22 1724  Inpatient Admission  Once                      Orders Placed This Encounter   Procedures    Inpatient Admission     Standing Status:   Standing     Number of Occurrences:   1     Order Specific Question:   Level of Care     Answer:   Med Surg [16]     Order Specific Question:   Estimated length of stay     Answer:   More than 2 Midnights     Order Specific Question:   Certification     Answer:   I certify that inpatient services are medically necessary for this patient for a duration of greater than two midnights  See H&P and MD Progress Notes for additional information about the patient's course of treatment  Initial Presentation: 60 y o  female who initially presented to [address] [place] [address], was then transferred by EMS to [address] [place] [address] as a direct admission to medical detox  Inpatient admission for evaluation and treatment of delirium tremens, alcohol withdrawal syndrome, acute respiratory failure w/ hypoxia  PMHx: Alcohol abuse, High cholesterol, Hypertension, and Pancreatitis  Presented w/ severe abdominal pain and vomiting in the setting of chronic alcohol use; pt was noted to have worsening alcohol withdrawal w/ agitation, disorientation and impulsive behavior requiring stabilization on medical detox unit  Reports 4-7 mixed drinking daily, last drink on 04/01 per pt  Has no prior inpatient or outpatient treatment for withdrawal  Reports no hx of withdrawal seizures   On exam, tachycardic, rales, wheezing, abdominal distention, tremors, ataxic, oriented to only person and time, anxious, impulsive, appears confused and unsure why she is here  Endorses anxiety  BRIANS 14  Plan: SEWS monitoring w/ phenobarbital management, thiamine/folic acid supplement, IVF, telemetry, continuous pulse ox, continue home meds, trend labs, replete electrolytes as needed  Date: 04/08/22       Day 2: Pt requiring control team response overnight s/t ripping herself out of soft limb restraints, currently requiring locked 4 point restraints for safety and started on precedex gtt  On exam, ill-appearing, diaphoretic, dry mucous membranes, wheezing, requiring Venturi mask for O2 support, abdominal distention, generalized abdominal tenderness w/ guarding  Pt w/ hypotension & increasing O2 requirements, ultimately requiring intubation  Pt will be transferred to [address] [place] [address] ICU for a higher level of care as [facility] not have an ICU          Wt Readings from Last 1 Encounters:   04/07/22 92 1 kg (203 lb 0 7 oz)     Vital Signs:   Date/Time Temp Pulse Resp BP MAP (mmHg) SpO2 FiO2 (%) Calculated FIO2 (%) - Nasal Cannula Nasal Cannula O2 Flow Rate (L/min) O2 Device   04/08/22 1322 -- -- -- -- -- 95 % 55 76 14 L/min Venturi mask   04/08/22 1255 98 6 °F (37 °C) 97 20 113/74 87 96 % -- -- -- Venturi mask     Date/Time Temp Pulse Resp BP SpO2 Calculated FIO2 (%) - Nasal Cannula O2 Flow Rate (L/min) Nasal Cannula O2 Flow Rate (L/min) O2 Device   04/08/22 1031 97 6 °F (36 4 °C) 92 20 115/80 91 % 76 -- 14 L/min Venturi mask   04/08/22 0900 98 2 °F (36 8 °C) 83 24 Abnormal  94/62 92 % 76 -- 14 L/min Venturi mask   04/08/22 0724 97 8 °F (36 6 °C) 82 18 100/64 96 % -- 8 L/min -- Simple mask   04/08/22 0615 97 8 °F (36 6 °C) 84 16 96/56 93 % -- 8 L/min -- Simple mask   04/08/22 0545 97 8 °F (36 6 °C) 78 16 94/59 97 % -- 8 L/min -- Simple mask   04/08/22 0515 97 8 °F (36 6 °C) 78 16 95/66 96 % -- 8 L/min -- Simple mask   04/08/22 0445 97 8 °F (36 6 °C) 79 16 100/62 94 % -- 8 L/min -- Simple mask   04/08/22 0415 97 8 °F (36 6 °C) 78 16 96/57 94 % -- 8 L/min -- Simple mask   04/08/22 0400 97 8 °F (36 6 °C) 79 16 97/53 93 % -- 8 L/min -- Simple mask   04/08/22 0345 97 8 °F (36 6 °C) 93 16 -- 96 % -- 8 L/min -- Simple mask   04/08/22 0315 97 8 °F (36 6 °C) 82 16 97/58 97 % -- 8 L/min -- Simple mask   04/08/22 0255 -- -- -- -- 97 % -- -- -- --   04/08/22 0245 97 8 °F (36 6 °C) 89 16 107/60 96 % -- 8 L/min -- Simple mask   04/08/22 0215 98 8 °F (37 1 °C) 82 16 105/64 97 % -- 8 L/min -- Simple mask   04/08/22 0145 99 °F (37 2 °C) 80 16 86/57 Abnormal  98 % -- 8 L/min -- Simple mask   04/08/22 0114 99 °F (37 2 °C) 80 16 97/56 97 % -- 8 L/min -- Simple mask   04/08/22 0045 99 2 °F (37 3 °C) 88 16 88/56 Abnormal  90 % 44 -- 6 L/min Nasal cannula   04/08/22 0015 99 2 °F (37 3 °C) 90 16 89/58 Abnormal  90 % 44 -- 6 L/min Nasal cannula   04/07/22 2345 99 °F (37 2 °C) 86 16 98/57 90 % 44 -- 6 L/min Nasal cannula   04/07/22 2315 99 2 °F (37 3 °C) 92 16 82/47 Abnormal  90 % 44 -- 6 L/min Nasal cannula   04/07/22 2249 99 4 °F (37 4 °C) 108 Abnormal  16 120/74 90 % 36 -- 4 L/min Nasal cannula   04/07/22 2220 -- 116 Abnormal  16 130/80 91 % 36 -- 4 L/min Nasal cannula   04/07/22 2155 100 3 °F (37 9 °C) 126 Abnormal  14 142/88 90 % 36 -- 4 L/min Nasal cannula   04/07/22 2131 100 9 °F (38 3 °C) Abnormal  136 Abnormal  16 147/92 91 % 36 -- 4 L/min Nasal cannula   04/07/22 2030 101 2 °F (38 4 °C) Abnormal  130 Abnormal  -- 135/84 96 % 36 -- 4 L/min Nasal cannula   04/07/22 1957 -- -- -- 128/84 -- -- -- -- --   04/07/22 1922 -- 146 Abnormal  20 164/84 -- -- -- -- --   04/07/22 1835 -- 120 Abnormal  20 150/100 92 % 32 -- 3 L/min Nasal cannula   04/07/22 1800 100 °F (37 8 °C) 123 Abnormal  20 148/78 92 % 32 -- 3 L/min Nasal cannula   04/07/22 1700 -- -- -- -- 91 % 32 -- 3 L/min Nasal cannula   04/07/22 1621 -- -- -- -- 91 % 32 -- 3 L/min Nasal cannula   04/07/22 1540 98 8 °F (37 1 °C) 129 Abnormal  20 161/82 89 % Abnormal  -- -- -- None (Room air)   04/07/22 1538 -- -- -- -- 91 % -- 2 L/min -- Nasal cannula       Severity of Ethanol Withdrawal Scale (SEWS)  Row Name 04/08/22 0615 04/08/22 0546 04/08/22 0515 04/08/22 0500 04/08/22 0445      ANXIETY: Do you feel that something bad is about to happen to you right now? 0  -CH 0  -CH 0  -CH -- 0  -CH   NAUSEA and DRY HEAVES or VOMITING? 0  -CH 0  -CH 0  -CH -- 0  -CH   SWEATING: (includes moist palms, sweating now)? Score 0 or 2 0  -CH 0  -CH 0  -CH -- 0  -CH   TREMOR: with arms extended eyes closed? 0  -CH 0  -CH 0  -CH -- 0  -CH   AGITATION: Fidgety, restless, pacing? 0  -CH 0  -CH 0  -CH -- 0  -CH   DISORIENTATION: 0  -CH 0  -CH 0  -CH -- 0  -CH   HALLUCINATIONS: 0  -CH 0  -CH 0  -CH -- 0  -CH   VITAL SIGNS: ANY (Pulse >268, Diastolic BP >63, Temp >36 9) 0  -CH 0  -CH 0  -CH -- 0  -CH   SEWS Total Score 0  -CH 0  -CH 0  -CH -- 0  -CH   Ivey Agitation Sedation Scale (RASS)   Ivey Agitation Sedation Scale (RASS) -2  -CH -3  -CH -3  -CH -3  -CH -3  -CH   Row Name 04/08/22 0415 04/08/22 0345 04/08/22 0315 04/08/22 0245 04/08/22 0215   Severity of Ethanol Withdrawal Scale (SEWS)   ANXIETY: Do you feel that something bad is about to happen to you right now? 0  -CH 0  -CH 0  -CH 0  -CH 0  -CH   NAUSEA and DRY HEAVES or VOMITING? 0  -CH 0  -CH 0  -CH 0  -CH 0  -CH   SWEATING: (includes moist palms, sweating now)? Score 0 or 2 0  -CH 0  -CH 0  -CH 0  -CH 0  -CH   TREMOR: with arms extended eyes closed? 0  -CH 0  -CH 0  -CH 0  -CH 0  -CH   AGITATION: Fidgety, restless, pacing?  0  -CH 0  -CH 0  -CH 0  -CH 0  -CH   DISORIENTATION: 0  -CH 0  -CH 0  -CH 0  -CH 0  -CH   HALLUCINATIONS: 0  -CH 0  -CH 0  -CH 0  -CH 0  -CH   VITAL SIGNS: ANY (Pulse >840, Diastolic BP >70, Temp >75 8) 0  -CH 0  -CH 0  -CH 0  -CH 0  -CH   SEWS Total Score 0  -CH 0  -CH 0  -CH 0  -CH 0  -CH   Ivey Agitation Sedation Scale (RASS)   Ivey Agitation Sedation Scale (RASS) -3  -CH -3  -CH -3  -CH -3  -CH -3  -CH   Row Name 04/08/22 0145 04/08/22 0115 04/08/22 0045 04/08/22 0015 04/07/22 2345   Severity of Ethanol Withdrawal Scale (SEWS)   ANXIETY: Do you feel that something bad is about to happen to you right now? 0  -CH 0  -CH 0  -CH 0  -CH 0  -CH   NAUSEA and DRY HEAVES or VOMITING? 0  -CH 0  -CH 0  -CH 0  -CH 0  -CH   SWEATING: (includes moist palms, sweating now)? Score 0 or 2 0  -CH 0  -CH 0  -CH 0  -CH 0  -CH   TREMOR: with arms extended eyes closed? 0  -CH 0  -CH 0  -CH 0  -CH 0  -CH   AGITATION: Fidgety, restless, pacing? 0  -CH 0  -CH 0  -CH 0  -CH 0  -CH   DISORIENTATION: 0  -CH 0  -CH 0  -CH 0  -CH 0  -CH   HALLUCINATIONS: 0  -CH 0  -CH 0  -CH 0  -CH 0  -CH   VITAL SIGNS: ANY (Pulse >480, Diastolic BP >13, Temp >31 7) 0  -CH 0  -CH 0  -CH 0  -CH 0  -CH   SEWS Total Score 0  -CH 0  -CH 0  -CH 0  -CH 0  -CH   Ivey Agitation Sedation Scale (RASS)   Ivey Agitation Sedation Scale (RASS) -3  -CH -3  -CH -3  -CH -3  -CH -4  -CH   Row Name 04/07/22 2315 04/07/22 2249 04/07/22 2220 04/07/22 2155 04/07/22 1921   Severity of Ethanol Withdrawal Scale (SEWS)   ANXIETY: Do you feel that something bad is about to happen to you right now? 0  -CH 0  -CH -- -- 3   NAUSEA and DRY HEAVES or VOMITING? 0  -CH 0  -CH -- -- 0   SWEATING: (includes moist palms, sweating now)? Score 0 or 2 0  -CH 0  -CH -- -- 2   REMOR: with arms extended eyes closed? 0  -CH 0  -CH -- -- 2   AGITATION: Fidgety, restless, pacing? 0  -CH 0  -CH -- -- 3   DISORIENTATION: 0  -CH 0  -CH -- -- 3   HALLUCINATIONS: 0  -CH 0  -CH -- -- 1   VITAL SIGNS: ANY (Pulse >495, Diastolic BP >91, Temp >28 7) 0  -CH 0  -CH -- -- 3   SEWS Total Score 0  -CH 0  -CH -- -- 17   Ivey Agitation Sedation Scale (RASS)   Ivey Agitation Sedation Scale (RASS) -4  -CH -1  -JT -1  -JT +1  -JT +2    Row Name 04/07/22 1836 04/07/22 1800 04/07/22 1600     Severity of Ethanol Withdrawal Scale (SEWS)       ANXIETY: Do you feel that something bad is about to happen to you right now?   3  -JT 0  -DE 3  -JG NAUSEA and DRY HEAVES or VOMITING? 0  -JT 0  -DE 0  -JG     SWEATING: (includes moist palms, sweating now)? Score 0 or 2 0  -JT 0  -DE 0  -JG     TREMOR: with arms extended eyes closed? 0  -JT 2  -DE 2  -JG     AGITATION: Fidgety, restless, pacing? 3  -JT 3  -DE 3  -JG     DISORIENTATION: 3  -JT 3  -DE 3  -JG     HALLUCINATIONS: 0  -JT 0  -DE 0  -JG     VITAL SIGNS: ANY (Pulse >099, Diastolic BP >35, Temp >56 6) 3  -JT 3  -DE 3  -JG     SEWS Total Score 12  -JT 11  -DE 14  -JG             Ivey Agitation Sedation Scale (RASS)       Ivey Agitation Sedation Scale (RASS) 0  -JT -1  -DE --         Pertinent Labs/Diagnostic Test Results:   4/7 - EKG  Sinus tachycardia  Cannot rule out Anterior infarct , age undetermined  Nonspecific T wave changes    4/7 - Echo    Left Ventricle: Normal left ventricular wall thickness, cavity size, and systolic function  Estimated ejection fraction 55-60%  No clear wall motion abnormality identified  Diastolic characterization was not performed    Right Ventricle: Normal right ventricular size and function    Tricuspid Valve: The tricuspid valve was not well visualized  Trace tricuspid regurgitation  No adequate spectral envelope for estimating pulmonary pressure  XR chest portable   Final Result  (04/08 0916)      Low lung volumes with trace effusions and bibasilar atelectasis  Results from last 7 days   Lab Units 04/05/22  0421   SARS-COV-2  Negative     Results from last 7 days   Lab Units 04/08/22  0452 04/07/22  0502 04/06/22  0458 04/05/22  0419 04/05/22  0419 04/05/22  0030 04/05/22  0030   WBC Thousand/uL 9 34 12 23* 9 80   < > 11 72*   < > 12 04*   HEMOGLOBIN g/dL 10 7* 13 9 14 0  --  14 2  --  14 1   HEMATOCRIT % 32 9* 40 5 40 1  --  41 2  --  40 9   PLATELETS Thousands/uL 158 172 169   < > 242   < > 241   NEUTROS ABS Thousands/µL  --   --   --   --  9 88*  --  8 55*    < > = values in this interval not displayed       Results from last 7 days   Lab Units 04/08/22  0452 04/07/22  0502 04/06/22  0458 04/05/22  0419 04/05/22  0030   SODIUM mmol/L 137 136 136 140 139   POTASSIUM mmol/L 3 3* 3 4* 3 6 3 9 3 3*   CHLORIDE mmol/L 100 101 103 107 102   CO2 mmol/L 33* 28 26 21 21   ANION GAP mmol/L 4* 7 7 12 16*   BUN mg/dL 9 7 8 15 16   CREATININE mg/dL 0 67 0 64 0 76 0 78 0 88   EGFR ml/min/1 73sq m 97 98 86 84 72   CALCIUM mg/dL 7 7* 8 0* 7 8* 8 0* 8 6   MAGNESIUM mg/dL 2 1 1 7 1 3*  --  1 9   PHOSPHORUS mg/dL 3 3 2 0* 2 8  --   --      Results from last 7 days   Lab Units 04/08/22  0452 04/07/22  0502 04/06/22  0458 04/05/22  0419 04/05/22  0030   AST U/L 41* 33 24 36 36   ALT U/L 22 31 33 53 56   ALK PHOS U/L 68 65 69 90 99   TOTAL PROTEIN g/dL 6 4 6 4 6 4 7 2 7 9   ALBUMIN g/dL 3 2 2 8* 3 2* 4 0 4 3   TOTAL BILIRUBIN mg/dL 0 89 1 15* 0 82 0 32 0 41     Results from last 7 days   Lab Units 04/07/22  1150 04/07/22  0604 04/07/22  0042 04/07/22  0026 04/06/22  1754 04/06/22  1259 04/06/22  0605 04/05/22  2341 04/05/22  0612   POC GLUCOSE mg/dl 94 118 123 120 113 122 132 122 105     Results from last 7 days   Lab Units 04/08/22  0452 04/07/22  0502 04/06/22  0458 04/05/22  0419 04/05/22  0030   GLUCOSE RANDOM mg/dL 94 112 121 133 166*     Results from last 7 days   Lab Units 04/08/22  1311 04/08/22  0742   PH TOM  7 345 7 369   PCO2 TOM mm Hg 51 8* 51 4*   PO2 TOM mm Hg 46 0* 73 4*   HCO3 TOM mmol/L 27 6 29 0   BASE EXC TOM mmol/L 1 2 2 9   O2 CONTENT TOM ml/dL 12 9 15 0   O2 HGB, VENOUS % 78 3 92 7*     Results from last 7 days   Lab Units 04/05/22  0419   PROCALCITONIN ng/ml <0 05     Results from last 7 days   Lab Units 04/05/22  0404 04/05/22  0030   LACTIC ACID mmol/L 2 1* 3 1*     Results from last 7 days   Lab Units 04/07/22  0502   NT-PRO BNP pg/mL 391*     Results from last 7 days   Lab Units 04/08/22  0452 04/07/22  1642 04/05/22  0419   LIPASE u/L 819* 1,466* 13,119*   AMYLASE IU/L  --   --  986*     Results from last 7 days   Lab Units 04/05/22  0411 CLARITY UA  Clear   COLOR UA  Straw   SPEC GRAV UA  1 015   PH UA  5 0   GLUCOSE UA mg/dl Negative   KETONES UA mg/dl Negative   BLOOD UA  Negative   PROTEIN UA mg/dl Negative   NITRITE UA  Negative   BILIRUBIN UA  Negative   UROBILINOGEN UA E U /dl 0 2   LEUKOCYTES UA  Negative     Results from last 7 days   Lab Units 04/05/22  0421   INFLUENZA A PCR  Negative   INFLUENZA B PCR  Negative   RSV PCR  Negative     Results from last 7 days   Lab Units 04/05/22  0030   ETHANOL LVL mg/dL 54*         Past Medical History:   Diagnosis Date    Alcohol abuse     High cholesterol     Hypertension     Pancreatitis      Present on Admission:   Alcohol use disorder, severe, dependence (HCC)   Alcohol-induced acute pancreatitis   Acute respiratory failure with hypoxia (HCC)   Other constipation   Delirium tremens (HCC)   Leukocytosis   Hypertension   Hypokalemia   Current every day nicotine vaping   Hepatic steatosis   Gastroesophageal reflux disease without esophagitis   Hyperlipidemia   Ataxia      Admitting Diagnosis: Alcohol induced acute pancreatitis [K85 20]  Age/Sex: 60 y o  female  Admission Orders:  Continual Observation for Safety  NPO  SCDs  Fall & Seizure precautions  Violent Locked Limb Restraints  SEWS monitoring  Continuous Cardio-Pulmonary Monitoring      Scheduled Medications:  diazepam, , ,   docusate sodium, 100 mg, Oral, BID  enoxaparin, 40 mg, Subcutaneous, Daily  etomidate, 20 mg, Intravenous, Once  folic acid IVPB, 1 mg, Intravenous, Daily  ipratropium, 0 5 mg, Nebulization, TID  levalbuterol, 1 25 mg, Nebulization, TID  lidocaine, 1 patch, Topical, Q24H  multivitamin-minerals, 1 tablet, Oral, Daily  nicotine, 1 patch, Transdermal, Daily  OLANZapine, 5 mg, Intramuscular, Once  pantoprazole, 40 mg, Intravenous, Q24H [address]  potassium chloride, 20 mEq, Intravenous, Once  Succinylcholine Chloride, 100 mg, Intravenous, Once  thiamine, 500 mg, Intravenous, Q8H    Continuous IV Infusions:  dexmedetomidine, 0 1-1 1 mcg/kg/hr, Intravenous, Titrated  propofol, 5-50 mcg/kg/min, Intravenous, Titrated    PRN Meds:  furosemide, 20 mg, Intravenous;  4/7 x1, 4/8 x2  HYDROmorphone, 0 2 mg, Intravenous, Q3H PRN; 4/8 x1  ipratropium-albuterol, 3 mL, Nebulization, TID PRN  naloxone, 0 04 mg, Intravenous, Q1MIN PRN  olanzapine, 10 mg, Intramuscular; 4/7 x1, 4/8 x1  ondansetron, 4 mg, Intravenous, Q6H PRN; 4/8 x1  phenobarbital, 650 mg, Intravenous; 4/7 x1  phenobarbital, 130 mg, Intravenous; 4/7 x3  polyethylene glycol, 17 g, Oral, Daily PRN        IP CONSULT TO CASE MANAGEMENT  IP CONSULT TO GASTROENTEROLOGY    Network Utilization Review Department  ATTENTION: Please call with any questions or concerns to [phone] and carefully listen to the prompts so that you are directed to the right person  All voicemails are confidential   [employer] all requests for admission clinical reviews, approved or denied determinations and any other requests to dedicated fax number below belonging to the campus where the patient is receiving treatment   List of dedicated fax numbers for the Facilities:  [address] DENIALS (Administrative/Medical Necessity) [phone]   [address] (Maternity/NICU/Pediatrics) [phone]   [address]  [address] [address] [place] [address] [address] [place] [address] [address] [facility] [phone]

## 2022-04-08 NOTE — NURSING NOTE
Patient received in 4 point restraints  Assessed and restraints applied correctly  Patient disoriented and unable to follow directions appropriately  Will continue to reassess restraint need

## 2022-04-08 NOTE — NURSING NOTE
Francisca was becoming increasingly restless, uncooperative, unable to accept redirection  Became physically aggressive (kicking, hitting w closed fist, attempting to bite)  Locked 4 pt restraints initiated, NV soft restraints d/c'd  Continuous observation maintained

## 2022-04-08 NOTE — ASSESSMENT & PLAN NOTE
Patient with h/o chronic daily alcohol use  Reports currently consuming 4-5 mixed drinks daily (1 shot whiskey per drink)  · Per reports from significant other-patient consumes 6-7 doubles daily  Patient states last drink was Friday 04/01/2022  Reports most recently sober for approx   1 yr until relapse 1 5 yrs ago  Continue thiamine/folic acid supplementation/multivitamin   Manage withdrawal as above  Consult case management for assistance with rehab resources

## 2022-04-08 NOTE — ASSESSMENT & PLAN NOTE
· CT abd/pelvis 4/5/2022: "Mild hepatic steatosis "  · Likely secondary to chronic alcohol consumption  · CMP this a m  Revealed stable LFTs:  AST 41, ALT 22, alk-phos 68    · Encourage alcohol cessation

## 2022-04-08 NOTE — PROCEDURES
OG Tube Insertion Procedure Note  Indications: Patient intubated  Procedure Details: A 16 fr OG tube was placed and confirmed by auscultation  Initial XR showed OG tube past the pylorus  Tube pulled back and reconfirmed by auscultation  Repeat XR shows tube still in the duodenum, discussed with radiology and they recommended could pull back around 12cm if desired  Patient ready for transport, will discuss results with ICU and defer to them on whether to leave in duodenum or pull back  Patient tolerated procedure well

## 2022-04-08 NOTE — NURSING NOTE
Medications etomidate 20mg, succinilcholyne, and propofol given during emergent intubation  Witnessed by Roby Maldonado

## 2022-04-08 NOTE — ASSESSMENT & PLAN NOTE
· CMP is a m   Revealed potassium of 3 3  · Magnesium 1 7  · Administer supplementation: 20 mEq IV x 1  · Continue to monitor electrolytes and replete as necessary

## 2022-04-08 NOTE — NURSING NOTE
This nurse attempted to transition patient to 2 point alternating locked restraints but patient immediately began trying to remove her mask and to swat away staff that were assessing her  She yells and curses at this nurse  L arm restraint immediately placed back on but R leg was taken out for comfort  Patient NPO and was offered assistance with toileting

## 2022-04-08 NOTE — NURSING NOTE
Francisca continues to fight restraints, yell for her "friends [that]  are right there", unable to accept redirection/reorientation, restless  Precedex increased to 0 9mg/kg/hr per Reese Soliz NP  4pt locked restraints and continuous observation continue

## 2022-04-08 NOTE — ASSESSMENT & PLAN NOTE
· Likely secondary to diuretic administration  · Continue completion  · BMP tomorrow a m  · Mg tomorrow a m

## 2022-04-08 NOTE — DISCHARGE SUMMARY
[facility]  Discharge- Francisca Murphy Prime 1965, 60 y o  female MRN: [id number]  Unit/Bed#: Transfer 01 Encounter: [id number]  Primary Care Provider: Dima Ramires DO   Date and time admitted to hospital: 4/7/2022  3:36 PM    * Delirium tremens [facility])  Assessment & Plan  H/o chronic daily alcohol consumption, denies h/o withdrawal seizures/DTs  Admitted to [facility] med surg unit x 2 days for acute alcoholic pancreatitis  · During that admission, patient managed on CIWA protocol: received Librium 30 mg total, Ativan 8 mg total in Valium 5 mg total  · Per chart review, patient became agitated, combative, and confused overnight in setting of alcohol withdrawal  · Transfer to [facility] detox unit for further management of severe alcohol withdrawal  Per patient, Last drink was Friday 04/01/2022  Ethanol 54 (04/05/2022, 12:30 a m )  Continue 1:1 observation for safety  [facility] protocol with symptom-triggered phenobarbital for medically-assisted alcohol withdrawal initiated upon admission  · Received 1040 mg phenobarbital initially, but had worsening disorientation and agitation in setting of notable ataxia, precedex drip subsequently initiated  · precedex drip currently at 0 5 mcg/kg/hr,  · Closely monitoring BPs on precedex- most recent 100/64; did dip to mid 80s/50s overnight  · Per discussion with pharmacy- unable to further concentrate precedex formulation   · Monitor closely for fluid overload/respiratory distress  · Continue 1:1 observation for safety   · Continue to monitor vitals, symptoms, sedation   · Pursue ICU transfer given respiratory status     Acute respiratory failure with hypoxia ([facility] [address] )  Assessment & Plan  · Per chart review, during admission at [facility], patient had episode of hypoxia with increased work of breathing  · CXR 4/7/2022: "Low lung volumes with bibasilar atelectasis "  · ECHO 4/7/2022: "Left Ventricle: Normal left ventricular wall thickness, cavity size, and systolic function  Estimated ejection fraction 55-60%  No clear wall motion abnormality identified  Diastolic characterization was not performed "  · Respiratory protocol, incentive spirometry  · Respiratory therapy contacted for nebulizer treatment  · Continue nebulizer treatment  · Continue to monitor vitals, symptoms  · 4/8/2022: patient hypoxic on 4 L NC  · Additional lasix 20 mg IV administered  · RT placed patient on non-rebreather   · VBG drawn: pH 7 369, pCO2 51 4, O2 73 4  · Repeat CXR: Low lung volumes with trace effusions and bibasilar atelectasis    · In light of VBG, RT transitioned to venturi mask 14 L 55%   · Worsening respiratory status later this afternoon (increased work of breathing)  · Subsequently intubated and transferred to [facility] ICU    Alcohol-induced acute pancreatitis  Assessment & Plan  · Patient originally presented to [facility] ED 04/05/2022 for evaluation of severe abdominal pain and multiple episodes of vomiting in setting of chronic frequent alcohol consumption  · CT abdomen/pelvis in the ED revealed acute pancreatitis  · Other pertinent labs  · Amylase 986  · Triglycerides 333 -> 290  · Patient subsequently admitted to [facility] med surge unit for treatment of acute pancreatitis  · GI was consulted during that admission- per most recent recommendations, hold fluids in light of respiratory status, keep NPO until mental status improves  · Abdomen appears more distended this AM, diffuse TTP  · Consult GI, appreciate recommendaitons  · Continue to hold fluids in setting of respiratory failure  · NPO diet- advance as tolerated  · Initial lipase 53,700, trended down to 13,119 (4/5/2022)  · Lipase this , down from 1466 yesterday  · Supportive care- Zofran as needed, Dilaudid as needed  · PRN Narcan ordered for respiratory depression (remain cautious with Dilaudid especially while on Precedex)    Ataxia  Assessment & Plan  · On admission- ataxic gait and finger-to-nose ataxia noted · Likely secondary to severe alcohol withdrawal versus Wernicke's  · Continue high-dose thiamine  · Head CT ordered however patient uncooperative with procedure  · Consider brain MRI once patient more cooperative  · Fall precautions  · 1:1 continues observation for safety    Alcohol use disorder, severe, dependence ([facility] [address] )  Assessment & Plan  Patient with h/o chronic daily alcohol use  Reports currently consuming 4-5 mixed drinks daily (1 shot whiskey per drink)  · Per reports from significant other-patient consumes 6-7 doubles daily  Patient states last drink was Friday 04/01/2022  Reports most recently sober for approx  1 yr until relapse 1 5 yrs ago  Continue thiamine/folic acid supplementation/multivitamin   Manage withdrawal as above  Consult case management for assistance with rehab resources    Hypertension  Assessment & Plan  · Patient with history of hypertension  · Outpatient regimen includes lisinopril 10 mg daily  · BP on admission 161/82  · Will hold antihypertensives in setting of acute alcohol withdrawal and treatment with precedex  · Continue to monitor vitals, BP  · Resume home antihypertensive regimen once alcohol withdrawal resolved    Hypokalemia  Assessment & Plan  · CMP is a m  Revealed potassium of 3 3  · Magnesium 1 7  · Administer supplementation: 20 mEq IV x 1  · Continue to monitor electrolytes and replete as necessary    Hyperlipidemia  Assessment & Plan  · Outpatient regimen includes Lipitor 40 mg daily  · Resume outpatient regimen once able to tolerate PO    Gastroesophageal reflux disease without esophagitis  Assessment & Plan  · Continue Protonix    Hepatic steatosis  Assessment & Plan  · CT abd/pelvis 4/5/2022: "Mild hepatic steatosis "  · Likely secondary to chronic alcohol consumption  · CMP this a m  Revealed stable LFTs:  AST 41, ALT 22, alk-phos 68    · Encourage alcohol cessation     Current every day nicotine vaping  Assessment & Plan  · Reports daily nicotine vaping  · Encourage cessation  · Offer nicotine replacement      Discharging Physician / Practitioner: Sary Sellers PA-C  PCP: Dottie Moon DO  Admission Date:   Admission Orders (From admission, onward)     Ordered        04/07/22 1724  Inpatient Admission  Once                      Discharge Date: 04/08/22    Medical Problems             Resolved Problems  Date Reviewed: 4/8/2022          Resolved    Leukocytosis 4/8/2022     Resolved by  Brayan Diggs PA-C                Consultations During Hospital Stay:  · GI  · Respiratory therapy    Procedures Performed:   · Intubation performed prior to transfer  · OG tube insertion  · Multiple CXRs    Significant Findings / Test Results:   · VBG: pH 7 369, pCO2 51 4, O2 73 4  · PH 7 345, pCO2 51 8, pO2 46  · CXR: Low lung volumes with trace effusions and bibasilar atelectasis  · Lipase 53,7000 -> 819  · Amylase 986  · Triglycerides 333 -> 290  · hypokalemia    Incidental Findings:   · none     Test Results Pending at Discharge (will require follow up):   · none     Outpatient Tests Requested:  · none    Complications:  Acute respiratory failure     Reason for Admission: alcohol withdrawal complicated by delirium tremens  Hospital Course:     Francisca Ziegler is a 60 y o  female patient PMH HTN, HLD, history of alcohol induced pancreatitis, hepatic steatosis, AUD who originally presented to the hospital on 6/8/0175 due to complicated alcohol withdrawal  Originally admitted to [facility] on 4/5/2022 for treatment of acute pancreatitis  Overnight 4/6-4/7, patient developed worsening agitation and disorientation likely 2/2 alcohol withdrawal  Early AM 4/7, patient experienced fluid overload- IVFs discontinued at that time, CXR suggested pulmonary congestion, lasix given, ECHO WNL   Patient subsequently transferred to [facility] detox unit later in evening on 4/7/2022 for further management of alcohol withdrawal      Patient placed on 3 L NC on admission due to desat to 89% on RA  Upon admission, patient's clinical presentation upon admission was concerning for delirium tremens  SEWS protocol with symptom-triggered phenobarbital initiated, received 1040 mg total phenobarbital  Despite phenobarbital, patient exhibited worsening agitation and disorientation night of 4/7/2022, subsequently placed on precedex drip for further management of withdrawal and 2 point soft restraints  Overnight, control team called for acute agitation, subsequently placed in 4-point locked restraints and olazapine administered  Additionally, patient's O2 noted to dip to 88-90% while sleeping despite 4-6 L via NC; respiratory placed face mask  Also of note, blood pressures intermittently hypotensive overnight, dipping to low of mid-80s / mid-50s  This morning, patient was maintained at 0 5 mcg/kg/hr precedex  RT continued to follow patient  Attempted to wean off face mask onto 4 L NC, however patient desatted to 86-88%; subsequently placed on non-rebreather  Additional lasix administered, stat VBG and CXR ordered  VBG revealed elevated pCO2, CXR showed low lung volumes with trace effusions  In light of results, RT transitioned supplemental O2 to venturi mask 14 L 55%; SpO2 maintained around 90-92%  Attempted to de-escalate locked restraints, however patient attempted to rip off supplemental O2 and attempted to strike staff, then placed in 3 point locked restraints and additional olazapine administered  Patient noted to have worsening respiratory status this afternoon, exhibiting increased work of breathing and increased O2 requirements  Patient subsequently intubated and patient transferred to [facility] ICU for further treatment  The patient, initially admitted to the hospital as inpatient, was discharged earlier than expected given the following: Required ICU transfer given acute respiratory failure  Please see above list of diagnoses and related plan for additional information  Condition at Discharge: stable     Discharge Day Visit / Exam:     * Please refer to separate progress note for these details *    Discussion with Family: attempted to update family- called significant other Trudy Fernandez via phone ([phone]), no answer, left voicemail  Discharge instructions/Information to patient and family:   See after visit summary for information provided to patient and family  Provisions for Follow-Up Care:  See after visit summary for information related to follow-up care and any pertinent home health orders  Disposition:     [address] Transfer to [facility] ICU- case discussed with Dr Jameel Skelton, who is agreeable to transfer  For Discharges to [facility] SNF:   · Not Applicable to this Patient - Not Applicable to this Patient    Planned Readmission: none     Discharge Statement:  I spent 71 minutes discharging the patient  This time was spent on the day of discharge  I had direct contact with the patient on the day of discharge  Greater than 50% of the total time was spent examining patient, answering all patient questions, arranging and discussing plan of care with patient as well as directly providing post-discharge instructions  Additional time then spent on discharge activities  Discharge Medications:  See after visit summary for reconciled discharge medications provided to patient and family        ** Please Note: This note has been constructed using a voice recognition system **

## 2022-04-08 NOTE — ASSESSMENT & PLAN NOTE
· Patient had noted hypertriglyceridemia, most recently 298   Patient is on Lipitor 40 mg at  · Can be resumed once extubated

## 2022-04-08 NOTE — RESTRAINT FACE TO FACE
Restraint Face to Face   Francisca Genao 60 y o  female MRN: [id number]  Unit/Bed#: 5T DETOX 518-01 Encounter: [id number]    Physical Evaluation: Patient was in 4 point locked restraints  Treatment team RN attempted to down-grade restraints to 2-point alternating restraints; however patient immediately pulled off venturi mask and attempted to swing at staff  Purpose for Restraints/ Seclusion High risk for self harm, High risk for causing significant disruption of treatment environment  and High risk for harm to others  Patient's reaction to the intervention: agitated  Patient's medical condition: delirium tremens on precedex  Distal pulses intact b/l upper and lower extremities, cap refill <2 sec  SpO2 91% venturi mask, HR 89 bpm    Patient's Behavioral condition: agitated, uncooperative, disoriented  Restraints to be Continued- Placed back in 3 point locked restraints (RA, LA, LL) and 10 mg IM olanzapine administered

## 2022-04-08 NOTE — PROGRESS NOTES
[facility]  Progress Note - Francisca Nascimento 1965, 60 y o  female MRN: [id number]  Unit/Bed#: 5T DETOX 589-25 Encounter: [id number]  Primary Care Provider: Jose Bennett DO   Date and time admitted to hospital: 4/7/2022  3:36 PM    Progress Note - Medical Toxicology    Francisca Nascimento 60 y o  female MRN: [id number]  Unit/Bed#: 5T DETOX 518-01 Encounter: [id number]  MEDICAL DETOX UNIT, LEVEL 4  Department of Medical Toxicology  Reason for Admission/Principal Problem: delirium tremens   Rounding Provider: Natalie Vasquez PA-C, Brittani Padilla MD       * Delirium tremens [facility])  Assessment & Plan  H/o chronic daily alcohol consumption, denies h/o withdrawal seizures/DTs  Admitted to [facility] med surg unit x 2 days for acute alcoholic pancreatitis  · During that admission, patient managed on CIWA protocol: received Librium 30 mg total, Ativan 8 mg total in Valium 5 mg total  · Per chart review, patient became agitated, combative, and confused overnight in setting of alcohol withdrawal  · [facility] for further management of severe alcohol withdrawal  Per patient, Last drink was Friday 04/01/2022  Ethanol 54 (04/05/2022, 12:30 a m )  Continue 1:1 observation for safety  [facility] protocol with symptom-triggered phenobarbital for medically-assisted alcohol withdrawal initiated upon admission  · Received 910 mg phenobarbital initially, but had worsening disorientation and agitation in setting of notable ataxia, precedex drip subsequently initiated  · precedex drip currently at 0 5 mcg/kg/hr, in 4-point locked restraints for agitation  · Closely monitoring BPs on precedex- most recent 100/64; did dip to mid 80s/50s overnight  · Per discussion with pharmacy- unable to further concentrate precedex formulation   · Monitor closely for fluid overload/respiratory distress  · Continue 1:1 observation for safety   · Continue restraints   · Continue to monitor vitals, symptoms, sedation   · Consider ICU transfer if worsening hypotension    Acute respiratory failure with hypoxia [facility])  Assessment & Plan  · Per chart review, during admission at [facility], patient had episode of hypoxia with increased work of breathing  · CXR 4/7/2022: "Low lung volumes with bibasilar atelectasis "  · ECHO 4/7/2022: "Left Ventricle: Normal left ventricular wall thickness, cavity size, and systolic function  Estimated ejection fraction 55-60%  No clear wall motion abnormality identified  Diastolic characterization was not performed "  · Respiratory protocol, incentive spirometry  · Respiratory therapy contacted for nebulizer treatment  · Continue nebulizer treatment  · Continue to monitor vitals, symptoms  · 4/8/2022: patient hypoxic on 4 L NC  · Additional lasix 20 mg IV administered  · RT placed patient on non-rebreather   · VBG drawn: pH 7 369, pCO2 51 4, O2 73 4  · Repeat CXR: Low lung volumes with trace effusions and bibasilar atelectasis    · In light of VBG, RT transitioned to venturi mask 14 L 55% - stable, SpO2 91-92%  · Consider ICU transfer if worsening respiratory status    Alcohol-induced acute pancreatitis  Assessment & Plan  · Patient originally presented to [facility] ED 04/05/2022 for evaluation of severe abdominal pain and multiple episodes of vomiting in setting of chronic frequent alcohol consumption  · CT abdomen/pelvis in the ED revealed acute pancreatitis  · Other pertinent labs  · Amylase 96  · Triglycerides 333 -> 290  · Patient subsequently admitted to [facility] med surge unit for treatment of acute pancreatitis  · GI was consulted during that admission- per most recent recommendations, hold fluids in light of respiratory status, keep NPO until mental status improves  · Abdomen appears more distended this AM, diffuse TTP  · Consult GI, appreciate recommendaitons  · Continue to hold fluids in setting of respiratory failure  · NPO diet- advance as tolerated  · Initial lipase 53,700, trended down to 13,119 (4/5/2022)  · Lipase this , down from 1466 yesterday  · Supportive care- Zofran as needed, Dilaudid as needed  · PRN Narcan ordered for respiratory depression (remain cautious with Dilaudid especially while on Precedex)    Ataxia  Assessment & Plan  · Patient gait notably ataxic on admission  · Finger-to-nose ataxia appreciated as well  · Likely secondary to severe alcohol withdrawal versus Wernicke's  · Initiate high-dose thiamine  · Head CT ordered however patient uncooperative with procedure  · Consider brain MRI once patient more cooperative  · Fall precautions  · 1:1 continues observation for safety    Alcohol use disorder, severe, dependence ([facility] [address] )  Assessment & Plan  Patient with h/o chronic daily alcohol use  Reports currently consuming 4-5 mixed drinks daily (1 shot whiskey per drink)  · Per reports from significant other-patient consumes 6-7 doubles daily  Patient states last drink was Friday 04/01/2022  Reports most recently sober for approx  1 yr until relapse 1 5 yrs ago  Continue thiamine/folic acid supplementation/multivitamin   Manage withdrawal as above  Consult case management for assistance with rehab resources    Hypertension  Assessment & Plan  · Patient with history of hypertension  · Outpatient regimen includes lisinopril 10 mg daily  · BP on admission 161/82  · Will hold antihypertensives in setting of acute alcohol withdrawal and treatment with phenobarbital  · Continue to monitor vitals, BP  · Resume home antihypertensive regimen once alcohol withdrawal resolved    Hypokalemia  Assessment & Plan  · CMP is a m   Revealed potassium of 3 3  · Magnesium 1 7  · Administer supplementation: 20 mEq IV x 1  · Continue to monitor electrolytes and replete as necessary    Leukocytosis-resolved as of 4/8/2022  Assessment & Plan  · CBC 04/08/2022: WBCs improved to 9 34  · resolved    Hyperlipidemia  Assessment & Plan  · Outpatient regimen includes Lipitor 40 mg daily  · Resume outpatient regimen once able to tolerate PO    Gastroesophageal reflux disease without esophagitis  Assessment & Plan  · Continue Protonix    Hepatic steatosis  Assessment & Plan  · CT abd/pelvis 4/5/2022: "Mild hepatic steatosis "  · Likely secondary to chronic alcohol consumption  · CMP this a m  Revealed stable LFTs:  AST 41, ALT 22, alk-phos 68  · Encourage alcohol cessation     Current every day nicotine vaping  Assessment & Plan  · Reports daily nicotine vaping  · Encourage cessation  · Offer nicotine replacement    Other constipation  Assessment & Plan  · Per chart review- patient reported no BM x 3 days  · CT abdomen/pelvis: "No bowel obstruction  Mild colonic diverticulosis without evidence of acute diverticulitis "  · On exam: +BS x 4, distension  · Continue to monitor      VTE Pharmacologic Prophylaxis:   Pharmacologic: Enoxaparin (Lovenox)  Mechanical VTE Prophylaxis in Place: no    Code Status: Level 1 - Full Code    Patient Centered Rounds: I have performed bedside rounds with nursing staff today  Discussions with Specialists or Other Care Team Provider: Dr Claudine Pereira, GI, respiratory     Education and Discussions with Family / Patient:     Time Spent for Care: 1 hour  More than 50% of total time spent on counseling and coordination of care as described above  Current Length of Stay: 1 day(s)    Current Patient Status: Inpatient     Certification Statement: The patient will continue to require additional inpatient hospital stay due to ongoing management of DTs, pancreatitis, respiratory failure   Discharge Plan: TBD once medically stable       Total Critical Care time spent 41 minutes excluding procedures, teaching and family updates  Subjective:   Patient seen and examined bedside this morning  Control team called overnight as patient ripped self out of soft restraints  Currently in 4-point locked, on precedex  ROS limited 2/2 delirium and sedation       Objective:     Clinical Opiate Withdrawal Scale  Pulse: 97    SEWS Total Score: 0 (2022  6:15 AM)        Last 24 Hours Medication List:   Current Facility-Administered Medications   Medication Dose Route Frequency Provider Last Rate    dexmedetomidine  0 1-1 1 mcg/kg/hr Intravenous Titrated TETE Santoro 0 5 mcg/kg/hr (22 025)    docusate sodium  100 mg Oral BID Nikki Defrancisco, PA-C      enoxaparin  40 mg Subcutaneous Daily Nikki Defrancisco, PA-C      folic acid IVPB  1 mg Intravenous Daily Nikki Defrancisco, PA-C 1 mg (22 5710)    HYDROmorphone  0 2 mg Intravenous Q3H PRN Nikki Defrancisco, PA-C      ipratropium  0 5 mg Nebulization TID Leighton Olszewski, MD      ipratropium-albuterol  3 mL Nebulization TID PRN Leighton Olszewski, MD      levalbuterol  1 25 mg Nebulization TID Leighton Olszewski, MD      lidocaine  1 patch Topical Q24H Nikki Defrancisco, PA-C      multivitamin-minerals  1 tablet Oral Daily Nikki Defrancisco, PA-C      naloxone  0 04 mg Intravenous Q1MIN PRN Nikki Defrancisco, PA-C      nicotine  1 patch Transdermal Daily Nikki Defrancisco, PA-C      OLANZapine  5 mg Intramuscular Once [employer], TETE      ondansetron  4 mg Intravenous Q6H PRN Nikki Defrancisco, PA-C      pantoprazole  40 mg Intravenous Q24H [address] Nikki Defrancisco, PA-C      polyethylene glycol  17 g Oral Daily PRN Nikki Defrancisco, PA-C      sterile water           thiamine  500 mg Intravenous Q8H Nikki Defrancisco, PA-C 500 mg (22 032)         Vitals:   Temp (24hrs), Av 8 °F (37 1 °C), Min:97 8 °F (36 6 °C), Max:101 2 °F (38 4 °C)    Temp:  [97 8 °F (36 6 °C)-101 2 °F (38 4 °C)] 97 8 °F (36 6 °C)  HR:  [] 82  Resp:  [14-20] 18  BP: ()/() 100/64  SpO2:  [89 %-98 %] 96 %  Body mass index is 30 87 kg/m²  Input and Output Summary (last 24 hours):No intake or output data in the 24 hours ending 22 1424    Physical Exam:   Physical Exam  Vitals and nursing note reviewed  Constitutional:       General: She is not in acute distress  Appearance: Normal appearance  She is well-developed  She is obese  She is ill-appearing and diaphoretic  Comments: Currently in 4-point locked restraints   HENT:      Head: Normocephalic and atraumatic  Mouth/Throat:      Mouth: Mucous membranes are dry  Pharynx: Oropharynx is clear  Eyes:      General: No scleral icterus  Conjunctiva/sclera: Conjunctivae normal       Pupils: Pupils are equal, round, and reactive to light  Cardiovascular:      Rate and Rhythm: Normal rate and regular rhythm  Pulses: Normal pulses  Radial pulses are 2+ on the right side and 2+ on the left side  Popliteal pulses are 2+ on the right side and 2+ on the left side  Dorsalis pedis pulses are 2+ on the right side and 2+ on the left side  Heart sounds: Normal heart sounds  No murmur heard  No friction rub  No gallop  Pulmonary:      Effort: Pulmonary effort is normal  No respiratory distress  Breath sounds: Examination of the right-lower field reveals wheezing  Examination of the left-lower field reveals wheezing  Decreased breath sounds and wheezing (expiratory) present  No rhonchi or rales  Comments: Currently on Venturi 14 L, 55%, SpO2 91%  Abdominal:      General: Abdomen is flat  Bowel sounds are normal  There is distension  Palpations: Abdomen is soft  Tenderness: There is generalized abdominal tenderness  There is guarding  Musculoskeletal:         General: Normal range of motion  Cervical back: Normal range of motion and neck supple  Skin:     General: Skin is warm  Capillary Refill: Capillary refill takes less than 2 seconds  Comments: No skin tears or lacerations noted in areas of restraints   Neurological:      Mental Status: She is alert         Additional Data:     Labs: keep all most recent labs as listed on admission templates   Results from last 7 days   Lab Units 04/08/22  0452 04/06/22  0458 04/05/22  0419   WBC Thousand/uL 9 34   < > 11 72*   HEMOGLOBIN g/dL 10 7*   < > 14 2   HEMATOCRIT % 32 9*   < > 41 2   PLATELETS Thousands/uL 158   < > 242   NEUTROS PCT %  --   --  84*   LYMPHS PCT %  --   --  8*   MONOS PCT %  --   --  4   EOS PCT %  --   --  3    < > = values in this interval not displayed  Results from last 7 days   Lab Units 04/08/22  0452   SODIUM mmol/L 137   POTASSIUM mmol/L 3 3*   CHLORIDE mmol/L 100   CO2 mmol/L 33*   BUN mg/dL 9   CREATININE mg/dL 0 67   ANION GAP mmol/L 4*   CALCIUM mg/dL 7 7*   ALBUMIN g/dL 3 2   TOTAL BILIRUBIN mg/dL 0 89   ALK PHOS U/L 68   ALT U/L 22   AST U/L 41*   GLUCOSE RANDOM mg/dL 94           Results from last 7 days   Lab Units 04/07/22  1150 04/07/22  0604 04/07/22  0042 04/07/22  0026 04/06/22  1754 04/06/22  1259 04/06/22  0605 04/05/22  2341 04/05/22  0612   POC GLUCOSE mg/dl 94 118 123 120 113 122 132 122 105           Results from last 7 days   Lab Units 04/05/22  0419 04/05/22  0404 04/05/22  0030   LACTIC ACID mmol/L  --  2 1* 3 1*   PROCALCITONIN ng/ml <0 05  --   --           * I Have Reviewed All Lab Data Listed Above  * Additional Pertinent Lab Tests Reviewed: All Labs Within Last 24 Hours Reviewed      Imaging Studies: I have personally reviewed pertinent reports  Recent Cultures (last 7 days): Today, Patient Was Seen By: Analia Casey PA-C    ** Please Note: Dictation voice to text software may have been used in the creation of this document   **

## 2022-04-08 NOTE — ASSESSMENT & PLAN NOTE
· Per chart review, during admission at [facility], patient had episode of hypoxia with increased work of breathing  · CXR 4/7/2022: "Low lung volumes with bibasilar atelectasis "  · ECHO 4/7/2022: "Left Ventricle: Normal left ventricular wall thickness, cavity size, and systolic function  Estimated ejection fraction 55-60%  No clear wall motion abnormality identified  Diastolic characterization was not performed "  · Respiratory protocol, incentive spirometry  · Respiratory therapy contacted for nebulizer treatment  · Continue nebulizer treatment  · Continue to monitor vitals, symptoms  · 4/8/2022: patient hypoxic on 4 L NC  · Additional lasix 20 mg IV administered  · RT placed patient on non-rebreather   · VBG drawn: pH 7 369, pCO2 51 4, O2 73 4  · Repeat CXR: Low lung volumes with trace effusions and bibasilar atelectasis    · In light of VBG, RT transitioned to venturi mask 14 L 55% - stable, SpO2 91-92%  · Consider ICU transfer if worsening respiratory status

## 2022-04-08 NOTE — ASSESSMENT & PLAN NOTE
· Patient is currently on 10 mg of lisinopril daily at home  Can be resumed once extubated  · Hydralazine p r n   For SBP greater than 170

## 2022-04-08 NOTE — NURSING NOTE
Locked restraints were removed prior to patient intubation  Debriefing could not occur due to procedure and pt sedation  Patient was then transferred to ED holding

## 2022-04-08 NOTE — QUICK NOTE
Reassessed patient due to patient becoming physically aggressive with staff  Was unable to be redirected and reoriented  Patient currently on dexmedetomidine infusion at rate of 0 5mcg/kg/hr  She was physically combative, and restless  Security was called for assistance with restraining patient  Plan:  - Administer 10mg olanzipine IM for agitation   - Will increase to 0 7mcg/kg/hr and reassess mental/respiratory status in 15 minutes, anticipate requiring further adjustment in infusion rate  - Patient high risk for causing harm to self and to staff, requiring higher intervention  Soft restraints DCed and 4 pt restraints initiated

## 2022-04-09 ENCOUNTER — APPOINTMENT (INPATIENT)
Dept: RADIOLOGY | Facility: HOSPITAL | Age: 57
DRG: 207 | End: 2022-04-09
Payer: COMMERCIAL

## 2022-04-09 ENCOUNTER — APPOINTMENT (INPATIENT)
Dept: CT IMAGING | Facility: HOSPITAL | Age: 57
DRG: 207 | End: 2022-04-09
Payer: COMMERCIAL

## 2022-04-09 LAB
ALBUMIN SERPL BCP-MCNC: 1.9 G/DL (ref 3.5–5)
ALBUMIN SERPL BCP-MCNC: 2.1 G/DL (ref 3.5–5)
ALP SERPL-CCNC: 110 U/L (ref 46–116)
ALP SERPL-CCNC: 89 U/L (ref 46–116)
ALT SERPL W P-5'-P-CCNC: 26 U/L (ref 12–78)
ALT SERPL W P-5'-P-CCNC: 35 U/L (ref 12–78)
AMYLASE SERPL-CCNC: 91 IU/L (ref 25–115)
ANION GAP SERPL CALCULATED.3IONS-SCNC: 10 MMOL/L (ref 4–13)
ANION GAP SERPL CALCULATED.3IONS-SCNC: 10 MMOL/L (ref 4–13)
ARTERIAL PATENCY WRIST A: YES
AST SERPL W P-5'-P-CCNC: 51 U/L (ref 5–45)
AST SERPL W P-5'-P-CCNC: 52 U/L (ref 5–45)
BASE EXCESS BLDA CALC-SCNC: 0.1 MMOL/L
BASE EXCESS BLDA CALC-SCNC: 0.3 MMOL/L
BILIRUB SERPL-MCNC: 0.64 MG/DL (ref 0.2–1)
BILIRUB SERPL-MCNC: 0.91 MG/DL (ref 0.2–1)
BUN SERPL-MCNC: 8 MG/DL (ref 5–25)
BUN SERPL-MCNC: 9 MG/DL (ref 5–25)
CALCIUM ALBUM COR SERPL-MCNC: 8.4 MG/DL (ref 8.3–10.1)
CALCIUM ALBUM COR SERPL-MCNC: 9.9 MG/DL (ref 8.3–10.1)
CALCIUM SERPL-MCNC: 6.7 MG/DL (ref 8.3–10.1)
CALCIUM SERPL-MCNC: 8.4 MG/DL (ref 8.3–10.1)
CHLORIDE SERPL-SCNC: 102 MMOL/L (ref 100–108)
CHLORIDE SERPL-SCNC: 91 MMOL/L (ref 100–108)
CO2 SERPL-SCNC: 24 MMOL/L (ref 21–32)
CO2 SERPL-SCNC: 27 MMOL/L (ref 21–32)
CREAT SERPL-MCNC: 0.53 MG/DL (ref 0.6–1.3)
CREAT SERPL-MCNC: 0.76 MG/DL (ref 0.6–1.3)
ERYTHROCYTE [DISTWIDTH] IN BLOOD BY AUTOMATED COUNT: 12.7 % (ref 11.6–15.1)
ERYTHROCYTE [DISTWIDTH] IN BLOOD BY AUTOMATED COUNT: 12.9 % (ref 11.6–15.1)
GFR SERPL CREATININE-BSD FRML MDRD: 104 ML/MIN/1.73SQ M
GFR SERPL CREATININE-BSD FRML MDRD: 86 ML/MIN/1.73SQ M
GLUCOSE SERPL-MCNC: 100 MG/DL (ref 65–140)
GLUCOSE SERPL-MCNC: 76 MG/DL (ref 65–140)
GLUCOSE SERPL-MCNC: 80 MG/DL (ref 65–140)
GLUCOSE SERPL-MCNC: 85 MG/DL (ref 65–140)
GLUCOSE SERPL-MCNC: 91 MG/DL (ref 65–140)
GLUCOSE SERPL-MCNC: 93 MG/DL (ref 65–140)
GLUCOSE SERPL-MCNC: 94 MG/DL (ref 65–140)
HCO3 BLDA-SCNC: 27.3 MMOL/L (ref 22–28)
HCO3 BLDA-SCNC: 28.7 MMOL/L (ref 22–28)
HCT VFR BLD AUTO: 30.7 % (ref 34.8–46.1)
HCT VFR BLD AUTO: 34.2 % (ref 34.8–46.1)
HGB BLD-MCNC: 11.4 G/DL (ref 11.5–15.4)
HGB BLD-MCNC: 11.6 G/DL (ref 11.5–15.4)
LIPASE SERPL-CCNC: 290 U/L (ref 73–393)
MAGNESIUM SERPL-MCNC: 1.9 MG/DL (ref 1.6–2.6)
MCH RBC QN AUTO: 33.2 PG (ref 26.8–34.3)
MCH RBC QN AUTO: 36.8 PG (ref 26.8–34.3)
MCHC RBC AUTO-ENTMCNC: 33.3 G/DL (ref 31.4–37.4)
MCHC RBC AUTO-ENTMCNC: 37.8 G/DL (ref 31.4–37.4)
MCV RBC AUTO: 100 FL (ref 82–98)
MCV RBC AUTO: 98 FL (ref 82–98)
NRBC BLD AUTO-RTO: 0 /100 WBCS
O2 CT BLDA-SCNC: 15.3 ML/DL (ref 16–23)
O2 CT BLDA-SCNC: 16.4 ML/DL (ref 16–23)
OXYHGB MFR BLDA: 89.6 % (ref 94–97)
OXYHGB MFR BLDA: 92.7 % (ref 94–97)
PCO2 BLDA: 55.4 MM HG (ref 36–44)
PCO2 BLDA: 67 MM HG (ref 36–44)
PH BLDA: 7.25 [PH] (ref 7.35–7.45)
PH BLDA: 7.31 [PH] (ref 7.35–7.45)
PLATELET # BLD AUTO: 193 THOUSANDS/UL (ref 149–390)
PLATELET # BLD AUTO: 244 THOUSANDS/UL (ref 149–390)
PMV BLD AUTO: 11.4 FL (ref 8.9–12.7)
PMV BLD AUTO: 9.7 FL (ref 8.9–12.7)
PO2 BLDA: 62.9 MM HG (ref 75–129)
PO2 BLDA: 78.2 MM HG (ref 75–129)
POTASSIUM SERPL-SCNC: 3.2 MMOL/L (ref 3.5–5.3)
POTASSIUM SERPL-SCNC: 3.5 MMOL/L (ref 3.5–5.3)
PROCALCITONIN SERPL-MCNC: 0.38 NG/ML
PROT SERPL-MCNC: 5.8 G/DL (ref 6.4–8.2)
PROT SERPL-MCNC: 6.7 G/DL (ref 6.4–8.2)
PS SUPP: 8
RBC # BLD AUTO: 3.15 MILLION/UL (ref 3.81–5.12)
RBC # BLD AUTO: 3.43 MILLION/UL (ref 3.81–5.12)
SIMV VENT INSPIRED AIR FIO2: 50
SIMV VENT PEEP: 6
SIMV VENT TIDAL VOLUME: 450
SIMV VENT: ABNORMAL
SODIUM SERPL-SCNC: 125 MMOL/L (ref 136–145)
SODIUM SERPL-SCNC: 139 MMOL/L (ref 136–145)
SPECIMEN SOURCE: ABNORMAL
SPECIMEN SOURCE: ABNORMAL
VENT SIMV: 8
WBC # BLD AUTO: 10.31 THOUSAND/UL (ref 4.31–10.16)
WBC # BLD AUTO: 9.65 THOUSAND/UL (ref 4.31–10.16)

## 2022-04-09 PROCEDURE — 94003 VENT MGMT INPAT SUBQ DAY: CPT

## 2022-04-09 PROCEDURE — 74177 CT ABD & PELVIS W/CONTRAST: CPT

## 2022-04-09 PROCEDURE — 82150 ASSAY OF AMYLASE: CPT | Performed by: NURSE PRACTITIONER

## 2022-04-09 PROCEDURE — 99291 CRITICAL CARE FIRST HOUR: CPT | Performed by: INTERNAL MEDICINE

## 2022-04-09 PROCEDURE — NC001 PR NO CHARGE: Performed by: SURGERY

## 2022-04-09 PROCEDURE — 82805 BLOOD GASES W/O2 SATURATION: CPT | Performed by: NURSE PRACTITIONER

## 2022-04-09 PROCEDURE — 71260 CT THORAX DX C+: CPT

## 2022-04-09 PROCEDURE — 85027 COMPLETE CBC AUTOMATED: CPT | Performed by: NURSE PRACTITIONER

## 2022-04-09 PROCEDURE — 03HY32Z INSERTION OF MONITORING DEVICE INTO UPPER ARTERY, PERCUTANEOUS APPROACH: ICD-10-PCS | Performed by: INTERNAL MEDICINE

## 2022-04-09 PROCEDURE — NC001 PR NO CHARGE: Performed by: NURSE PRACTITIONER

## 2022-04-09 PROCEDURE — 85027 COMPLETE CBC AUTOMATED: CPT | Performed by: INTERNAL MEDICINE

## 2022-04-09 PROCEDURE — 83690 ASSAY OF LIPASE: CPT | Performed by: NURSE PRACTITIONER

## 2022-04-09 PROCEDURE — 94640 AIRWAY INHALATION TREATMENT: CPT

## 2022-04-09 PROCEDURE — 71045 X-RAY EXAM CHEST 1 VIEW: CPT

## 2022-04-09 PROCEDURE — 80053 COMPREHEN METABOLIC PANEL: CPT | Performed by: INTERNAL MEDICINE

## 2022-04-09 PROCEDURE — G1004 CDSM NDSC: HCPCS

## 2022-04-09 PROCEDURE — 82948 REAGENT STRIP/BLOOD GLUCOSE: CPT

## 2022-04-09 PROCEDURE — 36620 INSERTION CATHETER ARTERY: CPT | Performed by: NURSE PRACTITIONER

## 2022-04-09 PROCEDURE — 74018 RADEX ABDOMEN 1 VIEW: CPT

## 2022-04-09 PROCEDURE — 83735 ASSAY OF MAGNESIUM: CPT | Performed by: INTERNAL MEDICINE

## 2022-04-09 PROCEDURE — 84145 PROCALCITONIN (PCT): CPT | Performed by: NURSE PRACTITIONER

## 2022-04-09 PROCEDURE — 4A133B1 MONITORING OF ARTERIAL PRESSURE, PERIPHERAL, PERCUTANEOUS APPROACH: ICD-10-PCS | Performed by: INTERNAL MEDICINE

## 2022-04-09 PROCEDURE — C9113 INJ PANTOPRAZOLE SODIUM, VIA: HCPCS | Performed by: INTERNAL MEDICINE

## 2022-04-09 PROCEDURE — 4A133J1 MONITORING OF ARTERIAL PULSE, PERIPHERAL, PERCUTANEOUS APPROACH: ICD-10-PCS | Performed by: INTERNAL MEDICINE

## 2022-04-09 PROCEDURE — 80053 COMPREHEN METABOLIC PANEL: CPT | Performed by: NURSE PRACTITIONER

## 2022-04-09 RX ORDER — ALBUTEROL SULFATE 2.5 MG/3ML
10 SOLUTION RESPIRATORY (INHALATION) ONCE
Status: COMPLETED | OUTPATIENT
Start: 2022-04-09 | End: 2022-04-09

## 2022-04-09 RX ORDER — CHLORHEXIDINE GLUCONATE 0.12 MG/ML
15 RINSE ORAL EVERY 12 HOURS SCHEDULED
Status: DISCONTINUED | OUTPATIENT
Start: 2022-04-09 | End: 2022-04-19

## 2022-04-09 RX ORDER — MIDAZOLAM HYDROCHLORIDE 2 MG/2ML
INJECTION, SOLUTION INTRAMUSCULAR; INTRAVENOUS
Status: COMPLETED
Start: 2022-04-09 | End: 2022-04-09

## 2022-04-09 RX ORDER — MIDAZOLAM HYDROCHLORIDE 2 MG/2ML
2 INJECTION, SOLUTION INTRAMUSCULAR; INTRAVENOUS ONCE
Status: COMPLETED | OUTPATIENT
Start: 2022-04-09 | End: 2022-04-09

## 2022-04-09 RX ORDER — SODIUM CHLORIDE, SODIUM LACTATE, POTASSIUM CHLORIDE, CALCIUM CHLORIDE 600; 310; 30; 20 MG/100ML; MG/100ML; MG/100ML; MG/100ML
100 INJECTION, SOLUTION INTRAVENOUS CONTINUOUS
Status: DISCONTINUED | OUTPATIENT
Start: 2022-04-09 | End: 2022-04-11

## 2022-04-09 RX ORDER — ACETAMINOPHEN 650 MG/1
975 SUPPOSITORY RECTAL ONCE
Status: COMPLETED | OUTPATIENT
Start: 2022-04-09 | End: 2022-04-09

## 2022-04-09 RX ORDER — HYDROMORPHONE HCL/PF 1 MG/ML
1 SYRINGE (ML) INJECTION
Status: DISCONTINUED | OUTPATIENT
Start: 2022-04-09 | End: 2022-04-19

## 2022-04-09 RX ORDER — MIDAZOLAM HYDROCHLORIDE 2 MG/2ML
2 INJECTION, SOLUTION INTRAMUSCULAR; INTRAVENOUS EVERY 4 HOURS PRN
Status: DISCONTINUED | OUTPATIENT
Start: 2022-04-09 | End: 2022-04-12

## 2022-04-09 RX ORDER — AMOXICILLIN 250 MG
2 CAPSULE ORAL
Status: DISCONTINUED | OUTPATIENT
Start: 2022-04-09 | End: 2022-04-20

## 2022-04-09 RX ORDER — HYDROMORPHONE HCL/PF 1 MG/ML
1 SYRINGE (ML) INJECTION ONCE
Status: COMPLETED | OUTPATIENT
Start: 2022-04-09 | End: 2022-04-09

## 2022-04-09 RX ORDER — SODIUM CHLORIDE, SODIUM LACTATE, POTASSIUM CHLORIDE, CALCIUM CHLORIDE 600; 310; 30; 20 MG/100ML; MG/100ML; MG/100ML; MG/100ML
100 INJECTION, SOLUTION INTRAVENOUS CONTINUOUS
Status: DISCONTINUED | OUTPATIENT
Start: 2022-04-09 | End: 2022-04-09

## 2022-04-09 RX ORDER — ALBUTEROL SULFATE 2.5 MG/3ML
SOLUTION RESPIRATORY (INHALATION)
Status: DISPENSED
Start: 2022-04-09 | End: 2022-04-09

## 2022-04-09 RX ORDER — LIDOCAINE HYDROCHLORIDE 10 MG/ML
INJECTION, SOLUTION EPIDURAL; INFILTRATION; INTRACAUDAL; PERINEURAL
Status: COMPLETED
Start: 2022-04-09 | End: 2022-04-09

## 2022-04-09 RX ADMIN — PROPOFOL 50 MCG/KG/MIN: 10 INJECTION, EMULSION INTRAVENOUS at 20:06

## 2022-04-09 RX ADMIN — MIDAZOLAM 2 MG: 1 INJECTION INTRAMUSCULAR; INTRAVENOUS at 16:03

## 2022-04-09 RX ADMIN — ACETAMINOPHEN 975 MG: 650 SUPPOSITORY RECTAL at 03:43

## 2022-04-09 RX ADMIN — PROPOFOL 50 MCG/KG/MIN: 10 INJECTION, EMULSION INTRAVENOUS at 03:02

## 2022-04-09 RX ADMIN — PROPOFOL 50 MCG/KG/MIN: 10 INJECTION, EMULSION INTRAVENOUS at 14:11

## 2022-04-09 RX ADMIN — IPRATROPIUM BROMIDE 0.5 MG: 0.5 SOLUTION RESPIRATORY (INHALATION) at 19:27

## 2022-04-09 RX ADMIN — IPRATROPIUM BROMIDE 0.5 MG: 0.5 SOLUTION RESPIRATORY (INHALATION) at 13:52

## 2022-04-09 RX ADMIN — Medication 100 MCG/HR: at 02:48

## 2022-04-09 RX ADMIN — DOCUSATE SODIUM AND SENNOSIDES 2 TABLET: 8.6; 5 TABLET, FILM COATED ORAL at 21:35

## 2022-04-09 RX ADMIN — ENOXAPARIN SODIUM 40 MG: 40 INJECTION SUBCUTANEOUS at 08:32

## 2022-04-09 RX ADMIN — Medication 100 MCG/HR: at 10:17

## 2022-04-09 RX ADMIN — IPRATROPIUM BROMIDE 0.5 MG: 0.5 SOLUTION RESPIRATORY (INHALATION) at 07:41

## 2022-04-09 RX ADMIN — THIAMINE HYDROCHLORIDE 500 MG: 100 INJECTION, SOLUTION INTRAMUSCULAR; INTRAVENOUS at 09:37

## 2022-04-09 RX ADMIN — MIDAZOLAM 2 MG: 1 INJECTION INTRAMUSCULAR; INTRAVENOUS at 12:15

## 2022-04-09 RX ADMIN — THIAMINE HYDROCHLORIDE 500 MG: 100 INJECTION, SOLUTION INTRAMUSCULAR; INTRAVENOUS at 01:48

## 2022-04-09 RX ADMIN — ALBUTEROL SULFATE 10 MG: 2.5 SOLUTION RESPIRATORY (INHALATION) at 03:25

## 2022-04-09 RX ADMIN — HYDROMORPHONE HYDROCHLORIDE 0.5 MG: 1 INJECTION, SOLUTION INTRAMUSCULAR; INTRAVENOUS; SUBCUTANEOUS at 20:06

## 2022-04-09 RX ADMIN — HYDROMORPHONE HYDROCHLORIDE 0.5 MG: 1 INJECTION, SOLUTION INTRAMUSCULAR; INTRAVENOUS; SUBCUTANEOUS at 15:42

## 2022-04-09 RX ADMIN — SODIUM CHLORIDE, SODIUM LACTATE, POTASSIUM CHLORIDE, AND CALCIUM CHLORIDE 100 ML/HR: .6; .31; .03; .02 INJECTION, SOLUTION INTRAVENOUS at 19:11

## 2022-04-09 RX ADMIN — LEVALBUTEROL HYDROCHLORIDE 1.25 MG: 1.25 SOLUTION, CONCENTRATE RESPIRATORY (INHALATION) at 13:53

## 2022-04-09 RX ADMIN — LIDOCAINE 1 PATCH: 50 PATCH CUTANEOUS at 10:05

## 2022-04-09 RX ADMIN — LEVALBUTEROL HYDROCHLORIDE 1.25 MG: 1.25 SOLUTION, CONCENTRATE RESPIRATORY (INHALATION) at 19:27

## 2022-04-09 RX ADMIN — Medication 1 PATCH: at 08:32

## 2022-04-09 RX ADMIN — FOLIC ACID 1 MG: 5 INJECTION, SOLUTION INTRAMUSCULAR; INTRAVENOUS; SUBCUTANEOUS at 08:42

## 2022-04-09 RX ADMIN — LEVALBUTEROL HYDROCHLORIDE 1.25 MG: 1.25 SOLUTION, CONCENTRATE RESPIRATORY (INHALATION) at 07:41

## 2022-04-09 RX ADMIN — PROPOFOL 50 MCG/KG/MIN: 10 INJECTION, EMULSION INTRAVENOUS at 06:07

## 2022-04-09 RX ADMIN — Medication 100 MCG/HR: at 20:08

## 2022-04-09 RX ADMIN — HYDROMORPHONE HYDROCHLORIDE 1 MG: 1 INJECTION, SOLUTION INTRAMUSCULAR; INTRAVENOUS; SUBCUTANEOUS at 04:20

## 2022-04-09 RX ADMIN — HYDROMORPHONE HYDROCHLORIDE 0.5 MG: 1 INJECTION, SOLUTION INTRAMUSCULAR; INTRAVENOUS; SUBCUTANEOUS at 12:04

## 2022-04-09 RX ADMIN — LIDOCAINE HYDROCHLORIDE 20 MG: 10 INJECTION, SOLUTION EPIDURAL; INFILTRATION; INTRACAUDAL; PERINEURAL at 21:03

## 2022-04-09 RX ADMIN — THIAMINE HYDROCHLORIDE 500 MG: 100 INJECTION, SOLUTION INTRAMUSCULAR; INTRAVENOUS at 17:07

## 2022-04-09 RX ADMIN — MIDAZOLAM HYDROCHLORIDE 2 MG: 2 INJECTION, SOLUTION INTRAMUSCULAR; INTRAVENOUS at 12:15

## 2022-04-09 RX ADMIN — Medication 1 TABLET: at 08:32

## 2022-04-09 RX ADMIN — IOHEXOL 50 ML: 240 INJECTION, SOLUTION INTRATHECAL; INTRAVASCULAR; INTRAVENOUS; ORAL at 11:55

## 2022-04-09 RX ADMIN — PROPOFOL 50 MCG/KG/MIN: 10 INJECTION, EMULSION INTRAVENOUS at 16:46

## 2022-04-09 RX ADMIN — MIDAZOLAM 2 MG: 1 INJECTION INTRAMUSCULAR; INTRAVENOUS at 20:15

## 2022-04-09 RX ADMIN — HYDROMORPHONE HYDROCHLORIDE 0.5 MG: 1 INJECTION, SOLUTION INTRAMUSCULAR; INTRAVENOUS; SUBCUTANEOUS at 03:09

## 2022-04-09 RX ADMIN — MIDAZOLAM 2 MG: 1 INJECTION INTRAMUSCULAR; INTRAVENOUS at 21:35

## 2022-04-09 RX ADMIN — PROPOFOL 50 MCG/KG/MIN: 10 INJECTION, EMULSION INTRAVENOUS at 23:55

## 2022-04-09 RX ADMIN — IOHEXOL 100 ML: 350 INJECTION, SOLUTION INTRAVENOUS at 11:55

## 2022-04-09 RX ADMIN — HYDROMORPHONE HYDROCHLORIDE 0.5 MG: 1 INJECTION, SOLUTION INTRAMUSCULAR; INTRAVENOUS; SUBCUTANEOUS at 06:20

## 2022-04-09 RX ADMIN — CHLORHEXIDINE GLUCONATE 0.12% ORAL RINSE 15 ML: 1.2 LIQUID ORAL at 20:06

## 2022-04-09 RX ADMIN — PROPOFOL 50 MCG/KG/MIN: 10 INJECTION, EMULSION INTRAVENOUS at 10:11

## 2022-04-09 RX ADMIN — PANTOPRAZOLE SODIUM 40 MG: 40 INJECTION, POWDER, FOR SOLUTION INTRAVENOUS at 06:02

## 2022-04-09 NOTE — CONSULTS
Consultation - General Surgery   Francisca Felix 60 y o  female MRN: [id number]  Unit/Bed#: ICU 07 Encounter: [id number]    Assessment:  54-yr old F (PMHx: alcohol abuse, chronic pancreatitis, HTN, HLD) w/ acute alcohol-induced pancreatitis c/b pancreatic necrosis, DT & acute hypoxic respiratory failure  Hemodynamically stable; not on pressors  Vents:SIMV/VC: 12/450/50/6  Ppeak: 28   UO: approx  40 cc/hr over the past 7 hours  Abdomen: distended but soft; no signs of abdominal wall edema  Normal amylase & lipase levels  White count: 9 7  Hb: 11 4  CTAP 4/9: worsening acute pancr w/ new necrosis of a portion of pancreatic neck & increased peripancreatic edema  No collections or vascular complications  Plan:  - no clinical or radiological signs of an infected pancreatic necrosis at this time  - no feature of abdominal compartment syndrome present  - continue IVF resuscitation  - strict I/O   - NPO for now; may commence trickle feeds as clinical status allows  - no role for surgical intervention; surgical team will follow as needed  - other care per ICU team     History of Present Illness   HPI:  Francisca Rosario is a 60 y o  female who was admitted 4/5 as a case of recurrent acute pancreatitis 2/2 alcohol  She had presented w/ abdominal pain associated w/ multiple episodes of non-bloody emesis  Hx of prior pancreatitis episodes  She continued to 8-12 whiskey drinks prior to symptomatic onset  Subsequently developed DT during the course of her admission and was transferred to the ICU yesterday following which she was intubated o/a encephalopathy and acute hypoxic respiratory failure  Inpatient consult to Acute Care Surgery  Consult performed by: Cherelle Barnett MD  Consult ordered by:  Domingo Newman MD          Review of Systems   Unable to perform ROS: Intubated       Historical Information   Past Medical History:   Diagnosis Date    Alcohol abuse     High cholesterol     Hypertension  Pancreatitis      Past Surgical History:   Procedure Laterality Date    FOOT SURGERY       Social History   Social History     Substance and Sexual Activity   Alcohol Use Yes    Alcohol/week: 12 0 standard drinks    Types: 12 Shots of liquor per week    Comment: daily 4-8 whiskey drinks (doubles)     Social History     Substance and Sexual Activity   Drug Use Never     E-Cigarette/Vaping    E-Cigarette Use Current Every Day User      E-Cigarette/Vaping Substances    Nicotine Yes     Flavoring Yes      Social History     Tobacco Use   Smoking Status Current Every Day Smoker    Types: Cigarettes    Last attempt to quit: 2017    Years since quittin 2   Smokeless Tobacco Never Used   Tobacco Comment    vape     Family History: non-contributory    Meds/Allergies   all current active meds have been reviewed  Allergies   Allergen Reactions    Latex Rash    Neomycin-Bacitracin Zn-Polymyx Rash       Objective   First Vitals:   Blood Pressure: 119/76 (22 1558)  Pulse: (!) 114 (22 1558)  Temperature: 99 6 °F (37 6 °C) (22 1558)  Temp Source: Temporal (22 1558)  Respirations: (!) 23 (22 1558)  Height: 5' 8" (172 7 cm) (22 1558)  Weight - Scale: 89 4 kg (197 lb 1 5 oz) (22 1558)  SpO2: 92 % (22 1558)    Current Vitals:   Blood Pressure: 114/69 (22 1415)  Pulse: (!) 112 (22 1415)  Temperature: 99 °F (37 2 °C) (22 1100)  Temp Source: Bladder (22 1100)  Respirations: 14 (22 1415)  Height: 5' 8" (172 7 cm) (22 1558)  Weight - Scale: 92 8 kg (204 lb 9 4 oz) (22 0537)  SpO2: 98 % (22 1415)      Intake/Output Summary (Last 24 hours) at 2022 1517  Last data filed at 2022 1407  Gross per 24 hour   Intake 2539 21 ml   Output 1267 ml   Net 1272 21 ml       Invasive Devices  Report    Peripheral Intravenous Line            Peripheral IV 22 Left Antecubital 1 day    Peripheral IV 22 Left Forearm 1 day Peripheral IV 04/08/22 Right Antecubital 1 day          Drain            Urethral Catheter Non-latex 16 Fr  1 day    NG/OG/Enteral Tube Orogastric 14 Fr Left mouth <1 day          Airway            ETT  Oral 8 mm 1 day                Physical Exam  Vitals reviewed  Constitutional:       Appearance: She is obese  She is ill-appearing  HENT:      Head: Normocephalic and atraumatic  Comments: ETT/enteric tube in place  Cardiovascular:      Rate and Rhythm: Tachycardia present  Pulmonary:      Comments: On mechanical ventilator  Abdominal:      General: There is distension  Palpations: Abdomen is soft  Genitourinary:     Comments: Mccullough cath in place  Musculoskeletal:         General: No deformity  Skin:     General: Skin is warm  Capillary Refill: Capillary refill takes less than 2 seconds  Neurological:      Comments: Intubated and sedated           Lab Results:   I have personally reviewed pertinent lab results    , CBC:   Lab Results   Component Value Date    WBC 9 65 04/09/2022    HGB 11 4 (L) 04/09/2022    HCT 34 2 (L) 04/09/2022     (H) 04/09/2022     04/09/2022    MCH 33 2 04/09/2022    MCHC 33 3 04/09/2022    RDW 12 7 04/09/2022    MPV 9 7 04/09/2022    NRBC 0 04/09/2022   , CMP:   Lab Results   Component Value Date    SODIUM 139 04/09/2022    K 3 5 04/09/2022     04/09/2022    CO2 27 04/09/2022    BUN 9 04/09/2022    CREATININE 0 76 04/09/2022    CALCIUM 8 4 04/09/2022    AST 52 (H) 04/09/2022    ALT 35 04/09/2022    ALKPHOS 110 04/09/2022    EGFR 86 04/09/2022   , Coagulation: No results found for: PT, INR, APTT, Urinalysis:   Lab Results   Component Value Date    COLORU Yellow 04/08/2022    CLARITYU Clear 04/08/2022    SPECGRAV >=1 030 04/08/2022    PHUR 5 5 04/08/2022    LEUKOCYTESUR Negative 04/08/2022    NITRITE Negative 04/08/2022    GLUCOSEU Negative 04/08/2022    KETONESU >=80 (3+) (A) 04/08/2022    BILIRUBINUR Interference- unable to analyze (A) 04/08/2022 BLOODU Negative 04/08/2022   , Amylase:   Lab Results   Component Value Date    AMYLASE 91 04/09/2022   , Lipase:   Lab Results   Component Value Date    LIPASE 290 04/09/2022     Imaging: I have personally reviewed pertinent reports  EKG, Pathology, and Other Studies: I have personally reviewed pertinent reports  Counseling / Coordination of Care  Total floor / unit time spent today 30 minutes  Greater than 50% of total time was spent with the patient and / or family counseling and / or coordination of care    A description of the counseling / coordination of care: NA

## 2022-04-09 NOTE — PLAN OF CARE
Problem: MOBILITY - ADULT  Goal: Maintain or return to baseline ADL function  Description: INTERVENTIONS:  -  Assess patient's ability to carry out ADLs; assess patient's baseline for ADL function and identify physical deficits which impact ability to perform ADLs (bathing, care of mouth/teeth, toileting, grooming, dressing, etc )  - Assess/evaluate cause of self-care deficits   - Assess range of motion  - Assess patient's mobility; develop plan if impaired  - Assess patient's need for assistive devices and provide as appropriate  - Encourage maximum independence but intervene and supervise when necessary  - Involve family in performance of ADLs  - Assess for home care needs following discharge   - Consider OT consult to assist with ADL evaluation and planning for discharge  - Provide patient education as appropriate  Outcome: Progressing    Problem: Potential for Falls  Goal: Patient will remain free of falls  Description: INTERVENTIONS:  - Educate patient/family on patient safety including physical limitations  - Instruct patient to call for assistance with activity   - Consult OT/PT to assist with strengthening/mobility   - Keep Call bell within reach  - Keep bed low and locked with side rails adjusted as appropriate  - Keep care items and personal belongings within reach  - Initiate and maintain comfort rounds  - Make Fall Risk Sign visible to staff  - Offer Toileting every 2 Hours, in advance of need  - Initiate/Maintain bed alarm  - Obtain necessary fall risk management equipment:   Problem: Prexisting or High Potential for Compromised Skin Integrity  Goal: Skin integrity is maintained or improved  Description: INTERVENTIONS:  - Identify patients at risk for skin breakdown  - Assess and monitor skin integrity  - Assess and monitor nutrition and hydration status  - Monitor labs   - Assess for incontinence   - Turn and reposition patient  - Assist with mobility/ambulation  - Relieve pressure over bony prominences  - Avoid friction and shearing  - Provide appropriate hygiene as needed including keeping skin clean and dry  - Evaluate need for skin moisturizer/barrier cream  - Collaborate with interdisciplinary team   - Patient/family teaching  - Consider wound care consult   Outcome: Progressing     Problem: DISCHARGE PLANNING - CARE MANAGEMENT  Goal: Discharge to post-acute care or home with appropriate resources  Description: INTERVENTIONS:  - Conduct assessment to determine patient/family and health care team treatment goals, and need for post-acute services based on payer coverage, community resources, and patient preferences, and barriers to discharge  - Address psychosocial, clinical, and financial barriers to discharge as identified in assessment in conjunction with the patient/family and health care team  - Arrange appropriate level of post-acute services according to patients   needs and preference and payer coverage in collaboration with the physician and health care team  - Communicate with and update the patient/family, physician, and health care team regarding progress on the discharge plan  - Arrange appropriate transportation to post-acute venues  Outcome: Progressing     Problem: Nutrition/Hydration-ADULT  Goal: Nutrient/Hydration intake appropriate for improving, restoring or maintaining nutritional needs  Description: Monitor and assess patient's nutrition/hydration status for malnutrition  Collaborate with interdisciplinary team and initiate plan and interventions as ordered  Monitor patient's weight and dietary intake as ordered or per policy  Utilize nutrition screening tool and intervene as necessary  Determine patient's food preferences and provide high-protein, high-caloric foods as appropriate       INTERVENTIONS:  - Monitor oral intake, urinary output, labs, and treatment plans  - Assess nutrition and hydration status and recommend course of action  - Evaluate amount of meals eaten  - Assist patient with eating if necessary   - Allow adequate time for meals  - Recommend/ encourage appropriate diets, oral nutritional supplements, and vitamin/mineral supplements  - Order, calculate, and assess calorie counts as needed  - Recommend, monitor, and adjust tube feedings and TPN/PPN based on assessed needs  - Assess need for intravenous fluids  - Provide specific nutrition/hydration education as appropriate  - Include patient/family/caregiver in decisions related to nutrition  Outcome: Progressing     Problem: PAIN - ADULT  Goal: Verbalizes/displays adequate comfort level or baseline comfort level  Description: Interventions:  - Encourage patient to monitor pain and request assistance  - Assess pain using appropriate pain scale  - Administer analgesics based on type and severity of pain and evaluate response  - Implement non-pharmacological measures as appropriate and evaluate response  - Consider cultural and social influences on pain and pain management  - Notify physician/advanced practitioner if interventions unsuccessful or patient reports new pain  Outcome: Progressing     Problem: INFECTION - ADULT  Goal: Absence or prevention of progression during hospitalization  Description: INTERVENTIONS:  - Assess and monitor for signs and symptoms of infection  - Monitor lab/diagnostic results  - Monitor all insertion sites, i e  indwelling lines, tubes, and drains  - Monitor endotracheal if appropriate and nasal secretions for changes in amount and color  - Dike appropriate cooling/warming therapies per order  - Administer medications as ordered  - Instruct and encourage patient and family to use good hand hygiene technique  - Identify and instruct in appropriate isolation precautions for identified infection/condition  Outcome: Progressing  Goal: Absence of fever/infection during neutropenic period  Description: INTERVENTIONS:  - Monitor WBC    Outcome: Progressing     Problem: SAFETY ADULT  Goal: Maintain or return to baseline ADL function  Description: INTERVENTIONS:  -  Assess patient's ability to carry out ADLs; assess patient's baseline for ADL function and identify physical deficits which impact ability to perform ADLs (bathing, care of mouth/teeth, toileting, grooming, dressing, etc )  - Assess/evaluate cause of self-care deficits   - Assess range of motion  - Assess patient's mobility; develop plan if impaired  - Assess patient's need for assistive devices and provide as appropriate  - Encourage maximum independence but intervene and supervise when necessary  - Involve family in performance of ADLs  - Assess for home care needs following discharge   - Consider OT consult to assist with ADL evaluation and planning for discharge  - Provide patient education as appropriate  Outcome: Progressing    Problem: Knowledge Deficit  Goal: Patient/family/caregiver demonstrates understanding of disease process, treatment plan, medications, and discharge instructions  Description: Complete learning assessment and assess knowledge base    Interventions:  - Provide teaching at level of understanding  - Provide teaching via preferred learning methods  Outcome: Progressing     Goal: Patient will remain free of falls  Description: INTERVENTIONS:  - Educate patient/family on patient safety including physical limitations  - Instruct patient to call for assistance with activity   - Consult OT/PT to assist with strengthening/mobility   - Keep Call bell within reach  - Keep bed low and locked with side rails adjusted as appropriate  - Keep care items and personal belongings within reach  - Initiate and maintain comfort rounds  - Make Fall Risk Sign visible to staff  - Offer Toileting every 2 Hours, in advance of need  - Initiate/Maintain bed alarm  - Obtain necessary fall risk management equipment:   - Apply yellow socks and bracelet for high fall risk patients  - Consider moving patient to room near nurses station  Outcome: Progressing     - Apply yellow socks and bracelet for high fall risk patients  - Consider moving patient to room near nurses station  Outcome: Progressing

## 2022-04-09 NOTE — UTILIZATION REVIEW
Initial Clinical Review    Admission: Date/Time/Statement:   Admission Orders (From admission, onward)     Ordered        04/08/22 1605  Inpatient Admission  Once                      Orders Placed This Encounter   Procedures    Inpatient Admission     Standing Status:   Standing     Number of Occurrences:   1     Order Specific Question:   Level of Care     Answer:   Critical Care [15]     Order Specific Question:   Estimated length of stay     Answer:   More than 2 Midnights     Order Specific Question:   Certification     Answer:   I certify that inpatient services are medically necessary for this patient for a duration of greater than two midnights  See H&P and MD Progress Notes for additional information about the patient's course of treatment  No chief complaint on file  Initial Presentation: 60 y o  female transferred from [facility] to [place] [address] ICU as inpatient admission for acute respiratory failure with hypoxia  Past medical history of alcohol use, chronic pancreatitis, hypertension, hyperlipidemia who presents with severe abdominal pain and multiple episodes of nonbloody, bilious vomiting on April 5, 2022  The time of admission patient was found to have lipase the dose with 53,000 and imaging consistent with acute pancreatitis  During patient's hospitalization, patient started to exhibit signs of severe withdrawal with agitation and altered mental status  Patient was then transferred to detox Unit at [facility]  Patient was started on the SEWS protocol and required phenobarbital and Precedex for management  While being treated for DT, patient started to become hypoxic and having increased work breathing  Patient was requiring 14 L on venturi mask, and was eventually intubated and transferred to [address] [place] [address] ICU  Attempted to wean off face mask onto 4 L NC, however patient desatted to 86-88%; subsequently placed on non-rebreather   Additional lasix administered, stat VBG and CXR ordered  VBG revealed elevated pCO2, CXR showed low lung volumes with trace effusions  In light of results, RT transitioned supplemental O2 to venturi mask 14 L 55%; SpO2 maintained around 90-92% On exam  Patient is sedated and intubated  RASS of -2  tachycardic, abdominal distention  diminished bowel sounds    Rash (Livido reticularis type rash seen on lower extremities      Date: 04-09-22  Patient remains on vent with Continue mechanical ventilation at minimal settings for now Will try to wean tomorrow VAE prevention Sedation with Fentanyl and Propofol infusion for goal RASS 0 to -1 initially presented with a lipase of 53,000 and has trended down to 819 this morning  Patient's acute pancreatitis is likely secondary to alcohol   NPO Continue IV fentanyl, propofol infusion IV Continue high-dose thiamine and folic acid        Admitting Vitals   Temperature Pulse Respirations Blood Pressure SpO2   04/08/22 1558 04/08/22 1558 04/08/22 1558 04/08/22 1558 04/08/22 1558   99 6 °F (37 6 °C) (!) 114 (!) 23 119/76 92 %      Temp Source Heart Rate Source Patient Position - Orthostatic VS BP Location FiO2 (%)   04/08/22 1558 04/08/22 1558 04/08/22 1558 04/08/22 1558 04/08/22 1558   Temporal Monitor Lying Left arm 60      Pain Score       04/08/22 1600       No Pain          Wt Readings from Last 1 Encounters:   04/09/22 92 8 kg (204 lb 9 4 oz)     Additional Vital Signs:     Date/Time Temp Pulse Resp BP MAP (mmHg) SpO2 FiO2 (%) O2 Device Patient Position - Orthostatic VS   04/09/22 1000 -- 108 Abnormal  17 113/75 88 94 % -- Ventilator --   04/09/22 0900 -- 112 Abnormal  13 115/78 89 95 % -- Ventilator --   04/09/22 0800 -- 110 Abnormal  13 112/72 83 96 % -- Ventilator --   04/09/22 0742 -- -- -- -- -- 98 % -- -- --   04/09/22 0700 -- 112 Abnormal  14 126/78 95 96 % 50 Ventilator --   04/09/22 0600 98 9 °F (37 2 °C) -- -- -- -- -- -- -- --   04/09/22 0550 -- 104 13 96/69 78 96 % -- -- --   04/09/22 0500 100 7 °F (38 2 °C) Abnormal  -- -- -- -- -- -- -- --   04/09/22 0450 -- 124 Abnormal  10 Abnormal  119/76 89 96 % -- -- --   04/09/22 0438 -- -- -- -- -- 96 % -- -- --   04/09/22 0400 100 4 °F (38 °C) -- -- -- -- -- -- -- --   04/09/22 0350 -- 138 Abnormal  8 Abnormal  146/85 107 97 % -- -- --   04/09/22 0325 -- -- -- -- -- 97 % -- -- --   04/09/22 0300 98 4 °F (36 9 °C) -- -- -- -- -- -- -- --   04/09/22 0250 -- 90 12 106/67 80 98 % -- -- --   04/09/22 0228 -- -- -- -- -- 97 % -- -- --   04/09/22 0200 98 6 °F (37 °C) -- -- -- -- -- -- -- --   04/09/22 0150 -- 92 12 101/61 73 97 % -- -- --   04/09/22 0100 98 4 °F (36 9 °C) -- -- -- -- -- -- -- --   04/09/22 0050 -- 90 12 95/62 72 98 % -- Ventilator Lying   04/09/22 0000 97 °F (36 1 °C) Abnormal  82 12 -- -- 98 % -- -- --   04/08/22 2350 -- 84 12 96/60 69 98 % -- Ventilator Lying   04/08/22 2300 97 1 °F (36 2 °C) Abnormal  -- -- -- -- -- -- -- --   04/08/22 2250 -- 86 12 95/62 71 99 % -- -- --   04/08/22 2200 99 2 °F (37 3 °C) -- -- -- -- -- -- -- --   04/08/22 2100 100 5 °F (38 1 °C) -- -- -- -- -- -- -- --   04/08/22 2030 101 2 °F (38 4 °C) Abnormal  -- -- -- -- -- -- -- --   04/08/22 1940 102 9 °F (39 4 °C) Abnormal  110 Abnormal  20 106/67 76 98 % -- -- --   04/08/22 1930 102 9 °F (39 4 °C) Abnormal  108 Abnormal  21 -- -- 97 % -- -- --   04/08/22 1927 -- -- -- -- -- 97 % -- -- --   04/08/22 1925 -- -- -- -- -- 97 % -- -- --   04/08/22 1910 -- 110 Abnormal  17 108/75 87 95 % -- Ventilator Lying   04/08/22 1840 -- 106 Abnormal  21 99/66 76 94 % -- --        Pertinent Labs/Diagnostic Test Results:   XR chest portable ICU    (04/09 0935)      ET tube 2 cm above the antonietta  Persistent extensive bibasilar atelectasis  Superimposed pneumonia cannot be excluded  XR chest portable ICU   F (04/09 0932)      ET tube 2 cm above the antonietta  Extensive bibasilar atelectasis  Superimposed pneumonia cannot be excluded     XR abdomen 1 view kub    (Results Pending)   CT chest abdomen pelvis w contrast    (Results Pending)     Results from last 7 days   Lab Units 04/05/22  0421   SARS-COV-2  Negative     Results from last 7 days   Lab Units 04/09/22  0924 04/09/22  0512 04/08/22  1949 04/08/22 0452 04/08/22 0452 04/07/22  0502 04/07/22  0502 04/06/22  0458 04/05/22  0419 04/05/22  0030 04/05/22  0030   WBC Thousand/uL 9 65 10 31* 9 79   < > 9 34   < > 12 23*   < > 11 72*   < > 12 04*   HEMOGLOBIN g/dL 11 4* 11 6 11 4*  --  10 7*  --  13 9   < > 14 2   < > 14 1   HEMATOCRIT % 34 2* 30 7* 32 4*  --  32 9*  --  40 5   < > 41 2   < > 40 9   PLATELETS Thousands/uL 193 244 174   < > 158   < > 172   < > 242   < > 241   NEUTROS ABS Thousands/µL  --   --   --   --   --   --   --   --  9 88*  --  8 55*   BANDS PCT %  --   --  5  --   --   --   --   --   --   --   --     < > = values in this interval not displayed  Results from last 7 days   Lab Units 04/09/22 0922 04/09/22 0512 04/08/22 1949 04/08/22 0452 04/07/22  0502 04/06/22  0458 04/06/22  0458   SODIUM mmol/L 139 125* 142 137 136   < > 136   POTASSIUM mmol/L 3 5 3 2* 3 5 3 3* 3 4*   < > 3 6   CHLORIDE mmol/L 102 91* 104 100 101   < > 103   CO2 mmol/L 27 24 30 33* 28   < > 26   ANION GAP mmol/L 10 10 8 4* 7   < > 7   BUN mg/dL 9 8 9 9 7   < > 8   CREATININE mg/dL 0 76 0 53* 0 73 0 67 0 64   < > 0 76   EGFR ml/min/1 73sq m 86 104 91 97 98   < > 86   CALCIUM mg/dL 8 4 6 7* 8 1* 7 7* 8 0*   < > 7 8*   CALCIUM, IONIZED mmol/L  --   --  1 05*  --   --   --   --    MAGNESIUM mg/dL  --  1 9 2 1 2 1 1 7  --  1 3*   PHOSPHORUS mg/dL  --   --  2 3* 3 3 2 0*  --  2 8    < > = values in this interval not displayed       Results from last 7 days   Lab Units 04/09/22  0922 04/09/22  0512 04/08/22  1949 04/08/22  0452 04/07/22  0502   AST U/L 52* 51* 30 41* 33   ALT U/L 35 26 24 22 31   ALK PHOS U/L 110 89 82 68 65   TOTAL PROTEIN g/dL 6 7 5 8* 6 3* 6 4 6 4   ALBUMIN g/dL 2 1* 1 9* 2 1* 3 2 2 8*   TOTAL BILIRUBIN mg/dL 0 64 0 91 0 61 0 89 1 15* Results from last 7 days   Lab Units 04/09/22  0551 04/09/22  0001 04/08/22  1909 04/07/22  1150 04/07/22  0604 04/07/22  0042 04/07/22  0026 04/06/22  1754 04/06/22  1259 04/06/22  0605 04/05/22  2341 04/05/22  0612   POC GLUCOSE mg/dl 93 91 85 94 118 123 120 113 122 132 122 105     Results from last 7 days   Lab Units 04/09/22  0922 04/09/22  0512 04/08/22  1949 04/08/22  0452 04/07/22  0502 04/06/22  0458 04/05/22  0419 04/05/22  0030   GLUCOSE RANDOM mg/dL 100 80 76 94 112 121 133 166*       Results from last 7 days   Lab Units 04/09/22  0436   PH ART  7 250*   PCO2 ART mm Hg 67 0*   PO2 ART mm Hg 78 2   HCO3 ART mmol/L 28 7*   BASE EXC ART mmol/L 0 1   O2 CONTENT ART mL/dL 16 4   O2 HGB, ARTERIAL % 92 7*   ABG SOURCE  Radial, Right     Results from last 7 days   Lab Units 04/08/22  1311 04/08/22  0742   PH TOM  7 345 7 369   PCO2 TOM mm Hg 51 8* 51 4*   PO2 TOM mm Hg 46 0* 73 4*   HCO3 TOM mmol/L 27 6 29 0   BASE EXC TOM mmol/L 1 2 2 9   O2 CONTENT TOM ml/dL 12 9 15 0   O2 HGB, VENOUS % 78 3 92 7*       Results from last 7 days   Lab Units 04/09/22  0924 04/08/22  1949 04/05/22  0419   PROCALCITONIN ng/ml 0 38* 0 34* <0 05     Results from last 7 days   Lab Units 04/08/22  1949 04/05/22  0404 04/05/22  0030   LACTIC ACID mmol/L 1 2 2 1* 3 1*       Results from last 7 days   Lab Units 04/07/22  0502   NT-PRO BNP pg/mL 391*     Results from last 7 days   Lab Units 04/09/22  0924 04/08/22  0452 04/07/22  1642 04/05/22  0419 04/05/22  0419   LIPASE u/L 290 819* 1,466*   < > 13,119*   AMYLASE IU/L 91  --   --   --  986*    < > = values in this interval not displayed       Results from last 7 days   Lab Units 04/08/22 1954 04/05/22  0411   CLARITY UA  Clear Clear   COLOR UA  Yellow Straw   SPEC GRAV UA  >=1 030 1 015   PH UA  5 5 5 0   GLUCOSE UA mg/dl Negative Negative   KETONES UA mg/dl >=80 (3+)* Negative   BLOOD UA  Negative Negative   PROTEIN UA mg/dl 100 (2+)* Negative   NITRITE UA  Negative Negative BILIRUBIN UA  Interference- unable to analyze* Negative   UROBILINOGEN UA E U /dl 1 0 0 2   LEUKOCYTES UA  Negative Negative   WBC UA /hpf 0-1*  --    RBC UA /hpf None Seen  --    BACTERIA UA /hpf Innumerable*  --    EPITHELIAL CELLS WET PREP /hpf Occasional  --      Results from last 7 days   Lab Units 04/05/22  0421   INFLUENZA A PCR  Negative   INFLUENZA B PCR  Negative   RSV PCR  Negative     Results from last 7 days   Lab Units 04/05/22  0030   ETHANOL LVL mg/dL 54*     Results from last 7 days   Lab Units 04/08/22  1953   BLOOD CULTURE  Received in Microbiology Lab  Culture in Progress  Received in Microbiology Lab  Culture in Progress         Past Medical History:   Diagnosis Date    Alcohol abuse     High cholesterol     Hypertension     Pancreatitis      Present on Admission:   Acute respiratory failure with hypoxia (HCC)   Delirium tremens (HCC)   Alcohol-induced acute pancreatitis   [facility]   Gastroesophageal reflux disease without esophagitis   Hepatic steatosis   Hyperlipidemia   Hypertension   Hypokalemia   Ataxia      Admitting Diagnosis: Acute respiratory failure ([email or web address] 75 ) [J96 00]  Age/Sex: 60 y o  female  Admission Orders:  Scheduled Medications:  albuterol, , ,   enoxaparin, 40 mg, Subcutaneous, Daily  folic acid IVPB, 1 mg, Intravenous, Daily  ipratropium, 0 5 mg, Nebulization, TID  levalbuterol, 1 25 mg, Nebulization, TID  lidocaine, 1 patch, Topical, Q24H  multivitamin-minerals, 1 tablet, Oral, Daily  nicotine, 1 patch, Transdermal, Daily  pantoprazole, 40 mg, Intravenous, Q24H THIAGO  thiamine, 500 mg, Intravenous, Q8H      Continuous IV Infusions:  fentaNYL, 100 mcg/hr, Intravenous, Continuous  propofol, 5-50 mcg/kg/min, Intravenous, Titrated      PRN Meds:  hydrALAZINE, 10 mg, Intravenous, Q6H PRN  HYDROmorphone, 0 5 mg, Intravenous, Q3H PRN  ipratropium-albuterol, 3 mL, Nebulization, TID PRN  naloxone, 0 04 mg, Intravenous, Q1MIN PRN  ondansetron, 4 mg, Intravenous, Q6H PRN        IP CONSULT TO CASE MANAGEMENT   Continuous cardio-pulmonary & pulse oximetry  SCD  Non violent restraints   Seizure precautions  Neuro checks q 4 hrs  Blood sugars q 6 hrs while NPO      Network Utilization Review Department  ATTENTION: Please call with any questions or concerns to [phone] and carefully listen to the prompts so that you are directed to the right person  All voicemails are confidential   Rk Eula all requests for admission clinical reviews, approved or denied determinations and any other requests to dedicated fax number below belonging to the campus where the patient is receiving treatment   List of dedicated fax numbers for the Facilities:  [address] DENIALS (Administrative/Medical Necessity) [phone]   [address] (Maternity/NICU/Pediatrics) [phone]   [address]  [address] [facility] [place] [id number] [place] [address] [address] [facility] [place] [address] [phone]

## 2022-04-09 NOTE — ASSESSMENT & PLAN NOTE
· Patient with history of hypertension  · Outpatient regimen includes lisinopril 10 mg daily  · BP on admission 161/82  · Will hold antihypertensives in setting of acute alcohol withdrawal and treatment with precedex  · Continue to monitor vitals, BP  · Resume home antihypertensive regimen once alcohol withdrawal resolved

## 2022-04-09 NOTE — ASSESSMENT & PLAN NOTE
· Per chart review, during admission at [facility], patient had episode of hypoxia with increased work of breathing  · CXR 4/7/2022: "Low lung volumes with bibasilar atelectasis "  · ECHO 4/7/2022: "Left Ventricle: Normal left ventricular wall thickness, cavity size, and systolic function  Estimated ejection fraction 55-60%  No clear wall motion abnormality identified  Diastolic characterization was not performed "  · Respiratory protocol, incentive spirometry  · Respiratory therapy contacted for nebulizer treatment  · Continue nebulizer treatment  · Continue to monitor vitals, symptoms  · 4/8/2022: patient hypoxic on 4 L NC  · Additional lasix 20 mg IV administered  · RT placed patient on non-rebreather   · VBG drawn: pH 7 369, pCO2 51 4, O2 73 4  · Repeat CXR: Low lung volumes with trace effusions and bibasilar atelectasis    · In light of VBG, RT transitioned to venturi mask 14 L 55%   · Worsening respiratory status later this afternoon (increased work of breathing)  · Subsequently intubated and transferred to [facility] ICU

## 2022-04-09 NOTE — ASSESSMENT & PLAN NOTE
H/o chronic daily alcohol consumption, denies h/o withdrawal seizures/DTs  Admitted to [facility] med surg unit x 2 days for acute alcoholic pancreatitis  · During that admission, patient managed on CIWA protocol: received Librium 30 mg total, Ativan 8 mg total in Valium 5 mg total  · Per chart review, patient became agitated, combative, and confused overnight in setting of alcohol withdrawal  · Transfer to [facility] unit for further management of severe alcohol withdrawal  Per patient, Last drink was Friday 04/01/2022  Ethanol 54 (04/05/2022, 12:30 a m )  Continue 1:1 observation for safety  [facility] protocol with symptom-triggered phenobarbital for medically-assisted alcohol withdrawal initiated upon admission  · Received 1040 mg phenobarbital initially, but had worsening disorientation and agitation in setting of notable ataxia, precedex drip subsequently initiated  · precedex drip currently at 0 5 mcg/kg/hr,  · Closely monitoring BPs on precedex- most recent 100/64; did dip to mid 80s/50s overnight  · Per discussion with pharmacy- unable to further concentrate precedex formulation   · Monitor closely for fluid overload/respiratory distress  · Continue 1:1 observation for safety   · Continue to monitor vitals, symptoms, sedation   · Pursue ICU transfer given respiratory status

## 2022-04-09 NOTE — PLAN OF CARE
Problem: MOBILITY - ADULT  Goal: Maintain or return to baseline ADL function  Description: INTERVENTIONS:  -  Assess patient's ability to carry out ADLs; assess patient's baseline for ADL function and identify physical deficits which impact ability to perform ADLs (bathing, care of mouth/teeth, toileting, grooming, dressing, etc )  - Assess/evaluate cause of self-care deficits   - Assess range of motion  - Assess patient's mobility; develop plan if impaired  - Assess patient's need for assistive devices and provide as appropriate  - Encourage maximum independence but intervene and supervise when necessary  - Involve family in performance of ADLs  - Assess for home care needs following discharge   - Consider OT consult to assist with ADL evaluation and planning for discharge  - Provide patient education as appropriate  Outcome: Progressing  Goal: Maintains/Returns to pre admission functional level  Description: INTERVENTIONS:  - Perform BMAT or MOVE assessment daily    - Set and communicate daily mobility goal to care team and patient/family/caregiver  - Collaborate with rehabilitation services on mobility goals if consulted  - Perform Range of Motion 3 times a day  - Reposition patient every 2 hours    - Record patient progress and toleration of activity level   Outcome: Progressing     Problem: Potential for Falls  Goal: Patient will remain free of falls  Description: INTERVENTIONS:  - Educate patient/family on patient safety including physical limitations  - Instruct patient to call for assistance with activity   - Consult OT/PT to assist with strengthening/mobility   - Keep Call bell within reach  - Keep bed low and locked with side rails adjusted as appropriate  - Keep care items and personal belongings within reach  - Initiate and maintain comfort rounds  - Make Fall Risk Sign visible to staff  - Offer Toileting every 2 Hours, in advance of need  - Initiate/Maintain bed alarm  - Apply yellow socks and bracelet for high fall risk patients  - Consider moving patient to room near nurses station  Outcome: Progressing     Problem: Prexisting or High Potential for Compromised Skin Integrity  Goal: Skin integrity is maintained or improved  Description: INTERVENTIONS:  - Identify patients at risk for skin breakdown  - Assess and monitor skin integrity  - Assess and monitor nutrition and hydration status  - Monitor labs   - Assess for incontinence   - Turn and reposition patient  - Assist with mobility/ambulation  - Relieve pressure over bony prominences  - Avoid friction and shearing  - Provide appropriate hygiene as needed including keeping skin clean and dry  - Evaluate need for skin moisturizer/barrier cream  - Collaborate with interdisciplinary team   - Patient/family teaching  - Consider wound care consult   Outcome: Progressing     Problem: DISCHARGE PLANNING - CARE MANAGEMENT  Goal: Discharge to post-acute care or home with appropriate resources  Description: INTERVENTIONS:  - Conduct assessment to determine patient/family and health care team treatment goals, and need for post-acute services based on payer coverage, community resources, and patient preferences, and barriers to discharge  - Address psychosocial, clinical, and financial barriers to discharge as identified in assessment in conjunction with the patient/family and health care team  - Arrange appropriate level of post-acute services according to patients   needs and preference and payer coverage in collaboration with the physician and health care team  - Communicate with and update the patient/family, physician, and health care team regarding progress on the discharge plan  - Arrange appropriate transportation to post-acute venues  Outcome: Progressing     Problem: SUBSTANCE USE/ABUSE  Goal: By discharge, will develop insight into their chemical dependency and sustain motivation to continue in recovery  Description: INTERVENTIONS:  - Attends all daily group sessions and scheduled AA groups  - Actively practices coping skills through participation in the therapeutic community and adherence to program rules  - Reviews and completes assignments from individual treatment plan  - Assist patient development of understanding of their personal cycle of addiction and relapse triggers  Outcome: Progressing  Goal: By discharge, patient will have ongoing treatment plan addressing chemical dependency  Description: INTERVENTIONS:  - Assist patient with resources and/or appointments for ongoing recovery based living  Outcome: Progressing     Problem: Nutrition/Hydration-ADULT  Goal: Nutrient/Hydration intake appropriate for improving, restoring or maintaining nutritional needs  Description: Monitor and assess patient's nutrition/hydration status for malnutrition  Collaborate with interdisciplinary team and initiate plan and interventions as ordered  Monitor patient's weight and dietary intake as ordered or per policy  Utilize nutrition screening tool and intervene as necessary  Determine patient's food preferences and provide high-protein, high-caloric foods as appropriate       INTERVENTIONS:  - Monitor oral intake, urinary output, labs, and treatment plans  - Assess nutrition and hydration status and recommend course of action  - Evaluate amount of meals eaten  - Assist patient with eating if necessary   - Allow adequate time for meals  - Recommend/ encourage appropriate diets, oral nutritional supplements, and vitamin/mineral supplements  - Order, calculate, and assess calorie counts as needed  - Recommend, monitor, and adjust tube feedings and TPN/PPN based on assessed needs  - Assess need for intravenous fluids  - Provide specific nutrition/hydration education as appropriate  - Include patient/family/caregiver in decisions related to nutrition  Outcome: Progressing     Problem: PAIN - ADULT  Goal: Verbalizes/displays adequate comfort level or baseline comfort level  Description: Interventions:  - Encourage patient to monitor pain and request assistance  - Assess pain using appropriate pain scale  - Administer analgesics based on type and severity of pain and evaluate response  - Implement non-pharmacological measures as appropriate and evaluate response  - Consider cultural and social influences on pain and pain management  - Notify physician/advanced practitioner if interventions unsuccessful or patient reports new pain  Outcome: Progressing     Problem: INFECTION - ADULT  Goal: Absence or prevention of progression during hospitalization  Description: INTERVENTIONS:  - Assess and monitor for signs and symptoms of infection  - Monitor lab/diagnostic results  - Monitor all insertion sites, i e  indwelling lines, tubes, and drains  - Monitor endotracheal if appropriate and nasal secretions for changes in amount and color  - [place] appropriate cooling/warming therapies per order  - Administer medications as ordered  - Instruct and encourage patient and family to use good hand hygiene technique  - Identify and instruct in appropriate isolation precautions for identified infection/condition  Outcome: Progressing     Problem: SAFETY ADULT  Goal: Maintain or return to baseline ADL function  Description: INTERVENTIONS:  -  Assess patient's ability to carry out ADLs; assess patient's baseline for ADL function and identify physical deficits which impact ability to perform ADLs (bathing, care of mouth/teeth, toileting, grooming, dressing, etc )  - Assess/evaluate cause of self-care deficits   - Assess range of motion  - Assess patient's mobility; develop plan if impaired  - Assess patient's need for assistive devices and provide as appropriate  - Encourage maximum independence but intervene and supervise when necessary  - Involve family in performance of ADLs  - Assess for home care needs following discharge   - Consider OT consult to assist with ADL evaluation and planning for discharge  - Provide patient education as appropriate  Outcome: Progressing  Goal: Maintains/Returns to pre admission functional level  Description: INTERVENTIONS:  - Perform BMAT or MOVE assessment daily    - Set and communicate daily mobility goal to care team and patient/family/caregiver  - Collaborate with rehabilitation services on mobility goals if consulted  - Perform Range of Motion 3 times a day  - Reposition patient every 2 hours    - Record patient progress and toleration of activity level   Outcome: Progressing  Goal: Patient will remain free of falls  Description: INTERVENTIONS:  - Educate patient/family on patient safety including physical limitations  - Instruct patient to call for assistance with activity   - Consult OT/PT to assist with strengthening/mobility   - Keep Call bell within reach  - Keep bed low and locked with side rails adjusted as appropriate  - Keep care items and personal belongings within reach  - Initiate and maintain comfort rounds  - Make Fall Risk Sign visible to staff  - Offer Toileting every 2 Hours, in advance of need  - Initiate/Maintain bed alarm  - Apply yellow socks and bracelet for high fall risk patients  - Consider moving patient to room near nurses station  Outcome: Progressing     Problem: DISCHARGE PLANNING  Goal: Discharge to home or other facility with appropriate resources  Description: INTERVENTIONS:  - Identify barriers to discharge w/patient and caregiver  - Arrange for needed discharge resources and transportation as appropriate  - Identify discharge learning needs (meds, wound care, etc )  - Arrange for interpretive services to assist at discharge as needed  - Refer to Case Management Department for coordinating discharge planning if the patient needs post-hospital services based on physician/advanced practitioner order or complex needs related to functional status, cognitive ability, or social support system  Outcome: Progressing     Problem: Knowledge Deficit  Goal: Patient/family/caregiver demonstrates understanding of disease process, treatment plan, medications, and discharge instructions  Description: Complete learning assessment and assess knowledge base  Interventions:  - Provide teaching at level of understanding  - Provide teaching via preferred learning methods  Outcome: Progressing     Problem: NEUROSENSORY - ADULT  Goal: Achieves stable or improved neurological status  Description: INTERVENTIONS  - Monitor and report changes in neurological status  - Monitor vital signs such as temperature, blood pressure, glucose, and any other labs ordered   - Initiate measures to prevent increased intracranial pressure  - Monitor for seizure activity and implement precautions if appropriate      Outcome: Progressing  Goal: Remains free of injury related to seizures activity  Description: INTERVENTIONS  - Maintain airway, patient safety  and administer oxygen as ordered  - Monitor patient for seizure activity, document and report duration and description of seizure to physician/advanced practitioner  - If seizure occurs,  ensure patient safety during seizure  - Reorient patient post seizure  - Seizure pads on all 4 side rails  - Instruct patient/family to notify RN of any seizure activity including if an aura is experienced  - Instruct patient/family to call for assistance with activity based on nursing assessment  - Administer anti-seizure medications if ordered    Outcome: Progressing  Goal: Achieves maximal functionality and self care  Description: INTERVENTIONS  - Monitor swallowing and airway patency with patient fatigue and changes in neurological status  - Encourage and assist patient to increase activity and self care     - Encourage visually impaired, hearing impaired and aphasic patients to use assistive/communication devices  Outcome: Progressing     Problem: CARDIOVASCULAR - ADULT  Goal: Maintains optimal cardiac output and hemodynamic stability  Description: INTERVENTIONS:  - Monitor I/O, vital signs and rhythm  - Monitor for S/S and trends of decreased cardiac output  - Administer and titrate ordered vasoactive medications to optimize hemodynamic stability  - Assess quality of pulses, skin color and temperature  - Assess for signs of decreased coronary artery perfusion  - Instruct patient to report change in severity of symptoms  Outcome: Progressing  Goal: Absence of cardiac dysrhythmias or at baseline rhythm  Description: INTERVENTIONS:  - Continuous cardiac monitoring, vital signs, obtain 12 lead EKG if ordered  - Administer antiarrhythmic and heart rate control medications as ordered  - Monitor electrolytes and administer replacement therapy as ordered  Outcome: Progressing     Problem: RESPIRATORY - ADULT  Goal: Achieves optimal ventilation and oxygenation  Description: INTERVENTIONS:  - Assess for changes in respiratory status  - Assess for changes in mentation and behavior  - Position to facilitate oxygenation and minimize respiratory effort  - Oxygen administered by appropriate delivery if ordered  - Initiate smoking cessation education as indicated  - Encourage broncho-pulmonary hygiene including cough, deep breathe, Incentive Spirometry  - Assess the need for suctioning and aspirate as needed  - Assess and instruct to report SOB or any respiratory difficulty  - Respiratory Therapy support as indicated  Outcome: Progressing     Problem: GASTROINTESTINAL - ADULT  Goal: Minimal or absence of nausea and/or vomiting  Description: INTERVENTIONS:  - Administer IV fluids if ordered to ensure adequate hydration  - Maintain NPO status until nausea and vomiting are resolved  - Nasogastric tube if ordered  - Administer ordered antiemetic medications as needed  - Provide nonpharmacologic comfort measures as appropriate  - Advance diet as tolerated, if ordered  - Consider nutrition services referral to assist patient with adequate nutrition and appropriate food choices  Outcome: Progressing  Goal: Maintains or returns to baseline bowel function  Description: INTERVENTIONS:  - Assess bowel function  - Encourage oral fluids to ensure adequate hydration  - Administer IV fluids if ordered to ensure adequate hydration  - Administer ordered medications as needed  - Encourage mobilization and activity  - Consider nutritional services referral to assist patient with adequate nutrition and appropriate food choices  Outcome: Progressing  Goal: Maintains adequate nutritional intake  Description: INTERVENTIONS:  - Monitor percentage of each meal consumed  - Identify factors contributing to decreased intake, treat as appropriate  - Assist with meals as needed  - Monitor I&O, weight, and lab values if indicated  - Obtain nutrition services referral as needed  Outcome: Progressing  Goal: Oral mucous membranes remain intact  Description: INTERVENTIONS  - Assess oral mucosa and hygiene practices  - Implement preventative oral hygiene regimen  - Implement oral medicated treatments as ordered  - Initiate Nutrition services referral as needed  Outcome: Progressing     Problem: GENITOURINARY - ADULT  Goal: Maintains or returns to baseline urinary function  Description: INTERVENTIONS:  - Assess urinary function  - Encourage oral fluids to ensure adequate hydration if ordered  - Administer IV fluids as ordered to ensure adequate hydration  - Administer ordered medications as needed  - Offer frequent toileting  - Follow urinary retention protocol if ordered  Outcome: Progressing  Goal: Absence of urinary retention  Description: INTERVENTIONS:  - Assess patients ability to void and empty bladder  - Monitor I/O  - Bladder scan as needed  - Discuss with physician/AP medications to alleviate retention as needed  - Discuss catheterization for long term situations as appropriate  Outcome: Progressing  Goal: Urinary catheter remains patent  Description: INTERVENTIONS:  - Assess patency of urinary catheter  - If patient has a chronic vazquez, consider changing catheter if non-functioning  - Follow guidelines for intermittent irrigation of non-functioning urinary catheter  Outcome: Progressing     Problem: METABOLIC, FLUID AND ELECTROLYTES - ADULT  Goal: Electrolytes maintained within normal limits  Description: INTERVENTIONS:  - Monitor labs and assess patient for signs and symptoms of electrolyte imbalances  - Administer electrolyte replacement as ordered  - Monitor response to electrolyte replacements, including repeat lab results as appropriate  - Instruct patient on fluid and nutrition as appropriate  Outcome: Progressing  Goal: Fluid balance maintained  Description: INTERVENTIONS:  - Monitor labs   - Monitor I/O and WT  - Instruct patient on fluid and nutrition as appropriate  - Assess for signs & symptoms of volume excess or deficit  Outcome: Progressing     Problem: SKIN/TISSUE INTEGRITY - ADULT  Goal: Skin Integrity remains intact(Skin Breakdown Prevention)  Description: Assess:  -Perform Davey assessment every 12 hours  -Clean and moisturize skin every 4 hours  -Inspect skin when repositioning, toileting, and assisting with ADLS  -Assess under medical devices such as restraints every 2 hours  -Assess extremities for adequate circulation and sensation     Bed Management:  -Have minimal linens on bed & keep smooth, unwrinkled  -Change linens as needed when moist or perspiring  -Avoid sitting or lying in one position for more than 2 hours while in bed  -Keep HOB at 30degrees     Toileting:  -Offer bedside commode  -Assess for incontinence every 2 hours  -Use incontinent care products after each incontinent episode such as BM    Activity:  -Mobilize patient 3 times a day  -Encourage activity and walks on unit  -Encourage or provide ROM exercises   -Turn and reposition patient every 2 Hours  -Use appropriate equipment to lift or move patient in bed  -Instruct/ Assist with weight shifting every 2 hours when out of bed in chair  -Consider limitation of chair time 2 hour intervals    Skin Care:  -Avoid use of baby powder, tape, friction and shearing, hot water or constrictive clothing  -Relieve pressure over bony prominences using Allevyn foam  -Do not massage red bony areas    Outcome: Progressing     Problem: SAFETY,RESTRAINT: NV/NON-SELF DESTRUCTIVE BEHAVIOR  Goal: Remains free of harm/injury (restraint for non violent/non self-detsructive behavior)  Description: INTERVENTIONS:  - Instruct patient/family regarding restraint use   - Assess and monitor physiologic and psychological status   - Provide interventions and comfort measures to meet assessed patient needs   - Identify and implement measures to help patient regain control  - Assess readiness for release of restraint   Outcome: Progressing  Goal: Returns to optimal restraint-free functioning  Description: INTERVENTIONS:  - Assess the patient's behavior and symptoms that indicate continued need for restraint  - Identify and implement measures to help patient regain control  - Assess readiness for release of restraint   Outcome: Progressing

## 2022-04-09 NOTE — ASSESSMENT & PLAN NOTE
· Patient originally presented to [facility] ED 04/05/2022 for evaluation of severe abdominal pain and multiple episodes of vomiting in setting of chronic frequent alcohol consumption  · CT abdomen/pelvis in the ED revealed acute pancreatitis  · Other pertinent labs  · Amylase 986  · Triglycerides 333 -> 290  · Patient subsequently admitted to [facility] med surge unit for treatment of acute pancreatitis  · GI was consulted during that admission- per most recent recommendations, hold fluids in light of respiratory status, keep NPO until mental status improves  · Abdomen appears more distended this AM, diffuse TTP  · Consult GI, appreciate recommendaitons  · Continue to hold fluids in setting of respiratory failure  · NPO diet- advance as tolerated  · Initial lipase 53,700, trended down to 13,119 (4/5/2022)  · Lipase this , down from 1466 yesterday  · Supportive care- Zofran as needed, Dilaudid as needed  · PRN Narcan ordered for respiratory depression (remain cautious with Dilaudid especially while on Precedex)

## 2022-04-09 NOTE — NUTRITION
04/09/22 1831   PES Statement   Problem Intake   Oral or Nutritional Support Intake (2) Inadequate oral intake NI-2 1   Related to Inability for po   As evidenced by: NPO status   Recommendations/Interventions   Malnutrition/BMI Present No   Summary Pt is currently intubated  Per MD note of 04/09, "patient was intubated for airway protection in the setting of encephalopathy and potential DTs "  "will attempt to wean tomorrow "  When extubation is successful, will recommend a goal diet of low fat  Will continue to monitor for po intake/diet advancement  Please consult if alternate nutrition becomes necessary     Interventions/Recommendations Initiate diet  (Initiate diet as medically able to low fat )   Education Assessment   Education Patient/caregiver not appropriate for education at this time   Patient Nutrition Goals   Goal Nutrition via appropriate route;Meet PO needs   Goal Status Initiated   Timeframe to complete goal by next f/u   Nutrition Complexity Risk   Nutrition complexity level High risk   Follow up date 04/13/22

## 2022-04-09 NOTE — PROGRESS NOTES
[facility]  Progress Note - Francisca Garrido 1965, 60 y o  female MRN: [id number]  Unit/Bed#: ICU 07 Encounter: [id number]  Primary Care Provider: Elana Boyer DO   Date and time admitted to hospital: 4/8/2022  3:55 PM    * Acute respiratory failure with hypoxia [facility])  Assessment & Plan  Patient acute hypoxic on admission, was given dose of Lasix with marked improvement  However, this morning patient started to become more agitated, hypoxic and tachycardic unable to tolerate BiPAP, patient was intubated for airway protection in the setting of encephalopathy and potential DTs  · Continue mechanical ventilation at minimal settings for now  · Will try to wean tomorrow  · VAE prevention  · Sedation with Fentanyl and Propofol infusion for goal RASS 0 to -1     Alcohol-induced acute pancreatitis  Assessment & Plan  Patient initially presented with a lipase of 53,000 and has trended down to 819 this morning  Patient's acute pancreatitis is likely secondary to alcohol  · GI recommendations appreciated  · Continue NPO  · Fentanyl infusion     Delirium tremens [facility])  Assessment & Plan  Patient drinks roughly 8-12 shots of whiskey daily  Patient's last drink was April 1, 2022  EtoH level on admission was 54  Has never had any history withdrawals or seizures  Patient was initially admitted for alcoholic pancreatitis, however, patient became encephalopathic and at agitated requiring transfer to detox Unit  · Patient was initially on phenobarbital and Precedex  · Patient switched over to fentanyl and Propofol     Alcohol use disorder, severe, dependence ([facility] [address] )  Assessment & Plan  · Will continue with withdrawal management  · Case management consult in place  · Encouraged cessation   · Continue thiamine and folic acid     Ataxia  Assessment & Plan  On admission patient was reported to have ataxia as well as finger-to-nose ataxia   Patient was started on high-dose thiamine for Wernicke's encephalopathy    · Continue high-dose thiamine and folic acid  · Can consider MRI once patient is stable  · Fall precautions      Hypokalemia  Assessment & Plan  · Likely secondary to diuretic administration  · Continue completion  · BMP tomorrow a m  · Mg tomorrow a m  Hypertension  Assessment & Plan  · Patient is currently on 10 mg of lisinopril daily at home  Can be resumed once extubated  · Hydralazine p r n  For SBP greater than 170        Gastroesophageal reflux disease without esophagitis  Assessment & Plan  · Continue Protonix 40 mg daily     Hyperlipidemia  Assessment & Plan  · Patient had noted hypertriglyceridemia, most recently 290   Patient is on Lipitor 40 mg at  · Can be resumed once extubated    · Monitor closely while on propofol       ----------------------------------------------------------------------------------------  HPI/24hr events:   Febrile->cultures sent, cooling measures, tylenol given  Pt agitated-> Fentanyl increased, PRN dilaudid  This AM pt with diffuse wheezing, heart neb and changed to [facility] ventilation    Patient appropriate for transfer out of the ICU today?: No  Disposition: Continue Critical Care   Code Status: Level 1 - Full Code  ---------------------------------------------------------------------------------------  SUBJECTIVE  Pt intubated and sedated, unable to provide    Review of Systems   Unable to perform ROS: Intubated     Review of systems was reviewed and negative unless stated above in HPI/24-hour events   ---------------------------------------------------------------------------------------  OBJECTIVE    Vitals   Vitals:    22 0100 22 0200 22 0228 22 0300   BP:       BP Location:       Pulse:       Resp:       Temp: 98 4 °F (36 9 °C) 98 6 °F (37 °C)  98 4 °F (36 9 °C)   TempSrc: Bladder Bladder  Bladder   SpO2:   97%    Weight:       Height:         Temp (24hrs), Av 7 °F (37 1 °C), Min:97 °F (36 1 °C), Max:102 9 °F (39 4 °C)  Current: Temperature: 98 4 °F (36 9 °C)          Respiratory:  SpO2: SpO2: 97 %       ACVC Rate 12/450/50/6      Physical Exam  Constitutional:       Appearance: She is ill-appearing  HENT:      Head: Normocephalic and atraumatic  Mouth/Throat:      Lips: Pink  Mouth: Mucous membranes are dry  Comments: ETT in place  Cardiovascular:      Rate and Rhythm: Normal rate and regular rhythm  Pulses:           Radial pulses are 2+ on the right side and 2+ on the left side  Dorsalis pedis pulses are 1+ on the right side and 1+ on the left side  Heart sounds: Normal heart sounds  Pulmonary:      Effort: Pulmonary effort is normal       Breath sounds: Examination of the right-lower field reveals decreased breath sounds  Examination of the left-lower field reveals decreased breath sounds  Decreased breath sounds and wheezing present  No rhonchi or rales  Comments: Intubated on full vent support  Abdominal:      Comments: Round, slightly distended, absent bowel sounds   Genitourinary:     Comments: Mccullough to gravity  Musculoskeletal:      Right lower leg: No edema  Left lower leg: No edema  Skin:     General: Skin is warm  Capillary Refill: Capillary refill takes less than 2 seconds  Nails: There is no clubbing  Neurological:      GCS: GCS eye subscore is 2  GCS verbal subscore is 1  GCS motor subscore is 5        Comments: Pt intubated and sedated, agitated at times with equal strength in all extremities, difficult to re-direct, positive commands           Laboratory and Diagnostics:  Results from last 7 days   Lab Units 04/08/22  1949 04/08/22  0452 04/07/22  0502 04/06/22  0458 04/05/22  0419 04/05/22  0030   WBC Thousand/uL 9 79 9 34 12 23* 9 80 11 72* 12 04*   HEMOGLOBIN g/dL 11 4* 10 7* 13 9 14 0 14 2 14 1   HEMATOCRIT % 32 4* 32 9* 40 5 40 1 41 2 40 9   PLATELETS Thousands/uL 174 158 172 169 242 241   NEUTROS PCT %  --   --   --   --  84* 70   BANDS PCT % 5 --   --   --   --   --    MONOS PCT %  --   --   --   --  4 9   MONO PCT % 13*  --   --   --   --   --      Results from last 7 days   Lab Units 04/08/22 1949 04/08/22 0452 04/07/22  0502 04/06/22 0458 04/05/22  0419 04/05/22  0030   SODIUM mmol/L 142 137 136 136 140 139   POTASSIUM mmol/L 3 5 3 3* 3 4* 3 6 3 9 3 3*   CHLORIDE mmol/L 104 100 101 103 107 102   CO2 mmol/L 30 33* 28 26 21 21   ANION GAP mmol/L 8 4* 7 7 12 16*   BUN mg/dL 9 9 7 8 15 16   CREATININE mg/dL 0 73 0 67 0 64 0 76 0 78 0 88   CALCIUM mg/dL 8 1* 7 7* 8 0* 7 8* 8 0* 8 6   GLUCOSE RANDOM mg/dL 76 94 112 121 133 166*   ALT U/L 24 22 31 33 53 56   AST U/L 30 41* 33 24 36 36   ALK PHOS U/L 82 68 65 69 90 99   ALBUMIN g/dL 2 1* 3 2 2 8* 3 2* 4 0 4 3   TOTAL BILIRUBIN mg/dL 0 61 0 89 1 15* 0 82 0 32 0 41     Results from last 7 days   Lab Units 04/08/22 1949 04/08/22 0452 04/07/22 0502 04/06/22 0458 04/05/22  0030   MAGNESIUM mg/dL 2 1 2 1 1 7 1 3* 1 9   PHOSPHORUS mg/dL 2 3* 3 3 2 0* 2 8  --                Results from last 7 days   Lab Units 04/08/22 1949 04/05/22  0404 04/05/22  0030   LACTIC ACID mmol/L 1 2 2 1* 3 1*     ABG:    VBG:  Results from last 7 days   Lab Units 04/08/22  1311   PH TOM  7 345   PCO2 TOM mm Hg 51 8*   PO2 TOM mm Hg 46 0*   HCO3 TOM mmol/L 27 6   BASE EXC TOM mmol/L 1 2     Results from last 7 days   Lab Units 04/08/22 1949 04/05/22  0419   PROCALCITONIN ng/ml 0 34* <0 05       Micro  Results from last 7 days   Lab Units 04/08/22 1953   BLOOD CULTURE  Received in Microbiology Lab  Culture in Progress  Received in Microbiology Lab  Culture in Progress  EKG: SR on tele  Imaging: I have personally reviewed pertinent reports     and I have personally reviewed pertinent films in PACS   4/7 ECHO EF 55-60%, RV nl    Intake and Output  I/O       04/07 0701 04/08 0700 04/08 0701 04/09 0700    I V  (mL/kg)  225 9 (2 5)    IV Piggyback  250    Total Intake(mL/kg)  475 9 (5 3)    Urine (mL/kg/hr)  790    Total Output 790    Net  -314 2                  Height and Weights   Height: 5' 8" (172 7 cm)     Body mass index is 29 97 kg/m²  Weight (last 2 days)     Date/Time Weight    04/08/22 1558 89 4 (197 09)            Nutrition       Diet Orders   (From admission, onward)             Start     Ordered    04/08/22 1610  Diet NPO  Diet effective now        References:    Nutrtion Support Algorithm Enteral vs  Parenteral   Question Answer Comment   Diet Type NPO    RD to adjust diet per protocol?  No        04/08/22 1614                  Active Medications  Scheduled Meds:  Current Facility-Administered Medications   Medication Dose Route Frequency Provider Last Rate    enoxaparin  40 mg Subcutaneous Daily Hiram Short MD      fentaNYL  100 mcg/hr Intravenous Continuous MINA BillingsNP 100 mcg/hr (07/33/64 5292)    folic acid IVPB  1 mg Intravenous Daily Hiram Short MD      hydrALAZINE  10 mg Intravenous Q6H PRN TETE Calhoun      HYDROmorphone  0 5 mg Intravenous Q3H PRN TETE Billings      ipratropium  0 5 mg Nebulization TID Hiram Short MD      ipratropium-albuterol  3 mL Nebulization TID PRN Hiram Short MD      levalbuterol  1 25 mg Nebulization TID Hiram Short MD      lidocaine  1 patch Topical Q24H Hiram Short MD      multivitamin-minerals  1 tablet Oral Daily Hiram Short MD      naloxone  0 04 mg Intravenous Q1MIN PRN Hiram Short MD      nicotine  1 patch Transdermal Daily Hiram Short MD      ondansetron  4 mg Intravenous Q6H PRN Hiram Short MD      pantoprazole  40 mg Intravenous Q24H [address] Hiram Short MD      propofol  5-50 mcg/kg/min Intravenous Titrated Hiram Short MD 50 mcg/kg/min (04/09/22 0302)    thiamine  500 mg Intravenous Q8H Hiram Short  mg (04/09/22 0148)     Continuous Infusions:  fentaNYL, 100 mcg/hr, Last Rate: 100 mcg/hr (04/09/22 0248)  propofol, 5-50 mcg/kg/min, Last Rate: 50 mcg/kg/min (04/09/22 0302)      PRN Meds:   hydrALAZINE, 10 mg, Q6H PRN  HYDROmorphone, 0 5 mg, Q3H PRN  ipratropium-albuterol, 3 mL, TID PRN  naloxone, 0 04 mg, Q1MIN PRN  ondansetron, 4 mg, Q6H PRN        Invasive Devices Review  Invasive Devices  Report    Peripheral Intravenous Line            Peripheral IV 04/08/22 Left Antecubital <1 day    Peripheral IV 04/08/22 Left Forearm <1 day    Peripheral IV 04/08/22 Right Antecubital <1 day          Drain            NG/OG/Enteral Tube Orogastric 14 Fr Left mouth <1 day    Urethral Catheter Non-latex 16 Fr  <1 day          Airway            ETT  Oral 8 mm <1 day                Rationale for remaining devices: critically ill  ---------------------------------------------------------------------------------------  Advance Directive and Living Will:      Power of :    POLST:    ---------------------------------------------------------------------------------------  Care Time Delivered:   No Critical Care time spent       TETE Samuels      Portions of the record may have been created with voice recognition software  Occasional wrong word or "sound a like" substitutions may have occurred due to the inherent limitations of voice recognition software    Read the chart carefully and recognize, using context, where substitutions have occurred

## 2022-04-09 NOTE — ASSESSMENT & PLAN NOTE
· On admission- ataxic gait and finger-to-nose ataxia noted   · Likely secondary to severe alcohol withdrawal versus Wernicke's  · Continue high-dose thiamine  · Head CT ordered however patient uncooperative with procedure  · Consider brain MRI once patient more cooperative  · Fall precautions  · 1:1 continues observation for safety

## 2022-04-10 PROBLEM — E87.6 HYPOKALEMIA: Status: RESOLVED | Noted: 2022-04-05 | Resolved: 2022-04-10

## 2022-04-10 LAB
ALBUMIN SERPL BCP-MCNC: 1.9 G/DL (ref 3.5–5)
ALP SERPL-CCNC: 153 U/L (ref 46–116)
ALT SERPL W P-5'-P-CCNC: 43 U/L (ref 12–78)
ANION GAP SERPL CALCULATED.3IONS-SCNC: 8 MMOL/L (ref 4–13)
AST SERPL W P-5'-P-CCNC: 65 U/L (ref 5–45)
BILIRUB SERPL-MCNC: 0.56 MG/DL (ref 0.2–1)
BUN SERPL-MCNC: 6 MG/DL (ref 5–25)
CA-I BLD-SCNC: 1.11 MMOL/L (ref 1.12–1.32)
CALCIUM ALBUM COR SERPL-MCNC: 9.9 MG/DL (ref 8.3–10.1)
CALCIUM SERPL-MCNC: 8.2 MG/DL (ref 8.3–10.1)
CHLORIDE SERPL-SCNC: 101 MMOL/L (ref 100–108)
CO2 SERPL-SCNC: 28 MMOL/L (ref 21–32)
CREAT SERPL-MCNC: 0.39 MG/DL (ref 0.6–1.3)
ERYTHROCYTE [DISTWIDTH] IN BLOOD BY AUTOMATED COUNT: 12.6 % (ref 11.6–15.1)
GFR SERPL CREATININE-BSD FRML MDRD: 116 ML/MIN/1.73SQ M
GLUCOSE SERPL-MCNC: 77 MG/DL (ref 65–140)
GLUCOSE SERPL-MCNC: 79 MG/DL (ref 65–140)
GLUCOSE SERPL-MCNC: 80 MG/DL (ref 65–140)
HCT VFR BLD AUTO: 33.7 % (ref 34.8–46.1)
HGB BLD-MCNC: 11.1 G/DL (ref 11.5–15.4)
MAGNESIUM SERPL-MCNC: 2.2 MG/DL (ref 1.6–2.6)
MCH RBC QN AUTO: 32.8 PG (ref 26.8–34.3)
MCHC RBC AUTO-ENTMCNC: 32.9 G/DL (ref 31.4–37.4)
MCV RBC AUTO: 100 FL (ref 82–98)
PHOSPHATE SERPL-MCNC: 3.6 MG/DL (ref 2.7–4.5)
PLATELET # BLD AUTO: 216 THOUSANDS/UL (ref 149–390)
PMV BLD AUTO: 9.6 FL (ref 8.9–12.7)
POTASSIUM SERPL-SCNC: 3.5 MMOL/L (ref 3.5–5.3)
PROCALCITONIN SERPL-MCNC: 0.63 NG/ML
PROT SERPL-MCNC: 6.6 G/DL (ref 6.4–8.2)
RBC # BLD AUTO: 3.38 MILLION/UL (ref 3.81–5.12)
SODIUM SERPL-SCNC: 137 MMOL/L (ref 136–145)
TRIGL SERPL-MCNC: 161 MG/DL
WBC # BLD AUTO: 10.35 THOUSAND/UL (ref 4.31–10.16)

## 2022-04-10 PROCEDURE — 80053 COMPREHEN METABOLIC PANEL: CPT | Performed by: NURSE PRACTITIONER

## 2022-04-10 PROCEDURE — 36600 WITHDRAWAL OF ARTERIAL BLOOD: CPT

## 2022-04-10 PROCEDURE — 99291 CRITICAL CARE FIRST HOUR: CPT | Performed by: INTERNAL MEDICINE

## 2022-04-10 PROCEDURE — 82948 REAGENT STRIP/BLOOD GLUCOSE: CPT

## 2022-04-10 PROCEDURE — 84100 ASSAY OF PHOSPHORUS: CPT | Performed by: NURSE PRACTITIONER

## 2022-04-10 PROCEDURE — 82330 ASSAY OF CALCIUM: CPT | Performed by: NURSE PRACTITIONER

## 2022-04-10 PROCEDURE — 85027 COMPLETE CBC AUTOMATED: CPT | Performed by: NURSE PRACTITIONER

## 2022-04-10 PROCEDURE — 84478 ASSAY OF TRIGLYCERIDES: CPT | Performed by: NURSE PRACTITIONER

## 2022-04-10 PROCEDURE — 94003 VENT MGMT INPAT SUBQ DAY: CPT

## 2022-04-10 PROCEDURE — C9113 INJ PANTOPRAZOLE SODIUM, VIA: HCPCS | Performed by: INTERNAL MEDICINE

## 2022-04-10 PROCEDURE — 84145 PROCALCITONIN (PCT): CPT | Performed by: NURSE PRACTITIONER

## 2022-04-10 PROCEDURE — 94640 AIRWAY INHALATION TREATMENT: CPT

## 2022-04-10 PROCEDURE — 83735 ASSAY OF MAGNESIUM: CPT | Performed by: NURSE PRACTITIONER

## 2022-04-10 RX ORDER — POTASSIUM CHLORIDE 20MEQ/15ML
20 LIQUID (ML) ORAL ONCE
Status: COMPLETED | OUTPATIENT
Start: 2022-04-10 | End: 2022-04-10

## 2022-04-10 RX ORDER — POTASSIUM CHLORIDE 14.9 MG/ML
20 INJECTION INTRAVENOUS ONCE
Status: COMPLETED | OUTPATIENT
Start: 2022-04-10 | End: 2022-04-10

## 2022-04-10 RX ADMIN — Medication 1 TABLET: at 08:11

## 2022-04-10 RX ADMIN — HYDROMORPHONE HYDROCHLORIDE 1 MG: 1 INJECTION, SOLUTION INTRAMUSCULAR; INTRAVENOUS; SUBCUTANEOUS at 21:09

## 2022-04-10 RX ADMIN — PROPOFOL 50 MCG/KG/MIN: 10 INJECTION, EMULSION INTRAVENOUS at 03:33

## 2022-04-10 RX ADMIN — HYDROMORPHONE HYDROCHLORIDE 1 MG: 1 INJECTION, SOLUTION INTRAMUSCULAR; INTRAVENOUS; SUBCUTANEOUS at 02:47

## 2022-04-10 RX ADMIN — FOLIC ACID 1 MG: 5 INJECTION, SOLUTION INTRAMUSCULAR; INTRAVENOUS; SUBCUTANEOUS at 08:12

## 2022-04-10 RX ADMIN — PROPOFOL 50 MCG/KG/MIN: 10 INJECTION, EMULSION INTRAVENOUS at 13:44

## 2022-04-10 RX ADMIN — HYDRALAZINE HYDROCHLORIDE 10 MG: 20 INJECTION, SOLUTION INTRAMUSCULAR; INTRAVENOUS at 02:35

## 2022-04-10 RX ADMIN — CHLORHEXIDINE GLUCONATE 0.12% ORAL RINSE 15 ML: 1.2 LIQUID ORAL at 20:19

## 2022-04-10 RX ADMIN — Medication 1 PATCH: at 08:12

## 2022-04-10 RX ADMIN — IPRATROPIUM BROMIDE 0.5 MG: 0.5 SOLUTION RESPIRATORY (INHALATION) at 07:47

## 2022-04-10 RX ADMIN — CHLORHEXIDINE GLUCONATE 0.12% ORAL RINSE 15 ML: 1.2 LIQUID ORAL at 08:11

## 2022-04-10 RX ADMIN — THIAMINE HYDROCHLORIDE 500 MG: 100 INJECTION, SOLUTION INTRAMUSCULAR; INTRAVENOUS at 11:41

## 2022-04-10 RX ADMIN — DOCUSATE SODIUM AND SENNOSIDES 2 TABLET: 8.6; 5 TABLET, FILM COATED ORAL at 21:09

## 2022-04-10 RX ADMIN — MIDAZOLAM 2 MG: 1 INJECTION INTRAMUSCULAR; INTRAVENOUS at 01:47

## 2022-04-10 RX ADMIN — MIDAZOLAM 2 MG: 1 INJECTION INTRAMUSCULAR; INTRAVENOUS at 13:39

## 2022-04-10 RX ADMIN — PROPOFOL 50 MCG/KG/MIN: 10 INJECTION, EMULSION INTRAVENOUS at 21:09

## 2022-04-10 RX ADMIN — POTASSIUM CHLORIDE 20 MEQ: 14.9 INJECTION, SOLUTION INTRAVENOUS at 06:41

## 2022-04-10 RX ADMIN — THIAMINE HYDROCHLORIDE 500 MG: 100 INJECTION, SOLUTION INTRAMUSCULAR; INTRAVENOUS at 02:39

## 2022-04-10 RX ADMIN — IPRATROPIUM BROMIDE 0.5 MG: 0.5 SOLUTION RESPIRATORY (INHALATION) at 20:40

## 2022-04-10 RX ADMIN — LEVALBUTEROL HYDROCHLORIDE 1.25 MG: 1.25 SOLUTION, CONCENTRATE RESPIRATORY (INHALATION) at 07:47

## 2022-04-10 RX ADMIN — LEVALBUTEROL HYDROCHLORIDE 1.25 MG: 1.25 SOLUTION, CONCENTRATE RESPIRATORY (INHALATION) at 13:38

## 2022-04-10 RX ADMIN — Medication 100 MCG/HR: at 05:14

## 2022-04-10 RX ADMIN — LEVALBUTEROL HYDROCHLORIDE 1.25 MG: 1.25 SOLUTION, CONCENTRATE RESPIRATORY (INHALATION) at 20:41

## 2022-04-10 RX ADMIN — LIDOCAINE 1 PATCH: 50 PATCH CUTANEOUS at 13:28

## 2022-04-10 RX ADMIN — POTASSIUM CHLORIDE 20 MEQ: 20 SOLUTION ORAL at 06:41

## 2022-04-10 RX ADMIN — HYDROMORPHONE HYDROCHLORIDE 1 MG: 1 INJECTION, SOLUTION INTRAMUSCULAR; INTRAVENOUS; SUBCUTANEOUS at 14:53

## 2022-04-10 RX ADMIN — PROPOFOL 50 MCG/KG/MIN: 10 INJECTION, EMULSION INTRAVENOUS at 23:58

## 2022-04-10 RX ADMIN — IPRATROPIUM BROMIDE 0.5 MG: 0.5 SOLUTION RESPIRATORY (INHALATION) at 13:38

## 2022-04-10 RX ADMIN — THIAMINE HYDROCHLORIDE 500 MG: 100 INJECTION, SOLUTION INTRAMUSCULAR; INTRAVENOUS at 18:27

## 2022-04-10 RX ADMIN — PROPOFOL 50 MCG/KG/MIN: 10 INJECTION, EMULSION INTRAVENOUS at 17:17

## 2022-04-10 RX ADMIN — MIDAZOLAM 2 MG: 1 INJECTION INTRAMUSCULAR; INTRAVENOUS at 19:23

## 2022-04-10 RX ADMIN — PANTOPRAZOLE SODIUM 40 MG: 40 INJECTION, POWDER, FOR SOLUTION INTRAVENOUS at 07:10

## 2022-04-10 RX ADMIN — SODIUM CHLORIDE, SODIUM LACTATE, POTASSIUM CHLORIDE, AND CALCIUM CHLORIDE 100 ML/HR: .6; .31; .03; .02 INJECTION, SOLUTION INTRAVENOUS at 14:54

## 2022-04-10 RX ADMIN — Medication 100 MCG/HR: at 15:41

## 2022-04-10 RX ADMIN — ENOXAPARIN SODIUM 40 MG: 40 INJECTION SUBCUTANEOUS at 08:11

## 2022-04-10 RX ADMIN — MIDAZOLAM 2 MG: 1 INJECTION INTRAMUSCULAR; INTRAVENOUS at 23:59

## 2022-04-10 RX ADMIN — PROPOFOL 50 MCG/KG/MIN: 10 INJECTION, EMULSION INTRAVENOUS at 06:18

## 2022-04-10 RX ADMIN — PROPOFOL 50 MCG/KG/MIN: 10 INJECTION, EMULSION INTRAVENOUS at 10:11

## 2022-04-10 NOTE — ASSESSMENT & PLAN NOTE
Patient initially presented with a lipase of 53,000 and has trended down to 819 this morning  Patient's acute pancreatitis is likely secondary to alcohol    4/9 CT scan showed worsening of acute pancreatitis with new necrosis of a portion of the pancreatic neck and with worsening peripancreatic edema    · GI recommendations appreciated  · Pt needs nutritional support, consider post pyloric enteral nutrition  · Pain control

## 2022-04-10 NOTE — ASSESSMENT & PLAN NOTE
Patient drinks roughly 8-12 shots of whiskey daily  Patient's last drink was April 1, 2022  EtoH level on admission was 54  Has never had any history withdrawals or seizures  Patient was initially admitted for alcoholic pancreatitis, however, patient became encephalopathic and at agitated requiring transfer to detox Unit      · Patient was initially on phenobarbital and Precedex  · Patient switched over to fentanyl and Propofol with PRN versed

## 2022-04-10 NOTE — PROGRESS NOTES
[facility]  Progress Note - Francisca Lima 1965, 60 y o  female MRN: [id number]  Unit/Bed#: ICU 07 Encounter: [id number]  Primary Care Provider: Meghan Major DO   Date and time admitted to hospital: 4/8/2022  3:55 PM    * Acute respiratory failure with hypoxia [facility])  Assessment & Plan  Patient acute hypoxic on admission, was given dose of Lasix with marked improvement  However, this morning patient started to become more agitated, hypoxic and tachycardic unable to tolerate BiPAP, patient was intubated for airway protection in the setting of encephalopathy and potential DTs  · Continue mechanical ventilation  · Daily assessment for SAT and SBT when appropriate  · Consider drainage of pleural effusions via thoracentesis  · Consider Bronch after thoracentesis for secretion clearance  · VAE prevention  · Sedation with Fentanyl and Propofol infusion for goal RASS 0 to -1, PRN versed and dilaudid     Alcohol-induced acute pancreatitis  Assessment & Plan  Patient initially presented with a lipase of 53,000 and has trended down to 819 this morning  Patient's acute pancreatitis is likely secondary to alcohol  4/9 CT scan showed worsening of acute pancreatitis with new necrosis of a portion of the pancreatic neck and with worsening peripancreatic edema    · GI recommendations appreciated  · Pt needs nutritional support, consider post pyloric enteral nutrition  · Pain control    Delirium tremens ([facility] [address] )  Assessment & Plan  Patient drinks roughly 8-12 shots of whiskey daily  Patient's last drink was April 1, 2022  EtoH level on admission was 54  Has never had any history withdrawals or seizures  Patient was initially admitted for alcoholic pancreatitis, however, patient became encephalopathic and at agitated requiring transfer to detox Unit      · Patient was initially on phenobarbital and Precedex  · Patient switched over to fentanyl and Propofol with PRN versed     Alcohol use disorder, severe, dependence ([facility] [address] )  Assessment & Plan  · Will continue with withdrawal management  · Case management consult in place  · Encouraged cessation   · Continue thiamine and folic acid       Ataxia  Assessment & Plan  On admission patient was reported to have ataxia as well as finger-to-nose ataxia  Patient was started on high-dose thiamine for Wernicke's encephalopathy    · Continue high-dose thiamine and folic acid  · Can consider MRI once patient is stable  · Fall precautions        Hypokalemia-resolved as of 4/10/2022  Assessment & Plan  · Likely secondary to diuretic administration  · Continue completion  · BMP tomorrow a m  · Mg tomorrow a m  Hypertension  Assessment & Plan  · Patient is currently on 10 mg of lisinopril daily at home  Can be resumed once extubated  · Hydralazine p r n  For SBP greater than 170         Gastroesophageal reflux disease without esophagitis  Assessment & Plan  · Continue Protonix 40 mg daily       Hyperlipidemia  Assessment & Plan  · Patient had noted hypertriglyceridemia, most recently 290   Patient is on Lipitor 40 mg at  · Can be resumed once extubated    · Monitor closely while on propofol, repeat triglycerides on 4/11/2022         ----------------------------------------------------------------------------------------  HPI/24hr events:   Significant secretions  FiO2 increased to 0 6  Actively being cooled on cooling blanked    Patient appropriate for transfer out of the ICU today?: No  Disposition: Continue Critical Care   Code Status: Level 1 - Full Code  ---------------------------------------------------------------------------------------  SUBJECTIVE  Pt intubated and sedated, unable to provide    Review of Systems   Unable to perform ROS: Intubated     ---------------------------------------------------------------------------------------  OBJECTIVE    Vitals   Vitals:    04/10/22 0200 04/10/22 0235 04/10/22 0300 04/10/22 0400   BP:  (!) 180/95     BP Location:       Pulse: (!) 120  (!) 118 (!) 108   Resp: 19  13 14   Temp: 100 4 °F (38 °C)  99 8 °F (37 7 °C) 99 5 °F (37 5 °C)   TempSrc: Bladder  Bladder Bladder   SpO2: 100%  98% 98%   Weight:       Height:         Temp (24hrs), Av 8 °F (37 7 °C), Min:98 6 °F (37 °C), Max:101 1 °F (38 4 °C)  Current: Temperature: 99 5 °F (37 5 °C) Cooling blanket actively cooling patient  Arterial Line BP: 136/58  Arterial Line MAP (mmHg): 82 mmHg    Respiratory:  SpO2: SpO2: 98 %       Invasive/non-invasive ventilation settings     SIMV 14/400/8/60/6      Respiratory  Report   Lab Data (Last 4 hours)    None         O2/Vent Data (Last 4 hours)      04/10 0249           Vent Mode SIMV/VC       Resp Rate (BPM) (BPM) 14       VT (mL) (mL) 400       FiO2 (%) (%) 60       PEEP (cmH2O) (cmH2O) 6       Pressure Support (cm H2O) (cm) 8                   Physical Exam  Constitutional:       Appearance: She is ill-appearing  Interventions: She is sedated, intubated and restrained  HENT:      Head: Normocephalic and atraumatic  Mouth/Throat:      Mouth: Mucous membranes are dry  Comments: ETT in place  Eyes:      Pupils: Pupils are equal, round, and reactive to light  Cardiovascular:      Rate and Rhythm: Regular rhythm  Tachycardia present  Pulses:           Radial pulses are 1+ on the right side and 1+ on the left side  Dorsalis pedis pulses are 1+ on the right side and 1+ on the left side  Posterior tibial pulses are 1+ on the right side and 1+ on the left side  Heart sounds: No murmur heard  No friction rub  No gallop  Comments: 1+ pitting edema R UE  2+ pitting edema LUE/hand  Hands and feet cool to touch  Pulmonary:      Effort: She is intubated        Comments: Pt on full vent support, rhonchi throughout, no wheezing, decreased in bases bilaterally, suctioned Q1 hour for secretions white/tan via ETT  Abdominal:      Comments: Round, distended, absent bowel sounds, SUMIT tenderness due to level of sedation   Genitourinary:     Comments: Concentrated UO, vazquez to gravity  Musculoskeletal:      Right lower leg: No edema  Left lower leg: No edema  Skin:     General: Skin is cool  Capillary Refill: Capillary refill takes 2 to 3 seconds  Comments: Mottled legs   Neurological:      GCS: GCS eye subscore is 2  GCS verbal subscore is 1  GCS motor subscore is 5  Comments: Purposeful, does not follow commands  Moving all extremities               Laboratory and Diagnostics:  Results from last 7 days   Lab Units 04/10/22  0331 04/09/22  0924 04/09/22  0512 04/08/22  1949 04/08/22  0452 04/07/22  0502 04/06/22  0458 04/05/22  0419 04/05/22  0419 04/05/22  0030 04/05/22  0030   WBC Thousand/uL 10 35* 9 65 10 31* 9 79 9 34 12 23* 9 80   < > 11 72*   < > 12 04*   HEMOGLOBIN g/dL 11 1* 11 4* 11 6 11 4* 10 7* 13 9 14 0   < > 14 2   < > 14 1   HEMATOCRIT % 33 7* 34 2* 30 7* 32 4* 32 9* 40 5 40 1   < > 41 2   < > 40 9   PLATELETS Thousands/uL 216 193 244 174 158 172 169   < > 242   < > 241   NEUTROS PCT %  --   --   --   --   --   --   --   --  84*  --  70   BANDS PCT %  --   --   --  5  --   --   --   --   --   --   --    MONOS PCT %  --   --   --   --   --   --   --   --  4  --  9   MONO PCT %  --   --   --  13*  --   --   --   --   --   --   --     < > = values in this interval not displayed       Results from last 7 days   Lab Units 04/10/22  0332 04/09/22  0922 04/09/22  0512 04/08/22  1949 04/08/22  0452 04/07/22  0502 04/06/22  0458   SODIUM mmol/L 137 139 125* 142 137 136 136   POTASSIUM mmol/L 3 5 3 5 3 2* 3 5 3 3* 3 4* 3 6   CHLORIDE mmol/L 101 102 91* 104 100 101 103   CO2 mmol/L 28 27 24 30 33* 28 26   ANION GAP mmol/L 8 10 10 8 4* 7 7   BUN mg/dL 6 9 8 9 9 7 8   CREATININE mg/dL 0 39* 0 76 0 53* 0 73 0 67 0 64 0 76   CALCIUM mg/dL 8 2* 8 4 6 7* 8 1* 7 7* 8 0* 7 8*   GLUCOSE RANDOM mg/dL 79 100 80 76 94 112 121   ALT U/L 43 35 26 24 22 31 33   AST U/L 65* 52* 51* 30 41* 33 24   ALK PHOS U/L 153* 110 89 82 68 65 69   ALBUMIN g/dL 1 9* 2 1* 1 9* 2 1* 3 2 2 8* 3 2*   TOTAL BILIRUBIN mg/dL 0 56 0 64 0 91 0 61 0 89 1 15* 0 82     Results from last 7 days   Lab Units 04/10/22  0332 04/09/22  0512 04/08/22 1949 04/08/22  0452 04/07/22  0502 04/06/22  0458 04/05/22  0030   MAGNESIUM mg/dL 2 2 1 9 2 1 2 1 1 7 1 3* 1 9   PHOSPHORUS mg/dL 3 6  --  2 3* 3 3 2 0* 2 8  --                Results from last 7 days   Lab Units 04/08/22 1949 04/05/22  0404 04/05/22  0030   LACTIC ACID mmol/L 1 2 2 1* 3 1*     ABG:  Results from last 7 days   Lab Units 04/09/22 2117   PH ART  7 311*   PCO2 ART mm Hg 55 4*   PO2 ART mm Hg 62 9*   HCO3 ART mmol/L 27 3   BASE EXC ART mmol/L 0 3   ABG SOURCE  Line, Arterial     VBG:  Results from last 7 days   Lab Units 04/09/22  2117 04/09/22  0436 04/08/22  1311   PH TOM   --   --  7 345   PCO2 TOM mm Hg  --   --  51 8*   PO2 TOM mm Hg  --   --  46 0*   HCO3 TOM mmol/L  --   --  27 6   BASE EXC TOM mmol/L  --   --  1 2   ABG SOURCE  Line, Arterial   < >  --     < > = values in this interval not displayed  Results from last 7 days   Lab Units 04/10/22  0332 04/09/22  0924 04/08/22 1949 04/05/22  0419   PROCALCITONIN ng/ml 0 63* 0 38* 0 34* <0 05       Micro  Results from last 7 days   Lab Units 04/08/22 1953   BLOOD CULTURE  No Growth at 24 hrs  No Growth at 24 hrs  EKG: ST on tele  Imaging: I have personally reviewed pertinent reports  and I have personally reviewed pertinent films in PACS  4/9 CT CAP Worsening acute pancreatitis, with new necrosis of a portion of the pancreatic neck, and with worsening peripancreatic edema  No organized peripancreatic collection or evidence of vascular complications    Moderate bilateral pleural effusions and complete collapse of both lower lobes, new since 4/5/2022    ET tube with tip approximately 1 cm above the antonietta  Recommend retraction by 2 cm    Intake and Output  I/O       04/08 0701 04/09 0700 04/09 0701  04/10 0700    I V  (mL/kg) 371 8 (4) 1306 2 (14 1)    NG/GT  1400    IV Piggyback 300 150    Total Intake(mL/kg) 671 8 (7 2) 2856 2 (30 8)    Urine (mL/kg/hr) 960 737 (0 3)    Total Output 960 737    Net -288 2 +2119 2              UOP: 0 3ml/kg/hr      Height and Weights   Height: 5' 8" (172 7 cm)     Body mass index is 31 11 kg/m²  Weight (last 2 days)     Date/Time Weight    04/09/22 0537 92 8 (204 59)    04/09/22 0500 92 8 (204 59)     04/08/22 1558 89 4 (197 09)    Comments:   Weight: cooling blanket in place at 04/09/22 0500           Nutrition       Diet Orders   (From admission, onward)             Start     Ordered    04/08/22 1610  Diet NPO  Diet effective now        References:    Nutrtion Support Algorithm Enteral vs  Parenteral   Question Answer Comment   Diet Type NPO    RD to adjust diet per protocol?  No        04/08/22 1614                  Active Medications  Scheduled Meds:  Current Facility-Administered Medications   Medication Dose Route Frequency Provider Last Rate    chlorhexidine  15 mL [facility] TETE Baker      enoxaparin  40 mg Subcutaneous Daily Joseph Figueroa MD      fentaNYL  100 mcg/hr Intravenous Continuous TETE Baker 100 mcg/hr (74/01/01 7241)    folic acid IVPB  1 mg Intravenous Daily Joseph Figueroa MD Stopped (04/09/22 0912)    hydrALAZINE  10 mg Intravenous Q6H PRN TETE Baker      HYDROmorphone  1 mg Intravenous Q3H PRN TETE Baker      ipratropium  0 5 mg Nebulization TID Joseph Figueroa MD      ipratropium-albuterol  3 mL Nebulization TID PRN Joseph Figueroa MD      lactated ringers  100 mL/hr Intravenous Continuous TETE Khanna 100 mL/hr (04/09/22 1911)    levalbuterol  1 25 mg Nebulization TID Joseph Figueroa MD      lidocaine  1 patch Topical Q24H Joseph Figueroa MD      midazolam  2 mg Intravenous Q4H PRN TETE Khanna      multivitamin-minerals  1 tablet Oral Daily MD Almaz Guerin nicotine  1 patch Transdermal Daily Julisa Gordillo MD      ondansetron  4 mg Intravenous Q6H PRN Julisa Gordillo MD      pantoprazole  40 mg Intravenous Q24H [facility] Julisa Gordillo MD      propofol  5-50 mcg/kg/min Intravenous Titrated Julisa Gordillo MD 50 mcg/kg/min (04/10/22 0333)    senna-docusate sodium  2 tablet Per NG Tube HS TETE Amaro      thiamine  500 mg Intravenous Q8H Julisa Gordillo  mg (04/10/22 0239)     Continuous Infusions:  fentaNYL, 100 mcg/hr, Last Rate: 100 mcg/hr (04/09/22 2008)  lactated ringers, 100 mL/hr, Last Rate: 100 mL/hr (04/09/22 1911)  propofol, 5-50 mcg/kg/min, Last Rate: 50 mcg/kg/min (04/10/22 0333)      PRN Meds:   hydrALAZINE, 10 mg, Q6H PRN  HYDROmorphone, 1 mg, Q3H PRN  ipratropium-albuterol, 3 mL, TID PRN  midazolam, 2 mg, Q4H PRN  ondansetron, 4 mg, Q6H PRN        Invasive Devices Review  Invasive Devices  Report    Peripheral Intravenous Line            Peripheral IV 04/08/22 Left Antecubital 1 day    Peripheral IV 04/08/22 Left Forearm 1 day    Peripheral IV 04/08/22 Right Antecubital 1 day          Arterial Line            Arterial Line 04/09/22 Radial <1 day          Drain            NG/OG/Enteral Tube Orogastric 14 Fr Left mouth 1 day    Urethral Catheter Non-latex 16 Fr  1 day          Airway            ETT  Oral 8 mm 1 day                Rationale for remaining devices: critically ill  ---------------------------------------------------------------------------------------  Advance Directive and Living Will:      Power of :    POLST:    ---------------------------------------------------------------------------------------  Care Time Delivered:   Upon my evaluation, this patient had a high probability of imminent or life-threatening deterioration due to Acute respiratory failure, acute pancreatitis, which required my direct attention, intervention, and personal management    I have personally provided 35 minutes (0300 to 0400) of critical care time, exclusive of procedures, teaching, family meetings, and any prior time recorded by providers other than myself  TETE Ta      Portions of the record may have been created with voice recognition software  Occasional wrong word or "sound a like" substitutions may have occurred due to the inherent limitations of voice recognition software    Read the chart carefully and recognize, using context, where substitutions have occurred

## 2022-04-10 NOTE — ASSESSMENT & PLAN NOTE
Patient acute hypoxic on admission, was given dose of Lasix with marked improvement  However, this morning patient started to become more agitated, hypoxic and tachycardic unable to tolerate BiPAP, patient was intubated for airway protection in the setting of encephalopathy and potential DTs      · Continue mechanical ventilation  · Daily assessment for SAT and SBT when appropriate  · Consider drainage of pleural effusions via thoracentesis  · Consider Bronch after thoracentesis for secretion clearance  · VAE prevention  · Sedation with Fentanyl and Propofol infusion for goal RASS 0 to -1, PRN versed and dilaudid

## 2022-04-10 NOTE — PLAN OF CARE
Problem: MOBILITY - ADULT  Goal: Maintain or return to baseline ADL function  Description: INTERVENTIONS:  -  Assess patient's ability to carry out ADLs; assess patient's baseline for ADL function and identify physical deficits which impact ability to perform ADLs (bathing, care of mouth/teeth, toileting, grooming, dressing, etc )  - Assess/evaluate cause of self-care deficits   - Assess range of motion  - Assess patient's mobility; develop plan if impaired  - Assess patient's need for assistive devices and provide as appropriate  - Encourage maximum independence but intervene and supervise when necessary  - Involve family in performance of ADLs  - Assess for home care needs following discharge   - Consider OT consult to assist with ADL evaluation and planning for discharge  - Provide patient education as appropriate  Outcome: Progressing  Goal: Maintains/Returns to pre admission functional level  Description: INTERVENTIONS:  - Perform BMAT or MOVE assessment daily    - Set and communicate daily mobility goal to care team and patient/family/caregiver  - Collaborate with rehabilitation services on mobility goals if consulted  - Perform Range of Motion 3 times a day  - Reposition patient every 2 hours    - Record patient progress and toleration of activity level   Outcome: Progressing     Problem: Potential for Falls  Goal: Patient will remain free of falls  Description: INTERVENTIONS:  - Educate patient/family on patient safety including physical limitations  - Instruct patient to call for assistance with activity   - Consult OT/PT to assist with strengthening/mobility   - Keep Call bell within reach  - Keep bed low and locked with side rails adjusted as appropriate  - Keep care items and personal belongings within reach  - Initiate and maintain comfort rounds  - Make Fall Risk Sign visible to staff  - Offer Toileting every 2 Hours, in advance of need  - Initiate/Maintain bed alarm  - Apply yellow socks and bracelet for high fall risk patients  - Consider moving patient to room near nurses station  Outcome: Progressing     Problem: Prexisting or High Potential for Compromised Skin Integrity  Goal: Skin integrity is maintained or improved  Description: INTERVENTIONS:  - Identify patients at risk for skin breakdown  - Assess and monitor skin integrity  - Assess and monitor nutrition and hydration status  - Monitor labs   - Assess for incontinence   - Turn and reposition patient  - Assist with mobility/ambulation  - Relieve pressure over bony prominences  - Avoid friction and shearing  - Provide appropriate hygiene as needed including keeping skin clean and dry  - Evaluate need for skin moisturizer/barrier cream  - Collaborate with interdisciplinary team   - Patient/family teaching  - Consider wound care consult   Outcome: Progressing     Problem: DISCHARGE PLANNING - CARE MANAGEMENT  Goal: Discharge to post-acute care or home with appropriate resources  Description: INTERVENTIONS:  - Conduct assessment to determine patient/family and health care team treatment goals, and need for post-acute services based on payer coverage, community resources, and patient preferences, and barriers to discharge  - Address psychosocial, clinical, and financial barriers to discharge as identified in assessment in conjunction with the patient/family and health care team  - Arrange appropriate level of post-acute services according to patients   needs and preference and payer coverage in collaboration with the physician and health care team  - Communicate with and update the patient/family, physician, and health care team regarding progress on the discharge plan  - Arrange appropriate transportation to post-acute venues  Outcome: Progressing     Problem: SUBSTANCE USE/ABUSE  Goal: By discharge, will develop insight into their chemical dependency and sustain motivation to continue in recovery  Description: INTERVENTIONS:  - Attends all daily group sessions and scheduled AA groups  - Actively practices coping skills through participation in the therapeutic community and adherence to program rules  - Reviews and completes assignments from individual treatment plan  - Assist patient development of understanding of their personal cycle of addiction and relapse triggers  Outcome: Progressing  Goal: By discharge, patient will have ongoing treatment plan addressing chemical dependency  Description: INTERVENTIONS:  - Assist patient with resources and/or appointments for ongoing recovery based living  Outcome: Progressing     Problem: Nutrition/Hydration-ADULT  Goal: Nutrient/Hydration intake appropriate for improving, restoring or maintaining nutritional needs  Description: Monitor and assess patient's nutrition/hydration status for malnutrition  Collaborate with interdisciplinary team and initiate plan and interventions as ordered  Monitor patient's weight and dietary intake as ordered or per policy  Utilize nutrition screening tool and intervene as necessary  Determine patient's food preferences and provide high-protein, high-caloric foods as appropriate       INTERVENTIONS:  - Monitor oral intake, urinary output, labs, and treatment plans  - Assess nutrition and hydration status and recommend course of action  - Evaluate amount of meals eaten  - Assist patient with eating if necessary   - Allow adequate time for meals  - Recommend/ encourage appropriate diets, oral nutritional supplements, and vitamin/mineral supplements  - Order, calculate, and assess calorie counts as needed  - Recommend, monitor, and adjust tube feedings and TPN/PPN based on assessed needs  - Assess need for intravenous fluids  - Provide specific nutrition/hydration education as appropriate  - Include patient/family/caregiver in decisions related to nutrition  Outcome: Progressing     Problem: PAIN - ADULT  Goal: Verbalizes/displays adequate comfort level or baseline comfort level  Description: Interventions:  - Encourage patient to monitor pain and request assistance  - Assess pain using appropriate pain scale  - Administer analgesics based on type and severity of pain and evaluate response  - Implement non-pharmacological measures as appropriate and evaluate response  - Consider cultural and social influences on pain and pain management  - Notify physician/advanced practitioner if interventions unsuccessful or patient reports new pain  Outcome: Progressing     Problem: INFECTION - ADULT  Goal: Absence or prevention of progression during hospitalization  Description: INTERVENTIONS:  - Assess and monitor for signs and symptoms of infection  - Monitor lab/diagnostic results  - Monitor all insertion sites, i e  indwelling lines, tubes, and drains  - Monitor endotracheal if appropriate and nasal secretions for changes in amount and color  - Smithers appropriate cooling/warming therapies per order  - Administer medications as ordered  - Instruct and encourage patient and family to use good hand hygiene technique  - Identify and instruct in appropriate isolation precautions for identified infection/condition  Outcome: Progressing     Problem: SAFETY ADULT  Goal: Maintain or return to baseline ADL function  Description: INTERVENTIONS:  -  Assess patient's ability to carry out ADLs; assess patient's baseline for ADL function and identify physical deficits which impact ability to perform ADLs (bathing, care of mouth/teeth, toileting, grooming, dressing, etc )  - Assess/evaluate cause of self-care deficits   - Assess range of motion  - Assess patient's mobility; develop plan if impaired  - Assess patient's need for assistive devices and provide as appropriate  - Encourage maximum independence but intervene and supervise when necessary  - Involve family in performance of ADLs  - Assess for home care needs following discharge   - Consider OT consult to assist with ADL evaluation and planning for discharge  - Provide patient education as appropriate  Outcome: Progressing  Goal: Maintains/Returns to pre admission functional level  Description: INTERVENTIONS:  - Perform BMAT or MOVE assessment daily    - Set and communicate daily mobility goal to care team and patient/family/caregiver  - Collaborate with rehabilitation services on mobility goals if consulted  - Perform Range of Motion 3 times a day  - Reposition patient every 2 hours     on of activity level   Outcome: Progressing  Goal: Patient will remain free of falls  Description: INTERVENTIONS:  - Educate patient/family on patient safety including physical limitations  - Instruct patient to call for assistance with activity   - Consult OT/PT to assist with strengthening/mobility   - Keep Call bell within reach  - Keep bed low and locked with side rails adjusted as appropriate  - Keep care items and personal belongings within reach  - Initiate and maintain comfort rounds  - Make Fall Risk Sign visible to staff  - Offer Toileting every 2 Hours, in advance of need  - Initiate/Maintain bed alarm  - Apply yellow socks and bracelet for high fall risk patients  - Consider moving patient to room near nurses station  Outcome: Progressing     Problem: DISCHARGE PLANNING  Goal: Discharge to home or other facility with appropriate resources  Description: INTERVENTIONS:  - Identify barriers to discharge w/patient and caregiver  - Arrange for needed discharge resources and transportation as appropriate  - Identify discharge learning needs (meds, wound care, etc )  - Arrange for interpretive services to assist at discharge as needed  - Refer to Case Management Department for coordinating discharge planning if the patient needs post-hospital services based on physician/advanced practitioner order or complex needs related to functional status, cognitive ability, or social support system  Outcome: Progressing     Problem: Knowledge Deficit  Goal: Patient/family/caregiver demonstrates understanding of disease process, treatment plan, medications, and discharge instructions  Description: Complete learning assessment and assess knowledge base  Interventions:  - Provide teaching at level of understanding  - Provide teaching via preferred learning methods  Outcome: Progressing     Problem: NEUROSENSORY - ADULT  Goal: Achieves stable or improved neurological status  Description: INTERVENTIONS  - Monitor and report changes in neurological status  - Monitor vital signs such as temperature, blood pressure, glucose, and any other labs ordered   - Initiate measures to prevent increased intracranial pressure  - Monitor for seizure activity and implement precautions if appropriate      Outcome: Progressing  Goal: Remains free of injury related to seizures activity  Description: INTERVENTIONS  - Maintain airway, patient safety  and administer oxygen as ordered  - Monitor patient for seizure activity, document and report duration and description of seizure to physician/advanced practitioner  - If seizure occurs,  ensure patient safety during seizure  - Reorient patient post seizure  - Seizure pads on all 4 side rails  - Instruct patient/family to notify RN of any seizure activity including if an aura is experienced  - Instruct patient/family to call for assistance with activity based on nursing assessment  - Administer anti-seizure medications if ordered    Outcome: Progressing  Goal: Achieves maximal functionality and self care  Description: INTERVENTIONS  - Monitor swallowing and airway patency with patient fatigue and changes in neurological status  - Encourage and assist patient to increase activity and self care     - Encourage visually impaired, hearing impaired and aphasic patients to use assistive/communication devices  Outcome: Progressing     Problem: CARDIOVASCULAR - ADULT  Goal: Maintains optimal cardiac output and hemodynamic stability  Description: INTERVENTIONS:  - Monitor I/O, vital signs and rhythm  - Monitor for S/S and trends of decreased cardiac output  - Administer and titrate ordered vasoactive medications to optimize hemodynamic stability  - Assess quality of pulses, skin color and temperature  - Assess for signs of decreased coronary artery perfusion  - Instruct patient to report change in severity of symptoms  Outcome: Progressing  Goal: Absence of cardiac dysrhythmias or at baseline rhythm  Description: INTERVENTIONS:  - Continuous cardiac monitoring, vital signs, obtain 12 lead EKG if ordered  - Administer antiarrhythmic and heart rate control medications as ordered  - Monitor electrolytes and administer replacement therapy as ordered  Outcome: Progressing     Problem: RESPIRATORY - ADULT  Goal: Achieves optimal ventilation and oxygenation  Description: INTERVENTIONS:  - Assess for changes in respiratory status  - Assess for changes in mentation and behavior  - Position to facilitate oxygenation and minimize respiratory effort  - Oxygen administered by appropriate delivery if ordered  - Initiate smoking cessation education as indicated  - Encourage broncho-pulmonary hygiene including cough, deep breathe, Incentive Spirometry  - Assess the need for suctioning and aspirate as needed  - Assess and instruct to report SOB or any respiratory difficulty  - Respiratory Therapy support as indicated  Outcome: Progressing     Problem: GASTROINTESTINAL - ADULT  Goal: Minimal or absence of nausea and/or vomiting  Description: INTERVENTIONS:  - Administer IV fluids if ordered to ensure adequate hydration  - Maintain NPO status until nausea and vomiting are resolved  - Nasogastric tube if ordered  - Administer ordered antiemetic medications as needed  - Provide nonpharmacologic comfort measures as appropriate  - Advance diet as tolerated, if ordered  - Consider nutrition services referral to assist patient with adequate nutrition and appropriate food choices  Outcome: Progressing  Goal: Maintains or returns to baseline bowel function  Description: INTERVENTIONS:  - Assess bowel function  - Encourage oral fluids to ensure adequate hydration  - Administer IV fluids if ordered to ensure adequate hydration  - Administer ordered medications as needed  - Encourage mobilization and activity  - Consider nutritional services referral to assist patient with adequate nutrition and appropriate food choices  Outcome: Progressing  Goal: Maintains adequate nutritional intake  Description: INTERVENTIONS:  - Monitor percentage of each meal consumed  - Identify factors contributing to decreased intake, treat as appropriate  - Assist with meals as needed  - Monitor I&O, weight, and lab values if indicated  - Obtain nutrition services referral as needed  Outcome: Progressing  Goal: Oral mucous membranes remain intact  Description: INTERVENTIONS  - Assess oral mucosa and hygiene practices  - Implement preventative oral hygiene regimen  - Implement oral medicated treatments as ordered  - Initiate Nutrition services referral as needed  Outcome: Progressing     Problem: GENITOURINARY - ADULT  Goal: Maintains or returns to baseline urinary function  Description: INTERVENTIONS:  - Assess urinary function  - Encourage oral fluids to ensure adequate hydration if ordered  - Administer IV fluids as ordered to ensure adequate hydration  - Administer ordered medications as needed  - Offer frequent toileting  - Follow urinary retention protocol if ordered  Outcome: Progressing  Goal: Absence of urinary retention  Description: INTERVENTIONS:  - Assess patients ability to void and empty bladder  - Monitor I/O  - Bladder scan as needed  - Discuss with physician/AP medications to alleviate retention as needed  - Discuss catheterization for long term situations as appropriate  Outcome: Progressing  Goal: Urinary catheter remains patent  Description: INTERVENTIONS:  - Assess patency of urinary catheter  - If patient has a chronic vazquez, consider changing catheter if non-functioning  - Follow guidelines for intermittent irrigation of non-functioning urinary catheter  Outcome: Progressing     Problem: METABOLIC, FLUID AND ELECTROLYTES - ADULT  Goal: Electrolytes maintained within normal limits  Description: INTERVENTIONS:  - Monitor labs and assess patient for signs and symptoms of electrolyte imbalances  - Administer electrolyte replacement as ordered  - Monitor response to electrolyte replacements, including repeat lab results as appropriate  - Instruct patient on fluid and nutrition as appropriate  Outcome: Progressing  Goal: Fluid balance maintained  Description: INTERVENTIONS:  - Monitor labs   - Monitor I/O and WT  - Instruct patient on fluid and nutrition as appropriate  - Assess for signs & symptoms of volume excess or deficit  Outcome: Progressing     Problem: SKIN/TISSUE INTEGRITY - ADULT  Goal: Skin Integrity remains intact(Skin Breakdown Prevention)  Description: Assess:  -Perform Davey assessment every 12 hours  -Clean and moisturize skin every 8 hours  -Inspect skin when repositioning, toileting, and assisting with ADLS  -Assess under medical devices such as SCDs every 2 hours  -Assess extremities for adequate circulation and sensation     Bed Management:  -Have minimal linens on bed & keep smooth, unwrinkled  -Change linens as needed when moist or perspiring  -Avoid sitting or lying in one position for more than 2 hours while in bed  -Keep HOB at 30degrees     Toileting:  -Offer bedside commode  -Assess for incontinence every 2 hours  -Use incontinent care products after each incontinent episode such as BM    Activity:  -Mobilize patient 3 times a day  -Encourage activity and walks on unit  -Encourage or provide ROM exercises   -Turn and reposition patient every 2 Hours  -Use appropriate equipment to lift or move patient in bed  -Instruct/ Assist with weight shifting every 2hours when out of bed in chair  -Consider limitation of chair time 2 hour intervals    Skin Care:  -Avoid use of baby powder, tape, friction and shearing, hot water or constrictive clothing  -Do not massage red bony areas    Outcome: Progressing     Problem: SAFETY,RESTRAINT: NV/NON-SELF DESTRUCTIVE BEHAVIOR  Goal: Remains free of harm/injury (restraint for non violent/non self-detsructive behavior)  Description: INTERVENTIONS:  - Instruct patient/family regarding restraint use   - Assess and monitor physiologic and psychological status   - Provide interventions and comfort measures to meet assessed patient needs   - Identify and implement measures to help patient regain control  - Assess readiness for release of restraint   Outcome: Progressing  Goal: Returns to optimal restraint-free functioning  Description: INTERVENTIONS:  - Assess the patient's behavior and symptoms that indicate continued need for restraint  - Identify and implement measures to help patient regain control  - Assess readiness for release of restraint   Outcome: Progressing

## 2022-04-10 NOTE — ASSESSMENT & PLAN NOTE
· Patient had noted hypertriglyceridemia, most recently 290   Patient is on Lipitor 40 mg at  · Can be resumed once extubated    · Monitor closely while on propofol, repeat triglycerides on 4/11/2022

## 2022-04-10 NOTE — PROCEDURES
Arterial Line Insertion    Date/Time: 4/9/2022 9:07 PM  Performed by: TETE Myers  Authorized by: TETE Myers     Patient location:  ICU  Consent:     Consent obtained:  Emergent situation  Universal protocol:     Immediately prior to procedure a time out was called: yes      Patient identity confirmed: Anonymous protocol, patient vented/unresponsive  Indications:     Indications: multiple ABGs and frequent labs / infusion    Pre-procedure details:     Skin preparation:  Chlorhexidine    Preparation: Patient was prepped and draped in sterile fashion    Sedation:     Sedation type: Pt sedated on propofol and fentanyl infusion  Anesthesia (see MAR for exact dosages): Anesthesia method:  Local infiltration    Local anesthetic:  Lidocaine 1% w/o epi  Procedure details:     Location / Tip of Catheter:  Radial    Laterality:  Right    Que's test performed: yes (Modified)      Que's test abnormal: no      Needle gauge:  22 G    Placement technique:  Letadinger    Number of attempts:  1    Successful placement: yes      Transducer: waveform confirmed    Post-procedure details:     Post-procedure:  Sutured and sterile dressing applied    CMS:  Unchanged    Patient tolerance of procedure:   Tolerated well, no immediate complications

## 2022-04-11 ENCOUNTER — APPOINTMENT (INPATIENT)
Dept: NON INVASIVE DIAGNOSTICS | Facility: HOSPITAL | Age: 57
DRG: 207 | End: 2022-04-11
Payer: COMMERCIAL

## 2022-04-11 ENCOUNTER — APPOINTMENT (INPATIENT)
Dept: RADIOLOGY | Facility: HOSPITAL | Age: 57
DRG: 207 | End: 2022-04-11
Payer: COMMERCIAL

## 2022-04-11 LAB
BASE EXCESS BLDA CALC-SCNC: 1.2 MMOL/L
CK MB SERPL-MCNC: <1 % (ref 0–2.5)
CK MB SERPL-MCNC: <1 NG/ML (ref 0–5)
CK SERPL-CCNC: 205 U/L (ref 26–192)
GLUCOSE SERPL-MCNC: 108 MG/DL (ref 65–140)
GLUCOSE SERPL-MCNC: 125 MG/DL (ref 65–140)
GLUCOSE SERPL-MCNC: 128 MG/DL (ref 65–140)
GLUCOSE SERPL-MCNC: 134 MG/DL (ref 65–140)
GLUCOSE SERPL-MCNC: 90 MG/DL (ref 65–140)
HCO3 BLDA-SCNC: 27.8 MMOL/L (ref 22–28)
O2 CT BLDA-SCNC: 13.8 ML/DL (ref 16–23)
OXYHGB MFR BLDA: 92.8 % (ref 94–97)
PCO2 BLDA: 53.6 MM HG (ref 36–44)
PH BLDA: 7.33 [PH] (ref 7.35–7.45)
PO2 BLDA: 71.8 MM HG (ref 75–129)
SIMV VENT INSPIRED AIR FIO2: 50
SIMV VENT PEEP: 6
SIMV VENT TIDAL VOLUME: 400
SIMV VENT: ABNORMAL
SPECIMEN SOURCE: ABNORMAL
VENT SIMV: 14

## 2022-04-11 PROCEDURE — 82553 CREATINE MB FRACTION: CPT | Performed by: PHYSICIAN ASSISTANT

## 2022-04-11 PROCEDURE — 82948 REAGENT STRIP/BLOOD GLUCOSE: CPT

## 2022-04-11 PROCEDURE — 82805 BLOOD GASES W/O2 SATURATION: CPT | Performed by: PHYSICIAN ASSISTANT

## 2022-04-11 PROCEDURE — C9113 INJ PANTOPRAZOLE SODIUM, VIA: HCPCS | Performed by: INTERNAL MEDICINE

## 2022-04-11 PROCEDURE — 94003 VENT MGMT INPAT SUBQ DAY: CPT

## 2022-04-11 PROCEDURE — 99291 CRITICAL CARE FIRST HOUR: CPT | Performed by: INTERNAL MEDICINE

## 2022-04-11 PROCEDURE — 82550 ASSAY OF CK (CPK): CPT | Performed by: PHYSICIAN ASSISTANT

## 2022-04-11 PROCEDURE — 93970 EXTREMITY STUDY: CPT

## 2022-04-11 PROCEDURE — 94640 AIRWAY INHALATION TREATMENT: CPT

## 2022-04-11 PROCEDURE — 71045 X-RAY EXAM CHEST 1 VIEW: CPT

## 2022-04-11 RX ORDER — DEXTROSE MONOHYDRATE 100 MG/ML
INJECTION, SOLUTION INTRAVENOUS
Status: DISPENSED
Start: 2022-04-11 | End: 2022-04-11

## 2022-04-11 RX ORDER — CLONIDINE HYDROCHLORIDE 0.1 MG/1
0.1 TABLET ORAL EVERY 8 HOURS SCHEDULED
Status: DISCONTINUED | OUTPATIENT
Start: 2022-04-11 | End: 2022-04-14

## 2022-04-11 RX ORDER — MIDAZOLAM HYDROCHLORIDE 2 MG/2ML
5 INJECTION, SOLUTION INTRAMUSCULAR; INTRAVENOUS ONCE
Status: COMPLETED | OUTPATIENT
Start: 2022-04-11 | End: 2022-04-11

## 2022-04-11 RX ORDER — FUROSEMIDE 10 MG/ML
20 INJECTION INTRAMUSCULAR; INTRAVENOUS ONCE
Status: COMPLETED | OUTPATIENT
Start: 2022-04-11 | End: 2022-04-11

## 2022-04-11 RX ADMIN — HYDROMORPHONE HYDROCHLORIDE 1 MG: 1 INJECTION, SOLUTION INTRAMUSCULAR; INTRAVENOUS; SUBCUTANEOUS at 02:43

## 2022-04-11 RX ADMIN — IPRATROPIUM BROMIDE 0.5 MG: 0.5 SOLUTION RESPIRATORY (INHALATION) at 19:43

## 2022-04-11 RX ADMIN — MIDAZOLAM 5 MG: 1 INJECTION INTRAMUSCULAR; INTRAVENOUS at 09:22

## 2022-04-11 RX ADMIN — Medication 1 PATCH: at 08:32

## 2022-04-11 RX ADMIN — PROPOFOL 50 MCG/KG/MIN: 10 INJECTION, EMULSION INTRAVENOUS at 03:27

## 2022-04-11 RX ADMIN — THIAMINE HYDROCHLORIDE 500 MG: 100 INJECTION, SOLUTION INTRAMUSCULAR; INTRAVENOUS at 12:12

## 2022-04-11 RX ADMIN — MIDAZOLAM 2 MG: 1 INJECTION INTRAMUSCULAR; INTRAVENOUS at 18:09

## 2022-04-11 RX ADMIN — LIDOCAINE 1 PATCH: 50 PATCH CUTANEOUS at 12:15

## 2022-04-11 RX ADMIN — Medication 100 MCG/HR: at 01:38

## 2022-04-11 RX ADMIN — THIAMINE HYDROCHLORIDE 500 MG: 100 INJECTION, SOLUTION INTRAMUSCULAR; INTRAVENOUS at 18:16

## 2022-04-11 RX ADMIN — PROPOFOL 70 MCG/KG/MIN: 10 INJECTION, EMULSION INTRAVENOUS at 23:37

## 2022-04-11 RX ADMIN — HYDROMORPHONE HYDROCHLORIDE 1 MG: 1 INJECTION, SOLUTION INTRAMUSCULAR; INTRAVENOUS; SUBCUTANEOUS at 08:25

## 2022-04-11 RX ADMIN — LEVALBUTEROL HYDROCHLORIDE 1.25 MG: 1.25 SOLUTION, CONCENTRATE RESPIRATORY (INHALATION) at 13:29

## 2022-04-11 RX ADMIN — CLONIDINE HYDROCHLORIDE 0.1 MG: 0.1 TABLET ORAL at 21:34

## 2022-04-11 RX ADMIN — PROPOFOL 50 MCG/KG/MIN: 10 INJECTION, EMULSION INTRAVENOUS at 06:08

## 2022-04-11 RX ADMIN — IPRATROPIUM BROMIDE 0.5 MG: 0.5 SOLUTION RESPIRATORY (INHALATION) at 07:27

## 2022-04-11 RX ADMIN — PROPOFOL 70 MCG/KG/MIN: 10 INJECTION, EMULSION INTRAVENOUS at 12:15

## 2022-04-11 RX ADMIN — THIAMINE HYDROCHLORIDE 500 MG: 100 INJECTION, SOLUTION INTRAMUSCULAR; INTRAVENOUS at 01:40

## 2022-04-11 RX ADMIN — CHLORHEXIDINE GLUCONATE 0.12% ORAL RINSE 15 ML: 1.2 LIQUID ORAL at 08:28

## 2022-04-11 RX ADMIN — PANTOPRAZOLE SODIUM 40 MG: 40 INJECTION, POWDER, FOR SOLUTION INTRAVENOUS at 06:25

## 2022-04-11 RX ADMIN — PROPOFOL 60 MCG/KG/MIN: 10 INJECTION, EMULSION INTRAVENOUS at 16:46

## 2022-04-11 RX ADMIN — Medication 1 TABLET: at 08:28

## 2022-04-11 RX ADMIN — PROPOFOL 70 MCG/KG/MIN: 10 INJECTION, EMULSION INTRAVENOUS at 19:31

## 2022-04-11 RX ADMIN — LEVALBUTEROL HYDROCHLORIDE 1.25 MG: 1.25 SOLUTION, CONCENTRATE RESPIRATORY (INHALATION) at 07:27

## 2022-04-11 RX ADMIN — PROPOFOL 50 MCG/KG/MIN: 10 INJECTION, EMULSION INTRAVENOUS at 09:29

## 2022-04-11 RX ADMIN — MIDAZOLAM 2 MG: 1 INJECTION INTRAMUSCULAR; INTRAVENOUS at 06:25

## 2022-04-11 RX ADMIN — Medication 20 MG: at 12:15

## 2022-04-11 RX ADMIN — IPRATROPIUM BROMIDE 0.5 MG: 0.5 SOLUTION RESPIRATORY (INHALATION) at 13:30

## 2022-04-11 RX ADMIN — PROPOFOL 70 MCG/KG/MIN: 10 INJECTION, EMULSION INTRAVENOUS at 21:40

## 2022-04-11 RX ADMIN — ENOXAPARIN SODIUM 40 MG: 40 INJECTION SUBCUTANEOUS at 08:27

## 2022-04-11 RX ADMIN — LEVALBUTEROL HYDROCHLORIDE 1.25 MG: 1.25 SOLUTION, CONCENTRATE RESPIRATORY (INHALATION) at 19:43

## 2022-04-11 RX ADMIN — DOCUSATE SODIUM AND SENNOSIDES 2 TABLET: 8.6; 5 TABLET, FILM COATED ORAL at 21:34

## 2022-04-11 RX ADMIN — FOLIC ACID 1 MG: 5 INJECTION, SOLUTION INTRAMUSCULAR; INTRAVENOUS; SUBCUTANEOUS at 08:26

## 2022-04-11 RX ADMIN — Medication 100 MCG/HR: at 21:39

## 2022-04-11 RX ADMIN — FUROSEMIDE 20 MG: 10 INJECTION, SOLUTION INTRAVENOUS at 18:16

## 2022-04-11 RX ADMIN — PROPOFOL 70 MCG/KG/MIN: 10 INJECTION, EMULSION INTRAVENOUS at 14:23

## 2022-04-11 RX ADMIN — CHLORHEXIDINE GLUCONATE 0.12% ORAL RINSE 15 ML: 1.2 LIQUID ORAL at 21:34

## 2022-04-11 RX ADMIN — Medication 100 MCG/HR: at 12:00

## 2022-04-11 NOTE — UTILIZATION REVIEW
Inpatient Admission Authorization Request   NOTIFICATION OF INPATIENT ADMISSION/INPATIENT AUTHORIZATION REQUEST   SERVICING FACILITY:   [address] [place] [address], [facility], [facility]  Tax ID: [id number]  NPI: [id number]  Place of Service: Inpatient [address] [facility]  [address]  Place of Service Code: 24     ATTENDING PROVIDER:  Attending Name and NPI#: Rose Wallace, [place] [[id number]]  Address: [address] [place] [address], [facility], [facility]  Phone: [phone]     UTILIZATION REVIEW CONTACT:  Maegan Leo Utilization   Network Utilization Review Department  Phone: [phone]  Fax: [phone]  Email: [email or web address]     PHYSICIAN ADVISORY SERVICES:  FOR [id number] REVIEW - MEDICAL NECESSITY DENIAL  Phone: [phone]  Fax: [phone]  Email: [email or web address]     TYPE OF REQUEST:  Inpatient Status     ADMISSION INFORMATION:  ADMISSION DATE/TIME: 4/8/22  3:55 PM  PATIENT DIAGNOSIS CODE/DESCRIPTION:  Acute respiratory failure ([facility] [address] ) [J96 [id number]]  DISCHARGE DATE/TIME: No discharge date for patient encounter  IMPORTANT INFORMATION:  Please contact the Maegan Leo directly with any questions or concerns regarding this request  Department voicemails are confidential     Send requests for admission clinical reviews, concurrent reviews, approvals, and administrative denials due to lack of clinical to fax [phone]

## 2022-04-11 NOTE — ASSESSMENT & PLAN NOTE
· Patient initially presented with a lipase of 53,000 and has trended down to 819 this morning  Patient's acute pancreatitis is likely secondary to alcohol  · 4/9 CT scan showed worsening of acute pancreatitis with new necrosis of a portion of the pancreatic neck and with worsening peripancreatic edema  · GI recommendations appreciated  · Surgery consulted, appreciate recommendations    No surgical intervention at the meantime and recommending TPN and rescan in 5-7 days  · On enteral nutrition  · Pain control  · On  mL/hr

## 2022-04-11 NOTE — PROGRESS NOTES
[facility]  Progress Note - Francisca Lima 1965, 60 y o  female MRN: [id number]  Unit/Bed#: ICU 07 Encounter: [id number]  Primary Care Provider: Meghan Major DO   Date and time admitted to hospital: 4/8/2022  3:55 PM    * Acute respiratory failure with hypoxia [facility])  Assessment & Plan  Patient acute hypoxic on admission, was given dose of Lasix with marked improvement  However, this morning patient started to become more agitated, hypoxic and tachycardic unable to tolerate BiPAP, patient was intubated for airway protection in the setting of encephalopathy and potential DTs  · Continue mechanical ventilation and titrate PEEP and FiO2 to maintain SpO2 above 88%  · Daily assessment for SAT and SBT when appropriate  · Consider drainage of pleural effusions via thoracentesis  · Sedation with Fentanyl and Propofol infusion for goal RASS 0 to -1, PRN versed and dilaudid, consider Precedex    Ataxia  Assessment & Plan  On admission patient was reported to have ataxia as well as finger-to-nose ataxia  Patient was started on high-dose thiamine for Wernicke's encephalopathy    · Continue thiamine and folic acid  · Can consider MRI once patient is stable  · Fall precautions        Gastroesophageal reflux disease without esophagitis  Assessment & Plan  · Continue Protonix 40 mg daily       Alcohol use disorder, severe, dependence ([facility] [address] )  Assessment & Plan  · Will continue with withdrawal management  · Case management consult in place  · Encouraged cessation   · Continue thiamine and folic acid       Hypertension  Assessment & Plan  · Patient is currently on 10 mg of lisinopril daily at home  Can be resumed once extubated  · Hydralazine p r n  For SBP greater than 170         Delirium tremens [facility])  Assessment & Plan  Patient drinks roughly 8-12 shots of whiskey daily  Patient's last drink was April 1, 2022  EtoH level on admission was 54   Has never had any history withdrawals or seizures  Patient was initially admitted for alcoholic pancreatitis, however, patient became encephalopathic and agitated requiring transfer to detox Unit  · Patient was initially on phenobarbital and Precedex  · Patient switched over to fentanyl and Propofol with PRN versed and dilaudid  Patient continues to be intermitantly agitated, can consider switching to versed/dilaudid drip     Alcohol-induced acute pancreatitis  Assessment & Plan  · Patient initially presented with a lipase of 53,000 and has trended down to 819 this morning  Patient's acute pancreatitis is likely secondary to alcohol  · 4/9 CT scan showed worsening of acute pancreatitis with new necrosis of a portion of the pancreatic neck and with worsening peripancreatic edema  · GI recommendations appreciated  · Surgery consulted, appreciate recommendations  No surgical intervention at the meantime and recommending TPN and rescan in 5-7 days  · On enteral nutrition  · Pain control  · On  mL/hr    Hypokalemia-resolved as of 4/10/2022  Assessment & Plan  · Likely secondary to diuretic administration  Resolved   · Monitor BMP      ----------------------------------------------------------------------------------------  HPI/24hr events: No acute issues overnight  Has received dilaudid 1 mg 4 times and versed 2mg 5 times over the past 24 hours      Patient appropriate for transfer out of the ICU today?: No  Disposition: Continue Critical Care   Code Status: Level 1 - Full Code  ---------------------------------------------------------------------------------------  SUBJECTIVE  Intubated and sedated     Review of Systems   Unable to perform ROS: Intubated     Review of systems was reviewed and negative unless stated above in HPI/24-hour events   ---------------------------------------------------------------------------------------  OBJECTIVE    Vitals   Vitals:    04/11/22 0500 04/11/22 0600 04/11/22 0640 04/11/22 0729   BP:       BP Location: Pulse: 98 100 104    Resp: 13 13 15    Temp: 98 2 °F (36 8 °C) 100 °F (37 8 °C)     TempSrc: Bladder Bladder     SpO2: 95% 100%  97%   Weight:       Height:         Temp (24hrs), Av 7 °F (37 1 °C), Min:97 4 °F (36 3 °C), Max:100 °F (37 8 °C)  Current: Temperature: 100 °F (37 8 °C)  Arterial Line BP: 130/54  Arterial Line MAP (mmHg): 76 mmHg    Respiratory:  SpO2: SpO2: 97 %       Invasive/non-invasive ventilation settings   Respiratory  Report   Lab Data (Last 4 hours)    None         O2/Vent Data (Last 4 hours)       0729           Vent Mode SIMV/VC                   Physical Exam  Vitals and nursing note reviewed  Constitutional:       General: She is not in acute distress  Appearance: She is obese  She is not ill-appearing or diaphoretic  Interventions: She is sedated and intubated  HENT:      Head: Normocephalic and atraumatic  Mouth/Throat:      Mouth: Mucous membranes are moist       Pharynx: Oropharynx is clear  Eyes:      General: No scleral icterus  Conjunctiva/sclera: Conjunctivae normal    Cardiovascular:      Rate and Rhythm: Regular rhythm  Tachycardia present  Pulses: Normal pulses  Heart sounds: Normal heart sounds  Pulmonary:      Effort: Pulmonary effort is normal  No respiratory distress  She is intubated  Breath sounds: Normal breath sounds  No wheezing or rales  Abdominal:      General: Bowel sounds are normal  There is distension (firm )  Musculoskeletal:         General: Swelling (left upper exrtemitiy swelling) present  Right lower leg: Edema present  Left lower leg: Edema present  Skin:     General: Skin is dry  Neurological:      Comments: Unable to completely assess as patient is intubated and sedated   Patient opens eyes to commands but does not follow any other commands    Psychiatric:      Comments: Unable to assess as patient is intubated and sedated              Laboratory and Diagnostics:  Results from last 7 days   Lab Units 04/10/22  0331 04/09/22  0924 04/09/22  0512 04/08/22  1949 04/08/22  0452 04/07/22  0502 04/06/22  0458 04/05/22  0419 04/05/22  0419 04/05/22  0030 04/05/22  0030   WBC Thousand/uL 10 35* 9 65 10 31* 9 79 9 34 12 23* 9 80   < > 11 72*   < > 12 04*   HEMOGLOBIN g/dL 11 1* 11 4* 11 6 11 4* 10 7* 13 9 14 0   < > 14 2   < > 14 1   HEMATOCRIT % 33 7* 34 2* 30 7* 32 4* 32 9* 40 5 40 1   < > 41 2   < > 40 9   PLATELETS Thousands/uL 216 193 244 174 158 172 169   < > 242   < > 241   NEUTROS PCT %  --   --   --   --   --   --   --   --  84*  --  70   BANDS PCT %  --   --   --  5  --   --   --   --   --   --   --    MONOS PCT %  --   --   --   --   --   --   --   --  4  --  9   MONO PCT %  --   --   --  13*  --   --   --   --   --   --   --     < > = values in this interval not displayed       Results from last 7 days   Lab Units 04/10/22  0332 04/09/22  0922 04/09/22  0512 04/08/22  1949 04/08/22  0452 04/07/22  0502 04/06/22  0458   SODIUM mmol/L 137 139 125* 142 137 136 136   POTASSIUM mmol/L 3 5 3 5 3 2* 3 5 3 3* 3 4* 3 6   CHLORIDE mmol/L 101 102 91* 104 100 101 103   CO2 mmol/L 28 27 24 30 33* 28 26   ANION GAP mmol/L 8 10 10 8 4* 7 7   BUN mg/dL 6 9 8 9 9 7 8   CREATININE mg/dL 0 39* 0 76 0 53* 0 73 0 67 0 64 0 76   CALCIUM mg/dL 8 2* 8 4 6 7* 8 1* 7 7* 8 0* 7 8*   GLUCOSE RANDOM mg/dL 79 100 80 76 94 112 121   ALT U/L 43 35 26 24 22 31 33   AST U/L 65* 52* 51* 30 41* 33 24   ALK PHOS U/L 153* 110 89 82 68 65 69   ALBUMIN g/dL 1 9* 2 1* 1 9* 2 1* 3 2 2 8* 3 2*   TOTAL BILIRUBIN mg/dL 0 56 0 64 0 91 0 61 0 89 1 15* 0 82     Results from last 7 days   Lab Units 04/10/22  0332 04/09/22  0512 04/08/22  1949 04/08/22  0452 04/07/22  0502 04/06/22  0458 04/05/22  0030   MAGNESIUM mg/dL 2 2 1 9 2 1 2 1 1 7 1 3* 1 9   PHOSPHORUS mg/dL 3 6  --  2 3* 3 3 2 0* 2 8  --                Results from last 7 days   Lab Units 04/08/22 1949 04/05/22  0404 04/05/22  0030   LACTIC ACID mmol/L 1 2 2 1* 3 1* ABG:  Results from last 7 days   Lab Units 04/09/22 2117   PH ART  7 311*   PCO2 ART mm Hg 55 4*   PO2 ART mm Hg 62 9*   HCO3 ART mmol/L 27 3   BASE EXC ART mmol/L 0 3   ABG SOURCE  Line, Arterial     VBG:  Results from last 7 days   Lab Units 04/09/22 2117 04/09/22  0436 04/08/22  1311   PH TOM   --   --  7 345   PCO2 TOM mm Hg  --   --  51 8*   PO2 TOM mm Hg  --   --  46 0*   HCO3 TOM mmol/L  --   --  27 6   BASE EXC TOM mmol/L  --   --  1 2   ABG SOURCE  Line, Arterial   < >  --     < > = values in this interval not displayed  Results from last 7 days   Lab Units 04/10/22  0332 04/09/22  0924 04/08/22  1949 04/05/22  0419   PROCALCITONIN ng/ml 0 63* 0 38* 0 34* <0 05       Micro  Results from last 7 days   Lab Units 04/08/22 1953   BLOOD CULTURE  No Growth at 48 hrs  No Growth at 48 hrs  EKG:   Imaging: I have personally reviewed pertinent reports  Intake and Output  I/O       04/09 0701  04/10 0700 04/10 0701 04/11 0700 04/11 0701 04/12 0700    I V  (mL/kg) 1306 2 (14 1) 3226 2 (34 8) 448 3 (4 8)    NG/GT 1400 200     IV Piggyback 150 250 50    Total Intake(mL/kg) 2856 2 (30 8) 3676 2 (39 6) 498 3 (5 4)    Urine (mL/kg/hr) 812 (0 4) 1490 (0 7)     Total Output 812 1490     Net +2044 2 +2186 2 +498 3                 Height and Weights   Height: 5' 8" (172 7 cm)     Body mass index is 31 11 kg/m²  Weight (last 2 days)     Date/Time Weight    04/09/22 0537 92 8 (204 59)    04/09/22 0500 92 8 (204 59)     Comments:   Weight: cooling blanket in place at 04/09/22 0500           Nutrition       Diet Orders   (From admission, onward)             Start     Ordered    04/10/22 1758  Diet Enteral/Parenteral; Tube Feeding No Oral Diet; Jevity 1 2 Hira; Continuous; 20; 100;  Water; Every 4 hours  Diet effective now        References:    Nutrtion Support Algorithm Enteral vs  Parenteral   Question Answer Comment   Diet Type Enteral/Parenteral    Enteral/Parenteral Tube Feeding No Oral Diet    Tube Feeding Formula: Jevity 1 2 Hira    Bolus/Cyclic/Continuous Continuous    Tube Feeding Goal Rate (mL/hr): 20    Tube Feeding flush (mL): 100    Water Flush type: Water    Water flush frequency: Every 4 hours    RD to adjust diet per protocol?  No        04/10/22 1374                  Active Medications  Scheduled Meds:  Current Facility-Administered Medications   Medication Dose Route Frequency Provider Last Rate    chlorhexidine  15 mL [facility] [address]      dextrose           enoxaparin  40 mg Subcutaneous Daily Haley Paredes MD      fentaNYL  100 mcg/hr Intravenous Continuous MINA NolenNP 100 mcg/hr (23/82/02 7088)    folic acid IVPB  1 mg Intravenous Daily Haley Paredes MD 1 mg (04/11/22 0849)    hydrALAZINE  10 mg Intravenous Q6H PRN TETE Moran      HYDROmorphone  1 mg Intravenous Q3H PRN TETE Nolen      ipratropium  0 5 mg Nebulization TID Haley Paredes MD      ipratropium-albuterol  3 mL Nebulization TID PRN Haley Paredes MD      lactated ringers  100 mL/hr Intravenous Continuous [place] MINA StewartNP 100 mL/hr (04/10/22 0332)    levalbuterol  1 25 mg Nebulization TID Haley Paredes MD      lidocaine  1 patch Topical Q24H Haley Paredes MD      midazolam  2 mg Intravenous Q4H PRN [place] TETE Stewart      multivitamin-minerals  1 tablet Oral Daily Haley Paredes MD      nicotine  1 patch Transdermal Daily Haley Paredes MD      ondansetron  4 mg Intravenous Q6H PRN Haley Paredes MD      pantoprazole  40 mg Intravenous Q24H [facility] Haley Paredes MD      propofol  5-50 mcg/kg/min Intravenous Titrated Haley Paredes MD 50 mcg/kg/min (04/11/22 0921)    senna-docusate sodium  2 tablet Per NG Tube HS TETE Nolen      thiamine  500 mg Intravenous Q8H Haley Paredes MD Stopped (04/11/22 5642)     Continuous Infusions:  fentaNYL, 100 mcg/hr, Last Rate: 100 mcg/hr (04/11/22 0138)  lactated ringers, 100 mL/hr, Last Rate: 100 mL/hr (04/10/22 1454)  propofol, 5-50 mcg/kg/min, Last Rate: 50 mcg/kg/min (04/11/22 8822)      PRN Meds:   hydrALAZINE, 10 mg, Q6H PRN  HYDROmorphone, 1 mg, Q3H PRN  ipratropium-albuterol, 3 mL, TID PRN  midazolam, 2 mg, Q4H PRN  ondansetron, 4 mg, Q6H PRN        Invasive Devices Review  Invasive Devices  Report    Peripheral Intravenous Line            Peripheral IV 04/08/22 Left Forearm 3 days    Peripheral IV 04/08/22 Left Antecubital 2 days    Peripheral IV 04/08/22 Right Antecubital 2 days          Arterial Line            Arterial Line 04/09/22 Radial 1 day          Drain            NG/OG/Enteral Tube Orogastric 14 Fr Left mouth 2 days    Urethral Catheter Non-latex 16 Fr  2 days          Airway            ETT  Oral 8 mm 2 days                Rationale for remaining devices: Intubated   ---------------------------------------------------------------------------------------  Advance Directive and Living Will:      Power of :    POLST:    ---------------------------------------------------------------------------------------        Matt Aviles MD      Portions of the record may have been created with voice recognition software  Occasional wrong word or "sound a like" substitutions may have occurred due to the inherent limitations of voice recognition software    Read the chart carefully and recognize, using context, where substitutions have occurred

## 2022-04-11 NOTE — ASSESSMENT & PLAN NOTE
Patient acute hypoxic on admission, was given dose of Lasix with marked improvement  However, this morning patient started to become more agitated, hypoxic and tachycardic unable to tolerate BiPAP, patient was intubated for airway protection in the setting of encephalopathy and potential DTs      · Continue mechanical ventilation and titrate PEEP and FiO2 to maintain SpO2 above 88%  · Daily assessment for SAT and SBT when appropriate  · Consider drainage of pleural effusions via thoracentesis  · Sedation with Fentanyl and Propofol infusion for goal RASS 0 to -1, PRN versed and dilaudid, consider Precedex

## 2022-04-11 NOTE — PLAN OF CARE
Problem: MOBILITY - ADULT  Goal: Maintain or return to baseline ADL function  Description: INTERVENTIONS:  -  Assess patient's ability to carry out ADLs; assess patient's baseline for ADL function and identify physical deficits which impact ability to perform ADLs (bathing, care of mouth/teeth, toileting, grooming, dressing, etc )  - Assess/evaluate cause of self-care deficits   - Assess range of motion  - Assess patient's mobility; develop plan if impaired  - Assess patient's need for assistive devices and provide as appropriate  - Encourage maximum independence but intervene and supervise when necessary  - Involve family in performance of ADLs  - Assess for home care needs following discharge   - Consider OT consult to assist with ADL evaluation and planning for discharge  - Provide patient education as appropriate  Outcome: Progressing  Goal: Maintains/Returns to pre admission functional level  Description: INTERVENTIONS:  - Perform BMAT or MOVE assessment daily    - Set and communicate daily mobility goal to care team and patient/family/caregiver     - Collaborate with rehabilitation services on mobility goals if consulted  - Perform Range of Motion   - Out of bed for toileting  - Record patient progress and toleration of activity level   Outcome: Progressing     Problem: Potential for Falls  Goal: Patient will remain free of falls  Description: INTERVENTIONS:  - Educate patient/family on patient safety including physical limitations  - Instruct patient to call for assistance with activity   - Consult OT/PT to assist with strengthening/mobility   - Keep Call bell within reach  - Keep bed low and locked with side rails adjusted as appropriate  - Keep care items and personal belongings within reach  - Initiate and maintain comfort rounds  - Make Fall Risk Sign visible to staff  - Offer Toileting  - Obtain necessary fall risk management equipment  - Apply yellow socks and bracelet for high fall risk patients  - Consider moving patient to room near nurses station  Outcome: Progressing     Problem: Prexisting or High Potential for Compromised Skin Integrity  Goal: Skin integrity is maintained or improved  Description: INTERVENTIONS:  - Identify patients at risk for skin breakdown  - Assess and monitor skin integrity  - Assess and monitor nutrition and hydration status  - Monitor labs   - Assess for incontinence   - Turn and reposition patient  - Assist with mobility/ambulation  - Relieve pressure over bony prominences  - Avoid friction and shearing  - Provide appropriate hygiene as needed including keeping skin clean and dry  - Evaluate need for skin moisturizer/barrier cream  - Collaborate with interdisciplinary team   - Patient/family teaching  - Consider wound care consult   Outcome: Progressing     Problem: DISCHARGE PLANNING - CARE MANAGEMENT  Goal: Discharge to post-acute care or home with appropriate resources  Description: INTERVENTIONS:  - Conduct assessment to determine patient/family and health care team treatment goals, and need for post-acute services based on payer coverage, community resources, and patient preferences, and barriers to discharge  - Address psychosocial, clinical, and financial barriers to discharge as identified in assessment in conjunction with the patient/family and health care team  - Arrange appropriate level of post-acute services according to patients   needs and preference and payer coverage in collaboration with the physician and health care team  - Communicate with and update the patient/family, physician, and health care team regarding progress on the discharge plan  - Arrange appropriate transportation to post-acute venues  Outcome: Progressing     Problem: SUBSTANCE USE/ABUSE  Goal: By discharge, will develop insight into their chemical dependency and sustain motivation to continue in recovery  Description: INTERVENTIONS:  - Attends all daily group sessions and scheduled AA groups  - Actively practices coping skills through participation in the therapeutic community and adherence to program rules  - Reviews and completes assignments from individual treatment plan  - Assist patient development of understanding of their personal cycle of addiction and relapse triggers  Outcome: Progressing  Goal: By discharge, patient will have ongoing treatment plan addressing chemical dependency  Description: INTERVENTIONS:  - Assist patient with resources and/or appointments for ongoing recovery based living  Outcome: Progressing     Problem: Nutrition/Hydration-ADULT  Goal: Nutrient/Hydration intake appropriate for improving, restoring or maintaining nutritional needs  Description: Monitor and assess patient's nutrition/hydration status for malnutrition  Collaborate with interdisciplinary team and initiate plan and interventions as ordered  Monitor patient's weight and dietary intake as ordered or per policy  Utilize nutrition screening tool and intervene as necessary  Determine patient's food preferences and provide high-protein, high-caloric foods as appropriate       INTERVENTIONS:  - Monitor oral intake, urinary output, labs, and treatment plans  - Assess nutrition and hydration status and recommend course of action  - Evaluate amount of meals eaten  - Assist patient with eating if necessary   - Allow adequate time for meals  - Recommend/ encourage appropriate diets, oral nutritional supplements, and vitamin/mineral supplements  - Order, calculate, and assess calorie counts as needed  - Recommend, monitor, and adjust tube feedings and TPN/PPN based on assessed needs  - Assess need for intravenous fluids  - Provide specific nutrition/hydration education as appropriate  - Include patient/family/caregiver in decisions related to nutrition  Outcome: Progressing     Problem: PAIN - ADULT  Goal: Verbalizes/displays adequate comfort level or baseline comfort level  Description: Interventions:  - Encourage patient to monitor pain and request assistance  - Assess pain using appropriate pain scale  - Administer analgesics based on type and severity of pain and evaluate response  - Implement non-pharmacological measures as appropriate and evaluate response  - Consider cultural and social influences on pain and pain management  - Notify physician/advanced practitioner if interventions unsuccessful or patient reports new pain  Outcome: Progressing     Problem: INFECTION - ADULT  Goal: Absence or prevention of progression during hospitalization  Description: INTERVENTIONS:  - Assess and monitor for signs and symptoms of infection  - Monitor lab/diagnostic results  - Monitor all insertion sites, i e  indwelling lines, tubes, and drains  - Monitor endotracheal if appropriate and nasal secretions for changes in amount and color  - [place] appropriate cooling/warming therapies per order  - Administer medications as ordered  - Instruct and encourage patient and family to use good hand hygiene technique  - Identify and instruct in appropriate isolation precautions for identified infection/condition  Outcome: Progressing     Problem: SAFETY ADULT  Goal: Maintain or return to baseline ADL function  Description: INTERVENTIONS:  -  Assess patient's ability to carry out ADLs; assess patient's baseline for ADL function and identify physical deficits which impact ability to perform ADLs (bathing, care of mouth/teeth, toileting, grooming, dressing, etc )  - Assess/evaluate cause of self-care deficits   - Assess range of motion  - Assess patient's mobility; develop plan if impaired  - Assess patient's need for assistive devices and provide as appropriate  - Encourage maximum independence but intervene and supervise when necessary  - Involve family in performance of ADLs  - Assess for home care needs following discharge   - Consider OT consult to assist with ADL evaluation and planning for discharge  - Provide patient education as appropriate  Outcome: Progressing  Goal: Maintains/Returns to pre admission functional level  Description: INTERVENTIONS:  - Perform BMAT or MOVE assessment daily    - Set and communicate daily mobility goal to care team and patient/family/caregiver     - Collaborate with rehabilitation services on mobility goals if consulted  - Perform Range of Motion & reposition patient   - Record patient progress and toleration of activity level   Outcome: Progressing  Goal: Patient will remain free of falls  Description: INTERVENTIONS:  - Educate patient/family on patient safety including physical limitations  - Instruct patient to call for assistance with activity   - Consult OT/PT to assist with strengthening/mobility   - Keep Call bell within reach  - Keep bed low and locked with side rails adjusted as appropriate  - Keep care items and personal belongings within reach  - Initiate and maintain comfort rounds  - Make Fall Risk Sign visible to staff  - Offer Toileting   - Initiate/Maintain bed alarm  - Obtain necessary fall risk management equipment  - Apply yellow socks and bracelet for high fall risk patients  - Consider moving patient to room near nurses station  Outcome: Progressing     Problem: DISCHARGE PLANNING  Goal: Discharge to home or other facility with appropriate resources  Description: INTERVENTIONS:  - Identify barriers to discharge w/patient and caregiver  - Arrange for needed discharge resources and transportation as appropriate  - Identify discharge learning needs (meds, wound care, etc )  - Arrange for interpretive services to assist at discharge as needed  - Refer to Case Management Department for coordinating discharge planning if the patient needs post-hospital services based on physician/advanced practitioner order or complex needs related to functional status, cognitive ability, or social support system  Outcome: Progressing     Problem: Knowledge Deficit  Goal: Patient/family/caregiver demonstrates understanding of disease process, treatment plan, medications, and discharge instructions  Description: Complete learning assessment and assess knowledge base  Interventions:  - Provide teaching at level of understanding  - Provide teaching via preferred learning methods  Outcome: Progressing     Problem: NEUROSENSORY - ADULT  Goal: Achieves stable or improved neurological status  Description: INTERVENTIONS  - Monitor and report changes in neurological status  - Monitor vital signs such as temperature, blood pressure, glucose, and any other labs ordered   - Initiate measures to prevent increased intracranial pressure  - Monitor for seizure activity and implement precautions if appropriate      Outcome: Progressing  Goal: Remains free of injury related to seizures activity  Description: INTERVENTIONS  - Maintain airway, patient safety  and administer oxygen as ordered  - Monitor patient for seizure activity, document and report duration and description of seizure to physician/advanced practitioner  - If seizure occurs,  ensure patient safety during seizure  - Reorient patient post seizure  - Seizure pads on all 4 side rails  - Instruct patient/family to notify RN of any seizure activity including if an aura is experienced  - Instruct patient/family to call for assistance with activity based on nursing assessment  - Administer anti-seizure medications if ordered    Outcome: Progressing  Goal: Achieves maximal functionality and self care  Description: INTERVENTIONS  - Monitor swallowing and airway patency with patient fatigue and changes in neurological status  - Encourage and assist patient to increase activity and self care     - Encourage visually impaired, hearing impaired and aphasic patients to use assistive/communication devices  Outcome: Progressing     Problem: CARDIOVASCULAR - ADULT  Goal: Maintains optimal cardiac output and hemodynamic stability  Description: INTERVENTIONS:  - Monitor I/O, vital signs and rhythm  - Monitor for S/S and trends of decreased cardiac output  - Administer and titrate ordered vasoactive medications to optimize hemodynamic stability  - Assess quality of pulses, skin color and temperature  - Assess for signs of decreased coronary artery perfusion  - Instruct patient to report change in severity of symptoms  Outcome: Progressing  Goal: Absence of cardiac dysrhythmias or at baseline rhythm  Description: INTERVENTIONS:  - Continuous cardiac monitoring, vital signs, obtain 12 lead EKG if ordered  - Administer antiarrhythmic and heart rate control medications as ordered  - Monitor electrolytes and administer replacement therapy as ordered  Outcome: Progressing     Problem: RESPIRATORY - ADULT  Goal: Achieves optimal ventilation and oxygenation  Description: INTERVENTIONS:  - Assess for changes in respiratory status  - Assess for changes in mentation and behavior  - Position to facilitate oxygenation and minimize respiratory effort  - Oxygen administered by appropriate delivery if ordered  - Initiate smoking cessation education as indicated  - Encourage broncho-pulmonary hygiene including cough, deep breathe, Incentive Spirometry  - Assess the need for suctioning and aspirate as needed  - Assess and instruct to report SOB or any respiratory difficulty  - Respiratory Therapy support as indicated  Outcome: Progressing     Problem: GASTROINTESTINAL - ADULT  Goal: Minimal or absence of nausea and/or vomiting  Description: INTERVENTIONS:  - Administer IV fluids if ordered to ensure adequate hydration  - Maintain NPO status until nausea and vomiting are resolved  - Nasogastric tube if ordered  - Administer ordered antiemetic medications as needed  - Provide nonpharmacologic comfort measures as appropriate  - Advance diet as tolerated, if ordered  - Consider nutrition services referral to assist patient with adequate nutrition and appropriate food choices  Outcome: Progressing  Goal: Maintains or returns to baseline bowel function  Description: INTERVENTIONS:  - Assess bowel function  - Encourage oral fluids to ensure adequate hydration  - Administer IV fluids if ordered to ensure adequate hydration  - Administer ordered medications as needed  - Encourage mobilization and activity  - Consider nutritional services referral to assist patient with adequate nutrition and appropriate food choices  Outcome: Progressing  Goal: Maintains adequate nutritional intake  Description: INTERVENTIONS:  - Monitor percentage of each meal consumed  - Identify factors contributing to decreased intake, treat as appropriate  - Assist with meals as needed  - Monitor I&O, weight, and lab values if indicated  - Obtain nutrition services referral as needed  Outcome: Progressing  Goal: Oral mucous membranes remain intact  Description: INTERVENTIONS  - Assess oral mucosa and hygiene practices  - Implement preventative oral hygiene regimen  - Implement oral medicated treatments as ordered  - Initiate Nutrition services referral as needed  Outcome: Progressing     Problem: GENITOURINARY - ADULT  Goal: Maintains or returns to baseline urinary function  Description: INTERVENTIONS:  - Assess urinary function  - Encourage oral fluids to ensure adequate hydration if ordered  - Administer IV fluids as ordered to ensure adequate hydration  - Administer ordered medications as needed  - Offer frequent toileting  - Follow urinary retention protocol if ordered  Outcome: Progressing  Goal: Absence of urinary retention  Description: INTERVENTIONS:  - Assess patients ability to void and empty bladder  - Monitor I/O  - Bladder scan as needed  - Discuss with physician/AP medications to alleviate retention as needed  - Discuss catheterization for long term situations as appropriate  Outcome: Progressing  Goal: Urinary catheter remains patent  Description: INTERVENTIONS:  - Assess patency of urinary catheter  - If patient has a chronic vazquez, consider changing catheter if non-functioning  - Follow guidelines for intermittent irrigation of non-functioning urinary catheter  Outcome: Progressing     Problem: METABOLIC, FLUID AND ELECTROLYTES - ADULT  Goal: Electrolytes maintained within normal limits  Description: INTERVENTIONS:  - Monitor labs and assess patient for signs and symptoms of electrolyte imbalances  - Administer electrolyte replacement as ordered  - Monitor response to electrolyte replacements, including repeat lab results as appropriate  - Instruct patient on fluid and nutrition as appropriate  Outcome: Progressing  Goal: Fluid balance maintained  Description: INTERVENTIONS:  - Monitor labs   - Monitor I/O and WT  - Instruct patient on fluid and nutrition as appropriate  - Assess for signs & symptoms of volume excess or deficit  Outcome: Progressing     Problem: SKIN/TISSUE INTEGRITY - ADULT  Goal: Skin Integrity remains intact(Skin Breakdown Prevention)  Description: Assess:  -Perform Davey assessment   -Clean and moisturize skin   -Inspect skin when repositioning, toileting, and assisting with ADLS  -Assess under medical devices   -Assess extremities for adequate circulation and sensation     Bed Management:  -Have minimal linens on bed & keep smooth, unwrinkled  -Change linens as needed when moist or perspiring  -Avoid sitting or lying in one position   -Keep HOB at 45 degrees     Toileting:  -Offer bedside commode  -Assess for incontinence  -Use incontinent care products after each incontinent episode such    Activity:  -Mobilize patient   -Encourage activity and walks on unit  -Encourage or provide ROM exercises   -Turn and reposition patient   -Use appropriate equipment to lift or move patient in bed  -Instruct/ Assist with weight shifting   -Consider limitation of chair time   Skin Care:  -Avoid use of baby powder, tape, friction and shearing, hot water or constrictive clothing  -Relieve pressure over bony prominences   -Do not massage red bony areas    Next Steps:   -Consider consults to  interdisciplinary teams   Outcome: Progressing     Problem: SAFETY,RESTRAINT: NV/NON-SELF DESTRUCTIVE BEHAVIOR  Goal: Remains free of harm/injury (restraint for non violent/non self-detsructive behavior)  Description: INTERVENTIONS:  - Instruct patient/family regarding restraint use   - Assess and monitor physiologic and psychological status   - Provide interventions and comfort measures to meet assessed patient needs   - Identify and implement measures to help patient regain control  - Assess readiness for release of restraint   Outcome: Progressing  Goal: Returns to optimal restraint-free functioning  Description: INTERVENTIONS:  - Assess the patient's behavior and symptoms that indicate continued need for restraint  - Identify and implement measures to help patient regain control  - Assess readiness for release of restraint   Outcome: Progressing

## 2022-04-11 NOTE — ASSESSMENT & PLAN NOTE
Patient drinks roughly 8-12 shots of whiskey daily  Patient's last drink was April 1, 2022  EtoH level on admission was 54  Has never had any history withdrawals or seizures  Patient was initially admitted for alcoholic pancreatitis, however, patient became encephalopathic and agitated requiring transfer to detox Unit  · Patient was initially on phenobarbital and Precedex  · Patient switched over to fentanyl and Propofol with PRN versed and dilaudid   Patient continues to be intermitantly agitated, can consider switching to versed/dilaudid drip

## 2022-04-11 NOTE — ASSESSMENT & PLAN NOTE
On admission patient was reported to have ataxia as well as finger-to-nose ataxia   Patient was started on high-dose thiamine for Wernicke's encephalopathy    · Continue thiamine and folic acid  · Can consider MRI once patient is stable  · Fall precautions

## 2022-04-11 NOTE — CONSULTS
Consult: TF recommendations  PT is intubated on propofol at 37  5mL/hr provides 990cal  Recommend [email or web address], advance by 10mL every 6hrs to goal of 30mL, add 3 packs of prosource (1 every 8hrs), water flushes 240mL every 4hrs provides with propofol total of 2034cal, 85g pro, 2021mL

## 2022-04-11 NOTE — CASE MANAGEMENT
Case Management Assessment & Discharge Planning Note    Patient name Sasha Hollis  Location ICU 11/ICU 11 MRN [id number]  : 1965 Date 2022       Current Admission Date: 2022  Current Admission Diagnosis:Acute respiratory failure with hypoxia [facility])   Patient Active Problem List    Diagnosis Date Noted    Acute respiratory failure with hypoxia ([facility] ) 2022    Alcohol use disorder, severe, dependence ([facility] ) 2022    Current every day nicotine vaping 2022    Hepatic steatosis 2022    Gastroesophageal reflux disease without esophagitis 2022    Hyperlipidemia 2022    Ataxia 2022    Other constipation 2022    Alcohol-induced acute pancreatitis 2022    Delirium tremens ([facility] ) 2022    Hypertension       LOS (days): 3  Geometric Mean LOS (GMLOS) (days):   Days to GMLOS:     OBJECTIVE:  PATIENT READMITTED TO HOSPITAL  Risk of Unplanned Readmission Score: 17         Current admission status: Inpatient       Preferred Pharmacy:   [address] [place] [address]  Phone: [phone] Fax: [phone]    Primary Care Provider: Bob Mathias DO    Primary Insurance: BLUE CROSS  Secondary Insurance:     ASSESSMENT:  Mahesh Palomino Proxies    There are no active Health Care Proxies on file         Advance Directives  Does patient have a [address]?: No  Was patient offered paperwork?: No (Pt is intubated)  Does patient currently have a Health Care decision maker?: Yes, please see Health Care Proxy section  Does patient have Advance Directives?: No  Was patient offered paperwork?: No (Pt is intubated)  Primary Contact: Kary Freeman         Readmission Root Cause  30 Day Readmission: No    Patient Information  Admitted from[de-identified] Home  Mental Status: Intubated  During Assessment patient was accompanied by: Not accompanied during assessment  Assessment information provided by[de-identified] Daughter,Other - please comment (Significant Other)  Primary Caregiver: Self  Support Systems: Spouse/significant other,Daughter  [place] of Residence: [facility] do you live in?: [facility] entry access options  Select all that apply : Ramp  Type of Current Residence: 2 story home  Upon entering residence, is there a bedroom on the main floor (no further steps)?: No  A bedroom is located on the following floor levels of residence (select all that apply):: 2nd Floor ([address])  Upon entering residence, is there a bathroom on the main floor (no further steps)?: No  Indicate which floors of current residence have a bathroom (select all the apply):: 2nd Floor  Number of steps to 2nd floor from main floor:  ([address])  Living Arrangements: Lives w/ Spouse/significant other    Activities of Daily Living Prior to Admission  Functional Status: Independent  Completes ADLs independently?: Yes  Ambulates independently?: Yes  Does patient use assisted devices?: No  Does patient currently own DME?: Yes (Shower chair, WC, stair glide)  What DME does the patient currently own?: Noris Truong Chair/Glide  Does patient have a history of Outpatient Therapy (PT/OT)?: No  Does the patient have a history of Short-Term Rehab?: No  Does patient have a history of C?: No  Does patient currently have [facility]?: No         Patient Information Continued  Income Source: Pension/group home  Does patient receive dialysis treatments?: No  Does patient have a history of substance abuse?: Yes  Historical substance use preference: Alcohol/ETOH  Is patient currently in treatment for substance abuse?: No  Treatment options provided (Pt intubated   Will m/w pt when extubated)  Does patient have a history of Mental Health Diagnosis?: No         Means of Transportation  Means of Transport to [facility][de-identified] Drives Self        DISCHARGE DETAILS:    Discharge planning discussed with[de-identified] Pt's S O  and daughter  Freedom of Choice: Yes     CM contacted family/caregiver?: Yes  Were Treatment Team discharge recommendations reviewed with patient/caregiver?: Yes  Did patient/caregiver verbalize understanding of patient care needs?: Yes  Were patient/caregiver advised of the risks associated with not following Treatment Team discharge recommendations?: Yes    Contacts  Patient Contacts: Rajesh Moncada  Relationship to Patient[de-identified] Family  Contact Method: Phone  Phone Number: [phone]  Reason/Outcome: Emergency [address]         Is the patient interested in [facility] at discharge?: No        Treatment Team Recommendation: Substance Abuse Treatment     Assessment and discharge planning completed with pt's S O  and daughter  Pt lives with S O  in a 2 SH with ramp to enter  Daughter is WC bound and home is handicap accessible  Pt currently intubated  Family prefers inpt D/A treatment [facility]) or as close to home as possible  CM will discuss with pt when extubated  Cm will continue to follow

## 2022-04-12 ENCOUNTER — APPOINTMENT (INPATIENT)
Dept: MRI IMAGING | Facility: HOSPITAL | Age: 57
DRG: 207 | End: 2022-04-12
Payer: COMMERCIAL

## 2022-04-12 ENCOUNTER — APPOINTMENT (INPATIENT)
Dept: NON INVASIVE DIAGNOSTICS | Facility: HOSPITAL | Age: 57
DRG: 207 | End: 2022-04-12
Payer: COMMERCIAL

## 2022-04-12 LAB
ALBUMIN SERPL BCP-MCNC: 1.5 G/DL (ref 3.5–5)
ALP SERPL-CCNC: 255 U/L (ref 46–116)
ALT SERPL W P-5'-P-CCNC: 52 U/L (ref 12–78)
ANION GAP SERPL CALCULATED.3IONS-SCNC: 5 MMOL/L (ref 4–13)
AST SERPL W P-5'-P-CCNC: 46 U/L (ref 5–45)
BASOPHILS # BLD MANUAL: 0 THOUSAND/UL (ref 0–0.1)
BASOPHILS NFR MAR MANUAL: 0 % (ref 0–1)
BILIRUB SERPL-MCNC: 0.37 MG/DL (ref 0.2–1)
BUN SERPL-MCNC: 5 MG/DL (ref 5–25)
CALCIUM ALBUM COR SERPL-MCNC: 10.3 MG/DL (ref 8.3–10.1)
CALCIUM SERPL-MCNC: 8.3 MG/DL (ref 8.3–10.1)
CHLORIDE SERPL-SCNC: 100 MMOL/L (ref 100–108)
CO2 SERPL-SCNC: 34 MMOL/L (ref 21–32)
CREAT SERPL-MCNC: 0.41 MG/DL (ref 0.6–1.3)
EOSINOPHIL # BLD MANUAL: 0.12 THOUSAND/UL (ref 0–0.4)
EOSINOPHIL NFR BLD MANUAL: 1 % (ref 0–6)
ERYTHROCYTE [DISTWIDTH] IN BLOOD BY AUTOMATED COUNT: 12.2 % (ref 11.6–15.1)
GFR SERPL CREATININE-BSD FRML MDRD: 114 ML/MIN/1.73SQ M
GLUCOSE SERPL-MCNC: 119 MG/DL (ref 65–140)
GLUCOSE SERPL-MCNC: 138 MG/DL (ref 65–140)
GLUCOSE SERPL-MCNC: 140 MG/DL (ref 65–140)
GLUCOSE SERPL-MCNC: 140 MG/DL (ref 65–140)
GLUCOSE SERPL-MCNC: 150 MG/DL (ref 65–140)
HCT VFR BLD AUTO: 29.2 % (ref 34.8–46.1)
HGB BLD-MCNC: 9.9 G/DL (ref 11.5–15.4)
LG PLATELETS BLD QL SMEAR: PRESENT
LYMPHOCYTES # BLD AUTO: 0.47 THOUSAND/UL (ref 0.6–4.47)
LYMPHOCYTES # BLD AUTO: 4 % (ref 14–44)
MAGNESIUM SERPL-MCNC: 2.1 MG/DL (ref 1.6–2.6)
MCH RBC QN AUTO: 32.4 PG (ref 26.8–34.3)
MCHC RBC AUTO-ENTMCNC: 33.9 G/DL (ref 31.4–37.4)
MCV RBC AUTO: 95 FL (ref 82–98)
MONOCYTES # BLD AUTO: 0.47 THOUSAND/UL (ref 0–1.22)
MONOCYTES NFR BLD: 4 % (ref 4–12)
NEUTROPHILS # BLD MANUAL: 10.8 THOUSAND/UL (ref 1.85–7.62)
NEUTS BAND NFR BLD MANUAL: 10 % (ref 0–8)
NEUTS SEG NFR BLD AUTO: 81 % (ref 43–75)
OVALOCYTES BLD QL SMEAR: PRESENT
PHOSPHATE SERPL-MCNC: 3.1 MG/DL (ref 2.7–4.5)
PLATELET # BLD AUTO: 274 THOUSANDS/UL (ref 149–390)
PLATELET BLD QL SMEAR: ADEQUATE
PMV BLD AUTO: 9.6 FL (ref 8.9–12.7)
POTASSIUM SERPL-SCNC: 3.3 MMOL/L (ref 3.5–5.3)
PROCALCITONIN SERPL-MCNC: 0.66 NG/ML
PROT SERPL-MCNC: 6 G/DL (ref 6.4–8.2)
RBC # BLD AUTO: 3.06 MILLION/UL (ref 3.81–5.12)
SODIUM SERPL-SCNC: 139 MMOL/L (ref 136–145)
TRIGL SERPL-MCNC: 271 MG/DL
WBC # BLD AUTO: 11.87 THOUSAND/UL (ref 4.31–10.16)

## 2022-04-12 PROCEDURE — 84100 ASSAY OF PHOSPHORUS: CPT | Performed by: PHYSICIAN ASSISTANT

## 2022-04-12 PROCEDURE — 94640 AIRWAY INHALATION TREATMENT: CPT

## 2022-04-12 PROCEDURE — 87070 CULTURE OTHR SPECIMN AEROBIC: CPT | Performed by: INTERNAL MEDICINE

## 2022-04-12 PROCEDURE — 84478 ASSAY OF TRIGLYCERIDES: CPT | Performed by: PHYSICIAN ASSISTANT

## 2022-04-12 PROCEDURE — 93931 UPPER EXTREMITY STUDY: CPT | Performed by: SURGERY

## 2022-04-12 PROCEDURE — 85027 COMPLETE CBC AUTOMATED: CPT | Performed by: PHYSICIAN ASSISTANT

## 2022-04-12 PROCEDURE — 83735 ASSAY OF MAGNESIUM: CPT | Performed by: PHYSICIAN ASSISTANT

## 2022-04-12 PROCEDURE — 94760 N-INVAS EAR/PLS OXIMETRY 1: CPT

## 2022-04-12 PROCEDURE — 80053 COMPREHEN METABOLIC PANEL: CPT | Performed by: PHYSICIAN ASSISTANT

## 2022-04-12 PROCEDURE — 99291 CRITICAL CARE FIRST HOUR: CPT | Performed by: INTERNAL MEDICINE

## 2022-04-12 PROCEDURE — 82948 REAGENT STRIP/BLOOD GLUCOSE: CPT

## 2022-04-12 PROCEDURE — 93970 EXTREMITY STUDY: CPT | Performed by: SURGERY

## 2022-04-12 PROCEDURE — 93931 UPPER EXTREMITY STUDY: CPT

## 2022-04-12 PROCEDURE — 84145 PROCALCITONIN (PCT): CPT | Performed by: INTERNAL MEDICINE

## 2022-04-12 PROCEDURE — 85007 BL SMEAR W/DIFF WBC COUNT: CPT | Performed by: PHYSICIAN ASSISTANT

## 2022-04-12 PROCEDURE — 93922 UPR/L XTREMITY ART 2 LEVELS: CPT | Performed by: SURGERY

## 2022-04-12 PROCEDURE — 94003 VENT MGMT INPAT SUBQ DAY: CPT

## 2022-04-12 PROCEDURE — G1004 CDSM NDSC: HCPCS

## 2022-04-12 PROCEDURE — 70551 MRI BRAIN STEM W/O DYE: CPT

## 2022-04-12 PROCEDURE — 87205 SMEAR GRAM STAIN: CPT | Performed by: INTERNAL MEDICINE

## 2022-04-12 RX ORDER — LANOLIN ALCOHOL/MO/W.PET/CERES
100 CREAM (GRAM) TOPICAL DAILY
Status: DISCONTINUED | OUTPATIENT
Start: 2022-04-13 | End: 2022-04-24 | Stop reason: HOSPADM

## 2022-04-12 RX ORDER — MIDAZOLAM HYDROCHLORIDE 2 MG/2ML
2 INJECTION, SOLUTION INTRAMUSCULAR; INTRAVENOUS ONCE AS NEEDED
Status: COMPLETED | OUTPATIENT
Start: 2022-04-12 | End: 2022-04-12

## 2022-04-12 RX ORDER — FOLIC ACID 1 MG/1
1 TABLET ORAL DAILY
Status: DISCONTINUED | OUTPATIENT
Start: 2022-04-13 | End: 2022-04-24 | Stop reason: HOSPADM

## 2022-04-12 RX ORDER — POTASSIUM CHLORIDE 14.9 MG/ML
20 INJECTION INTRAVENOUS ONCE
Status: COMPLETED | OUTPATIENT
Start: 2022-04-12 | End: 2022-04-12

## 2022-04-12 RX ORDER — ALBUMIN, HUMAN INJ 5% 5 %
25 SOLUTION INTRAVENOUS ONCE
Status: COMPLETED | OUTPATIENT
Start: 2022-04-12 | End: 2022-04-12

## 2022-04-12 RX ORDER — MIDAZOLAM HYDROCHLORIDE 2 MG/2ML
2 INJECTION, SOLUTION INTRAMUSCULAR; INTRAVENOUS EVERY 2 HOUR PRN
Status: DISCONTINUED | OUTPATIENT
Start: 2022-04-12 | End: 2022-04-19

## 2022-04-12 RX ORDER — POTASSIUM CHLORIDE 14.9 MG/ML
20 INJECTION INTRAVENOUS 4 TIMES DAILY
Status: DISCONTINUED | OUTPATIENT
Start: 2022-04-12 | End: 2022-04-12

## 2022-04-12 RX ORDER — DEXMEDETOMIDINE HYDROCHLORIDE 4 UG/ML
.1-.7 INJECTION, SOLUTION INTRAVENOUS
Status: DISCONTINUED | OUTPATIENT
Start: 2022-04-12 | End: 2022-04-12

## 2022-04-12 RX ADMIN — CHLORHEXIDINE GLUCONATE 0.12% ORAL RINSE 15 ML: 1.2 LIQUID ORAL at 20:01

## 2022-04-12 RX ADMIN — MIDAZOLAM 2 MG: 1 INJECTION INTRAMUSCULAR; INTRAVENOUS at 14:02

## 2022-04-12 RX ADMIN — PROPOFOL 70 MCG/KG/MIN: 10 INJECTION, EMULSION INTRAVENOUS at 05:21

## 2022-04-12 RX ADMIN — MIDAZOLAM 2 MG: 1 INJECTION INTRAMUSCULAR; INTRAVENOUS at 10:26

## 2022-04-12 RX ADMIN — PROPOFOL 70 MCG/KG/MIN: 10 INJECTION, EMULSION INTRAVENOUS at 02:45

## 2022-04-12 RX ADMIN — PROPOFOL 70 MCG/KG/MIN: 10 INJECTION, EMULSION INTRAVENOUS at 17:57

## 2022-04-12 RX ADMIN — CHLORHEXIDINE GLUCONATE 0.12% ORAL RINSE 15 ML: 1.2 LIQUID ORAL at 08:02

## 2022-04-12 RX ADMIN — IPRATROPIUM BROMIDE 0.5 MG: 0.5 SOLUTION RESPIRATORY (INHALATION) at 13:14

## 2022-04-12 RX ADMIN — MIDAZOLAM 2 MG: 1 INJECTION INTRAMUSCULAR; INTRAVENOUS at 01:54

## 2022-04-12 RX ADMIN — Medication 1 TABLET: at 08:02

## 2022-04-12 RX ADMIN — IPRATROPIUM BROMIDE 0.5 MG: 0.5 SOLUTION RESPIRATORY (INHALATION) at 19:22

## 2022-04-12 RX ADMIN — PROPOFOL 70 MCG/KG/MIN: 10 INJECTION, EMULSION INTRAVENOUS at 15:56

## 2022-04-12 RX ADMIN — CLONIDINE HYDROCHLORIDE 0.1 MG: 0.1 TABLET ORAL at 05:53

## 2022-04-12 RX ADMIN — Medication 20 MG: at 08:02

## 2022-04-12 RX ADMIN — PROPOFOL 70 MCG/KG/MIN: 10 INJECTION, EMULSION INTRAVENOUS at 22:36

## 2022-04-12 RX ADMIN — LEVALBUTEROL HYDROCHLORIDE 1.25 MG: 1.25 SOLUTION, CONCENTRATE RESPIRATORY (INHALATION) at 19:22

## 2022-04-12 RX ADMIN — ENOXAPARIN SODIUM 40 MG: 40 INJECTION SUBCUTANEOUS at 08:02

## 2022-04-12 RX ADMIN — Medication 1 PATCH: at 08:02

## 2022-04-12 RX ADMIN — MIDAZOLAM 2 MG: 1 INJECTION INTRAMUSCULAR; INTRAVENOUS at 13:40

## 2022-04-12 RX ADMIN — PROPOFOL 70 MCG/KG/MIN: 10 INJECTION, EMULSION INTRAVENOUS at 11:08

## 2022-04-12 RX ADMIN — PROPOFOL 70 MCG/KG/MIN: 10 INJECTION, EMULSION INTRAVENOUS at 20:01

## 2022-04-12 RX ADMIN — LEVALBUTEROL HYDROCHLORIDE 1.25 MG: 1.25 SOLUTION, CONCENTRATE RESPIRATORY (INHALATION) at 07:22

## 2022-04-12 RX ADMIN — CLONIDINE HYDROCHLORIDE 0.1 MG: 0.1 TABLET ORAL at 21:01

## 2022-04-12 RX ADMIN — Medication 100 MCG/HR: at 08:01

## 2022-04-12 RX ADMIN — PROPOFOL 70 MCG/KG/MIN: 10 INJECTION, EMULSION INTRAVENOUS at 08:01

## 2022-04-12 RX ADMIN — MIDAZOLAM 2 MG: 1 INJECTION INTRAMUSCULAR; INTRAVENOUS at 05:54

## 2022-04-12 RX ADMIN — LEVALBUTEROL HYDROCHLORIDE 1.25 MG: 1.25 SOLUTION, CONCENTRATE RESPIRATORY (INHALATION) at 13:13

## 2022-04-12 RX ADMIN — FOLIC ACID 1 MG: 5 INJECTION, SOLUTION INTRAMUSCULAR; INTRAVENOUS; SUBCUTANEOUS at 11:07

## 2022-04-12 RX ADMIN — POTASSIUM CHLORIDE 20 MEQ: 14.9 INJECTION, SOLUTION INTRAVENOUS at 07:55

## 2022-04-12 RX ADMIN — THIAMINE HYDROCHLORIDE 500 MG: 100 INJECTION, SOLUTION INTRAMUSCULAR; INTRAVENOUS at 10:03

## 2022-04-12 RX ADMIN — LIDOCAINE 1 PATCH: 50 PATCH CUTANEOUS at 11:08

## 2022-04-12 RX ADMIN — CLONIDINE HYDROCHLORIDE 0.1 MG: 0.1 TABLET ORAL at 15:55

## 2022-04-12 RX ADMIN — THIAMINE HYDROCHLORIDE 500 MG: 100 INJECTION, SOLUTION INTRAMUSCULAR; INTRAVENOUS at 01:20

## 2022-04-12 RX ADMIN — PROPOFOL 70 MCG/KG/MIN: 10 INJECTION, EMULSION INTRAVENOUS at 12:52

## 2022-04-12 RX ADMIN — ALBUMIN (HUMAN) 25 G: 12.5 INJECTION, SOLUTION INTRAVENOUS at 11:07

## 2022-04-12 RX ADMIN — DOCUSATE SODIUM AND SENNOSIDES 2 TABLET: 8.6; 5 TABLET, FILM COATED ORAL at 21:01

## 2022-04-12 RX ADMIN — IPRATROPIUM BROMIDE 0.5 MG: 0.5 SOLUTION RESPIRATORY (INHALATION) at 07:22

## 2022-04-12 RX ADMIN — MIDAZOLAM 2 MG: 1 INJECTION INTRAMUSCULAR; INTRAVENOUS at 21:45

## 2022-04-12 RX ADMIN — THIAMINE HYDROCHLORIDE 500 MG: 100 INJECTION, SOLUTION INTRAMUSCULAR; INTRAVENOUS at 17:57

## 2022-04-12 RX ADMIN — Medication 100 MCG/HR: at 19:33

## 2022-04-12 NOTE — PLAN OF CARE
Problem: MOBILITY - ADULT  Goal: Maintain or return to baseline ADL function  Description: INTERVENTIONS:  -  Assess patient's ability to carry out ADLs; assess patient's baseline for ADL function and identify physical deficits which impact ability to perform ADLs (bathing, care of mouth/teeth, toileting, grooming, dressing, etc )  - Assess/evaluate cause of self-care deficits   - Assess range of motion  - Assess patient's mobility; develop plan if impaired  - Assess patient's need for assistive devices and provide as appropriate  - Encourage maximum independence but intervene and supervise when necessary  - Involve family in performance of ADLs  - Assess for home care needs following discharge   - Consider OT consult to assist with ADL evaluation and planning for discharge  - Provide patient education as appropriate  Outcome: Not Progressing  Goal: Maintains/Returns to pre admission functional level  Description: INTERVENTIONS:  - Perform BMAT or MOVE assessment daily    - Set and communicate daily mobility goal to care team and patient/family/caregiver  - Collaborate with rehabilitation services on mobility goals if consulted  - Perform Range of Motion  times a day  - Reposition patient every  hours    - Dangle patient  times a day  - Stand patient  times a day  - Ambulate patient  times a day  - Out of bed to chair  times a day   - Out of bed for meals  times a day  - Out of bed for toileting  - Record patient progress and toleration of activity level   Outcome: Not Progressing     Problem: Potential for Falls  Goal: Patient will remain free of falls  Description: INTERVENTIONS:  - Educate patient/family on patient safety including physical limitations  - Instruct patient to call for assistance with activity   - Consult OT/PT to assist with strengthening/mobility   - Keep Call bell within reach  - Keep bed low and locked with side rails adjusted as appropriate  - Keep care items and personal belongings within reach  - Initiate and maintain comfort rounds  - Make Fall Risk Sign visible to staff  - Offer Toileting every  Hours, in advance of need  - Initiate/Maintain alarm  - Obtain necessary fall risk management equipment:   - Apply yellow socks and bracelet for high fall risk patients  - Consider moving patient to room near nurses station  Outcome: Not Progressing     Problem: Prexisting or High Potential for Compromised Skin Integrity  Goal: Skin integrity is maintained or improved  Description: INTERVENTIONS:  - Identify patients at risk for skin breakdown  - Assess and monitor skin integrity  - Assess and monitor nutrition and hydration status  - Monitor labs   - Assess for incontinence   - Turn and reposition patient  - Assist with mobility/ambulation  - Relieve pressure over bony prominences  - Avoid friction and shearing  - Provide appropriate hygiene as needed including keeping skin clean and dry  - Evaluate need for skin moisturizer/barrier cream  - Collaborate with interdisciplinary team   - Patient/family teaching  - Consider wound care consult   Outcome: Not Progressing     Problem: DISCHARGE PLANNING - CARE MANAGEMENT  Goal: Discharge to post-acute care or home with appropriate resources  Description: INTERVENTIONS:  - Conduct assessment to determine patient/family and health care team treatment goals, and need for post-acute services based on payer coverage, community resources, and patient preferences, and barriers to discharge  - Address psychosocial, clinical, and financial barriers to discharge as identified in assessment in conjunction with the patient/family and health care team  - Arrange appropriate level of post-acute services according to patients   needs and preference and payer coverage in collaboration with the physician and health care team  - Communicate with and update the patient/family, physician, and health care team regarding progress on the discharge plan  - Arrange appropriate transportation to post-acute venues  Outcome: Not Progressing     Problem: SUBSTANCE USE/ABUSE  Goal: By discharge, will develop insight into their chemical dependency and sustain motivation to continue in recovery  Description: INTERVENTIONS:  - Attends all daily group sessions and scheduled AA groups  - Actively practices coping skills through participation in the therapeutic community and adherence to program rules  - Reviews and completes assignments from individual treatment plan  - Assist patient development of understanding of their personal cycle of addiction and relapse triggers  Outcome: Not Progressing  Goal: By discharge, patient will have ongoing treatment plan addressing chemical dependency  Description: INTERVENTIONS:  - Assist patient with resources and/or appointments for ongoing recovery based living  Outcome: Not Progressing     Problem: Nutrition/Hydration-ADULT  Goal: Nutrient/Hydration intake appropriate for improving, restoring or maintaining nutritional needs  Description: Monitor and assess patient's nutrition/hydration status for malnutrition  Collaborate with interdisciplinary team and initiate plan and interventions as ordered  Monitor patient's weight and dietary intake as ordered or per policy  Utilize nutrition screening tool and intervene as necessary  Determine patient's food preferences and provide high-protein, high-caloric foods as appropriate       INTERVENTIONS:  - Monitor oral intake, urinary output, labs, and treatment plans  - Assess nutrition and hydration status and recommend course of action  - Evaluate amount of meals eaten  - Assist patient with eating if necessary   - Allow adequate time for meals  - Recommend/ encourage appropriate diets, oral nutritional supplements, and vitamin/mineral supplements  - Order, calculate, and assess calorie counts as needed  - Recommend, monitor, and adjust tube feedings and TPN/PPN based on assessed needs  - Assess need for intravenous fluids  - Provide specific nutrition/hydration education as appropriate  - Include patient/family/caregiver in decisions related to nutrition  Outcome: Not Progressing     Problem: PAIN - ADULT  Goal: Verbalizes/displays adequate comfort level or baseline comfort level  Description: Interventions:  - Encourage patient to monitor pain and request assistance  - Assess pain using appropriate pain scale  - Administer analgesics based on type and severity of pain and evaluate response  - Implement non-pharmacological measures as appropriate and evaluate response  - Consider cultural and social influences on pain and pain management  - Notify physician/advanced practitioner if interventions unsuccessful or patient reports new pain  Outcome: Not Progressing     Problem: INFECTION - ADULT  Goal: Absence or prevention of progression during hospitalization  Description: INTERVENTIONS:  - Assess and monitor for signs and symptoms of infection  - Monitor lab/diagnostic results  - Monitor all insertion sites, i e  indwelling lines, tubes, and drains  - Monitor endotracheal if appropriate and nasal secretions for changes in amount and color  - [place] appropriate cooling/warming therapies per order  - Administer medications as ordered  - Instruct and encourage patient and family to use good hand hygiene technique  - Identify and instruct in appropriate isolation precautions for identified infection/condition  Outcome: Not Progressing     Problem: SAFETY ADULT  Goal: Maintain or return to baseline ADL function  Description: INTERVENTIONS:  -  Assess patient's ability to carry out ADLs; assess patient's baseline for ADL function and identify physical deficits which impact ability to perform ADLs (bathing, care of mouth/teeth, toileting, grooming, dressing, etc )  - Assess/evaluate cause of self-care deficits   - Assess range of motion  - Assess patient's mobility; develop plan if impaired  - Assess patient's need for assistive devices and provide as appropriate  - Encourage maximum independence but intervene and supervise when necessary  - Involve family in performance of ADLs  - Assess for home care needs following discharge   - Consider OT consult to assist with ADL evaluation and planning for discharge  - Provide patient education as appropriate  Outcome: Not Progressing  Goal: Maintains/Returns to pre admission functional level  Description: INTERVENTIONS:  - Perform BMAT or MOVE assessment daily    - Set and communicate daily mobility goal to care team and patient/family/caregiver  - Collaborate with rehabilitation services on mobility goals if consulted  - Perform Range of Motion  times a day  - Reposition patient every  hours    - Dangle patient times a day  - Stand patient  times a day  - Ambulate patient  times a day  - Out of bed to chair  times a day   - Out of bed for meals  times a day  - Out of bed for toileting  - Record patient progress and toleration of activity level   Outcome: Not Progressing  Goal: Patient will remain free of falls  Description: INTERVENTIONS:  - Educate patient/family on patient safety including physical limitations  - Instruct patient to call for assistance with activity   - Consult OT/PT to assist with strengthening/mobility   - Keep Call bell within reach  - Keep bed low and locked with side rails adjusted as appropriate  - Keep care items and personal belongings within reach  - Initiate and maintain comfort rounds  - Make Fall Risk Sign visible to staff  - Offer Toileting every  Hours, in advance of need  - Initiate/Maintain alarm  - Obtain necessary fall risk management equipment:   - Apply yellow socks and bracelet for high fall risk patients  - Consider moving patient to room near nurses station  Outcome: Not Progressing     Problem: DISCHARGE PLANNING  Goal: Discharge to home or other facility with appropriate resources  Description: INTERVENTIONS:  - Identify barriers to discharge w/patient and caregiver  - Arrange for needed discharge resources and transportation as appropriate  - Identify discharge learning needs (meds, wound care, etc )  - Arrange for interpretive services to assist at discharge as needed  - Refer to Case Management Department for coordinating discharge planning if the patient needs post-hospital services based on physician/advanced practitioner order or complex needs related to functional status, cognitive ability, or social support system  Outcome: Not Progressing     Problem: Knowledge Deficit  Goal: Patient/family/caregiver demonstrates understanding of disease process, treatment plan, medications, and discharge instructions  Description: Complete learning assessment and assess knowledge base    Interventions:  - Provide teaching at level of understanding  - Provide teaching via preferred learning methods  Outcome: Not Progressing     Problem: NEUROSENSORY - ADULT  Goal: Achieves stable or improved neurological status  Description: INTERVENTIONS  - Monitor and report changes in neurological status  - Monitor vital signs such as temperature, blood pressure, glucose, and any other labs ordered   - Initiate measures to prevent increased intracranial pressure  - Monitor for seizure activity and implement precautions if appropriate      Outcome: Not Progressing  Goal: Remains free of injury related to seizures activity  Description: INTERVENTIONS  - Maintain airway, patient safety  and administer oxygen as ordered  - Monitor patient for seizure activity, document and report duration and description of seizure to physician/advanced practitioner  - If seizure occurs,  ensure patient safety during seizure  - Reorient patient post seizure  - Seizure pads on all 4 side rails  - Instruct patient/family to notify RN of any seizure activity including if an aura is experienced  - Instruct patient/family to call for assistance with activity based on nursing assessment  - Administer anti-seizure medications if ordered    Outcome: Not Progressing  Goal: Achieves maximal functionality and self care  Description: INTERVENTIONS  - Monitor swallowing and airway patency with patient fatigue and changes in neurological status  - Encourage and assist patient to increase activity and self care     - Encourage visually impaired, hearing impaired and aphasic patients to use assistive/communication devices  Outcome: Not Progressing     Problem: CARDIOVASCULAR - ADULT  Goal: Maintains optimal cardiac output and hemodynamic stability  Description: INTERVENTIONS:  - Monitor I/O, vital signs and rhythm  - Monitor for S/S and trends of decreased cardiac output  - Administer and titrate ordered vasoactive medications to optimize hemodynamic stability  - Assess quality of pulses, skin color and temperature  - Assess for signs of decreased coronary artery perfusion  - Instruct patient to report change in severity of symptoms  Outcome: Not Progressing  Goal: Absence of cardiac dysrhythmias or at baseline rhythm  Description: INTERVENTIONS:  - Continuous cardiac monitoring, vital signs, obtain 12 lead EKG if ordered  - Administer antiarrhythmic and heart rate control medications as ordered  - Monitor electrolytes and administer replacement therapy as ordered  Outcome: Not Progressing     Problem: RESPIRATORY - ADULT  Goal: Achieves optimal ventilation and oxygenation  Description: INTERVENTIONS:  - Assess for changes in respiratory status  - Assess for changes in mentation and behavior  - Position to facilitate oxygenation and minimize respiratory effort  - Oxygen administered by appropriate delivery if ordered  - Initiate smoking cessation education as indicated  - Encourage broncho-pulmonary hygiene including cough, deep breathe, Incentive Spirometry  - Assess the need for suctioning and aspirate as needed  - Assess and instruct to report SOB or any respiratory difficulty  - Respiratory Therapy support as indicated  Outcome: Not Progressing     Problem: GASTROINTESTINAL - ADULT  Goal: Minimal or absence of nausea and/or vomiting  Description: INTERVENTIONS:  - Administer IV fluids if ordered to ensure adequate hydration  - Maintain NPO status until nausea and vomiting are resolved  - Nasogastric tube if ordered  - Administer ordered antiemetic medications as needed  - Provide nonpharmacologic comfort measures as appropriate  - Advance diet as tolerated, if ordered  - Consider nutrition services referral to assist patient with adequate nutrition and appropriate food choices  Outcome: Not Progressing  Goal: Maintains or returns to baseline bowel function  Description: INTERVENTIONS:  - Assess bowel function  - Encourage oral fluids to ensure adequate hydration  - Administer IV fluids if ordered to ensure adequate hydration  - Administer ordered medications as needed  - Encourage mobilization and activity  - Consider nutritional services referral to assist patient with adequate nutrition and appropriate food choices  Outcome: Not Progressing  Goal: Maintains adequate nutritional intake  Description: INTERVENTIONS:  - Monitor percentage of each meal consumed  - Identify factors contributing to decreased intake, treat as appropriate  - Assist with meals as needed  - Monitor I&O, weight, and lab values if indicated  - Obtain nutrition services referral as needed  Outcome: Not Progressing  Goal: Oral mucous membranes remain intact  Description: INTERVENTIONS  - Assess oral mucosa and hygiene practices  - Implement preventative oral hygiene regimen  - Implement oral medicated treatments as ordered  - Initiate Nutrition services referral as needed  Outcome: Not Progressing     Problem: GENITOURINARY - ADULT  Goal: Maintains or returns to baseline urinary function  Description: INTERVENTIONS:  - Assess urinary function  - Encourage oral fluids to ensure adequate hydration if ordered  - Administer IV fluids as ordered to ensure adequate hydration  - Administer ordered medications as needed  - Offer frequent toileting  - Follow urinary retention protocol if ordered  Outcome: Not Progressing  Goal: Absence of urinary retention  Description: INTERVENTIONS:  - Assess patients ability to void and empty bladder  - Monitor I/O  - Bladder scan as needed  - Discuss with physician/AP medications to alleviate retention as needed  - Discuss catheterization for long term situations as appropriate  Outcome: Not Progressing  Goal: Urinary catheter remains patent  Description: INTERVENTIONS:  - Assess patency of urinary catheter  - If patient has a chronic vazquez, consider changing catheter if non-functioning  - Follow guidelines for intermittent irrigation of non-functioning urinary catheter  Outcome: Not Progressing     Problem: METABOLIC, FLUID AND ELECTROLYTES - ADULT  Goal: Electrolytes maintained within normal limits  Description: INTERVENTIONS:  - Monitor labs and assess patient for signs and symptoms of electrolyte imbalances  - Administer electrolyte replacement as ordered  - Monitor response to electrolyte replacements, including repeat lab results as appropriate  - Instruct patient on fluid and nutrition as appropriate  Outcome: Not Progressing  Goal: Fluid balance maintained  Description: INTERVENTIONS:  - Monitor labs   - Monitor I/O and WT  - Instruct patient on fluid and nutrition as appropriate  - Assess for signs & symptoms of volume excess or deficit  Outcome: Not Progressing     Problem: SKIN/TISSUE INTEGRITY - ADULT  Goal: Skin Integrity remains intact(Skin Breakdown Prevention)  Description: Assess:  -Perform [place] assessment every   -Clean and moisturize skin every   -Inspect skin when repositioning, toileting, and assisting with ADLS  -Assess under medical devices such as  every   -Assess extremities for adequate circulation and sensation     Bed Management:  -Have minimal linens on bed & keep smooth, unwrinkled  -Change linens as needed when moist or perspiring  -Avoid sitting or lying in one position for more than  hours while in bed  -Keep HOB at degrees     Toileting:  -Offer bedside commode  -Assess for incontinence every   -Use incontinent care products after each incontinent episode such as     Activity:  -Mobilize patient  times a day  -Encourage activity and walks on unit  -Encourage or provide ROM exercises   -Turn and reposition patient every  Hours  -Use appropriate equipment to lift or move patient in bed  -Instruct/ Assist with weight shifting every  when out of bed in chair  -Consider limitation of chair time  hour intervals    Skin Care:  -Avoid use of baby powder, tape, friction and shearing, hot water or constrictive clothing  -Relieve pressure over bony prominences using   -Do not massage red bony areas    Next Steps:  -Teach patient strategies to minimize risks such as    -Consider consults to  interdisciplinary teams such as  Outcome: Not Progressing     Problem: SAFETY,RESTRAINT: NV/NON-SELF DESTRUCTIVE BEHAVIOR  Goal: Remains free of harm/injury (restraint for non violent/non self-detsructive behavior)  Description: INTERVENTIONS:  - Instruct patient/family regarding restraint use   - Assess and monitor physiologic and psychological status   - Provide interventions and comfort measures to meet assessed patient needs   - Identify and implement measures to help patient regain control  - Assess readiness for release of restraint   Outcome: Not Progressing  Goal: Returns to optimal restraint-free functioning  Description: INTERVENTIONS:  - Assess the patient's behavior and symptoms that indicate continued need for restraint  - Identify and implement measures to help patient regain control  - Assess readiness for release of restraint   Outcome: Not Progressing

## 2022-04-12 NOTE — ASSESSMENT & PLAN NOTE
Patient acute hypoxic on admission, was given dose of Lasix with marked improvement  However, this morning patient started to become more agitated, hypoxic and tachycardic unable to tolerate BiPAP, patient was intubated for airway protection in the setting of encephalopathy and potential DTs  · Will try to extubate today   Patient continues to have high amounts of secretions  · Patient continues to require propafol at 70 mcgs   · Sputum culture shows polys and procal is not significantly elevated

## 2022-04-12 NOTE — PROGRESS NOTES
[address]  Progress Note - Francisca Bender Ser 1965, 60 y o  female MRN: [id number]  Unit/Bed#: ICU 11 Encounter: [id number]  Primary Care Provider: Thalia Ng DO   Date and time admitted to hospital: 4/8/2022  3:55 PM    * Acute respiratory failure with hypoxia [facility])  Assessment & Plan  Patient acute hypoxic on admission, was given dose of Lasix with marked improvement  However, this morning patient started to become more agitated, hypoxic and tachycardic unable to tolerate BiPAP, patient was intubated for airway protection in the setting of encephalopathy and potential DTs  · Continue mechanical ventilation and titrate PEEP and FiO2 to maintain SpO2 above 88%  · Daily assessment for SAT and SBT when appropriate  · Consider drainage of pleural effusions via thoracentesis  · Sedation with Fentanyl and Propofol infusion for goal RASS 0 to -1, PRN versed and dilaudid, consider Precedex    Delirium tremens ([address] )  Assessment & Plan  Patient drinks roughly 8-12 shots of whiskey daily  Patient's last drink was April 1, 2022  EtoH level on admission was 54  Has never had any history withdrawals or seizures  Patient was initially admitted for alcoholic pancreatitis, however, patient became encephalopathic and agitated requiring transfer to detox Unit  · Patient was initially on phenobarbital and Precedex  · Patient switched over to fentanyl and Propofol with PRN versed and dilaudid  Patient continues to be intermitantly agitated, can consider switching to versed/dilaudid drip     Alcohol-induced acute pancreatitis  Assessment & Plan  · Patient initially presented with a lipase of 53,000 and has trended down to 819 this morning  Patient's acute pancreatitis is likely secondary to alcohol    · 4/9 CT scan showed worsening of acute pancreatitis with new necrosis of a portion of the pancreatic neck and with worsening peripancreatic edema  · GI recommendations appreciated  · Surgery consulted, appreciate recommendations  No surgical intervention at the meantime and recommending TPN and rescan in 5-7 days  · On enteral nutrition  · Pain control  · On  mL/hr    Alcohol use disorder, severe, dependence ([facility] [address] )  Assessment & Plan  · Will continue with withdrawal management  · Case management consult in place  · Encouraged cessation   · Continue thiamine and folic acid       Ataxia  Assessment & Plan  On admission patient was reported to have ataxia as well as finger-to-nose ataxia  Patient was started on high-dose thiamine for Wernicke's encephalopathy    · Continue thiamine and folic acid  · Can consider MRI once patient is stable  · Fall precautions        Gastroesophageal reflux disease without esophagitis  Assessment & Plan  · Continue Protonix 40 mg daily       Hypertension  Assessment & Plan  · Patient is currently on 10 mg of lisinopril daily at home  Can be resumed once extubated  · Hydralazine p r n  For SBP greater than 170         Hypokalemia-resolved as of 4/10/2022  Assessment & Plan  · Likely secondary to diuretic administration  Resolved   · Monitor BMP      ----------------------------------------------------------------------------------------  HPI/24hr events: Patient was diuresed yesterday  No other significant overnight events       Patient appropriate for transfer out of the ICU today?: No  Disposition: Continue Critical Care   Code Status: Level 1 - Full Code  ---------------------------------------------------------------------------------------  SUBJECTIVE  Very agitated     Review of Systems   Unable to perform ROS: Intubated     Review of systems was reviewed and negative unless stated above in HPI/24-hour events   ---------------------------------------------------------------------------------------  OBJECTIVE    Vitals   Vitals:    04/12/22 0400 04/12/22 0500 04/12/22 0600 04/12/22 0700   BP:       BP Location:       Pulse: 98 104 (!) 112    Resp: 14 13 13    Temp: 98 °F (36 7 °C)   99 7 °F (37 6 °C)   TempSrc: Bladder   Bladder   SpO2: 99% 98% 94%    Weight:  95 9 kg (211 lb 6 7 oz)     Height:         Temp (24hrs), Av 3 °F (37 4 °C), Min:98 °F (36 7 °C), Max:100 4 °F (38 °C)  Current: Temperature: 99 7 °F (37 6 °C)  Arterial Line BP: 138/54  Arterial Line MAP (mmHg): 76 mmHg    Respiratory:  SpO2: SpO2: 94 %       Invasive/non-invasive ventilation settings   Respiratory  Report   Lab Data (Last 4 hours)    None         O2/Vent Data (Last 4 hours)       07           Vent Mode SIMV/VC       Resp Rate (BPM) (BPM) 14       VT (mL) (mL) 400       FiO2 (%) (%) 50       PEEP (cmH2O) (cmH2O) 6       Pressure Support (cm H2O) (cm) 8                   Physical Exam  Constitutional:       Comments: Intubated  Very agitated    HENT:      Head: Normocephalic  Eyes:      Conjunctiva/sclera: Conjunctivae normal       Pupils: Pupils are equal, round, and reactive to light  Cardiovascular:      Rate and Rhythm: Regular rhythm  Tachycardia present  Pulses: Normal pulses  Heart sounds: No murmur heard  No gallop  Pulmonary:      Comments: SMIV  14/400/50%/6  Abdominal:      General: There is distension  Tenderness: There is no abdominal tenderness        Comments: Severely diminished bowel sounds              Laboratory and Diagnostics:  Results from last 7 days   Lab Units 04/12/22  0527 04/10/22  0331 22  0924 22  0512 22  1949 22  0452 22  0502   WBC Thousand/uL 11 87* 10 35* 9 65 10 31* 9 79 9 34 12 23*   HEMOGLOBIN g/dL 9 9* 11 1* 11 4* 11 6 11 4* 10 7* 13 9   HEMATOCRIT % 29 2* 33 7* 34 2* 30 7* 32 4* 32 9* 40 5   PLATELETS Thousands/uL 274 216 193 244 174 158 172   BANDS PCT %  --   --   --   --  5  --   --    MONO PCT %  --   --   --   --  13*  --   --      Results from last 7 days   Lab Units 04/12/22  0527 04/10/22  0332 22  0922 22  0512 22  1949 22  0452 22  0502   SODIUM mmol/L 139 137 139 125* 142 137 136   POTASSIUM mmol/L 3 3* 3 5 3 5 3 2* 3 5 3 3* 3 4*   CHLORIDE mmol/L 100 101 102 91* 104 100 101   CO2 mmol/L 34* 28 27 24 30 33* 28   ANION GAP mmol/L 5 8 10 10 8 4* 7   BUN mg/dL 5 6 9 8 9 9 7   CREATININE mg/dL 0 41* 0 39* 0 76 0 53* 0 73 0 67 0 64   CALCIUM mg/dL 8 3 8 2* 8 4 6 7* 8 1* 7 7* 8 0*   GLUCOSE RANDOM mg/dL 140 79 100 80 76 94 112   ALT U/L 52 43 35 26 24 22 31   AST U/L 46* 65* 52* 51* 30 41* 33   ALK PHOS U/L 255* 153* 110 89 82 68 65   ALBUMIN g/dL 1 5* 1 9* 2 1* 1 9* 2 1* 3 2 2 8*   TOTAL BILIRUBIN mg/dL 0 37 0 56 0 64 0 91 0 61 0 89 1 15*     Results from last 7 days   Lab Units 04/12/22  0527 04/10/22  0332 04/09/22  0512 04/08/22  1949 04/08/22  0452 04/07/22  0502 04/06/22  0458   MAGNESIUM mg/dL 2 1 2 2 1 9 2 1 2 1 1 7 1 3*   PHOSPHORUS mg/dL 3 1 3 6  --  2 3* 3 3 2 0* 2 8               Results from last 7 days   Lab Units 04/08/22  1949   LACTIC ACID mmol/L 1 2     ABG:  Results from last 7 days   Lab Units 04/11/22  1035   PH ART  7 332*   PCO2 ART mm Hg 53 6*   PO2 ART mm Hg 71 8*   HCO3 ART mmol/L 27 8   BASE EXC ART mmol/L 1 2   ABG SOURCE  Line, Arterial     VBG:  Results from last 7 days   Lab Units 04/11/22  1035 04/09/22  0436 04/08/22  1311   PH TOM   --   --  7 345   PCO2 TOM mm Hg  --   --  51 8*   PO2 TOM mm Hg  --   --  46 0*   HCO3 TOM mmol/L  --   --  27 6   BASE EXC TOM mmol/L  --   --  1 2   ABG SOURCE  Line, Arterial   < >  --     < > = values in this interval not displayed  Results from last 7 days   Lab Units 04/10/22  0332 04/09/22  0924 04/08/22 1949   PROCALCITONIN ng/ml 0 63* 0 38* 0 34*       Micro  Results from last 7 days   Lab Units 04/08/22 1953   BLOOD CULTURE  No Growth at 72 hrs  No Growth at 72 hrs  EKG: NSR  Imaging: I have personally reviewed pertinent reports        Intake and Output  I/O       04/10 0701 04/11 0700 04/11 0701 04/12 0700 04/12 0701 04/13 0700    I V  (mL/kg) 3226 2 (34 8) 1492 9 (15 6) 95 (1) NG/ 701     IV Piggyback 250 150     Feedings  379     Total Intake(mL/kg) 3676 2 (39 6) 2722 9 (28 4) 95 (1)    Urine (mL/kg/hr) 1490 (0 7) 3550 (1 5) 350 (3)    Total Output 1490 3550 350    Net +2186 2 -827 1 -255                 Height and Weights   Height: 5' 8" (172 7 cm)     Body mass index is 32 15 kg/m²  Weight (last 2 days)     Date/Time Weight    04/12/22 0500 95 9 (211 42)    04/11/22 1140 96 8 (213 41)            Nutrition       Diet Orders   (From admission, onward)             Start     Ordered    04/10/22 1758  Diet Enteral/Parenteral; Tube Feeding No Oral Diet; Jevity 1 2 Hira; Continuous; 20; 100; Water; Every 4 hours  Diet effective now        References:    Nutrtion Support Algorithm Enteral vs  Parenteral   Question Answer Comment   Diet Type Enteral/Parenteral    Enteral/Parenteral Tube Feeding No Oral Diet    Tube Feeding Formula: Jevity 1 2 Hira    Bolus/Cyclic/Continuous Continuous    Tube Feeding Goal Rate (mL/hr): 20    Tube Feeding flush (mL): 100    Water Flush type: Water    Water flush frequency: Every 4 hours    RD to adjust diet per protocol?  No        04/10/22 1758                  Active Medications  Scheduled Meds:  Current Facility-Administered Medications   Medication Dose Route Frequency Provider Last Rate    chlorhexidine  15 mL [facility] TETE Tom      cloNIDine  0 1 mg Oral [place], [place]      enoxaparin  40 mg Subcutaneous Daily Low Mcqueen MD      fentaNYL  100 mcg/hr Intravenous Continuous TETE Tom 100 mcg/hr (85/58/48 0277)    folic acid IVPB  1 mg Intravenous Daily Low Mcqueen MD Stopped (04/11/22 1200)    hydrALAZINE  10 mg Intravenous Q6H PRN TETE Kapadia      HYDROmorphone  1 mg Intravenous Q3H PRN TETE Tom      ipratropium  0 5 mg Nebulization TID Low Mcqueen MD      ipratropium-albuterol  3 mL Nebulization TID PRN Low Mcqueen MD      levalbuterol  1 25 mg Nebulization TID Esteban Herrera MD      lidocaine  1 patch Topical Q24H Esteban Herrera MD      midazolam  2 mg Intravenous Q4H PRN TETE Negro      multivitamin-minerals  1 tablet Oral Daily Esteban Herrera MD      nicotine  1 patch Transdermal Daily Esteban Herrera MD      omeprazole (PRILOSEC) suspension 2 mg/mL  20 mg Oral Daily GURJIT GIBSON      ondansetron  4 mg Intravenous Q6H PRN Esteban Herrera MD      potassium chloride  20 mEq Intravenous Once Esteban Herrera MD 20 mEq (04/12/22 0755)    propofol  5-70 mcg/kg/min Intravenous Titrated GURJIT GIBSON 70 mcg/kg/min (04/12/22 0801)    senna-docusate sodium  2 tablet Per NG Tube HS TETE Banuelos      thiamine  500 mg Intravenous Q8H Esteban Herrera  mg (04/12/22 0120)     Continuous Infusions:  fentaNYL, 100 mcg/hr, Last Rate: 100 mcg/hr (04/12/22 0801)  propofol, 5-70 mcg/kg/min, Last Rate: 70 mcg/kg/min (04/12/22 0801)      PRN Meds:   hydrALAZINE, 10 mg, Q6H PRN  HYDROmorphone, 1 mg, Q3H PRN  ipratropium-albuterol, 3 mL, TID PRN  midazolam, 2 mg, Q4H PRN  ondansetron, 4 mg, Q6H PRN        Invasive Devices Review  Invasive Devices  Report    Peripheral Intravenous Line            Peripheral IV 04/08/22 Left Antecubital 3 days    Peripheral IV 04/12/22 Left;Ventral (anterior) Forearm <1 day    Peripheral IV 04/12/22 Right;Ventral (anterior) Forearm <1 day          Arterial Line            Arterial Line 04/09/22 Radial 2 days          Drain            NG/OG/Enteral Tube Orogastric 14 Fr Left mouth 3 days    Urethral Catheter Non-latex 16 Fr  3 days          Airway            ETT  Oral 8 mm 3 days                Rationale for remaining devices:   ---------------------------------------------------------------------------------------  Advance Directive and Living Will:      Power of :    POLST:    ---------------------------------------------------------------------------------------  Care Time Delivered:   Please see attending's attestation      Ingrid Santana MD      Portions of the record may have been created with voice recognition software  Occasional wrong word or "sound a like" substitutions may have occurred due to the inherent limitations of voice recognition software    Read the chart carefully and recognize, using context, where substitutions have occurred

## 2022-04-12 NOTE — ASSESSMENT & PLAN NOTE
Patient acute hypoxic on admission, was given dose of Lasix with marked improvement  However, this morning patient started to become more agitated, hypoxic and tachycardic unable to tolerate BiPAP, patient was intubated for airway protection in the setting of encephalopathy and potential DTs  · Will try to extubate today after diuresis yesterday     · Daily assessment for SAT and SBT when appropriate  · Sedation with Fentanyl and Propofol infusion for goal RASS 0 to -1, PRN versed and dilaudid, consider Precedex

## 2022-04-12 NOTE — PLAN OF CARE
Problem: Potential for Falls  Goal: Patient will remain free of falls  Description: INTERVENTIONS:  - Educate patient/family on patient safety including physical limitations  - Instruct patient to call for assistance with activity   - Consult OT/PT to assist with strengthening/mobility   - Keep Call bell within reach  - Keep bed low and locked with side rails adjusted as appropriate  - Keep care items and personal belongings within reach  - Initiate and maintain comfort rounds  - Make Fall Risk Sign visible to staff  - Offer Toileting every 2 Hours, in advance of need  - Initiate/Maintain bed alarm  - Obtain necessary fall risk management equipment: bed alarm  - Apply yellow socks and bracelet for high fall risk patients  - Consider moving patient to room near nurses station  Outcome: Progressing     Problem: Prexisting or High Potential for Compromised Skin Integrity  Goal: Skin integrity is maintained or improved  Description: INTERVENTIONS:  - Identify patients at risk for skin breakdown  - Assess and monitor skin integrity  - Assess and monitor nutrition and hydration status  - Monitor labs   - Assess for incontinence   - Turn and reposition patient  - Assist with mobility/ambulation  - Relieve pressure over bony prominences  - Avoid friction and shearing  - Provide appropriate hygiene as needed including keeping skin clean and dry  - Evaluate need for skin moisturizer/barrier cream  - Collaborate with interdisciplinary team   - Patient/family teaching  - Consider wound care consult   Outcome: Progressing     Problem: Nutrition/Hydration-ADULT  Goal: Nutrient/Hydration intake appropriate for improving, restoring or maintaining nutritional needs  Description: Monitor and assess patient's nutrition/hydration status for malnutrition  Collaborate with interdisciplinary team and initiate plan and interventions as ordered  Monitor patient's weight and dietary intake as ordered or per policy   Utilize nutrition screening tool and intervene as necessary  Determine patient's food preferences and provide high-protein, high-caloric foods as appropriate       INTERVENTIONS:  - Monitor oral intake, urinary output, labs, and treatment plans  - Assess nutrition and hydration status and recommend course of action  - Evaluate amount of meals eaten  - Assist patient with eating if necessary   - Allow adequate time for meals  - Recommend/ encourage appropriate diets, oral nutritional supplements, and vitamin/mineral supplements  - Order, calculate, and assess calorie counts as needed  - Recommend, monitor, and adjust tube feedings and TPN/PPN based on assessed needs  - Assess need for intravenous fluids  - Provide specific nutrition/hydration education as appropriate  - Include patient/family/caregiver in decisions related to nutrition  Outcome: Progressing     Problem: INFECTION - ADULT  Goal: Absence or prevention of progression during hospitalization  Description: INTERVENTIONS:  - Assess and monitor for signs and symptoms of infection  - Monitor lab/diagnostic results  - Monitor all insertion sites, i e  indwelling lines, tubes, and drains  - Monitor endotracheal if appropriate and nasal secretions for changes in amount and color  - [place] appropriate cooling/warming therapies per order  - Administer medications as ordered  - Instruct and encourage patient and family to use good hand hygiene technique  - Identify and instruct in appropriate isolation precautions for identified infection/condition  Outcome: Progressing     Problem: SAFETY ADULT  Goal: Patient will remain free of falls  Description: INTERVENTIONS:  - Educate patient/family on patient safety including physical limitations  - Instruct patient to call for assistance with activity   - Consult OT/PT to assist with strengthening/mobility   - Keep Call bell within reach  - Keep bed low and locked with side rails adjusted as appropriate  - Keep care items and personal belongings within reach  - Initiate and maintain comfort rounds  - Make Fall Risk Sign visible to staff  - Offer Toileting every 2 Hours, in advance of need  - Initiate/Maintain bed alarm  - Obtain necessary fall risk management equipment: bed alarm  - Apply yellow socks and bracelet for high fall risk patients  - Consider moving patient to room near nurses station  Outcome: Progressing     Problem: CARDIOVASCULAR - ADULT  Goal: Absence of cardiac dysrhythmias or at baseline rhythm  Description: INTERVENTIONS:  - Continuous cardiac monitoring, vital signs, obtain 12 lead EKG if ordered  - Administer antiarrhythmic and heart rate control medications as ordered  - Monitor electrolytes and administer replacement therapy as ordered  Outcome: Progressing     Problem: SAFETY,RESTRAINT: NV/NON-SELF DESTRUCTIVE BEHAVIOR  Goal: Remains free of harm/injury (restraint for non violent/non self-detsructive behavior)  Description: INTERVENTIONS:  - Instruct patient/family regarding restraint use   - Assess and monitor physiologic and psychological status   - Provide interventions and comfort measures to meet assessed patient needs   - Identify and implement measures to help patient regain control  - Assess readiness for release of restraint   Outcome: Progressing  Goal: Returns to optimal restraint-free functioning  Description: INTERVENTIONS:  - Assess the patient's behavior and symptoms that indicate continued need for restraint  - Identify and implement measures to help patient regain control  - Assess readiness for release of restraint   Outcome: Progressing

## 2022-04-13 ENCOUNTER — APPOINTMENT (INPATIENT)
Dept: GASTROENTEROLOGY | Facility: HOSPITAL | Age: 57
DRG: 207 | End: 2022-04-13
Payer: COMMERCIAL

## 2022-04-13 ENCOUNTER — APPOINTMENT (INPATIENT)
Dept: RADIOLOGY | Facility: HOSPITAL | Age: 57
DRG: 207 | End: 2022-04-13
Payer: COMMERCIAL

## 2022-04-13 LAB
ALBUMIN SERPL BCP-MCNC: 1.9 G/DL (ref 3.5–5)
ALBUMIN SERPL BCP-MCNC: 1.9 G/DL (ref 3.5–5)
ALP SERPL-CCNC: 276 U/L (ref 46–116)
ALT SERPL W P-5'-P-CCNC: 50 U/L (ref 12–78)
ANION GAP SERPL CALCULATED.3IONS-SCNC: 11 MMOL/L (ref 4–13)
ANION GAP SERPL CALCULATED.3IONS-SCNC: 5 MMOL/L (ref 4–13)
AST SERPL W P-5'-P-CCNC: 67 U/L (ref 5–45)
BASOPHILS # BLD AUTO: 0.03 THOUSANDS/ΜL (ref 0–0.1)
BASOPHILS NFR BLD AUTO: 0 % (ref 0–1)
BILIRUB DIRECT SERPL-MCNC: 0.24 MG/DL (ref 0–0.2)
BILIRUB SERPL-MCNC: 0.41 MG/DL (ref 0.2–1)
BUN SERPL-MCNC: 6 MG/DL (ref 5–25)
BUN SERPL-MCNC: 9 MG/DL (ref 5–25)
CALCIUM SERPL-MCNC: 7.9 MG/DL (ref 8.3–10.1)
CALCIUM SERPL-MCNC: 8.7 MG/DL (ref 8.3–10.1)
CHLORIDE SERPL-SCNC: 97 MMOL/L (ref 100–108)
CHLORIDE SERPL-SCNC: 99 MMOL/L (ref 100–108)
CO2 SERPL-SCNC: 30 MMOL/L (ref 21–32)
CO2 SERPL-SCNC: 36 MMOL/L (ref 21–32)
CREAT SERPL-MCNC: 0.48 MG/DL (ref 0.6–1.3)
CREAT SERPL-MCNC: 0.6 MG/DL (ref 0.6–1.3)
EOSINOPHIL # BLD AUTO: 0.1 THOUSAND/ΜL (ref 0–0.61)
EOSINOPHIL NFR BLD AUTO: 1 % (ref 0–6)
ERYTHROCYTE [DISTWIDTH] IN BLOOD BY AUTOMATED COUNT: 12.6 % (ref 11.6–15.1)
GFR SERPL CREATININE-BSD FRML MDRD: 100 ML/MIN/1.73SQ M
GFR SERPL CREATININE-BSD FRML MDRD: 108 ML/MIN/1.73SQ M
GLUCOSE SERPL-MCNC: 124 MG/DL (ref 65–140)
GLUCOSE SERPL-MCNC: 134 MG/DL (ref 65–140)
GLUCOSE SERPL-MCNC: 140 MG/DL (ref 65–140)
GLUCOSE SERPL-MCNC: 145 MG/DL (ref 65–140)
GLUCOSE SERPL-MCNC: 157 MG/DL (ref 65–140)
HCT VFR BLD AUTO: 30.1 % (ref 34.8–46.1)
HGB BLD-MCNC: 9.9 G/DL (ref 11.5–15.4)
IMM GRANULOCYTES # BLD AUTO: 0.2 THOUSAND/UL (ref 0–0.2)
IMM GRANULOCYTES NFR BLD AUTO: 2 % (ref 0–2)
LYMPHOCYTES # BLD AUTO: 0.86 THOUSANDS/ΜL (ref 0.6–4.47)
LYMPHOCYTES NFR BLD AUTO: 8 % (ref 14–44)
MAGNESIUM SERPL-MCNC: 1.8 MG/DL (ref 1.6–2.6)
MCH RBC QN AUTO: 33.8 PG (ref 26.8–34.3)
MCHC RBC AUTO-ENTMCNC: 32.9 G/DL (ref 31.4–37.4)
MCV RBC AUTO: 103 FL (ref 82–98)
MONOCYTES # BLD AUTO: 0.86 THOUSAND/ΜL (ref 0.17–1.22)
MONOCYTES NFR BLD AUTO: 8 % (ref 4–12)
NEUTROPHILS # BLD AUTO: 8.59 THOUSANDS/ΜL (ref 1.85–7.62)
NEUTS SEG NFR BLD AUTO: 81 % (ref 43–75)
NRBC BLD AUTO-RTO: 0 /100 WBCS
PHOSPHATE SERPL-MCNC: 3.4 MG/DL (ref 2.7–4.5)
PLATELET # BLD AUTO: 283 THOUSANDS/UL (ref 149–390)
PMV BLD AUTO: 9.6 FL (ref 8.9–12.7)
POTASSIUM SERPL-SCNC: 3.2 MMOL/L (ref 3.5–5.3)
POTASSIUM SERPL-SCNC: 3.3 MMOL/L (ref 3.5–5.3)
PROT SERPL-MCNC: 6.4 G/DL (ref 6.4–8.2)
RBC # BLD AUTO: 2.93 MILLION/UL (ref 3.81–5.12)
SODIUM SERPL-SCNC: 138 MMOL/L (ref 136–145)
SODIUM SERPL-SCNC: 140 MMOL/L (ref 136–145)
TRIGL SERPL-MCNC: 225 MG/DL
WBC # BLD AUTO: 10.64 THOUSAND/UL (ref 4.31–10.16)

## 2022-04-13 PROCEDURE — 85027 COMPLETE CBC AUTOMATED: CPT | Performed by: INTERNAL MEDICINE

## 2022-04-13 PROCEDURE — 80048 BASIC METABOLIC PNL TOTAL CA: CPT | Performed by: INTERNAL MEDICINE

## 2022-04-13 PROCEDURE — 88112 CYTOPATH CELL ENHANCE TECH: CPT | Performed by: PATHOLOGY

## 2022-04-13 PROCEDURE — 82948 REAGENT STRIP/BLOOD GLUCOSE: CPT

## 2022-04-13 PROCEDURE — 82040 ASSAY OF SERUM ALBUMIN: CPT | Performed by: INTERNAL MEDICINE

## 2022-04-13 PROCEDURE — 80076 HEPATIC FUNCTION PANEL: CPT | Performed by: INTERNAL MEDICINE

## 2022-04-13 PROCEDURE — 94003 VENT MGMT INPAT SUBQ DAY: CPT

## 2022-04-13 PROCEDURE — 88342 IMHCHEM/IMCYTCHM 1ST ANTB: CPT | Performed by: PATHOLOGY

## 2022-04-13 PROCEDURE — 83735 ASSAY OF MAGNESIUM: CPT

## 2022-04-13 PROCEDURE — 71045 X-RAY EXAM CHEST 1 VIEW: CPT

## 2022-04-13 PROCEDURE — 94640 AIRWAY INHALATION TREATMENT: CPT

## 2022-04-13 PROCEDURE — 88305 TISSUE EXAM BY PATHOLOGIST: CPT | Performed by: PATHOLOGY

## 2022-04-13 PROCEDURE — 87070 CULTURE OTHR SPECIMN AEROBIC: CPT | Performed by: INTERNAL MEDICINE

## 2022-04-13 PROCEDURE — 0BCB8ZZ EXTIRPATION OF MATTER FROM LEFT LOWER LOBE BRONCHUS, VIA NATURAL OR ARTIFICIAL OPENING ENDOSCOPIC: ICD-10-PCS | Performed by: INTERNAL MEDICINE

## 2022-04-13 PROCEDURE — 84478 ASSAY OF TRIGLYCERIDES: CPT | Performed by: INTERNAL MEDICINE

## 2022-04-13 PROCEDURE — 0BC68ZZ EXTIRPATION OF MATTER FROM RIGHT LOWER LOBE BRONCHUS, VIA NATURAL OR ARTIFICIAL OPENING ENDOSCOPIC: ICD-10-PCS | Performed by: INTERNAL MEDICINE

## 2022-04-13 PROCEDURE — 87081 CULTURE SCREEN ONLY: CPT

## 2022-04-13 PROCEDURE — 87281 PNEUMOCYSTIS CARINII AG IF: CPT | Performed by: INTERNAL MEDICINE

## 2022-04-13 PROCEDURE — 83605 ASSAY OF LACTIC ACID: CPT

## 2022-04-13 PROCEDURE — 87040 BLOOD CULTURE FOR BACTERIA: CPT

## 2022-04-13 PROCEDURE — 80048 BASIC METABOLIC PNL TOTAL CA: CPT

## 2022-04-13 PROCEDURE — 94669 MECHANICAL CHEST WALL OSCILL: CPT

## 2022-04-13 PROCEDURE — 87252 VIRUS INOCULATION TISSUE: CPT | Performed by: INTERNAL MEDICINE

## 2022-04-13 PROCEDURE — 31622 DX BRONCHOSCOPE/WASH: CPT | Performed by: INTERNAL MEDICINE

## 2022-04-13 PROCEDURE — 94668 MNPJ CHEST WALL SBSQ: CPT

## 2022-04-13 PROCEDURE — 99291 CRITICAL CARE FIRST HOUR: CPT | Performed by: INTERNAL MEDICINE

## 2022-04-13 PROCEDURE — 87015 SPECIMEN INFECT AGNT CONCNTJ: CPT | Performed by: INTERNAL MEDICINE

## 2022-04-13 PROCEDURE — 88341 IMHCHEM/IMCYTCHM EA ADD ANTB: CPT | Performed by: PATHOLOGY

## 2022-04-13 PROCEDURE — 84100 ASSAY OF PHOSPHORUS: CPT

## 2022-04-13 RX ORDER — DEXMEDETOMIDINE HYDROCHLORIDE 4 UG/ML
.1-1.5 INJECTION, SOLUTION INTRAVENOUS
Status: DISCONTINUED | OUTPATIENT
Start: 2022-04-13 | End: 2022-04-19

## 2022-04-13 RX ORDER — SODIUM CHLORIDE, SODIUM GLUCONATE, SODIUM ACETATE, POTASSIUM CHLORIDE, MAGNESIUM CHLORIDE, SODIUM PHOSPHATE, DIBASIC, AND POTASSIUM PHOSPHATE .53; .5; .37; .037; .03; .012; .00082 G/100ML; G/100ML; G/100ML; G/100ML; G/100ML; G/100ML; G/100ML
1000 INJECTION, SOLUTION INTRAVENOUS ONCE
Status: DISCONTINUED | OUTPATIENT
Start: 2022-04-13 | End: 2022-04-13

## 2022-04-13 RX ORDER — POLYETHYLENE GLYCOL 3350 17 G/17G
17 POWDER, FOR SOLUTION ORAL DAILY
Status: DISCONTINUED | OUTPATIENT
Start: 2022-04-13 | End: 2022-04-15

## 2022-04-13 RX ORDER — ALBUTEROL SULFATE 2.5 MG/3ML
SOLUTION RESPIRATORY (INHALATION)
Status: DISPENSED
Start: 2022-04-13 | End: 2022-04-13

## 2022-04-13 RX ORDER — BISACODYL 10 MG
10 SUPPOSITORY, RECTAL RECTAL DAILY
Status: DISCONTINUED | OUTPATIENT
Start: 2022-04-13 | End: 2022-04-16

## 2022-04-13 RX ORDER — MAGNESIUM SULFATE HEPTAHYDRATE 40 MG/ML
2 INJECTION, SOLUTION INTRAVENOUS ONCE
Status: COMPLETED | OUTPATIENT
Start: 2022-04-13 | End: 2022-04-14

## 2022-04-13 RX ORDER — ACETAMINOPHEN 160 MG/5ML
650 SUSPENSION, ORAL (FINAL DOSE FORM) ORAL EVERY 6 HOURS PRN
Status: DISCONTINUED | OUTPATIENT
Start: 2022-04-13 | End: 2022-04-19

## 2022-04-13 RX ORDER — FUROSEMIDE 10 MG/ML
20 INJECTION INTRAMUSCULAR; INTRAVENOUS ONCE
Status: COMPLETED | OUTPATIENT
Start: 2022-04-13 | End: 2022-04-13

## 2022-04-13 RX ORDER — ACETAMINOPHEN 325 MG/1
650 TABLET ORAL EVERY 6 HOURS PRN
Status: DISCONTINUED | OUTPATIENT
Start: 2022-04-13 | End: 2022-04-13

## 2022-04-13 RX ORDER — POTASSIUM CHLORIDE 14.9 MG/ML
20 INJECTION INTRAVENOUS
Status: COMPLETED | OUTPATIENT
Start: 2022-04-14 | End: 2022-04-14

## 2022-04-13 RX ORDER — POTASSIUM CHLORIDE 29.8 MG/ML
40 INJECTION INTRAVENOUS ONCE
Status: COMPLETED | OUTPATIENT
Start: 2022-04-13 | End: 2022-04-13

## 2022-04-13 RX ORDER — MIDAZOLAM HYDROCHLORIDE 2 MG/2ML
2 INJECTION, SOLUTION INTRAMUSCULAR; INTRAVENOUS ONCE
Status: COMPLETED | OUTPATIENT
Start: 2022-04-13 | End: 2022-04-13

## 2022-04-13 RX ORDER — POTASSIUM CHLORIDE 20MEQ/15ML
40 LIQUID (ML) ORAL ONCE
Status: COMPLETED | OUTPATIENT
Start: 2022-04-13 | End: 2022-04-13

## 2022-04-13 RX ORDER — BISACODYL 10 MG
10 SUPPOSITORY, RECTAL RECTAL DAILY PRN
Status: DISCONTINUED | OUTPATIENT
Start: 2022-04-13 | End: 2022-04-13

## 2022-04-13 RX ORDER — SODIUM CHLORIDE 30 MG/ML INHALATION SOLUTION 30 MG/ML
4 SOLUTION INHALANT
Status: DISCONTINUED | OUTPATIENT
Start: 2022-04-13 | End: 2022-04-19

## 2022-04-13 RX ADMIN — FOLIC ACID 1 MG: 1 TABLET ORAL at 08:04

## 2022-04-13 RX ADMIN — POTASSIUM CHLORIDE 40 MEQ: 20 SOLUTION ORAL at 23:16

## 2022-04-13 RX ADMIN — IPRATROPIUM BROMIDE 0.5 MG: 0.5 SOLUTION RESPIRATORY (INHALATION) at 13:28

## 2022-04-13 RX ADMIN — CEFEPIME HYDROCHLORIDE 2000 MG: 2 INJECTION, POWDER, FOR SOLUTION INTRAVENOUS at 23:10

## 2022-04-13 RX ADMIN — PROPOFOL 50 MCG/KG/MIN: 10 INJECTION, EMULSION INTRAVENOUS at 21:14

## 2022-04-13 RX ADMIN — CHLORHEXIDINE GLUCONATE 0.12% ORAL RINSE 15 ML: 1.2 LIQUID ORAL at 08:05

## 2022-04-13 RX ADMIN — LIDOCAINE 1 PATCH: 50 PATCH CUTANEOUS at 12:48

## 2022-04-13 RX ADMIN — ACETAMINOPHEN 650 MG: 650 SUSPENSION ORAL at 23:28

## 2022-04-13 RX ADMIN — DEXMEDETOMIDINE HYDROCHLORIDE 0.2 MCG/KG/HR: 400 INJECTION INTRAVENOUS at 21:14

## 2022-04-13 RX ADMIN — Medication 100 MCG/HR: at 15:50

## 2022-04-13 RX ADMIN — POLYETHYLENE GLYCOL 3350 17 G: 17 POWDER, FOR SOLUTION ORAL at 12:47

## 2022-04-13 RX ADMIN — MIDAZOLAM 2 MG: 1 INJECTION INTRAMUSCULAR; INTRAVENOUS at 09:11

## 2022-04-13 RX ADMIN — DOCUSATE SODIUM AND SENNOSIDES 2 TABLET: 8.6; 5 TABLET, FILM COATED ORAL at 21:16

## 2022-04-13 RX ADMIN — CHLORHEXIDINE GLUCONATE 0.12% ORAL RINSE 15 ML: 1.2 LIQUID ORAL at 21:16

## 2022-04-13 RX ADMIN — SODIUM CHLORIDE SOLN NEBU 3% 4 ML: 3 NEBU SOLN at 19:50

## 2022-04-13 RX ADMIN — MAGNESIUM SULFATE 2 G: 2 INJECTION INTRAVENOUS at 23:22

## 2022-04-13 RX ADMIN — SODIUM CHLORIDE SOLN NEBU 3% 4 ML: 3 NEBU SOLN at 07:42

## 2022-04-13 RX ADMIN — MIDAZOLAM 2 MG: 1 INJECTION INTRAMUSCULAR; INTRAVENOUS at 01:46

## 2022-04-13 RX ADMIN — PROPOFOL 50 MCG/KG/MIN: 10 INJECTION, EMULSION INTRAVENOUS at 17:17

## 2022-04-13 RX ADMIN — VANCOMYCIN HYDROCHLORIDE 1500 MG: 1 INJECTION, POWDER, LYOPHILIZED, FOR SOLUTION INTRAVENOUS at 23:29

## 2022-04-13 RX ADMIN — SODIUM CHLORIDE SOLN NEBU 3% 4 ML: 3 NEBU SOLN at 13:28

## 2022-04-13 RX ADMIN — IPRATROPIUM BROMIDE 0.5 MG: 0.5 SOLUTION RESPIRATORY (INHALATION) at 19:50

## 2022-04-13 RX ADMIN — ENOXAPARIN SODIUM 40 MG: 40 INJECTION SUBCUTANEOUS at 08:05

## 2022-04-13 RX ADMIN — Medication 1 TABLET: at 08:04

## 2022-04-13 RX ADMIN — METRONIDAZOLE 500 MG: 500 INJECTION, SOLUTION INTRAVENOUS at 23:17

## 2022-04-13 RX ADMIN — BISACODYL 10 MG: 10 SUPPOSITORY RECTAL at 12:47

## 2022-04-13 RX ADMIN — POTASSIUM CHLORIDE 40 MEQ: 400 INJECTION, SOLUTION INTRAVENOUS at 09:04

## 2022-04-13 RX ADMIN — ACETAMINOPHEN 650 MG: 325 TABLET ORAL at 12:57

## 2022-04-13 RX ADMIN — Medication 1 PATCH: at 08:05

## 2022-04-13 RX ADMIN — PROPOFOL 50 MCG/KG/MIN: 10 INJECTION, EMULSION INTRAVENOUS at 01:38

## 2022-04-13 RX ADMIN — CLONIDINE HYDROCHLORIDE 0.1 MG: 0.1 TABLET ORAL at 05:06

## 2022-04-13 RX ADMIN — LEVALBUTEROL HYDROCHLORIDE 1.25 MG: 1.25 SOLUTION, CONCENTRATE RESPIRATORY (INHALATION) at 13:28

## 2022-04-13 RX ADMIN — FUROSEMIDE 20 MG: 10 INJECTION, SOLUTION INTRAVENOUS at 17:34

## 2022-04-13 RX ADMIN — PROPOFOL 70 MCG/KG/MIN: 10 INJECTION, EMULSION INTRAVENOUS at 09:51

## 2022-04-13 RX ADMIN — IPRATROPIUM BROMIDE 0.5 MG: 0.5 SOLUTION RESPIRATORY (INHALATION) at 07:20

## 2022-04-13 RX ADMIN — LEVALBUTEROL HYDROCHLORIDE 1.25 MG: 1.25 SOLUTION, CONCENTRATE RESPIRATORY (INHALATION) at 19:50

## 2022-04-13 RX ADMIN — PROPOFOL 70 MCG/KG/MIN: 10 INJECTION, EMULSION INTRAVENOUS at 07:52

## 2022-04-13 RX ADMIN — MIDAZOLAM 2 MG: 1 INJECTION INTRAMUSCULAR; INTRAVENOUS at 09:17

## 2022-04-13 RX ADMIN — LEVALBUTEROL HYDROCHLORIDE 1.25 MG: 1.25 SOLUTION, CONCENTRATE RESPIRATORY (INHALATION) at 07:20

## 2022-04-13 RX ADMIN — Medication 100 MCG/HR: at 04:23

## 2022-04-13 RX ADMIN — Medication 20 MG: at 08:04

## 2022-04-13 RX ADMIN — MIDAZOLAM 2 MG: 1 INJECTION INTRAMUSCULAR; INTRAVENOUS at 04:48

## 2022-04-13 RX ADMIN — THIAMINE HCL TAB 100 MG 100 MG: 100 TAB at 08:04

## 2022-04-13 NOTE — PLAN OF CARE
Problem: Potential for Falls  Goal: Patient will remain free of falls  Description: INTERVENTIONS:  - Educate patient/family on patient safety including physical limitations  - Instruct patient to call for assistance with activity   - Consult OT/PT to assist with strengthening/mobility   - Keep Call bell within reach  - Keep bed low and locked with side rails adjusted as appropriate  - Keep care items and personal belongings within reach  - Initiate and maintain comfort rounds  - Make Fall Risk Sign visible to staff  - Offer Toileting every 2 Hours, in advance of need  - Initiate/Maintain bed alarm  - Obtain necessary fall risk management equipment: bed alarm  - Apply yellow socks and bracelet for high fall risk patients  - Consider moving patient to room near nurses station  Outcome: Progressing     Problem: Prexisting or High Potential for Compromised Skin Integrity  Goal: Skin integrity is maintained or improved  Description: INTERVENTIONS:  - Identify patients at risk for skin breakdown  - Assess and monitor skin integrity  - Assess and monitor nutrition and hydration status  - Monitor labs   - Assess for incontinence   - Turn and reposition patient  - Assist with mobility/ambulation  - Relieve pressure over bony prominences  - Avoid friction and shearing  - Provide appropriate hygiene as needed including keeping skin clean and dry  - Evaluate need for skin moisturizer/barrier cream  - Collaborate with interdisciplinary team   - Patient/family teaching  - Consider wound care consult   Outcome: Progressing     Problem: Nutrition/Hydration-ADULT  Goal: Nutrient/Hydration intake appropriate for improving, restoring or maintaining nutritional needs  Description: Monitor and assess patient's nutrition/hydration status for malnutrition  Collaborate with interdisciplinary team and initiate plan and interventions as ordered  Monitor patient's weight and dietary intake as ordered or per policy   Utilize nutrition screening tool and intervene as necessary  Determine patient's food preferences and provide high-protein, high-caloric foods as appropriate  INTERVENTIONS:  - Monitor oral intake, urinary output, labs, and treatment plans  - Assess nutrition and hydration status and recommend course of action  - Evaluate amount of meals eaten  - Assist patient with eating if necessary   - Allow adequate time for meals  - Recommend/ encourage appropriate diets, oral nutritional supplements, and vitamin/mineral supplements  - Order, calculate, and assess calorie counts as needed  - Recommend, monitor, and adjust tube feedings and TPN/PPN based on assessed needs  - Assess need for intravenous fluids  - Provide specific nutrition/hydration education as appropriate  - Include patient/family/caregiver in decisions related to nutrition  Outcome: Progressing     Problem: SAFETY ADULT  Goal: Patient will remain free of falls     Problem: Knowledge Deficit  Goal: Patient/family/caregiver demonstrates understanding of disease process, treatment plan, medications, and discharge instructions  Description: Complete learning assessment and assess knowledge base    Interventions:  - Provide teaching at level of understanding  - Provide teaching via preferred learning methods  Outcome: Progressing     Problem: CARDIOVASCULAR - ADULT  Goal: Absence of cardiac dysrhythmias or at baseline rhythm  Description: INTERVENTIONS:  - Continuous cardiac monitoring, vital signs, obtain 12 lead EKG if ordered  - Administer antiarrhythmic and heart rate control medications as ordered  - Monitor electrolytes and administer replacement therapy as ordered  Outcome: Progressing     Problem: RESPIRATORY - ADULT  Goal: Achieves optimal ventilation and oxygenation  Description: INTERVENTIONS:  - Assess for changes in respiratory status  - Assess for changes in mentation and behavior  - Position to facilitate oxygenation and minimize respiratory effort  - Oxygen administered by appropriate delivery if ordered  - Initiate smoking cessation education as indicated  - Encourage broncho-pulmonary hygiene including cough, deep breathe, Incentive Spirometry  - Assess the need for suctioning and aspirate as needed  - Assess and instruct to report SOB or any respiratory difficulty  - Respiratory Therapy support as indicated  Outcome: Progressing     Problem: GENITOURINARY - ADULT  Goal: Urinary catheter remains patent  Description: INTERVENTIONS:  - Assess patency of urinary catheter  - If patient has a chronic vazquez, consider changing catheter if non-functioning  - Follow guidelines for intermittent irrigation of non-functioning urinary catheter  Outcome: Progressing

## 2022-04-13 NOTE — PLAN OF CARE
Problem: MOBILITY - ADULT  Goal: Maintain or return to baseline ADL function  Description: INTERVENTIONS:  -  Assess patient's ability to carry out ADLs; assess patient's baseline for ADL function and identify physical deficits which impact ability to perform ADLs (bathing, care of mouth/teeth, toileting, grooming, dressing, etc )  - Assess/evaluate cause of self-care deficits   - Assess range of motion  - Assess patient's mobility; develop plan if impaired  - Assess patient's need for assistive devices and provide as appropriate  - Encourage maximum independence but intervene and supervise when necessary  - Involve family in performance of ADLs  - Assess for home care needs following discharge   - Consider OT consult to assist with ADL evaluation and planning for discharge  - Provide patient education as appropriate  Outcome: Not Progressing  Goal: Maintains/Returns to pre admission functional level  Description: INTERVENTIONS:  - Perform BMAT or MOVE assessment daily    - Set and communicate daily mobility goal to care team and patient/family/caregiver  - Collaborate with rehabilitation services on mobility goals if consulted  - Perform Range of Motion  times a day  - Reposition patient every  hours    - Dangle patient  times a day  - Stand patient  times a day  - Ambulate patient  times a day  - Out of bed to chair  times a day   - Out of bed for meals  times a day  - Out of bed for toileting  - Record patient progress and toleration of activity level   Outcome: Not Progressing     Problem: Potential for Falls  Goal: Patient will remain free of falls  Description: INTERVENTIONS:  - Educate patient/family on patient safety including physical limitations  - Instruct patient to call for assistance with activity   - Consult OT/PT to assist with strengthening/mobility   - Keep Call bell within reach  - Keep bed low and locked with side rails adjusted as appropriate  - Keep care items and personal belongings within reach  - Initiate and maintain comfort rounds  - Make Fall Risk Sign visible to staff  - Offer Toileting every  Hours, in advance of need  - Initiate/Maintain alarm  - Obtain necessary fall risk management equipment:   - Apply yellow socks and bracelet for high fall risk patients  - Consider moving patient to room near nurses station  Outcome: Not Progressing     Problem: Prexisting or High Potential for Compromised Skin Integrity  Goal: Skin integrity is maintained or improved  Description: INTERVENTIONS:  - Identify patients at risk for skin breakdown  - Assess and monitor skin integrity  - Assess and monitor nutrition and hydration status  - Monitor labs   - Assess for incontinence   - Turn and reposition patient  - Assist with mobility/ambulation  - Relieve pressure over bony prominences  - Avoid friction and shearing  - Provide appropriate hygiene as needed including keeping skin clean and dry  - Evaluate need for skin moisturizer/barrier cream  - Collaborate with interdisciplinary team   - Patient/family teaching  - Consider wound care consult   Outcome: Not Progressing     Problem: DISCHARGE PLANNING - CARE MANAGEMENT  Goal: Discharge to post-acute care or home with appropriate resources  Description: INTERVENTIONS:  - Conduct assessment to determine patient/family and health care team treatment goals, and need for post-acute services based on payer coverage, community resources, and patient preferences, and barriers to discharge  - Address psychosocial, clinical, and financial barriers to discharge as identified in assessment in conjunction with the patient/family and health care team  - Arrange appropriate level of post-acute services according to patients   needs and preference and payer coverage in collaboration with the physician and health care team  - Communicate with and update the patient/family, physician, and health care team regarding progress on the discharge plan  - Arrange appropriate transportation to post-acute venues  Outcome: Not Progressing     Problem: SUBSTANCE USE/ABUSE  Goal: By discharge, will develop insight into their chemical dependency and sustain motivation to continue in recovery  Description: INTERVENTIONS:  - Attends all daily group sessions and scheduled AA groups  - Actively practices coping skills through participation in the therapeutic community and adherence to program rules  - Reviews and completes assignments from individual treatment plan  - Assist patient development of understanding of their personal cycle of addiction and relapse triggers  Outcome: Not Progressing  Goal: By discharge, patient will have ongoing treatment plan addressing chemical dependency  Description: INTERVENTIONS:  - Assist patient with resources and/or appointments for ongoing recovery based living  Outcome: Not Progressing     Problem: Nutrition/Hydration-ADULT  Goal: Nutrient/Hydration intake appropriate for improving, restoring or maintaining nutritional needs  Description: Monitor and assess patient's nutrition/hydration status for malnutrition  Collaborate with interdisciplinary team and initiate plan and interventions as ordered  Monitor patient's weight and dietary intake as ordered or per policy  Utilize nutrition screening tool and intervene as necessary  Determine patient's food preferences and provide high-protein, high-caloric foods as appropriate       INTERVENTIONS:  - Monitor oral intake, urinary output, labs, and treatment plans  - Assess nutrition and hydration status and recommend course of action  - Evaluate amount of meals eaten  - Assist patient with eating if necessary   - Allow adequate time for meals  - Recommend/ encourage appropriate diets, oral nutritional supplements, and vitamin/mineral supplements  - Order, calculate, and assess calorie counts as needed  - Recommend, monitor, and adjust tube feedings and TPN/PPN based on assessed needs  - Assess need for intravenous fluids  - Provide specific nutrition/hydration education as appropriate  - Include patient/family/caregiver in decisions related to nutrition  Outcome: Not Progressing     Problem: PAIN - ADULT  Goal: Verbalizes/displays adequate comfort level or baseline comfort level  Description: Interventions:  - Encourage patient to monitor pain and request assistance  - Assess pain using appropriate pain scale  - Administer analgesics based on type and severity of pain and evaluate response  - Implement non-pharmacological measures as appropriate and evaluate response  - Consider cultural and social influences on pain and pain management  - Notify physician/advanced practitioner if interventions unsuccessful or patient reports new pain  Outcome: Not Progressing     Problem: INFECTION - ADULT  Goal: Absence or prevention of progression during hospitalization  Description: INTERVENTIONS:  - Assess and monitor for signs and symptoms of infection  - Monitor lab/diagnostic results  - Monitor all insertion sites, i e  indwelling lines, tubes, and drains  - Monitor endotracheal if appropriate and nasal secretions for changes in amount and color  - [place] appropriate cooling/warming therapies per order  - Administer medications as ordered  - Instruct and encourage patient and family to use good hand hygiene technique  - Identify and instruct in appropriate isolation precautions for identified infection/condition  Outcome: Not Progressing     Problem: SAFETY ADULT  Goal: Maintain or return to baseline ADL function  Description: INTERVENTIONS:  -  Assess patient's ability to carry out ADLs; assess patient's baseline for ADL function and identify physical deficits which impact ability to perform ADLs (bathing, care of mouth/teeth, toileting, grooming, dressing, etc )  - Assess/evaluate cause of self-care deficits   - Assess range of motion  - Assess patient's mobility; develop plan if impaired  - Assess patient's need for assistive devices and provide as appropriate  - Encourage maximum independence but intervene and supervise when necessary  - Involve family in performance of ADLs  - Assess for home care needs following discharge   - Consider OT consult to assist with ADL evaluation and planning for discharge  - Provide patient education as appropriate  Outcome: Not Progressing  Goal: Maintains/Returns to pre admission functional level  Description: INTERVENTIONS:  - Perform BMAT or MOVE assessment daily    - Set and communicate daily mobility goal to care team and patient/family/caregiver  - Collaborate with rehabilitation services on mobility goals if consulted  - Perform Range of Motion  times a day  - Reposition patient every  hours    - Dangle patient  times a day  - Stand patient  times a day  - Ambulate patient  times a day  - Out of bed to chair  times a day   - Out of bed for meals  times a day  - Out of bed for toileting  - Record patient progress and toleration of activity level   Outcome: Not Progressing  Goal: Patient will remain free of falls  Description: INTERVENTIONS:  - Educate patient/family on patient safety including physical limitations  - Instruct patient to call for assistance with activity   - Consult OT/PT to assist with strengthening/mobility   - Keep Call bell within reach  - Keep bed low and locked with side rails adjusted as appropriate  - Keep care items and personal belongings within reach  - Initiate and maintain comfort rounds  - Make Fall Risk Sign visible to staff  - Offer Toileting every  Hours, in advance of need  - Initiate/Maintain alarm  - Obtain necessary fall risk management equipment:   - Apply yellow socks and bracelet for high fall risk patients  - Consider moving patient to room near nurses station  Outcome: Not Progressing     Problem: DISCHARGE PLANNING  Goal: Discharge to home or other facility with appropriate resources  Description: INTERVENTIONS:  - Identify barriers to discharge w/patient and caregiver  - Arrange for needed discharge resources and transportation as appropriate  - Identify discharge learning needs (meds, wound care, etc )  - Arrange for interpretive services to assist at discharge as needed  - Refer to Case Management Department for coordinating discharge planning if the patient needs post-hospital services based on physician/advanced practitioner order or complex needs related to functional status, cognitive ability, or social support system  Outcome: Not Progressing     Problem: Knowledge Deficit  Goal: Patient/family/caregiver demonstrates understanding of disease process, treatment plan, medications, and discharge instructions  Description: Complete learning assessment and assess knowledge base    Interventions:  - Provide teaching at level of understanding  - Provide teaching via preferred learning methods  Outcome: Not Progressing     Problem: NEUROSENSORY - ADULT  Goal: Achieves stable or improved neurological status  Description: INTERVENTIONS  - Monitor and report changes in neurological status  - Monitor vital signs such as temperature, blood pressure, glucose, and any other labs ordered   - Initiate measures to prevent increased intracranial pressure  - Monitor for seizure activity and implement precautions if appropriate      Outcome: Not Progressing  Goal: Remains free of injury related to seizures activity  Description: INTERVENTIONS  - Maintain airway, patient safety  and administer oxygen as ordered  - Monitor patient for seizure activity, document and report duration and description of seizure to physician/advanced practitioner  - If seizure occurs,  ensure patient safety during seizure  - Reorient patient post seizure  - Seizure pads on all 4 side rails  - Instruct patient/family to notify RN of any seizure activity including if an aura is experienced  - Instruct patient/family to call for assistance with activity based on nursing assessment  - Administer anti-seizure medications if ordered    Outcome: Not Progressing  Goal: Achieves maximal functionality and self care  Description: INTERVENTIONS  - Monitor swallowing and airway patency with patient fatigue and changes in neurological status  - Encourage and assist patient to increase activity and self care     - Encourage visually impaired, hearing impaired and aphasic patients to use assistive/communication devices  Outcome: Not Progressing     Problem: CARDIOVASCULAR - ADULT  Goal: Maintains optimal cardiac output and hemodynamic stability  Description: INTERVENTIONS:  - Monitor I/O, vital signs and rhythm  - Monitor for S/S and trends of decreased cardiac output  - Administer and titrate ordered vasoactive medications to optimize hemodynamic stability  - Assess quality of pulses, skin color and temperature  - Assess for signs of decreased coronary artery perfusion  - Instruct patient to report change in severity of symptoms  Outcome: Not Progressing  Goal: Absence of cardiac dysrhythmias or at baseline rhythm  Description: INTERVENTIONS:  - Continuous cardiac monitoring, vital signs, obtain 12 lead EKG if ordered  - Administer antiarrhythmic and heart rate control medications as ordered  - Monitor electrolytes and administer replacement therapy as ordered  Outcome: Not Progressing     Problem: RESPIRATORY - ADULT  Goal: Achieves optimal ventilation and oxygenation  Description: INTERVENTIONS:  - Assess for changes in respiratory status  - Assess for changes in mentation and behavior  - Position to facilitate oxygenation and minimize respiratory effort  - Oxygen administered by appropriate delivery if ordered  - Initiate smoking cessation education as indicated  - Encourage broncho-pulmonary hygiene including cough, deep breathe, Incentive Spirometry  - Assess the need for suctioning and aspirate as needed  - Assess and instruct to report SOB or any respiratory difficulty  - Respiratory Therapy support as indicated  Outcome: Not Progressing     Problem: GASTROINTESTINAL - ADULT  Goal: Minimal or absence of nausea and/or vomiting  Description: INTERVENTIONS:  - Administer IV fluids if ordered to ensure adequate hydration  - Maintain NPO status until nausea and vomiting are resolved  - Nasogastric tube if ordered  - Administer ordered antiemetic medications as needed  - Provide nonpharmacologic comfort measures as appropriate  - Advance diet as tolerated, if ordered  - Consider nutrition services referral to assist patient with adequate nutrition and appropriate food choices  Outcome: Not Progressing  Goal: Maintains or returns to baseline bowel function  Description: INTERVENTIONS:  - Assess bowel function  - Encourage oral fluids to ensure adequate hydration  - Administer IV fluids if ordered to ensure adequate hydration  - Administer ordered medications as needed  - Encourage mobilization and activity  - Consider nutritional services referral to assist patient with adequate nutrition and appropriate food choices  Outcome: Not Progressing  Goal: Maintains adequate nutritional intake  Description: INTERVENTIONS:  - Monitor percentage of each meal consumed  - Identify factors contributing to decreased intake, treat as appropriate  - Assist with meals as needed  - Monitor I&O, weight, and lab values if indicated  - Obtain nutrition services referral as needed  Outcome: Not Progressing  Goal: Oral mucous membranes remain intact  Description: INTERVENTIONS  - Assess oral mucosa and hygiene practices  - Implement preventative oral hygiene regimen  - Implement oral medicated treatments as ordered  - Initiate Nutrition services referral as needed  Outcome: Not Progressing     Problem: GENITOURINARY - ADULT  Goal: Maintains or returns to baseline urinary function  Description: INTERVENTIONS:  - Assess urinary function  - Encourage oral fluids to ensure adequate hydration if ordered  - Administer IV fluids as ordered to ensure adequate hydration  - Administer ordered medications as needed  - Offer frequent toileting  - Follow urinary retention protocol if ordered  Outcome: Not Progressing  Goal: Absence of urinary retention  Description: INTERVENTIONS:  - Assess patients ability to void and empty bladder  - Monitor I/O  - Bladder scan as needed  - Discuss with physician/AP medications to alleviate retention as needed  - Discuss catheterization for long term situations as appropriate  Outcome: Not Progressing  Goal: Urinary catheter remains patent  Description: INTERVENTIONS:  - Assess patency of urinary catheter  - If patient has a chronic vazquez, consider changing catheter if non-functioning  - Follow guidelines for intermittent irrigation of non-functioning urinary catheter  Outcome: Not Progressing     Problem: METABOLIC, FLUID AND ELECTROLYTES - ADULT  Goal: Electrolytes maintained within normal limits  Description: INTERVENTIONS:  - Monitor labs and assess patient for signs and symptoms of electrolyte imbalances  - Administer electrolyte replacement as ordered  - Monitor response to electrolyte replacements, including repeat lab results as appropriate  - Instruct patient on fluid and nutrition as appropriate  Outcome: Not Progressing  Goal: Fluid balance maintained  Description: INTERVENTIONS:  - Monitor labs   - Monitor I/O and WT  - Instruct patient on fluid and nutrition as appropriate  - Assess for signs & symptoms of volume excess or deficit  Outcome: Not Progressing     Problem: SKIN/TISSUE INTEGRITY - ADULT  Goal: Skin Integrity remains intact(Skin Breakdown Prevention)  Description: Assess:  -Perform [place] assessment every   -Clean and moisturize skin every   -Inspect skin when repositioning, toileting, and assisting with ADLS  -Assess under medical devices such as  every   -Assess extremities for adequate circulation and sensation     Bed Management:  -Have minimal linens on bed & keep smooth, unwrinkled  -Change linens as needed when moist or perspiring  -Avoid sitting or lying in one position for more than  hours while in bed  -Keep HOB at degrees     Toileting:  -Offer bedside commode  -Assess for incontinence every   -Use incontinent care products after each incontinent episode such as     Activity:  -Mobilize patient  times a day  -Encourage activity and walks on unit  -Encourage or provide ROM exercises   -Turn and reposition patient every Hours  -Use appropriate equipment to lift or move patient in bed  -Instruct/ Assist with weight shifting every when out of bed in chair  -Consider limitation of chair time  hour intervals    Skin Care:  -Avoid use of baby powder, tape, friction and shearing, hot water or constrictive clothing  -Relieve pressure over bony prominences using   -Do not massage red bony areas    Next Steps:  -Teach patient strategies to minimize risks such as    -Consider consults to  interdisciplinary teams such as   Outcome: Not Progressing     Problem: SAFETY,RESTRAINT: NV/NON-SELF DESTRUCTIVE BEHAVIOR  Goal: Remains free of harm/injury (restraint for non violent/non self-detsructive behavior)  Description: INTERVENTIONS:  - Instruct patient/family regarding restraint use   - Assess and monitor physiologic and psychological status   - Provide interventions and comfort measures to meet assessed patient needs   - Identify and implement measures to help patient regain control  - Assess readiness for release of restraint   Outcome: Not Progressing  Goal: Returns to optimal restraint-free functioning  Description: INTERVENTIONS:  - Assess the patient's behavior and symptoms that indicate continued need for restraint  - Identify and implement measures to help patient regain control  - Assess readiness for release of restraint   Outcome: Not Progressing

## 2022-04-13 NOTE — PROGRESS NOTES
[address]  Progress Note - Francisca Ahmadi 1965, 60 y o  female MRN: [id number]  Unit/Bed#: ICU 11 Encounter: [id number]  Primary Care Provider: Bren Soriano,    Date and time admitted to hospital: 4/8/2022  3:55 PM    * Acute respiratory failure with hypoxia [facility])  Assessment & Plan  Patient acute hypoxic on admission, was given dose of Lasix with marked improvement  However, this morning patient started to become more agitated, hypoxic and tachycardic unable to tolerate BiPAP, patient was intubated for airway protection in the setting of encephalopathy and potential DTs  · Will try to extubate today  Patient continues to have high amounts of secretions  · Patient continues to require propafol at 70 mcgs   · Sputum culture shows polys and procal is not significantly elevated     Delirium tremens [facility])  Assessment & Plan  Patient drinks roughly 8-12 shots of whiskey daily  Patient's last drink was April 1, 2022  EtoH level on admission was 54  Has never had any history withdrawals or seizures  Patient was initially admitted for alcoholic pancreatitis, however, patient became encephalopathic and agitated requiring transfer to detox Unit  · Patient was initially on phenobarbital and Precedex  · Patient switched over to fentanyl and Propofol with PRN versed and dilaudid  Patient continues to be intermitantly agitated, can consider switching to versed/dilaudid drip     Alcohol-induced acute pancreatitis  Assessment & Plan  · Patient initially presented with a lipase of 53,000  Patient's acute pancreatitis is likely secondary to alcohol    · No surgical intervention of the pancreas  · Monitor TGL    Alcohol use disorder, severe, dependence ([facility]  Assessment & Plan  · Will continue with withdrawal management  · Case management consult in place  · Encouraged cessation   · Continue thiamine and folic acid       Ataxia  Assessment & Plan  On admission patient was reported to have ataxia as well as finger-to-nose ataxia  Patient was started on high-dose thiamine for Wernicke's encephalopathy    · F/u MRI    Gastroesophageal reflux disease without esophagitis  Assessment & Plan  · Continue Protonix 40 mg daily       Hypertension  Assessment & Plan  · Patient is currently on 10 mg of lisinopril daily at home  Can be resumed once extubated  · Hydralazine p r n  For SBP greater than 170         Hypokalemia-resolved as of 4/10/2022  Assessment & Plan  · Likely secondary to diuretic administration  Resolved   · Monitor BMP        ----------------------------------------------------------------------------------------  HPI/24hr events: Continued to require high dose of propofol  Patient appropriate for transfer out of the ICU today?: No  Disposition: Continue Critical Care   Code Status: Level 1 - Full Code  ---------------------------------------------------------------------------------------  SUBJECTIVE  Intubated  Unable to be directed      Review of Systems   Unable to perform ROS: Intubated     Review of systems was reviewed and negative unless stated above in HPI/24-hour events   ---------------------------------------------------------------------------------------  OBJECTIVE    Vitals   Vitals:    22 0313 22 0400 22 0500 22 0600   BP:       BP Location:       Pulse:  102 (!) 112 (!) 118   Resp:  14 16 19   Temp:  99 2 °F (37 3 °C)     TempSrc:  Temporal     SpO2: 95% 95% 92% 94%   Weight:       Height:         Temp (24hrs), Av 3 °F (37 4 °C), Min:98 9 °F (37 2 °C), Max:99 7 °F (37 6 °C)  Current: Temperature: 99 2 °F (37 3 °C)  Arterial Line BP: 138/62  Arterial Line MAP (mmHg): 88 mmHg    Respiratory:  SpO2: SpO2: 94 %       Invasive/non-invasive ventilation settings   Respiratory  Report   Lab Data (Last 4 hours)    None         O2/Vent Data (Last 4 hours)       0722           Vent Mode AC/VC       Resp Rate (BPM) (BPM) 14       Vt (mL) (mL) 400       FIO2 (%) (%) 50       PEEP (cmH2O) (cmH2O) 6       Patient safety screen outcome: Failed       MV 9 04                   Physical Exam  Constitutional:       Comments: Intubated  Very agitated    HENT:      Head: Normocephalic  Eyes:      Conjunctiva/sclera: Conjunctivae normal       Pupils: Pupils are equal, round, and reactive to light  Cardiovascular:      Rate and Rhythm: Regular rhythm  Tachycardia present  Pulses: Normal pulses  Heart sounds: No murmur heard  No gallop  Pulmonary:      Comments: ACVC  14/400/50%/6  Abdominal:      General: There is distension  Tenderness: There is no abdominal tenderness  Comments: Severely diminished bowel sounds    Skin:     General: Skin is warm  Capillary Refill: Capillary refill takes less than 2 seconds               Laboratory and Diagnostics:  Results from last 7 days   Lab Units 04/13/22  0539 04/12/22  0527 04/10/22  0331 04/09/22  0924 04/09/22  0512 04/08/22 1949 04/08/22  0452   WBC Thousand/uL 10 64* 11 87* 10 35* 9 65 10 31* 9 79 9 34   HEMOGLOBIN g/dL 9 9* 9 9* 11 1* 11 4* 11 6 11 4* 10 7*   HEMATOCRIT % 30 1* 29 2* 33 7* 34 2* 30 7* 32 4* 32 9*   PLATELETS Thousands/uL 283 274 216 193 244 174 158   NEUTROS PCT % 81*  --   --   --   --   --   --    BANDS PCT %  --  10*  --   --   --  5  --    MONOS PCT % 8  --   --   --   --   --   --    MONO PCT %  --  4  --   --   --  13*  --      Results from last 7 days   Lab Units 04/12/22  0527 04/10/22  0332 04/09/22  0922 04/09/22  0512 04/08/22  1949 04/08/22  0452 04/07/22  0502   SODIUM mmol/L 139 137 139 125* 142 137 136   POTASSIUM mmol/L 3 3* 3 5 3 5 3 2* 3 5 3 3* 3 4*   CHLORIDE mmol/L 100 101 102 91* 104 100 101   CO2 mmol/L 34* 28 27 24 30 33* 28   ANION GAP mmol/L 5 8 10 10 8 4* 7   BUN mg/dL 5 6 9 8 9 9 7   CREATININE mg/dL 0 41* 0 39* 0 76 0 53* 0 73 0 67 0 64   CALCIUM mg/dL 8 3 8 2* 8 4 6 7* 8 1* 7 7* 8 0*   GLUCOSE RANDOM mg/dL 140 79 100 80 76 94 112   ALT U/L 52 43 35 26 24 22 31   AST U/L 46* 65* 52* 51* 30 41* 33   ALK PHOS U/L 255* 153* 110 89 82 68 65   ALBUMIN g/dL 1 5* 1 9* 2 1* 1 9* 2 1* 3 2 2 8*   TOTAL BILIRUBIN mg/dL 0 37 0 56 0 64 0 91 0 61 0 89 1 15*     Results from last 7 days   Lab Units 04/12/22  0527 04/10/22  0332 04/09/22  0512 04/08/22  1949 04/08/22  0452 04/07/22  0502   MAGNESIUM mg/dL 2 1 2 2 1 9 2 1 2 1 1 7   PHOSPHORUS mg/dL 3 1 3 6  --  2 3* 3 3 2 0*               Results from last 7 days   Lab Units 04/08/22 1949   LACTIC ACID mmol/L 1 2     ABG:  Results from last 7 days   Lab Units 04/11/22  1035   PH ART  7 332*   PCO2 ART mm Hg 53 6*   PO2 ART mm Hg 71 8*   HCO3 ART mmol/L 27 8   BASE EXC ART mmol/L 1 2   ABG SOURCE  Line, Arterial     VBG:  Results from last 7 days   Lab Units 04/11/22  1035 04/09/22  0436 04/08/22  1311   PH TOM   --   --  7 345   PCO2 TOM mm Hg  --   --  51 8*   PO2 TOM mm Hg  --   --  46 0*   HCO3 TOM mmol/L  --   --  27 6   BASE EXC TOM mmol/L  --   --  1 2   ABG SOURCE  Line, Arterial   < >  --     < > = values in this interval not displayed  Results from last 7 days   Lab Units 04/12/22  0527 04/10/22  0332 04/09/22  0924 04/08/22 1949   PROCALCITONIN ng/ml 0 66* 0 63* 0 38* 0 34*       Micro  Results from last 7 days   Lab Units 04/12/22  1637 04/08/22 1953   BLOOD CULTURE   --  No Growth After 4 Days  No Growth After 4 Days  GRAM STAIN RESULT  3+ Polys*  1+ Gram positive cocci in pairs*  --        EKG: Tachycardic with PVCs  Imaging: I have personally reviewed pertinent reports        Intake and Output  I/O       04/11 0701 04/12 0700 04/12 0701 04/13 0700 04/13 0701  04/14 0700    I V  (mL/kg) 1492 9 (15 6) 1155 3 (11 8)     NG/ 440     IV Piggyback 150 500     Feedings 379 232     Total Intake(mL/kg) 2722 9 (28 4) 2327 3 (23 8)     Urine (mL/kg/hr) 3550 (1 5) 2320 (1) 300 (3 3)    Total Output 3550 2320 300    Net -827 1 +7 3 -300                 Height and Weights   Height: 5' 8" (172 7 cm) Body mass index is 32 78 kg/m²  Weight (last 2 days)     Date/Time Weight    04/13/22 0200 97 8 (215 61)    04/12/22 0500 95 9 (211 42)    04/11/22 1140 96 8 (213 41)            Nutrition       Diet Orders   (From admission, onward)             Start     Ordered    04/12/22 0954  Diet Enteral/Parenteral; Tube Feeding No Oral Diet; Jevity 1 2 Hira; Continuous; 30; Prosource Protein Liquid - Three Packets; 240; Water; Every 4 hours  Diet effective now        References:    Nutrtion Support Algorithm Enteral vs  Parenteral   Question Answer Comment   Diet Type Enteral/Parenteral    Enteral/Parenteral Tube Feeding No Oral Diet    Tube Feeding Formula: Jevity 1 2 Hira    Bolus/Cyclic/Continuous Continuous    Tube Feeding Goal Rate (mL/hr): 30    Prosource Protein Liquid - No Carb Prosource Protein Liquid - Three Packets    Tube Feeding flush (mL): 240    Water Flush type: Water    Water flush frequency: Every 4 hours    RD to adjust diet per protocol?  No        04/12/22 0954                  Active Medications  Scheduled Meds:  Current Facility-Administered Medications   Medication Dose Route Frequency Provider Last Rate    bisacodyl  10 mg Rectal Daily PRN Mari Aden MD      chlorhexidine  15 mL [facility] Padmini Scottdon, [address]      cloNIDine  0 1 mg Oral [place], [place]      enoxaparin  40 mg Subcutaneous Daily Mari Aden MD      fentaNYL  100 mcg/hr Intravenous Continuous Padminisweetie Scottdon, CRNP 100 mcg/hr (10/74/84 0761)    folic acid  1 mg Oral Daily Mari Aden MD      hydrALAZINE  10 mg Intravenous Q6H PRN Kailash [place], CRNP      HYDROmorphone  1 mg Intravenous Q3H PRN Padmini Pardon, CRNP      ipratropium  0 5 mg Nebulization TID Mari Aden MD      ipratropium-albuterol  3 mL Nebulization TID PRN Mari Aden MD      levalbuterol  1 25 mg Nebulization TID Mari Aden MD      lidocaine  1 patch Topical Q24H Mari Aden MD      midazolam  2 mg Intravenous Q2H PRN Roberto Polanco MD      multivitamin-minerals  1 tablet Oral Daily Roberto Polanco MD      nicotine  1 patch Transdermal Daily Roberto Polanco MD      omeprazole (PRILOSEC) suspension 2 mg/mL  20 mg Oral Daily GURJIT GIBSON      ondansetron  4 mg Intravenous Q6H PRN Roberto Polanco MD      propofol  5-70 mcg/kg/min Intravenous Titrated UGRJIT GIBSON 70 mcg/kg/min (04/13/22 0752)    senna-docusate sodium  2 tablet Per NG Tube HS TETE Mckeon      sodium chloride  4 mL Nebulization TID Constance Navarro MD      thiamine  100 mg Oral Daily Roberto Polanco MD       Continuous Infusions:  fentaNYL, 100 mcg/hr, Last Rate: 100 mcg/hr (04/13/22 0423)  propofol, 5-70 mcg/kg/min, Last Rate: 70 mcg/kg/min (04/13/22 0752)      PRN Meds:   bisacodyl, 10 mg, Daily PRN  hydrALAZINE, 10 mg, Q6H PRN  HYDROmorphone, 1 mg, Q3H PRN  ipratropium-albuterol, 3 mL, TID PRN  midazolam, 2 mg, Q2H PRN  ondansetron, 4 mg, Q6H PRN        Invasive Devices Review  Invasive Devices  Report    Peripheral Intravenous Line            Peripheral IV 04/12/22 Right;Ventral (anterior) Forearm 1 day    Peripheral IV 04/12/22 Proximal;Right;Ventral (anterior) Forearm <1 day          Arterial Line            Arterial Line 04/09/22 Radial 3 days          Drain            NG/OG/Enteral Tube Orogastric 14 Fr Left mouth 4 days    Urethral Catheter Non-latex 16 Fr  4 days          Airway            ETT  Oral 8 mm 4 days                Rationale for remaining devices:   ---------------------------------------------------------------------------------------  Advance Directive and Living Will:      Power of :    POLST:    ---------------------------------------------------------------------------------------  Care Time Delivered:   Please see attending's attestation      Roberto Polanco MD      Portions of the record may have been created with voice recognition software    Occasional wrong word or "sound a like" substitutions may have occurred due to the inherent limitations of voice recognition software    Read the chart carefully and recognize, using context, where substitutions have occurred

## 2022-04-13 NOTE — ASSESSMENT & PLAN NOTE
· Patient initially presented with a lipase of 53,000  Patient's acute pancreatitis is likely secondary to alcohol    · No surgical intervention of the pancreas  · Monitor TGL

## 2022-04-13 NOTE — WOUND OSTOMY CARE
Consult Note - Wound   Francisca Cordova 60 y o  female MRN: [id number]  Unit/Bed#: ICU 11 Encounter: [id number]      History and Present Illness:  60year old female transferred from [facility], where she was being treated for alcohol withdrawal, with hypoxic respiratory failure and pancreatitis  Assessment Findings:   Patient is intubated, restrained, sedated (however, easily agitated)  Mccullough catheter in place  On low air-loss mattress  Positioning wedges in use  Focused assessment of lip pressure injury performed  Patient's face appears edematous, tube cid tight and pressing downward on lip  Respiratory therapy and primary RN at bedside to change ETT cid at this time  Hospital acquired midline upper lip pressure injury secondary to ETT cid is scabbed, brown, no drainage  Scant bleeding after cleansing  Hydrocolloid dressing placed under ETT cid lip support  Plan:  Continue to moisturize lips and provide good oral care per protocol  Monitor ETT cid position to offload wound  Change hydrocolloid dressing to upper lip/under ETT cid every 3 days and as needed with soilage  Wound care team to follow  Other preventative skin care orders placed  Wound 04/13/22 Pressure Injury Mouth Upper (Active)   Wound Image   04/13/22 1630   Wound Description Brown 04/13/22 1630   Justa-wound Assessment Edema; Erythema 04/13/22 1630   Wound Length (cm) 0 7 cm 04/13/22 1630   Wound Width (cm) 0 8 cm 04/13/22 1630   Wound Depth (cm) 0 1 cm 04/13/22 1630   Wound Surface Area (cm^2) 0 56 cm^2 04/13/22 1630   Wound Volume (cm^3) 0 056 cm^3 04/13/22 1630   Calculated Wound Volume (cm^3) 0 06 cm^3 04/13/22 1630   Drainage Amount None 04/13/22 1630   Treatments Cleansed 04/13/22 1630   Dressing Hydrocolloid 04/13/22 1630   Dressing Changed New 04/13/22 1630   Patient Tolerance Tolerated well 04/13/22 1630   Dressing Status Clean;Dry; Intact 04/13/22 1500 Claude ScottN, RN, CWON

## 2022-04-13 NOTE — UTILIZATION REVIEW
Continued Stay Review    Date: 4/13/22                         Current Patient Class: Inpatient  Current Level of Care: Critical care/ICU     HPI:56 y o  female initially admitted on 4/8     Assessment/Plan:     Internal Medicine: tachycardia and abd distention  Continues to require propafol at 70 mcgs  F/u MRI-for ataxia, concern for brain injury/wernickes encephalitis  Sputum culture shows polys and procal is not significantly elevated  Remains intubated, but will attempt to extubate today  Will continue with withdrawal management  Febrile today  Vital Signs:   Date/Time Temp Pulse Resp Arterial Line BP MAP SpO2 FiO2 (%) O2 Device       04/13/22 1300 -- 108 Abnormal  15 106/52 70 mmHg 96 % -- Ventilator   04/13/22 1200 101 6 °F (38 7 °C) Abnormal  124 Abnormal  23 Abnormal  148/70 98 mmHg 95 % -- --   04/13/22 1100 99 7 °F (37 6 °C) 102 13 98/48 64 mmHg 91 % -- --       04/13/22 1000 -- 116 Abnormal  16 110/46 66 mmHg 92 % -- --   04/13/22 0900 -- 120 Abnormal  18 142/60 88 mmHg 90 % -- --   04/13/22 0800 99 8 °F (37 7 °C) 130 Abnormal  27 Abnormal  152/70 96 mmHg 90 % 50 Ventilator   04/13/22 0721 -- -- -- -- -- -- 50 Ventilator   04/13/22 0700 -- 112 Abnormal  15 132/58 82 mmHg 91 % -- --   04/13/22 0600 -- 118 Abnormal  19 138/62 88 mmHg 94 % -- --       Pertinent Labs/Diagnostic Results:     4/13 VAS upper limb:  Impression  RIGHT UPPER LIMB:  Artifact noted in the PVR's  The Digit PPG appears to be dampened  Forearm/Upper arm index: 1 27  Digit pressure of 67 mmHg, which is within limits for healing potential      LEFT UPPER LIMB:  Duplex evaluation does not reveal any hemodynamically significant disease  Artifact noted in the PVR's  The Digit PPG appears to be dampened  Forearm/Upper arm index : 1 13  Digit pressure of 81 mmHg, which is within limits for healing potential     4/12 MRI brain:  IMPRESSION:        1  No acute infarction, intracranial hemorrhage or mass effect    2   Moderate pansinusitis  Results from last 7 days   Lab Units 04/13/22  0539 04/12/22  0527 04/10/22  0331 04/09/22  0924 04/09/22  0924 04/09/22  0512 04/09/22  0512 04/08/22 1949 04/08/22 1949   WBC Thousand/uL 10 64* 11 87* 10 35*   < > 9 65   < > 10 31*   < > 9 79   HEMOGLOBIN g/dL 9 9* 9 9* 11 1*  --  11 4*  --  11 6   < > 11 4*   HEMATOCRIT % 30 1* 29 2* 33 7*  --  34 2*  --  30 7*   < > 32 4*   PLATELETS Thousands/uL 283 274 216   < > 193   < > 244   < > 174   NEUTROS ABS Thousands/µL 8 59*  --   --   --   --   --   --   --   --    BANDS PCT %  --  10*  --   --   --   --   --   --  5    < > = values in this interval not displayed  Results from last 7 days   Lab Units 04/13/22  0539 04/12/22  0527 04/10/22  0332 04/10/22  0331 04/09/22  0922 04/09/22  0512 04/08/22  1949 04/08/22  1949 04/08/22  0452 04/08/22  0452 04/07/22  0502 04/07/22  0502   SODIUM mmol/L 138 139 137  --  139 125*   < > 142   < > 137   < > 136   POTASSIUM mmol/L 3 3* 3 3* 3 5  --  3 5 3 2*   < > 3 5   < > 3 3*   < > 3 4*   CHLORIDE mmol/L 97* 100 101  --  102 91*   < > 104   < > 100   < > 101   CO2 mmol/L 30 34* 28  --  27 24   < > 30   < > 33*   < > 28   ANION GAP mmol/L 11 5 8  --  10 10   < > 8   < > 4*   < > 7   BUN mg/dL 6 5 6  --  9 8   < > 9   < > 9   < > 7   CREATININE mg/dL 0 48* 0 41* 0 39*  --  0 76 0 53*   < > 0 73   < > 0 67   < > 0 64   EGFR ml/min/1 73sq m 108 114 116  --  86 104   < > 91   < > 97   < > 98   CALCIUM mg/dL 8 7 8 3 8 2*  --  8 4 6 7*   < > 8 1*   < > 7 7*   < > 8 0*   CALCIUM, IONIZED mmol/L  --   --   --  1 11*  --   --   --  1 05*  --   --   --   --    MAGNESIUM mg/dL  --  2 1 2 2  --   --  1 9  --  2 1  --  2 1   < > 1 7   PHOSPHORUS mg/dL  --  3 1 3 6  --   --   --   --  2 3*  --  3 3  --  2 0*    < > = values in this interval not displayed       Results from last 7 days   Lab Units 04/13/22  0539 04/12/22  0527 04/10/22  0332 04/09/22 0922 04/09/22 0512   AST U/L 67* 46* 65* 52* 51*   ALT U/L 50 52 43 35 26   ALK PHOS U/L 276* 255* 153* 110 89   TOTAL PROTEIN g/dL 6 4 6 0* 6 6 6 7 5 8*   ALBUMIN g/dL 1 9*  1 9* 1 5* 1 9* 2 1* 1 9*   TOTAL BILIRUBIN mg/dL 0 41 0 37 0 56 0 64 0 91   BILIRUBIN DIRECT mg/dL 0 24*  --   --   --   --      Results from last 7 days   Lab Units 04/13/22  0538 04/12/22  2328 04/12/22  1801 04/12/22  1131 04/12/22  0520 04/11/22  2348 04/11/22  1808 04/11/22  1211 04/11/22  0622 04/11/22  0015 04/10/22  1826 04/10/22  1455   POC GLUCOSE mg/dl 134 150* 119 140 138 134 128 125 90 108 77 80     Results from last 7 days   Lab Units 04/13/22  0539 04/12/22  0527 04/10/22  0332 04/09/22  0922 04/09/22  0512 04/08/22  1949 04/08/22  0452 04/07/22  0502   GLUCOSE RANDOM mg/dL 124 140 79 100 80 76 94 112        Results from last 7 days   Lab Units 04/11/22  1035 04/09/22  2117 04/09/22  0436   PH ART  7 332* 7 311* 7 250*   PCO2 ART mm Hg 53 6* 55 4* 67 0*   PO2 ART mm Hg 71 8* 62 9* 78 2   HCO3 ART mmol/L 27 8 27 3 28 7*   BASE EXC ART mmol/L 1 2 0 3 0 1   O2 CONTENT ART mL/dL 13 8* 15 3* 16 4   O2 HGB, ARTERIAL % 92 8* 89 6* 92 7*   ABG SOURCE  Line, Arterial Line, Arterial Radial, Right     Results from last 7 days   Lab Units 04/08/22  1311 04/08/22  0742   PH TOM  7 345 7 369   PCO2 TOM mm Hg 51 8* 51 4*   PO2 TOM mm Hg 46 0* 73 4*   HCO3 TOM mmol/L 27 6 29 0   BASE EXC TOM mmol/L 1 2 2 9   O2 CONTENT TOM ml/dL 12 9 15 0   O2 HGB, VENOUS % 78 3 92 7*         Results from last 7 days   Lab Units 04/11/22  1035   CK TOTAL U/L 205*   CK MB INDEX % <1 0   CK MB ng/mL <1 0         Results from last 7 days   Lab Units 04/12/22  0527 04/10/22  0332 04/09/22  0924 04/08/22  1949   PROCALCITONIN ng/ml 0 66* 0 63* 0 38* 0 34*     Results from last 7 days   Lab Units 04/08/22  1949   LACTIC ACID mmol/L 1 2             Results from last 7 days   Lab Units 04/07/22  0502   NT-PRO BNP pg/mL 391*             Results from last 7 days   Lab Units 04/09/22  0924 04/08/22  0452 04/07/22  1642   LIPASE u/L 290 819* 1,466*   AMYLASE IU/L 91  --   --                  Results from last 7 days   Lab Units 04/08/22 1954   CLARITY UA  Clear   COLOR UA  Yellow   SPEC GRAV UA  >=1 030   PH UA  5 5   GLUCOSE UA mg/dl Negative   KETONES UA mg/dl >=80 (3+)*   BLOOD UA  Negative   PROTEIN UA mg/dl 100 (2+)*   NITRITE UA  Negative   BILIRUBIN UA  Interference- unable to analyze*   UROBILINOGEN UA E U /dl 1 0   LEUKOCYTES UA  Negative   WBC UA /hpf 0-1*   RBC UA /hpf None Seen   BACTERIA UA /hpf Innumerable*   EPITHELIAL CELLS WET PREP /hpf Occasional         Results from last 7 days   Lab Units 04/12/22  1637 04/08/22 1953   BLOOD CULTURE   --  No Growth After 4 Days  No Growth After 4 Days     GRAM STAIN RESULT  3+ Polys*  1+ Gram positive cocci in pairs*  --          Medications:   Scheduled Medications:  chlorhexidine, 15 mL, Swish & Spit, Q12H [address]  cloNIDine, 0 1 mg, Oral, Q8H THIAGO  enoxaparin, 40 mg, Subcutaneous, Daily  folic acid, 1 mg, Oral, Daily  ipratropium, 0 5 mg, Nebulization, TID  levalbuterol, 1 25 mg, Nebulization, TID  lidocaine, 1 patch, Topical, Q24H  multivitamin-minerals, 1 tablet, Oral, Daily  nicotine, 1 patch, Transdermal, Daily  omeprazole (PRILOSEC) suspension 2 mg/mL, 20 mg, Oral, Daily  potassium chloride, 40 mEq, Intravenous, Once  senna-docusate sodium, 2 tablet, Per NG Tube, HS  sodium chloride, 4 mL, Nebulization, TID  thiamine, 100 mg, Oral, Daily      Continuous IV Infusions:  fentaNYL, 100 mcg/hr, Intravenous, Continuous  propofol, 5-70 mcg/kg/min, Intravenous, Titrated      PRN Meds:  bisacodyl, 10 mg, Rectal, Daily PRN  hydrALAZINE, 10 mg, Intravenous, Q6H PRN  HYDROmorphone, 1 mg, Intravenous, Q3H PRN  ipratropium-albuterol, 3 mL, Nebulization, TID PRN  midazolam, 2 mg, Intravenous, Q2H PRN  ondansetron, 4 mg, Intravenous, Q6H PRN        Discharge Plan: D    Network Utilization Review Department  ATTENTION: Please call with any questions or concerns to [phone] and carefully listen to the prompts so that you are directed to the right person  All voicemails are confidential   Meera Koehler all requests for admission clinical reviews, approved or denied determinations and any other requests to dedicated fax number below belonging to the campus where the patient is receiving treatment   List of dedicated fax numbers for the Facilities:  [address] DENIALS (Administrative/Medical Necessity) [phone]   [address] (Maternity/NICU/Pediatrics) [phone]   [address]  [address] [place] [address] [id number] [place] [address] [address] Rogel Maxinedo [address] [facility] [place] [address] [phone]

## 2022-04-13 NOTE — ASSESSMENT & PLAN NOTE
On admission patient was reported to have ataxia as well as finger-to-nose ataxia   Patient was started on high-dose thiamine for Wernicke's encephalopathy    · F/u MRI

## 2022-04-14 ENCOUNTER — APPOINTMENT (INPATIENT)
Dept: RADIOLOGY | Facility: HOSPITAL | Age: 57
DRG: 207 | End: 2022-04-14
Payer: COMMERCIAL

## 2022-04-14 PROBLEM — R65.10 SIRS (SYSTEMIC INFLAMMATORY RESPONSE SYNDROME) (HCC): Status: ACTIVE | Noted: 2022-04-14

## 2022-04-14 LAB
ALBUMIN SERPL BCP-MCNC: 1.7 G/DL (ref 3.5–5)
ALP SERPL-CCNC: 252 U/L (ref 46–116)
ALT SERPL W P-5'-P-CCNC: 51 U/L (ref 12–78)
ANION GAP SERPL CALCULATED.3IONS-SCNC: 6 MMOL/L (ref 4–13)
AST SERPL W P-5'-P-CCNC: 54 U/L (ref 5–45)
BACTERIA BLD CULT: NORMAL
BACTERIA BLD CULT: NORMAL
BACTERIA SPT RESP CULT: ABNORMAL
BILIRUB SERPL-MCNC: 0.51 MG/DL (ref 0.2–1)
BUN SERPL-MCNC: 10 MG/DL (ref 5–25)
CA-I BLD-SCNC: 1.02 MMOL/L (ref 1.12–1.32)
CALCIUM ALBUM COR SERPL-MCNC: 10 MG/DL (ref 8.3–10.1)
CALCIUM SERPL-MCNC: 8.2 MG/DL (ref 8.3–10.1)
CHLORIDE SERPL-SCNC: 101 MMOL/L (ref 100–108)
CO2 SERPL-SCNC: 33 MMOL/L (ref 21–32)
CREAT SERPL-MCNC: 0.57 MG/DL (ref 0.6–1.3)
ERYTHROCYTE [DISTWIDTH] IN BLOOD BY AUTOMATED COUNT: 12.6 % (ref 11.6–15.1)
GFR SERPL CREATININE-BSD FRML MDRD: 102 ML/MIN/1.73SQ M
GLUCOSE SERPL-MCNC: 129 MG/DL (ref 65–140)
GLUCOSE SERPL-MCNC: 142 MG/DL (ref 65–140)
GLUCOSE SERPL-MCNC: 145 MG/DL (ref 65–140)
GLUCOSE SERPL-MCNC: 167 MG/DL (ref 65–140)
GLUCOSE SERPL-MCNC: 181 MG/DL (ref 65–140)
GRAM STN SPEC: ABNORMAL
GRAM STN SPEC: ABNORMAL
HCT VFR BLD AUTO: 43.2 % (ref 34.8–46.1)
HGB BLD-MCNC: 13.9 G/DL (ref 11.5–15.4)
LACTATE SERPL-SCNC: 0.8 MMOL/L (ref 0.5–2)
MAGNESIUM SERPL-MCNC: 2.5 MG/DL (ref 1.6–2.6)
MCH RBC QN AUTO: 31.4 PG (ref 26.8–34.3)
MCHC RBC AUTO-ENTMCNC: 32.2 G/DL (ref 31.4–37.4)
MCV RBC AUTO: 98 FL (ref 82–98)
PHOSPHATE SERPL-MCNC: 3.7 MG/DL (ref 2.7–4.5)
PLATELET # BLD AUTO: 214 THOUSANDS/UL (ref 149–390)
PMV BLD AUTO: 9.3 FL (ref 8.9–12.7)
POTASSIUM SERPL-SCNC: 4 MMOL/L (ref 3.5–5.3)
PROCALCITONIN SERPL-MCNC: 0.64 NG/ML
PROT SERPL-MCNC: 6.5 G/DL (ref 6.4–8.2)
RBC # BLD AUTO: 4.42 MILLION/UL (ref 3.81–5.12)
SODIUM SERPL-SCNC: 140 MMOL/L (ref 136–145)
TRIGL SERPL-MCNC: 279 MG/DL
WBC # BLD AUTO: 6.83 THOUSAND/UL (ref 4.31–10.16)

## 2022-04-14 PROCEDURE — 99232 SBSQ HOSP IP/OBS MODERATE 35: CPT | Performed by: SURGERY

## 2022-04-14 PROCEDURE — 94640 AIRWAY INHALATION TREATMENT: CPT

## 2022-04-14 PROCEDURE — 71045 X-RAY EXAM CHEST 1 VIEW: CPT

## 2022-04-14 PROCEDURE — 84478 ASSAY OF TRIGLYCERIDES: CPT | Performed by: INTERNAL MEDICINE

## 2022-04-14 PROCEDURE — 84100 ASSAY OF PHOSPHORUS: CPT

## 2022-04-14 PROCEDURE — 85027 COMPLETE CBC AUTOMATED: CPT | Performed by: INTERNAL MEDICINE

## 2022-04-14 PROCEDURE — 94668 MNPJ CHEST WALL SBSQ: CPT

## 2022-04-14 PROCEDURE — 83735 ASSAY OF MAGNESIUM: CPT

## 2022-04-14 PROCEDURE — 94003 VENT MGMT INPAT SUBQ DAY: CPT

## 2022-04-14 PROCEDURE — 84145 PROCALCITONIN (PCT): CPT

## 2022-04-14 PROCEDURE — 99291 CRITICAL CARE FIRST HOUR: CPT | Performed by: INTERNAL MEDICINE

## 2022-04-14 PROCEDURE — 82948 REAGENT STRIP/BLOOD GLUCOSE: CPT

## 2022-04-14 PROCEDURE — 94669 MECHANICAL CHEST WALL OSCILL: CPT

## 2022-04-14 PROCEDURE — 82330 ASSAY OF CALCIUM: CPT

## 2022-04-14 PROCEDURE — 80053 COMPREHEN METABOLIC PANEL: CPT | Performed by: INTERNAL MEDICINE

## 2022-04-14 RX ORDER — CALCIUM GLUCONATE 20 MG/ML
1 INJECTION, SOLUTION INTRAVENOUS ONCE
Status: COMPLETED | OUTPATIENT
Start: 2022-04-14 | End: 2022-04-14

## 2022-04-14 RX ORDER — MINERAL OIL 100 G/100G
1 OIL RECTAL ONCE
Status: COMPLETED | OUTPATIENT
Start: 2022-04-14 | End: 2022-04-14

## 2022-04-14 RX ORDER — SODIUM CHLORIDE, SODIUM GLUCONATE, SODIUM ACETATE, POTASSIUM CHLORIDE, MAGNESIUM CHLORIDE, SODIUM PHOSPHATE, DIBASIC, AND POTASSIUM PHOSPHATE .53; .5; .37; .037; .03; .012; .00082 G/100ML; G/100ML; G/100ML; G/100ML; G/100ML; G/100ML; G/100ML
3000 INJECTION, SOLUTION INTRAVENOUS ONCE
Status: COMPLETED | OUTPATIENT
Start: 2022-04-14 | End: 2022-04-14

## 2022-04-14 RX ORDER — LIDOCAINE HYDROCHLORIDE 10 MG/ML
INJECTION, SOLUTION EPIDURAL; INFILTRATION; INTRACAUDAL; PERINEURAL
Status: DISPENSED
Start: 2022-04-14 | End: 2022-04-14

## 2022-04-14 RX ORDER — ALBUMIN (HUMAN) 12.5 G/50ML
12.5 SOLUTION INTRAVENOUS EVERY 6 HOURS
Status: COMPLETED | OUTPATIENT
Start: 2022-04-14 | End: 2022-04-16

## 2022-04-14 RX ORDER — MIDAZOLAM HYDROCHLORIDE 2 MG/2ML
2 INJECTION, SOLUTION INTRAMUSCULAR; INTRAVENOUS ONCE
Status: COMPLETED | OUTPATIENT
Start: 2022-04-14 | End: 2022-04-14

## 2022-04-14 RX ORDER — ACETYLCYSTEINE 200 MG/ML
3 SOLUTION ORAL; RESPIRATORY (INHALATION)
Status: DISPENSED | OUTPATIENT
Start: 2022-04-14 | End: 2022-04-16

## 2022-04-14 RX ORDER — POTASSIUM CHLORIDE 20MEQ/15ML
40 LIQUID (ML) ORAL ONCE
Status: DISCONTINUED | OUTPATIENT
Start: 2022-04-14 | End: 2022-04-14

## 2022-04-14 RX ORDER — ALBUMIN (HUMAN) 12.5 G/50ML
12.5 SOLUTION INTRAVENOUS EVERY 6 HOURS
Status: DISCONTINUED | OUTPATIENT
Start: 2022-04-14 | End: 2022-04-14

## 2022-04-14 RX ORDER — SODIUM CHLORIDE, SODIUM GLUCONATE, SODIUM ACETATE, POTASSIUM CHLORIDE, MAGNESIUM CHLORIDE, SODIUM PHOSPHATE, DIBASIC, AND POTASSIUM PHOSPHATE .53; .5; .37; .037; .03; .012; .00082 G/100ML; G/100ML; G/100ML; G/100ML; G/100ML; G/100ML; G/100ML
1000 INJECTION, SOLUTION INTRAVENOUS ONCE
Status: COMPLETED | OUTPATIENT
Start: 2022-04-14 | End: 2022-04-14

## 2022-04-14 RX ADMIN — DEXMEDETOMIDINE HYDROCHLORIDE 1.5 MCG/KG/HR: 400 INJECTION INTRAVENOUS at 19:07

## 2022-04-14 RX ADMIN — POTASSIUM CHLORIDE 20 MEQ: 14.9 INJECTION, SOLUTION INTRAVENOUS at 00:06

## 2022-04-14 RX ADMIN — Medication 100 MCG/HR: at 19:10

## 2022-04-14 RX ADMIN — Medication 100 MCG/HR: at 00:01

## 2022-04-14 RX ADMIN — IPRATROPIUM BROMIDE 0.5 MG: 0.5 SOLUTION RESPIRATORY (INHALATION) at 19:17

## 2022-04-14 RX ADMIN — MIDAZOLAM 2 MG: 1 INJECTION INTRAMUSCULAR; INTRAVENOUS at 10:57

## 2022-04-14 RX ADMIN — CEFEPIME HYDROCHLORIDE 2000 MG: 2 INJECTION, POWDER, FOR SOLUTION INTRAVENOUS at 21:17

## 2022-04-14 RX ADMIN — Medication 100 MCG/HR: at 10:50

## 2022-04-14 RX ADMIN — PROPOFOL 45 MCG/KG/MIN: 10 INJECTION, EMULSION INTRAVENOUS at 04:08

## 2022-04-14 RX ADMIN — CLONIDINE HYDROCHLORIDE 0.1 MG: 0.1 TABLET ORAL at 05:25

## 2022-04-14 RX ADMIN — CHLORHEXIDINE GLUCONATE 0.12% ORAL RINSE 15 ML: 1.2 LIQUID ORAL at 08:19

## 2022-04-14 RX ADMIN — SODIUM CHLORIDE, SODIUM GLUCONATE, SODIUM ACETATE, POTASSIUM CHLORIDE, MAGNESIUM CHLORIDE, SODIUM PHOSPHATE, DIBASIC, AND POTASSIUM PHOSPHATE 3000 ML: .53; .5; .37; .037; .03; .012; .00082 INJECTION, SOLUTION INTRAVENOUS at 00:21

## 2022-04-14 RX ADMIN — IPRATROPIUM BROMIDE 0.5 MG: 0.5 SOLUTION RESPIRATORY (INHALATION) at 07:40

## 2022-04-14 RX ADMIN — DEXMEDETOMIDINE HYDROCHLORIDE 1.5 MCG/KG/HR: 400 INJECTION INTRAVENOUS at 11:13

## 2022-04-14 RX ADMIN — DEXMEDETOMIDINE HYDROCHLORIDE 1.5 MCG/KG/HR: 400 INJECTION INTRAVENOUS at 14:00

## 2022-04-14 RX ADMIN — ACETYLCYSTEINE 3 ML: 200 SOLUTION ORAL; RESPIRATORY (INHALATION) at 13:29

## 2022-04-14 RX ADMIN — ALBUMIN (HUMAN) 12.5 G: 0.25 INJECTION, SOLUTION INTRAVENOUS at 10:08

## 2022-04-14 RX ADMIN — SODIUM CHLORIDE SOLN NEBU 3% 4 ML: 3 NEBU SOLN at 19:17

## 2022-04-14 RX ADMIN — VANCOMYCIN HYDROCHLORIDE 1500 MG: 1 INJECTION, POWDER, LYOPHILIZED, FOR SOLUTION INTRAVENOUS at 11:42

## 2022-04-14 RX ADMIN — NOREPINEPHRINE BITARTRATE 2 MCG/MIN: 1 INJECTION, SOLUTION, CONCENTRATE INTRAVENOUS at 01:00

## 2022-04-14 RX ADMIN — DOCUSATE SODIUM AND SENNOSIDES 2 TABLET: 8.6; 5 TABLET, FILM COATED ORAL at 21:01

## 2022-04-14 RX ADMIN — LEVALBUTEROL HYDROCHLORIDE 1.25 MG: 1.25 SOLUTION, CONCENTRATE RESPIRATORY (INHALATION) at 19:17

## 2022-04-14 RX ADMIN — ACETYLCYSTEINE 600 MG: 200 SOLUTION ORAL; RESPIRATORY (INHALATION) at 19:17

## 2022-04-14 RX ADMIN — BISACODYL 10 MG: 10 SUPPOSITORY RECTAL at 08:19

## 2022-04-14 RX ADMIN — ENOXAPARIN SODIUM 40 MG: 40 INJECTION SUBCUTANEOUS at 08:19

## 2022-04-14 RX ADMIN — SODIUM CHLORIDE SOLN NEBU 3% 4 ML: 3 NEBU SOLN at 13:29

## 2022-04-14 RX ADMIN — Medication 1 PATCH: at 08:20

## 2022-04-14 RX ADMIN — CEFEPIME HYDROCHLORIDE 2000 MG: 2 INJECTION, POWDER, FOR SOLUTION INTRAVENOUS at 10:08

## 2022-04-14 RX ADMIN — SODIUM CHLORIDE, SODIUM GLUCONATE, SODIUM ACETATE, POTASSIUM CHLORIDE, MAGNESIUM CHLORIDE, SODIUM PHOSPHATE, DIBASIC, AND POTASSIUM PHOSPHATE 1000 ML: .53; .5; .37; .037; .03; .012; .00082 INJECTION, SOLUTION INTRAVENOUS at 00:39

## 2022-04-14 RX ADMIN — LEVALBUTEROL HYDROCHLORIDE 1.25 MG: 1.25 SOLUTION, CONCENTRATE RESPIRATORY (INHALATION) at 07:40

## 2022-04-14 RX ADMIN — CALCIUM GLUCONATE 1 G: 20 INJECTION, SOLUTION INTRAVENOUS at 06:38

## 2022-04-14 RX ADMIN — METRONIDAZOLE 500 MG: 500 INJECTION, SOLUTION INTRAVENOUS at 05:30

## 2022-04-14 RX ADMIN — MINERAL OIL 1 ENEMA: 100 ENEMA RECTAL at 13:06

## 2022-04-14 RX ADMIN — ALBUMIN (HUMAN) 12.5 G: 0.25 INJECTION, SOLUTION INTRAVENOUS at 21:01

## 2022-04-14 RX ADMIN — SODIUM CHLORIDE SOLN NEBU 3% 4 ML: 3 NEBU SOLN at 07:40

## 2022-04-14 RX ADMIN — FOLIC ACID 1 MG: 1 TABLET ORAL at 08:19

## 2022-04-14 RX ADMIN — PROPOFOL 35 MCG/KG/MIN: 10 INJECTION, EMULSION INTRAVENOUS at 00:03

## 2022-04-14 RX ADMIN — PROPOFOL 30 MCG/KG/MIN: 10 INJECTION, EMULSION INTRAVENOUS at 11:14

## 2022-04-14 RX ADMIN — IPRATROPIUM BROMIDE 0.5 MG: 0.5 SOLUTION RESPIRATORY (INHALATION) at 13:29

## 2022-04-14 RX ADMIN — MIDAZOLAM 2 MG: 1 INJECTION INTRAMUSCULAR; INTRAVENOUS at 11:39

## 2022-04-14 RX ADMIN — THIAMINE HCL TAB 100 MG 100 MG: 100 TAB at 08:19

## 2022-04-14 RX ADMIN — LIDOCAINE 1 PATCH: 50 PATCH CUTANEOUS at 10:13

## 2022-04-14 RX ADMIN — Medication 1 TABLET: at 08:19

## 2022-04-14 RX ADMIN — DEXMEDETOMIDINE HYDROCHLORIDE 1.5 MCG/KG/HR: 400 INJECTION INTRAVENOUS at 22:10

## 2022-04-14 RX ADMIN — ALBUMIN (HUMAN) 12.5 G: 0.25 INJECTION, SOLUTION INTRAVENOUS at 16:25

## 2022-04-14 RX ADMIN — Medication 20 MG: at 08:19

## 2022-04-14 RX ADMIN — LEVALBUTEROL HYDROCHLORIDE 1.25 MG: 1.25 SOLUTION, CONCENTRATE RESPIRATORY (INHALATION) at 13:29

## 2022-04-14 RX ADMIN — POTASSIUM CHLORIDE 20 MEQ: 14.9 INJECTION, SOLUTION INTRAVENOUS at 02:17

## 2022-04-14 RX ADMIN — CHLORHEXIDINE GLUCONATE 0.12% ORAL RINSE 15 ML: 1.2 LIQUID ORAL at 21:01

## 2022-04-14 RX ADMIN — MIDAZOLAM 2 MG: 1 INJECTION INTRAMUSCULAR; INTRAVENOUS at 21:02

## 2022-04-14 RX ADMIN — POLYETHYLENE GLYCOL 3350 17 G: 17 POWDER, FOR SOLUTION ORAL at 08:19

## 2022-04-14 RX ADMIN — DEXMEDETOMIDINE HYDROCHLORIDE 0.5 MCG/KG/HR: 400 INJECTION INTRAVENOUS at 03:38

## 2022-04-14 RX ADMIN — PROPOFOL 50 MCG/KG/MIN: 10 INJECTION, EMULSION INTRAVENOUS at 06:30

## 2022-04-14 RX ADMIN — DEXMEDETOMIDINE HYDROCHLORIDE 1.5 MCG/KG/HR: 400 INJECTION INTRAVENOUS at 16:45

## 2022-04-14 RX ADMIN — VANCOMYCIN HYDROCHLORIDE 1500 MG: 1 INJECTION, POWDER, LYOPHILIZED, FOR SOLUTION INTRAVENOUS at 23:19

## 2022-04-14 NOTE — QUICK NOTE
The 30ml/kg fluid bolus was not given to the patient despite hypotension and/or significantly elevated lactate of ? 4 and/or presence of septic shock due to: Concern for fluid/volume overload  The patient will be administered 1L of crystalloid fluid instead  Orders for this have been placed in Epic  The patient may receive additional colloid or crystalloid fluids thereafter based on clinical condition  Hypotension likely multifactorial in the setting of high propofol dose and sepsis  Patient also examines grossly fluid overloaded with anasarca and distended IVC on POCUS  In the setting of clinical volume overload and negative lactic acid will bolus 1L IVF and decrease propofol   If BP improves with fluid administration will consider repeat IVF bolus    Devora Palencia PA-C

## 2022-04-14 NOTE — SEPSIS NOTE
Sepsis Note   Francisca Nascimento 60 y o  female MRN: [id number]  Unit/Bed#: ICU 11 Encounter: [id number]       qSOFA     Row Name 04/13/22 2200 04/13/22 2100 04/13/22 2000 04/13/22 1900 04/13/22 1800    Altered mental status GCS < 15 -- -- 1 -- --    Respiratory Rate > / =22 0 0 0 0 0    Systolic BP < / =840 -- -- -- -- --    Q Sofa Score -- -- -- -- --    Row Name 04/13/22 1700 04/13/22 1616 04/13/22 1600 04/13/22 1500 04/13/22 1400    Altered mental status GCS < 15 -- 1 -- -- --    Respiratory Rate > / =22 1 -- 0 0 0    Systolic BP < / =971 -- -- -- -- --    Q Sofa Score -- -- -- -- --    Row Name 04/13/22 1300 04/13/22 1200 04/13/22 1100 04/13/22 1000 04/13/22 0900    Altered mental status GCS < 15 -- 1 -- -- --    Respiratory Rate > / =22 0 1 0 0 0    Systolic BP < / =516 -- -- -- -- --    Q Sofa Score -- -- -- -- --    Row Name 04/13/22 0800 04/13/22 0700 04/13/22 0600 04/13/22 0500 04/13/22 0400    Altered mental status GCS < 15 1 -- -- -- 1    Respiratory Rate > / =22 1 0 0 0 0    Systolic BP < / =422 -- -- -- -- --    Q Sofa Score -- -- -- -- --    Row Name 04/13/22 0300 04/13/22 0200 04/13/22 0100 04/13/22 0000 04/12/22 2300    Altered mental status GCS < 15 -- -- -- 1 --    Respiratory Rate > / =22 0 0 0 0 0    Systolic BP < / =463 -- -- -- -- --    Q Sofa Score -- -- -- -- --    Row Name 04/12/22 2200 04/12/22 2100 04/12/22 2000 04/12/22 1911 04/12/22 1900    Altered mental status GCS < 15 -- -- 1 -- --    Respiratory Rate > / =22 0 0 0 0 0    Systolic BP < / =793 -- -- -- -- --    Q Sofa Score -- -- -- -- --    Row Name 04/12/22 1700 04/12/22 1600 04/12/22 1500 04/12/22 1300 04/12/22 1200    Altered mental status GCS < 15 -- 1 -- -- 1    Respiratory Rate > / =22 0 -- 0 0 0    Systolic BP < / =720 -- -- -- -- --    Q Sofa Score -- -- -- -- --    Row Name 04/12/22 1100 04/12/22 1000 04/12/22 0900 04/12/22 0800 04/12/22 0700    Altered mental status GCS < 15 -- -- -- 1 --    Respiratory Rate > / =22 0 0 0 0 0    Systolic BP < / =510 -- -- -- -- --    Q Sofa Score -- -- -- -- --    Row Name 04/12/22 0600 04/12/22 0500 04/12/22 0400 04/12/22 0300 04/12/22 0200    Altered mental status GCS < 15 -- -- 1 -- --    Respiratory Rate > / =22 0 0 0 0 0    Systolic BP < / =195 -- -- -- -- --    Q Sofa Score -- -- -- -- --    Row Name 04/12/22 0100 04/12/22 0000 04/11/22 2300          Altered mental status GCS < 15 -- 1 --      Respiratory Rate > / =22 1 0 0      Systolic BP < / =524 -- -- --      Q Sofa Score -- -- --                 Initial Sepsis Screening     Row Name 04/13/22 3559                Is the patient's history suggestive of a new or worsening infection? --        Suspected source of infection pneumonia  -JS        Are two or more of the following signs & symptoms of infection both present and new to the patient? Yes (Proceed)  -JS        Indicate SIRS criteria Hyperthemia > 38 3C (100 9F); Tachycardia > 90 bpm  -JS        If the answer is yes to both questions, suspicion of sepsis is present --        If severe sepsis is present AND tissue hypoperfusion perists in the hour after fluid resuscitation or lactate > 4, the patient meets criteria for SEPTIC SHOCK --        Are any of the following organ dysfunction criteria present within 6 hours of suspected infection and SIRS criteria that are NOT considered to be chronic conditions?  No  -JS        Organ dysfunction --        Date of presentation of severe sepsis --        Time of presentation of severe sepsis --        Tissue hypoperfusion persists in the hour after crystalloid fluid administration, evidenced, by either: --        Was hypotension present within one hour of the conclusion of crystalloid fluid administration? --        Date of presentation of septic shock --        Time of presentation of septic shock --              User Key  (r) = Recorded By, (t) = Taken By, (c) = Cosigned By    Initials Name Provider Type    [facility], TETE Nurse Practitioner

## 2022-04-14 NOTE — PROGRESS NOTES
Vancomycin IV Pharmacy-to-Dose Consultation    Francisca Garrido is a 60 y o  female who is currently receiving Vancomycin IV with management by the Pharmacy Consult service  Assessment/Plan:  The patient was reviewed  Renal function is stable and no signs or symptoms of nephrotoxicity and/or infusion reactions were documented in the chart  Potential Nephrotoxicity Factors:  Medications: none identified  Patient Factors: none identified    Based on todays assessment, continue current vancomycin (day # 2) dosing of 1,500 mg q12h, with a plan for trough to be drawn at 1030 on 4/15  We will continue to follow the patients culture results and clinical progress daily      Jessica Rodriguez, PharmD, [facility] Clinical Pharmacist  [phone] or via [address] [place] [address]

## 2022-04-14 NOTE — PROGRESS NOTES
Vancomycin Assessment    Francisca Mosquera is a 60 y o  female who is currently ordered vancomycin 1500mg IV q12h for MRSA suspected     Relevant clinical data and objective history reviewed:  Creatinine   Date Value Ref Range Status   04/13/2022 0 48 (L) 0 60 - 1 30 mg/dL Final     Comment:     Standardized to IDMS reference method   04/12/2022 0 41 (L) 0 60 - 1 30 mg/dL Final     Comment:     Standardized to IDMS reference method   04/10/2022 0 39 (L) 0 60 - 1 30 mg/dL Final     Comment:     Standardized to IDMS reference method     BP (!) 180/95   Pulse 100   Temp (!) 102 1 °F (38 9 °C) (Rectal)   Resp 16   Ht 5' 8" (1 727 m)   Wt 97 8 kg (215 lb 9 8 oz)   SpO2 93%   BMI 32 78 kg/m²   I/O last 3 completed shifts: In: 3290 9 [I V :1536 2; NG/GT:830; IV Piggyback:598 8; Feedings:326]  Out: 4270 [Urine:4270]  Lab Results   Component Value Date/Time    BUN 6 04/13/2022 05:39 AM    WBC 10 64 (H) 04/13/2022 05:39 AM    HGB 9 9 (L) 04/13/2022 05:39 AM    HCT 30 1 (L) 04/13/2022 05:39 AM     (H) 04/13/2022 05:39 AM     04/13/2022 05:39 AM     Temp Readings from Last 3 Encounters:   04/13/22 (!) 102 1 °F (38 9 °C) (Rectal)   04/08/22 98 6 °F (37 °C) (Temporal)   04/07/22 97 8 °F (36 6 °C)     Vancomycin Days of Therapy: 1    Assessment/Plan  The patient is currently ordered vancomycin utilizing scheduled dosing based on adjusted body weight (due to obesity)  Baseline risks associated with therapy include: advanced age  The patient is currently ordered 1500mg IV q12h and is clinically appropriate and dose will be continued  Pharmacy will also follow closely for s/sx of nephrotoxicity, infusion reactions, and appropriateness of therapy  BMP and CBC will be ordered per protocol  Plan for trough as patient approaches steady state, prior to the 4th  dose at approximately 1030 on 4/15/22  Due to infection severity, will target a trough of 15-20 (appropriate for most indications)     Pharmacy will continue to follow the patients culture results and clinical progress daily      Maricruz Alston, Pharmacist

## 2022-04-14 NOTE — PLAN OF CARE
Problem: MOBILITY - ADULT  Goal: Maintain or return to baseline ADL function  Description: INTERVENTIONS:  -  Assess patient's ability to carry out ADLs; assess patient's baseline for ADL function and identify physical deficits which impact ability to perform ADLs (bathing, care of mouth/teeth, toileting, grooming, dressing, etc )  - Assess/evaluate cause of self-care deficits   - Assess range of motion  - Assess patient's mobility; develop plan if impaired  - Assess patient's need for assistive devices and provide as appropriate  - Encourage maximum independence but intervene and supervise when necessary  - Involve family in performance of ADLs  - Assess for home care needs following discharge   - Consider OT consult to assist with ADL evaluation and planning for discharge  - Provide patient education as appropriate  Outcome: Progressing  Goal: Maintains/Returns to pre admission functional level  Description: INTERVENTIONS:  - Perform BMAT or MOVE assessment daily    - Set and communicate daily mobility goal to care team and patient/family/caregiver     - Collaborate with rehabilitation services on mobility goals if consulted  - Out of bed for toileting  - Record patient progress and toleration of activity level   Outcome: Progressing     Problem: Potential for Falls  Goal: Patient will remain free of falls  Description: INTERVENTIONS:  - Educate patient/family on patient safety including physical limitations  - Instruct patient to call for assistance with activity   - Consult OT/PT to assist with strengthening/mobility   - Keep Call bell within reach  - Keep bed low and locked with side rails adjusted as appropriate  - Keep care items and personal belongings within reach  - Initiate and maintain comfort rounds  - Make Fall Risk Sign visible to staff  - Apply yellow socks and bracelet for high fall risk patients  - Consider moving patient to room near nurses station  Outcome: Progressing     Problem: Prexisting or High Potential for Compromised Skin Integrity  Goal: Skin integrity is maintained or improved  Description: INTERVENTIONS:  - Identify patients at risk for skin breakdown  - Assess and monitor skin integrity  - Assess and monitor nutrition and hydration status  - Monitor labs   - Assess for incontinence   - Turn and reposition patient  - Assist with mobility/ambulation  - Relieve pressure over bony prominences  - Avoid friction and shearing  - Provide appropriate hygiene as needed including keeping skin clean and dry  - Evaluate need for skin moisturizer/barrier cream  - Collaborate with interdisciplinary team   - Patient/family teaching  - Consider wound care consult   Outcome: Progressing     Problem: DISCHARGE PLANNING - CARE MANAGEMENT  Goal: Discharge to post-acute care or home with appropriate resources  Description: INTERVENTIONS:  - Conduct assessment to determine patient/family and health care team treatment goals, and need for post-acute services based on payer coverage, community resources, and patient preferences, and barriers to discharge  - Address psychosocial, clinical, and financial barriers to discharge as identified in assessment in conjunction with the patient/family and health care team  - Arrange appropriate level of post-acute services according to patients   needs and preference and payer coverage in collaboration with the physician and health care team  - Communicate with and update the patient/family, physician, and health care team regarding progress on the discharge plan  - Arrange appropriate transportation to post-acute venues  Outcome: Progressing     Problem: SUBSTANCE USE/ABUSE  Goal: By discharge, will develop insight into their chemical dependency and sustain motivation to continue in recovery  Description: INTERVENTIONS:  - Attends all daily group sessions and scheduled AA groups  - Actively practices coping skills through participation in the therapeutic community and adherence to program rules  - Reviews and completes assignments from individual treatment plan  - Assist patient development of understanding of their personal cycle of addiction and relapse triggers  Outcome: Progressing  Goal: By discharge, patient will have ongoing treatment plan addressing chemical dependency  Description: INTERVENTIONS:  - Assist patient with resources and/or appointments for ongoing recovery based living  Outcome: Progressing     Problem: Nutrition/Hydration-ADULT  Goal: Nutrient/Hydration intake appropriate for improving, restoring or maintaining nutritional needs  Description: Monitor and assess patient's nutrition/hydration status for malnutrition  Collaborate with interdisciplinary team and initiate plan and interventions as ordered  Monitor patient's weight and dietary intake as ordered or per policy  Utilize nutrition screening tool and intervene as necessary  Determine patient's food preferences and provide high-protein, high-caloric foods as appropriate       INTERVENTIONS:  - Monitor oral intake, urinary output, labs, and treatment plans  - Assess nutrition and hydration status and recommend course of action  - Evaluate amount of meals eaten  - Assist patient with eating if necessary   - Allow adequate time for meals  - Recommend/ encourage appropriate diets, oral nutritional supplements, and vitamin/mineral supplements  - Order, calculate, and assess calorie counts as needed  - Recommend, monitor, and adjust tube feedings and TPN/PPN based on assessed needs  - Assess need for intravenous fluids  - Provide specific nutrition/hydration education as appropriate  - Include patient/family/caregiver in decisions related to nutrition  Outcome: Progressing     Problem: PAIN - ADULT  Goal: Verbalizes/displays adequate comfort level or baseline comfort level  Description: Interventions:  - Encourage patient to monitor pain and request assistance  - Assess pain using appropriate pain scale  - Administer analgesics based on type and severity of pain and evaluate response  - Implement non-pharmacological measures as appropriate and evaluate response  - Consider cultural and social influences on pain and pain management  - Notify physician/advanced practitioner if interventions unsuccessful or patient reports new pain  Outcome: Progressing     Problem: INFECTION - ADULT  Goal: Absence or prevention of progression during hospitalization  Description: INTERVENTIONS:  - Assess and monitor for signs and symptoms of infection  - Monitor lab/diagnostic results  - Monitor all insertion sites, i e  indwelling lines, tubes, and drains  - Monitor endotracheal if appropriate and nasal secretions for changes in amount and color  - [place] appropriate cooling/warming therapies per order  - Administer medications as ordered  - Instruct and encourage patient and family to use good hand hygiene technique  - Identify and instruct in appropriate isolation precautions for identified infection/condition  Outcome: Progressing     Problem: SAFETY ADULT  Goal: Maintain or return to baseline ADL function  Description: INTERVENTIONS:  -  Assess patient's ability to carry out ADLs; assess patient's baseline for ADL function and identify physical deficits which impact ability to perform ADLs (bathing, care of mouth/teeth, toileting, grooming, dressing, etc )  - Assess/evaluate cause of self-care deficits   - Assess range of motion  - Assess patient's mobility; develop plan if impaired  - Assess patient's need for assistive devices and provide as appropriate  - Encourage maximum independence but intervene and supervise when necessary  - Involve family in performance of ADLs  - Assess for home care needs following discharge   - Consider OT consult to assist with ADL evaluation and planning for discharge  - Provide patient education as appropriate  Outcome: Progressing  Goal: Maintains/Returns to pre admission functional level  Description: INTERVENTIONS:  - Perform BMAT or MOVE assessment daily    - Set and communicate daily mobility goal to care team and patient/family/caregiver  - Collaborate with rehabilitation services on mobility goals if consulted  - Out of bed for toileting  - Record patient progress and toleration of activity level   Outcome: Progressing  Goal: Patient will remain free of falls  Description: INTERVENTIONS:  - Educate patient/family on patient safety including physical limitations  - Instruct patient to call for assistance with activity   - Consult OT/PT to assist with strengthening/mobility   - Keep Call bell within reach  - Keep bed low and locked with side rails adjusted as appropriate  - Keep care items and personal belongings within reach  - Initiate and maintain comfort rounds  - Make Fall Risk Sign visible to staff  - Apply yellow socks and bracelet for high fall risk patients  - Consider moving patient to room near nurses station  Outcome: Progressing     Problem: DISCHARGE PLANNING  Goal: Discharge to home or other facility with appropriate resources  Description: INTERVENTIONS:  - Identify barriers to discharge w/patient and caregiver  - Arrange for needed discharge resources and transportation as appropriate  - Identify discharge learning needs (meds, wound care, etc )  - Arrange for interpretive services to assist at discharge as needed  - Refer to Case Management Department for coordinating discharge planning if the patient needs post-hospital services based on physician/advanced practitioner order or complex needs related to functional status, cognitive ability, or social support system  Outcome: Progressing     Problem: Knowledge Deficit  Goal: Patient/family/caregiver demonstrates understanding of disease process, treatment plan, medications, and discharge instructions  Description: Complete learning assessment and assess knowledge base    Interventions:  - Provide teaching at level of understanding  - Provide teaching via preferred learning methods  Outcome: Progressing     Problem: NEUROSENSORY - ADULT  Goal: Achieves stable or improved neurological status  Description: INTERVENTIONS  - Monitor and report changes in neurological status  - Monitor vital signs such as temperature, blood pressure, glucose, and any other labs ordered   - Initiate measures to prevent increased intracranial pressure  - Monitor for seizure activity and implement precautions if appropriate      Outcome: Progressing  Goal: Remains free of injury related to seizures activity  Description: INTERVENTIONS  - Maintain airway, patient safety  and administer oxygen as ordered  - Monitor patient for seizure activity, document and report duration and description of seizure to physician/advanced practitioner  - If seizure occurs,  ensure patient safety during seizure  - Reorient patient post seizure  - Seizure pads on all 4 side rails  - Instruct patient/family to notify RN of any seizure activity including if an aura is experienced  - Instruct patient/family to call for assistance with activity based on nursing assessment  - Administer anti-seizure medications if ordered    Outcome: Progressing  Goal: Achieves maximal functionality and self care  Description: INTERVENTIONS  - Monitor swallowing and airway patency with patient fatigue and changes in neurological status  - Encourage and assist patient to increase activity and self care     - Encourage visually impaired, hearing impaired and aphasic patients to use assistive/communication devices  Outcome: Progressing     Problem: CARDIOVASCULAR - ADULT  Goal: Maintains optimal cardiac output and hemodynamic stability  Description: INTERVENTIONS:  - Monitor I/O, vital signs and rhythm  - Monitor for S/S and trends of decreased cardiac output  - Administer and titrate ordered vasoactive medications to optimize hemodynamic stability  - Assess quality of pulses, skin color and temperature  - Assess for signs of decreased coronary artery perfusion  - Instruct patient to report change in severity of symptoms  Outcome: Progressing  Goal: Absence of cardiac dysrhythmias or at baseline rhythm  Description: INTERVENTIONS:  - Continuous cardiac monitoring, vital signs, obtain 12 lead EKG if ordered  - Administer antiarrhythmic and heart rate control medications as ordered  - Monitor electrolytes and administer replacement therapy as ordered  Outcome: Progressing     Problem: RESPIRATORY - ADULT  Goal: Achieves optimal ventilation and oxygenation  Description: INTERVENTIONS:  - Assess for changes in respiratory status  - Assess for changes in mentation and behavior  - Position to facilitate oxygenation and minimize respiratory effort  - Oxygen administered by appropriate delivery if ordered  - Initiate smoking cessation education as indicated  - Encourage broncho-pulmonary hygiene including cough, deep breathe, Incentive Spirometry  - Assess the need for suctioning and aspirate as needed  - Assess and instruct to report SOB or any respiratory difficulty  - Respiratory Therapy support as indicated  Outcome: Progressing     Problem: GASTROINTESTINAL - ADULT  Goal: Minimal or absence of nausea and/or vomiting  Description: INTERVENTIONS:  - Administer IV fluids if ordered to ensure adequate hydration  - Maintain NPO status until nausea and vomiting are resolved  - Nasogastric tube if ordered  - Administer ordered antiemetic medications as needed  - Provide nonpharmacologic comfort measures as appropriate  - Advance diet as tolerated, if ordered  - Consider nutrition services referral to assist patient with adequate nutrition and appropriate food choices  Outcome: Progressing  Goal: Maintains or returns to baseline bowel function  Description: INTERVENTIONS:  - Assess bowel function  - Encourage oral fluids to ensure adequate hydration  - Administer IV fluids if ordered to ensure adequate hydration  - Administer ordered medications as needed  - Encourage mobilization and activity  - Consider nutritional services referral to assist patient with adequate nutrition and appropriate food choices  Outcome: Progressing  Goal: Maintains adequate nutritional intake  Description: INTERVENTIONS:  - Monitor percentage of each meal consumed  - Identify factors contributing to decreased intake, treat as appropriate  - Assist with meals as needed  - Monitor I&O, weight, and lab values if indicated  - Obtain nutrition services referral as needed  Outcome: Progressing  Goal: Oral mucous membranes remain intact  Description: INTERVENTIONS  - Assess oral mucosa and hygiene practices  - Implement preventative oral hygiene regimen  - Implement oral medicated treatments as ordered  - Initiate Nutrition services referral as needed  Outcome: Progressing     Problem: GENITOURINARY - ADULT  Goal: Maintains or returns to baseline urinary function  Description: INTERVENTIONS:  - Assess urinary function  - Encourage oral fluids to ensure adequate hydration if ordered  - Administer IV fluids as ordered to ensure adequate hydration  - Administer ordered medications as needed  - Offer frequent toileting  - Follow urinary retention protocol if ordered  Outcome: Progressing  Goal: Absence of urinary retention  Description: INTERVENTIONS:  - Assess patients ability to void and empty bladder  - Monitor I/O  - Bladder scan as needed  - Discuss with physician/AP medications to alleviate retention as needed  - Discuss catheterization for long term situations as appropriate  Outcome: Progressing  Goal: Urinary catheter remains patent  Description: INTERVENTIONS:  - Assess patency of urinary catheter  - If patient has a chronic vazquez, consider changing catheter if non-functioning  - Follow guidelines for intermittent irrigation of non-functioning urinary catheter  Outcome: Progressing     Problem: METABOLIC, FLUID AND ELECTROLYTES - ADULT  Goal: Electrolytes maintained within normal limits  Description: INTERVENTIONS:  - Monitor labs and assess patient for signs and symptoms of electrolyte imbalances  - Administer electrolyte replacement as ordered  - Monitor response to electrolyte replacements, including repeat lab results as appropriate  - Instruct patient on fluid and nutrition as appropriate  Outcome: Progressing  Goal: Fluid balance maintained  Description: INTERVENTIONS:  - Monitor labs   - Monitor I/O and WT  - Instruct patient on fluid and nutrition as appropriate  - Assess for signs & symptoms of volume excess or deficit  Outcome: Progressing     Problem: SKIN/TISSUE INTEGRITY - ADULT  Goal: Skin Integrity remains intact(Skin Breakdown Prevention)  Description: Assess:  -Inspect skin when repositioning, toileting, and assisting with ADLS  -Assess extremities for adequate circulation and sensation     Bed Management:  -Have minimal linens on bed & keep smooth, unwrinkled  -Change linens as needed when moist or perspiring    Toileting:  -Offer bedside commode    Activity:  -Encourage activity and walks on unit  -Encourage or provide ROM exercises     Skin Care:  -Avoid use of baby powder, tape, friction and shearing, hot water or constrictive clothing  -Do not massage red bony areas    Outcome: Progressing     Problem: SAFETY,RESTRAINT: NV/NON-SELF DESTRUCTIVE BEHAVIOR  Goal: Remains free of harm/injury (restraint for non violent/non self-detsructive behavior)  Description: INTERVENTIONS:  - Instruct patient/family regarding restraint use   - Assess and monitor physiologic and psychological status   - Provide interventions and comfort measures to meet assessed patient needs   - Identify and implement measures to help patient regain control  - Assess readiness for release of restraint   Outcome: Progressing  Goal: Returns to optimal restraint-free functioning  Description: INTERVENTIONS:  - Assess the patient's behavior and symptoms that indicate continued need for restraint  - Identify and implement measures to help patient regain control  - Assess readiness for release of restraint   Outcome: Progressing

## 2022-04-14 NOTE — ASSESSMENT & PLAN NOTE
Patient acute hypoxic on admission, was given dose of Lasix with marked improvement  However, this morning patient started to become more agitated, hypoxic and tachycardic unable to tolerate BiPAP, patient was intubated for airway protection in the setting of encephalopathy and potential DTs  Patient had bronchoscopy on 04/13/2020 to showing significant secretions throughout lungs  Patient failed weaning trial yesterday  Overnight patient developed fever of 103 and started to become hypotensive  Patient was given 1 L of fluids, cefepime, Flagyl, vancomycin  Patient was also started on Levophed and Precedex      · Patient will likely have another bronchoscopy today to clear out secretions  · SBT with possible extubation  · Procalcitonin is unchanged, no leukocytosis  · Will follow final sputum culture results

## 2022-04-14 NOTE — PROGRESS NOTES
PT remains intubated, PT's propofol decreased, consider d/cing propofol d/t elevated triglycerides, if plans to maintain propofol at 16  1mL/hr, recommend adjusting Jevity 1 2 to 50mL/hr, add 2 packs of prosource, water flushes 175mL every 4hrs provides total of 1985cal, 96g pro, 2018mL  If propofol d/carley recommend [email or web address], no prosource required, water flushes 110mL every 4hrs provides total 2016cal, 93g pro,  2016mL

## 2022-04-14 NOTE — ASSESSMENT & PLAN NOTE
On admission patient was reported to have ataxia as well as finger-to-nose ataxia   Patient was started on high-dose thiamine for Wernicke's encephalopathy    · MRI came back negative for any acute or chronic pathologies

## 2022-04-14 NOTE — ASSESSMENT & PLAN NOTE
Patient drinks roughly 8-12 shots of whiskey daily  Patient's last drink was April 1, 2022  EtoH level on admission was 54  Has never had any history withdrawals or seizures  Patient was initially admitted for alcoholic pancreatitis, however, patient became encephalopathic and agitated requiring transfer to detox Unit  · Patient was started on Precedex overnight due to tachycardia  · Will try to wean sedation prior to extubation

## 2022-04-14 NOTE — PLAN OF CARE
PT remains intubated, PT's propofol decreased, consider d/cing propofol d/t elevated triglycerides, if plans to maintain propofol at 16  1mL/hr, recommend adjusting Jevity 1 2 to 50mL/hr, add 2 packs of prosource, water flushes 175mL every 4hrs provides total of 1985cal, 96g pro, 2018mL  If propofol d/carley recommend Jevity [email or web address], no prosource required, water flushes 110mL every 4hrs provides total 2016cal, 93g pro,  2016mL  Problem: Nutrition/Hydration-ADULT  Goal: Nutrient/Hydration intake appropriate for improving, restoring or maintaining nutritional needs  Description: Monitor and assess patient's nutrition/hydration status for malnutrition  Collaborate with interdisciplinary team and initiate plan and interventions as ordered  Monitor patient's weight and dietary intake as ordered or per policy  Utilize nutrition screening tool and intervene as necessary  Determine patient's food preferences and provide high-protein, high-caloric foods as appropriate       INTERVENTIONS:  - Monitor oral intake, urinary output, labs, and treatment plans  - Assess nutrition and hydration status and recommend course of action  - Evaluate amount of meals eaten  - Assist patient with eating if necessary   - Allow adequate time for meals  - Recommend/ encourage appropriate diets, oral nutritional supplements, and vitamin/mineral supplements  - Order, calculate, and assess calorie counts as needed  - Recommend, monitor, and adjust tube feedings and TPN/PPN based on assessed needs  - Assess need for intravenous fluids  - Provide specific nutrition/hydration education as appropriate  - Include patient/family/caregiver in decisions related to nutrition  Outcome: Progressing

## 2022-04-14 NOTE — PLAN OF CARE
Problem: Potential for Falls  Goal: Patient will remain free of falls  Description: INTERVENTIONS:  - Educate patient/family on patient safety including physical limitations  - Instruct patient to call for assistance with activity   - Consult OT/PT to assist with strengthening/mobility   - Keep Call bell within reach  - Keep bed low and locked with side rails adjusted as appropriate  - Keep care items and personal belongings within reach  - Initiate and maintain comfort rounds  - Make Fall Risk Sign visible to staff  - Offer Toileting every 2 Hours, in advance of need  - Initiate/Maintain bed alarm  - Obtain necessary fall risk management equipment: bed alarm  - Apply yellow socks and bracelet for high fall risk patients  - Consider moving patient to room near nurses station  Outcome: Progressing     Problem: Prexisting or High Potential for Compromised Skin Integrity  Goal: Skin integrity is maintained or improved  Description: INTERVENTIONS:  - Identify patients at risk for skin breakdown  - Assess and monitor skin integrity  - Assess and monitor nutrition and hydration status  - Monitor labs   - Assess for incontinence   - Turn and reposition patient  - Assist with mobility/ambulation  - Relieve pressure over bony prominences  - Avoid friction and shearing  - Provide appropriate hygiene as needed including keeping skin clean and dry  - Evaluate need for skin moisturizer/barrier cream  - Collaborate with interdisciplinary team   - Patient/family teaching  - Consider wound care consult   Outcome: Progressing     Problem: Nutrition/Hydration-ADULT  Goal: Nutrient/Hydration intake appropriate for improving, restoring or maintaining nutritional needs  Description: Monitor and assess patient's nutrition/hydration status for malnutrition  Collaborate with interdisciplinary team and initiate plan and interventions as ordered  Monitor patient's weight and dietary intake as ordered or per policy   Utilize nutrition screening tool and intervene as necessary  Determine patient's food preferences and provide high-protein, high-caloric foods as appropriate       INTERVENTIONS:  - Monitor oral intake, urinary output, labs, and treatment plans  - Assess nutrition and hydration status and recommend course of action  - Evaluate amount of meals eaten  - Assist patient with eating if necessary   - Allow adequate time for meals  - Recommend/ encourage appropriate diets, oral nutritional supplements, and vitamin/mineral supplements  - Order, calculate, and assess calorie counts as needed  - Recommend, monitor, and adjust tube feedings and TPN/PPN based on assessed needs  - Assess need for intravenous fluids  - Provide specific nutrition/hydration education as appropriate  - Include patient/family/caregiver in decisions related to nutrition  Outcome: Progressing     Problem: PAIN - ADULT  Goal: Verbalizes/displays adequate comfort level or baseline comfort level  Description: Interventions:  - Encourage patient to monitor pain and request assistance  - Assess pain using appropriate pain scale  - Administer analgesics based on type and severity of pain and evaluate response  - Implement non-pharmacological measures as appropriate and evaluate response  - Consider cultural and social influences on pain and pain management  - Notify physician/advanced practitioner if interventions unsuccessful or patient reports new pain  Outcome: Progressing     Problem: INFECTION - ADULT  Goal: Absence or prevention of progression during hospitalization  Description: INTERVENTIONS:  - Assess and monitor for signs and symptoms of infection  - Monitor lab/diagnostic results  - Monitor all insertion sites, i e  indwelling lines, tubes, and drains  - Monitor endotracheal if appropriate and nasal secretions for changes in amount and color  - Olivia appropriate cooling/warming therapies per order  - Administer medications as ordered  - Instruct and encourage patient and family to use good hand hygiene technique  - Identify and instruct in appropriate isolation precautions for identified infection/condition  Outcome: Progressing     Problem: SAFETY ADULT  Goal: Patient will remain free of falls  Description: INTERVENTIONS:  - Educate patient/family on patient safety including physical limitations  - Instruct patient to call for assistance with activity   - Consult OT/PT to assist with strengthening/mobility   - Keep Call bell within reach  - Keep bed low and locked with side rails adjusted as appropriate  - Keep care items and personal belongings within reach  - Initiate and maintain comfort rounds  - Make Fall Risk Sign visible to staff  - Offer Toileting every 2 Hours, in advance of need  - Initiate/Maintain bed alarm  - Obtain necessary fall risk management equipment: bed alarm  - Apply yellow socks and bracelet for high fall risk patients  - Consider moving patient to room near nurses station  Outcome: Progressing     Problem: Knowledge Deficit  Goal: Patient/family/caregiver demonstrates understanding of disease process, treatment plan, medications, and discharge instructions  Description: Complete learning assessment and assess knowledge base    Interventions:  - Provide teaching at level of understanding  - Provide teaching via preferred learning methods  Outcome: Progressing     Problem: CARDIOVASCULAR - ADULT  Goal: Absence of cardiac dysrhythmias or at baseline rhythm  Description: INTERVENTIONS:  - Continuous cardiac monitoring, vital signs, obtain 12 lead EKG if ordered  - Administer antiarrhythmic and heart rate control medications as ordered  - Monitor electrolytes and administer replacement therapy as ordered  Outcome: Progressing

## 2022-04-14 NOTE — PROGRESS NOTES
Progress Note - General Surgery   Francisca Espinosa 60 y o  female MRN: [id number]  Unit/Bed#: ICU 6 Encounter: [id number]    Assessment:  54-yr old F (PMHx: alcohol abuse, chronic pancreatitis, HTN, HLD) w/ acute alcohol-induced pancreatitis c/b pancreatic necrosis, DT & acute hypoxic respiratory failure  Levo @ 1  Vent 14/400/60/6    Plan:  Wean vent as tolerated  Wean pressors    Continue TF's as tolerated  Patient can have repeat CT scan tomorrow with PO and IV contrast    Subjective/Objective   Chief Complaint:     Subjective: Patient needed 1L bolus overnight for more fluids    Objective:     Blood pressure (!) 88/56, pulse 72, temperature (!) 97 4 °F (36 3 °C), temperature source Rectal, resp  rate 14, height 5' 8" (1 727 m), weight 98 9 kg (218 lb 0 6 oz), SpO2 94 %, not currently breastfeeding  ,Body mass index is 33 15 kg/m²  Intake/Output Summary (Last 24 hours) at 4/14/2022 0814  Last data filed at 4/14/2022 4669  Gross per 24 hour   Intake 4367 84 ml   Output 2325 ml   Net 2042 84 ml       Invasive Devices  Report    Peripheral Intravenous Line            Peripheral IV 04/12/22 Right;Ventral (anterior) Forearm 2 days    Peripheral IV 04/12/22 Proximal;Right;Ventral (anterior) Forearm 1 day    Peripheral IV 04/13/22 Left Antecubital <1 day          Arterial Line            Arterial Line 04/09/22 Radial 4 days          Drain            NG/OG/Enteral Tube Orogastric 14 Fr Left mouth 5 days    Urethral Catheter Non-latex 16 Fr  5 days          Airway            ETT  Oral 8 mm 5 days                Physical Exam: General: intubated and sedated  Head: normocephalic, atraumatic  Neck: supple, trachea midline  Respiratory: BS b/l  Abdomen: Soft, NT, no rebound/guarding  Heart: RRR, S1s2  Ext: Warm no cyanosis         Lab, Imaging and other studies:  I have personally reviewed pertinent lab results    , CBC:   Lab Results   Component Value Date    WBC 6 83 04/14/2022    HGB 13 9 04/14/2022    HCT 43 2 04/14/2022    MCV 98 04/14/2022     04/14/2022    MCH 31 4 04/14/2022    MCHC 32 2 04/14/2022    RDW 12 6 04/14/2022    MPV 9 3 04/14/2022   , CMP:   Lab Results   Component Value Date    SODIUM 140 04/14/2022    K 4 0 04/14/2022     04/14/2022    CO2 33 (H) 04/14/2022    BUN 10 04/14/2022    CREATININE 0 57 (L) 04/14/2022    CALCIUM 8 2 (L) 04/14/2022    AST 54 (H) 04/14/2022    ALT 51 04/14/2022    ALKPHOS 252 (H) 04/14/2022    EGFR 102 04/14/2022     VTE Pharmacologic Prophylaxis: Heparin  VTE Mechanical Prophylaxis: sequential compression device

## 2022-04-14 NOTE — ASSESSMENT & PLAN NOTE
Patient had elevated temp at 1:03 a m , tachycardia and hypotension yesterday requiring IV fluids and eventually pressors  Patient was also started on broad-spectrum antibiotics and started on Precedex for tachycardia  Patient's presentation is unlikely to be secondary to an infectious process given down trending leukocytosis, stable procalcitonin, cool extremities, and no identifiable source  SIRS response likely secondary to bronchoscopy versus decreased ECF  · Will try to wean off pressors  · Consider discontinuing antibiotics and monitoring off  · Tylenol 650 q 6 p r n

## 2022-04-14 NOTE — PROGRESS NOTES
[address]  Progress Note - Francisca Carreon 1965, 60 y o  female MRN: [id number]  Unit/Bed#: ICU 11 Encounter: [id number]  Primary Care Provider: Juwan Angeles,    Date and time admitted to hospital: 4/8/2022  3:55 PM    * Acute respiratory failure with hypoxia [facility])  Assessment & Plan  Patient acute hypoxic on admission, was given dose of Lasix with marked improvement  However, this morning patient started to become more agitated, hypoxic and tachycardic unable to tolerate BiPAP, patient was intubated for airway protection in the setting of encephalopathy and potential DTs  Patient had bronchoscopy on 04/13/2020 to showing significant secretions throughout lungs  Patient failed weaning trial yesterday  Overnight patient developed fever of 103 and started to become hypotensive  Patient was given 1 L of fluids, cefepime, Flagyl, vancomycin  Patient was also started on Levophed and Precedex  · Patient will likely have another bronchoscopy today to clear out secretions  · SBT with possible extubation  · Procalcitonin is unchanged, no leukocytosis  · Will follow final sputum culture results    Delirium tremens [facility])  Assessment & Plan  Patient drinks roughly 8-12 shots of whiskey daily  Patient's last drink was April 1, 2022  EtoH level on admission was 54  Has never had any history withdrawals or seizures  Patient was initially admitted for alcoholic pancreatitis, however, patient became encephalopathic and agitated requiring transfer to detox Unit  · Patient was started on Precedex overnight due to tachycardia  · Will try to wean sedation prior to extubation  Alcohol-induced acute pancreatitis  Assessment & Plan  · Patient initially presented with a lipase of 53,000  Patient's acute pancreatitis is likely secondary to alcohol    · No surgical intervention of the pancreas  · Monitor TGL    Al[facility] use disorder, severe, dependence ([facility] )  Assessment & Plan  · Will continue with withdrawal management  · Case management consult in place  · Encouraged cessation   · Continue thiamine and folic acid       SIRS (systemic inflammatory response syndrome) ([facility] )  Assessment & Plan  Patient had elevated temp at 1:03 a m , tachycardia and hypotension yesterday requiring IV fluids and eventually pressors  Patient was also started on broad-spectrum antibiotics and started on Precedex for tachycardia  Patient's presentation is unlikely to be secondary to an infectious process given down trending leukocytosis, stable procalcitonin, cool extremities, and no identifiable source  SIRS response likely secondary to bronchoscopy versus decreased ECF  · Will try to wean off pressors  · Consider discontinuing antibiotics and monitoring off  · Tylenol 650 q 6 p r n  Ataxia  Assessment & Plan  On admission patient was reported to have ataxia as well as finger-to-nose ataxia  Patient was started on high-dose thiamine for Wernicke's encephalopathy    · MRI came back negative for any acute or chronic pathologies    Gastroesophageal reflux disease without esophagitis  Assessment & Plan  · Continue Protonix 40 mg daily       Hypertension  Assessment & Plan  · Patient is currently on 10 mg of lisinopril daily at home  Can be resumed once extubated  · Hydralazine p r n  For SBP greater than 170         Hypokalemia-resolved as of 4/10/2022  Assessment & Plan  · Likely secondary to diuretic administration  Resolved   · Monitor BMP        ----------------------------------------------------------------------------------------  HPI/24hr events:  Patient had sustained hyperthermia yesterday with T-max of 103°  Patient also was agitated overnight and was restarted on Precedex      Patient appropriate for transfer out of the ICU today?: No  Disposition: Continue Critical Care   Code Status: Level 1 - Full Code  ---------------------------------------------------------------------------------------  SUBJECTIVE  Patient is sedated and intubated  Review of Systems   Unable to perform ROS: Intubated     Review of systems was reviewed and negative unless stated above in HPI/24-hour events   ---------------------------------------------------------------------------------------  OBJECTIVE    Vitals   Vitals:    22 0715 22 0727 22 0736 22 0740   BP: (!) 88/56      Pulse: 72      Resp: 14      Temp:  (!) 97 4 °F (36 3 °C)     TempSrc:  Rectal     SpO2: 94%  94% 94%   Weight:       Height:         Temp (24hrs), Av 5 °F (38 1 °C), Min:97 4 °F (36 3 °C), Max:103 °F (39 4 °C)  Current: Temperature: (!) 97 4 °F (36 3 °C)  Arterial Line BP: 80/56  Arterial Line MAP (mmHg): 68 mmHg    Respiratory:  SpO2: SpO2: 94 %       Invasive/non-invasive ventilation settings   Respiratory  Report   Lab Data (Last 4 hours)    None         O2/Vent Data (Last 4 hours)       0736           Vent Mode AC/VC       Resp Rate (BPM) (BPM) 14       Vt (mL) (mL) 400       FIO2 (%) (%) 60       PEEP (cmH2O) (cmH2O) 6       MV 7 85                   Physical Exam  Constitutional:       Comments: Intubated  Very agitated    HENT:      Head: Normocephalic  Eyes:      Conjunctiva/sclera: Conjunctivae normal       Pupils: Pupils are equal, round, and reactive to light  Cardiovascular:      Rate and Rhythm: Regular rhythm  Tachycardia present  Pulses: Normal pulses  Heart sounds: No murmur heard  No gallop  Pulmonary:      Comments: ACVC  14/400/50%/6  Abdominal:      General: There is distension  Tenderness: There is no abdominal tenderness  Comments: Severely diminished bowel sounds    Skin:     General: Skin is warm  Capillary Refill: Capillary refill takes less than 2 seconds               Laboratory and Diagnostics:  Results from last 7 days   Lab Units 22  0503 22  6816 04/12/22  0527 04/10/22  0331 04/09/22 0924 04/09/22 0512 04/08/22 1949   WBC Thousand/uL 6 83 10 64* 11 87* 10 35* 9 65 10 31* 9 79   HEMOGLOBIN g/dL 13 9 9 9* 9 9* 11 1* 11 4* 11 6 11 4*   HEMATOCRIT % 43 2 30 1* 29 2* 33 7* 34 2* 30 7* 32 4*   PLATELETS Thousands/uL 214 283 274 216 193 244 174   NEUTROS PCT %  --  81*  --   --   --   --   --    BANDS PCT %  --   --  10*  --   --   --  5   MONOS PCT %  --  8  --   --   --   --   --    MONO PCT %  --   --  4  --   --   --  13*     Results from last 7 days   Lab Units 04/14/22  0503 04/13/22  2155 04/13/22  0539 04/12/22  0527 04/10/22  0332 04/09/22  0922 04/09/22  0512 04/08/22  1949 04/08/22  1949   SODIUM mmol/L 140 140 138 139 137 139 125*   < > 142   POTASSIUM mmol/L 4 0 3 2* 3 3* 3 3* 3 5 3 5 3 2*   < > 3 5   CHLORIDE mmol/L 101 99* 97* 100 101 102 91*   < > 104   CO2 mmol/L 33* 36* 30 34* 28 27 24   < > 30   ANION GAP mmol/L 6 5 11 5 8 10 10   < > 8   BUN mg/dL 10 9 6 5 6 9 8   < > 9   CREATININE mg/dL 0 57* 0 60 0 48* 0 41* 0 39* 0 76 0 53*   < > 0 73   CALCIUM mg/dL 8 2* 7 9* 8 7 8 3 8 2* 8 4 6 7*   < > 8 1*   GLUCOSE RANDOM mg/dL 145* 157* 124 140 79 100 80   < > 76   ALT U/L 51  --  50 52 43 35 26  --  24   AST U/L 54*  --  67* 46* 65* 52* 51*  --  30   ALK PHOS U/L 252*  --  276* 255* 153* 110 89  --  82   ALBUMIN g/dL 1 7*  --  1 9*  1 9* 1 5* 1 9* 2 1* 1 9*  --  2 1*   TOTAL BILIRUBIN mg/dL 0 51  --  0 41 0 37 0 56 0 64 0 91  --  0 61    < > = values in this interval not displayed       Results from last 7 days   Lab Units 04/14/22  0503 04/13/22  2155 04/12/22  0527 04/10/22  0332 04/09/22  0512 04/08/22  1949 04/08/22  0452   MAGNESIUM mg/dL 2 5 1 8 2 1 2 2 1 9 2 1 2 1   PHOSPHORUS mg/dL 3 7 3 4 3 1 3 6  --  2 3* 3 3               Results from last 7 days   Lab Units 04/13/22  2340 04/08/22  1949   LACTIC ACID mmol/L 0 8 1 2     ABG:  Results from last 7 days   Lab Units 04/11/22  1035   PH ART  7 332*   PCO2 ART mm Hg 53 6*   PO2 ART mm Hg 71 8* HCO3 ART mmol/L 27 8   BASE EXC ART mmol/L 1 2   ABG SOURCE  Line, Arterial     VBG:  Results from last 7 days   Lab Units 04/11/22  1035 04/09/22  0436 04/08/22  1311   PH TOM   --   --  7 345   PCO2 TOM mm Hg  --   --  51 8*   PO2 TOM mm Hg  --   --  46 0*   HCO3 TOM mmol/L  --   --  27 6   BASE EXC TOM mmol/L  --   --  1 2   ABG SOURCE  Line, Arterial   < >  --     < > = values in this interval not displayed  Results from last 7 days   Lab Units 04/14/22  0025 04/12/22  0527 04/10/22  0332 04/09/22  0924 04/08/22  1949   PROCALCITONIN ng/ml 0 64* 0 66* 0 63* 0 38* 0 34*       Micro  Results from last 7 days   Lab Units 04/13/22  1017 04/12/22  1637 04/08/22  1953   BLOOD CULTURE   --   --  No Growth After 5 Days  No Growth After 5 Days  SPUTUM CULTURE   --  Culture results to follow  --    GRAM STAIN RESULT  1+ Polys  No organisms seen 3+ Polys*  1+ Gram positive cocci in pairs*  --        EKG:   Imaging: I have personally reviewed pertinent reports  Intake and Output  I/O       04/12 0701 04/13 0700 04/13 0701  04/14 0700 04/14 0701  04/15 0700    I V  (mL/kg) 1155 3 (11 8) 1968 8 (19 9) 76 3 (0 8)    NG/ 1410     IV Piggyback 500 493 8     Feedings 232 569     Total Intake(mL/kg) 2327 3 (23 8) 4441 6 (44 9) 76 3 (0 8)    Urine (mL/kg/hr) 2320 (1) 2975 (1 3) 175 (0 9)    Total Output 2320 2975 175    Net +7 3 +1466 6 -98 7                 Height and Weights   Height: 5' 8" (172 7 cm)     Body mass index is 33 15 kg/m²    Weight (last 2 days)     Date/Time Weight    04/14/22 0534 98 9 (218 04)     04/13/22 0200 97 8 (215 61)    04/12/22 0500 95 9 (211 42)    Comments:   Weight: w/ cooling blanket on at 04/14/22 0534           Nutrition       Diet Orders   (From admission, onward)             Start     Ordered    04/12/22 0954  Diet Enteral/Parenteral; Tube Feeding No Oral Diet; Jevity 1 2 Hira; Continuous; 30; Prosource Protein Liquid - Three Packets; 240; Water; Every 4 hours  Diet effective now        References:    Nutrtion Support Algorithm Enteral vs  Parenteral   Question Answer Comment   Diet Type Enteral/Parenteral    Enteral/Parenteral Tube Feeding No Oral Diet    Tube Feeding Formula: Jevity 1 2 Hira    Bolus/Cyclic/Continuous Continuous    Tube Feeding Goal Rate (mL/hr): 30    Prosource Protein Liquid - No Carb Prosource Protein Liquid - Three Packets    Tube Feeding flush (mL): 240    Water Flush type: Water    Water flush frequency: Every 4 hours    RD to adjust diet per protocol?  No        04/12/22 0954                  Active Medications  Scheduled Meds:  Current Facility-Administered Medications   Medication Dose Route Frequency Provider Last Rate    acetaminophen  650 mg Oral Q6H PRN Amrita Sarabia PA-C      bisacodyl  10 mg Rectal Daily Corky Lewis MD      cefepime  2,000 mg Intravenous Q12H TETE [place] 2,000 mg (04/13/22 2310)    chlorhexidine  15 mL Swish & Spit Q12H [address] TETE Drake      cloNIDine  0 1 mg Oral [place], [place]      dexmedetomidine  0 1-0 7 mcg/kg/hr Intravenous Titrated TETE [place] 0 5 mcg/kg/hr (04/14/22 0338)    enoxaparin  40 mg Subcutaneous Daily Corky Lewis MD      fentaNYL  100 mcg/hr Intravenous Continuous TETE Drake 100 mcg/hr (41/41/47 4002)    folic acid  1 mg Oral Daily Corky Lewis MD      hydrALAZINE  10 mg Intravenous Q6H PRN TETE Trinidad      HYDROmorphone  1 mg Intravenous Q3H PRN TETE Drake      ipratropium  0 5 mg Nebulization TID Corky Lewis MD      ipratropium-albuterol  3 mL Nebulization TID PRN Corky Lewis MD      levalbuterol  1 25 mg Nebulization TID Corky Lewis MD      lidocaine  1 patch Topical Q24H Corky Lewis MD      metroNIDAZOLE  500 mg Intravenous Q8H TETE [place] 500 mg (04/14/22 0530)    midazolam  2 mg Intravenous Q2H PRN Corky Lewis MD      multivitamin-minerals  1 tablet Oral Daily Corky Lewis MD      nicotine  1 patch Transdermal Daily Harrison Greer MD      norepinephrine  1-30 mcg/min Intravenous Titrated Tuan Sigala PA-C 2 mcg/min (04/14/22 0716)    omeprazole (PRILOSEC) suspension 2 mg/mL  20 mg Oral Daily Missy Bey PA-C      ondansetron  4 mg Intravenous Q6H PRN Harrison Greer MD      polyethylene glycol  17 g Oral Daily Harrison Greer MD      propofol  5-70 mcg/kg/min Intravenous [facility]rated [facility] 40 mcg/kg/min (04/14/22 0716)    senna-docusate sodium  2 tablet Per NG Tube HS TETE High      sodium chloride  4 mL Nebulization TID Kika Prado MD      thiamine  100 mg Oral Daily Harrison Greer MD      vancomycin  20 mg/kg (Adjusted) Intravenous Q12H TETE [place]incon       Continuous Infusions:  dexmedetomidine, 0 1-0 7 mcg/kg/hr, Last Rate: 0 5 mcg/kg/hr (04/14/22 0338)  fentaNYL, 100 mcg/hr, Last Rate: 100 mcg/hr (04/14/22 0001)  norepinephrine, 1-30 mcg/min, Last Rate: 2 mcg/min (04/14/22 0716)  propofol, 5-70 mcg/kg/min, Last Rate: 40 mcg/kg/min (04/14/22 0716)      PRN Meds:   acetaminophen, 650 mg, Q6H PRN  hydrALAZINE, 10 mg, Q6H PRN  HYDROmorphone, 1 mg, Q3H PRN  ipratropium-albuterol, 3 mL, TID PRN  midazolam, 2 mg, Q2H PRN  ondansetron, 4 mg, Q6H PRN        Invasive Devices Review  Invasive Devices  Report    Peripheral Intravenous Line            Peripheral IV 04/12/22 Right;Ventral (anterior) Forearm 2 days    Peripheral IV 04/12/22 Proximal;Right;Ventral (anterior) Forearm 1 day    Peripheral IV 04/13/22 Left Antecubital <1 day          Arterial Line            Arterial Line 04/09/22 Radial 4 days          Drain            NG/OG/Enteral Tube Orogastric 14 Fr Left mouth 5 days    Urethral Catheter Non-latex 16 Fr  5 days          Airway            ETT  Oral 8 mm 5 days                Rationale for remaining devices:   ---------------------------------------------------------------------------------------  Advance Directive and Living Will:      Power of :    POLST:    ---------------------------------------------------------------------------------------  Care Time Delivered:   Please see attending's attestation      Esteban Herrera MD      Portions of the record may have been created with voice recognition software  Occasional wrong word or "sound a like" substitutions may have occurred due to the inherent limitations of voice recognition software    Read the chart carefully and recognize, using context, where substitutions have occurred

## 2022-04-15 ENCOUNTER — APPOINTMENT (INPATIENT)
Dept: GASTROENTEROLOGY | Facility: HOSPITAL | Age: 57
DRG: 207 | End: 2022-04-15
Attending: INTERNAL MEDICINE
Payer: COMMERCIAL

## 2022-04-15 ENCOUNTER — APPOINTMENT (INPATIENT)
Dept: RADIOLOGY | Facility: HOSPITAL | Age: 57
DRG: 207 | End: 2022-04-15
Payer: COMMERCIAL

## 2022-04-15 LAB
ALBUMIN SERPL BCP-MCNC: 2.1 G/DL (ref 3.5–5)
ALP SERPL-CCNC: 211 U/L (ref 46–116)
ALT SERPL W P-5'-P-CCNC: 47 U/L (ref 12–78)
ANION GAP SERPL CALCULATED.3IONS-SCNC: 9 MMOL/L (ref 4–13)
AST SERPL W P-5'-P-CCNC: 49 U/L (ref 5–45)
BASOPHILS # BLD AUTO: 0.06 THOUSANDS/ΜL (ref 0–0.1)
BASOPHILS NFR BLD AUTO: 1 % (ref 0–1)
BILIRUB SERPL-MCNC: 0.45 MG/DL (ref 0.2–1)
BUN SERPL-MCNC: 10 MG/DL (ref 5–25)
CALCIUM ALBUM COR SERPL-MCNC: 9.8 MG/DL (ref 8.3–10.1)
CALCIUM SERPL-MCNC: 8.3 MG/DL (ref 8.3–10.1)
CHLORIDE SERPL-SCNC: 99 MMOL/L (ref 100–108)
CO2 SERPL-SCNC: 26 MMOL/L (ref 21–32)
CREAT SERPL-MCNC: 0.44 MG/DL (ref 0.6–1.3)
EOSINOPHIL # BLD AUTO: 0.12 THOUSAND/ΜL (ref 0–0.61)
EOSINOPHIL NFR BLD AUTO: 1 % (ref 0–6)
ERYTHROCYTE [DISTWIDTH] IN BLOOD BY AUTOMATED COUNT: 12.2 % (ref 11.6–15.1)
ERYTHROCYTE [DISTWIDTH] IN BLOOD BY AUTOMATED COUNT: 12.4 % (ref 11.6–15.1)
GFR SERPL CREATININE-BSD FRML MDRD: 111 ML/MIN/1.73SQ M
GLUCOSE SERPL-MCNC: 130 MG/DL (ref 65–140)
GLUCOSE SERPL-MCNC: 136 MG/DL (ref 65–140)
GLUCOSE SERPL-MCNC: 138 MG/DL (ref 65–140)
HCT VFR BLD AUTO: 27.3 % (ref 34.8–46.1)
HCT VFR BLD AUTO: 28 % (ref 34.8–46.1)
HGB BLD-MCNC: 9 G/DL (ref 11.5–15.4)
HGB BLD-MCNC: 9.3 G/DL (ref 11.5–15.4)
IMM GRANULOCYTES # BLD AUTO: 0.25 THOUSAND/UL (ref 0–0.2)
IMM GRANULOCYTES NFR BLD AUTO: 2 % (ref 0–2)
LYMPHOCYTES # BLD AUTO: 0.93 THOUSANDS/ΜL (ref 0.6–4.47)
LYMPHOCYTES NFR BLD AUTO: 9 % (ref 14–44)
MCH RBC QN AUTO: 31.6 PG (ref 26.8–34.3)
MCH RBC QN AUTO: 32.4 PG (ref 26.8–34.3)
MCHC RBC AUTO-ENTMCNC: 33 G/DL (ref 31.4–37.4)
MCHC RBC AUTO-ENTMCNC: 33.2 G/DL (ref 31.4–37.4)
MCV RBC AUTO: 96 FL (ref 82–98)
MCV RBC AUTO: 98 FL (ref 82–98)
MONOCYTES # BLD AUTO: 0.87 THOUSAND/ΜL (ref 0.17–1.22)
MONOCYTES NFR BLD AUTO: 8 % (ref 4–12)
MRSA NOSE QL CULT: NORMAL
NEUTROPHILS # BLD AUTO: 8.21 THOUSANDS/ΜL (ref 1.85–7.62)
NEUTS SEG NFR BLD AUTO: 79 % (ref 43–75)
NRBC BLD AUTO-RTO: 0 /100 WBCS
PLATELET # BLD AUTO: 284 THOUSANDS/UL (ref 149–390)
PLATELET # BLD AUTO: 315 THOUSANDS/UL (ref 149–390)
PMV BLD AUTO: 9.8 FL (ref 8.9–12.7)
PMV BLD AUTO: 9.9 FL (ref 8.9–12.7)
POTASSIUM SERPL-SCNC: 4.1 MMOL/L (ref 3.5–5.3)
PROCALCITONIN SERPL-MCNC: 0.46 NG/ML
PROT SERPL-MCNC: 6.4 G/DL (ref 6.4–8.2)
RBC # BLD AUTO: 2.85 MILLION/UL (ref 3.81–5.12)
RBC # BLD AUTO: 2.87 MILLION/UL (ref 3.81–5.12)
SODIUM SERPL-SCNC: 134 MMOL/L (ref 136–145)
VANCOMYCIN TROUGH SERPL-MCNC: 18 UG/ML (ref 10–20)
WBC # BLD AUTO: 10.44 THOUSAND/UL (ref 4.31–10.16)
WBC # BLD AUTO: 9.83 THOUSAND/UL (ref 4.31–10.16)

## 2022-04-15 PROCEDURE — 0BJ08ZZ INSPECTION OF TRACHEOBRONCHIAL TREE, VIA NATURAL OR ARTIFICIAL OPENING ENDOSCOPIC: ICD-10-PCS | Performed by: INTERNAL MEDICINE

## 2022-04-15 PROCEDURE — 94003 VENT MGMT INPAT SUBQ DAY: CPT

## 2022-04-15 PROCEDURE — NC001 PR NO CHARGE: Performed by: NURSE PRACTITIONER

## 2022-04-15 PROCEDURE — 82948 REAGENT STRIP/BLOOD GLUCOSE: CPT

## 2022-04-15 PROCEDURE — 94640 AIRWAY INHALATION TREATMENT: CPT

## 2022-04-15 PROCEDURE — 0BH17EZ INSERTION OF ENDOTRACHEAL AIRWAY INTO TRACHEA, VIA NATURAL OR ARTIFICIAL OPENING: ICD-10-PCS | Performed by: INTERNAL MEDICINE

## 2022-04-15 PROCEDURE — 80053 COMPREHEN METABOLIC PANEL: CPT | Performed by: INTERNAL MEDICINE

## 2022-04-15 PROCEDURE — 31622 DX BRONCHOSCOPE/WASH: CPT | Performed by: INTERNAL MEDICINE

## 2022-04-15 PROCEDURE — 84145 PROCALCITONIN (PCT): CPT

## 2022-04-15 PROCEDURE — 94668 MNPJ CHEST WALL SBSQ: CPT

## 2022-04-15 PROCEDURE — 85027 COMPLETE CBC AUTOMATED: CPT | Performed by: INTERNAL MEDICINE

## 2022-04-15 PROCEDURE — 94669 MECHANICAL CHEST WALL OSCILL: CPT

## 2022-04-15 PROCEDURE — NC001 PR NO CHARGE: Performed by: INTERNAL MEDICINE

## 2022-04-15 PROCEDURE — 5A1955Z RESPIRATORY VENTILATION, GREATER THAN 96 CONSECUTIVE HOURS: ICD-10-PCS | Performed by: INTERNAL MEDICINE

## 2022-04-15 PROCEDURE — 71045 X-RAY EXAM CHEST 1 VIEW: CPT

## 2022-04-15 PROCEDURE — 94002 VENT MGMT INPAT INIT DAY: CPT

## 2022-04-15 PROCEDURE — 85025 COMPLETE CBC W/AUTO DIFF WBC: CPT | Performed by: INTERNAL MEDICINE

## 2022-04-15 PROCEDURE — 31500 INSERT EMERGENCY AIRWAY: CPT | Performed by: NURSE PRACTITIONER

## 2022-04-15 PROCEDURE — 80202 ASSAY OF VANCOMYCIN: CPT | Performed by: INTERNAL MEDICINE

## 2022-04-15 PROCEDURE — 99291 CRITICAL CARE FIRST HOUR: CPT | Performed by: INTERNAL MEDICINE

## 2022-04-15 RX ORDER — LORAZEPAM 2 MG/ML
0.5 INJECTION INTRAMUSCULAR ONCE
Status: COMPLETED | OUTPATIENT
Start: 2022-04-15 | End: 2022-04-15

## 2022-04-15 RX ORDER — SODIUM PHOSPHATE, DIBASIC AND SODIUM PHOSPHATE, MONOBASIC 7; 19 G/133ML; G/133ML
1 ENEMA RECTAL ONCE
Status: DISCONTINUED | OUTPATIENT
Start: 2022-04-15 | End: 2022-04-15

## 2022-04-15 RX ORDER — MINERAL OIL 100 G/100G
1 OIL RECTAL ONCE
Status: COMPLETED | OUTPATIENT
Start: 2022-04-15 | End: 2022-04-15

## 2022-04-15 RX ORDER — LIDOCAINE HYDROCHLORIDE 10 MG/ML
INJECTION, SOLUTION EPIDURAL; INFILTRATION; INTRACAUDAL; PERINEURAL
Status: COMPLETED
Start: 2022-04-15 | End: 2022-04-15

## 2022-04-15 RX ORDER — DEXAMETHASONE SODIUM PHOSPHATE 4 MG/ML
4 INJECTION, SOLUTION INTRA-ARTICULAR; INTRALESIONAL; INTRAMUSCULAR; INTRAVENOUS; SOFT TISSUE EVERY 6 HOURS SCHEDULED
Status: COMPLETED | OUTPATIENT
Start: 2022-04-15 | End: 2022-04-17

## 2022-04-15 RX ORDER — POLYETHYLENE GLYCOL 3350 17 G/17G
17 POWDER, FOR SOLUTION ORAL 2 TIMES DAILY
Status: DISCONTINUED | OUTPATIENT
Start: 2022-04-15 | End: 2022-04-16

## 2022-04-15 RX ORDER — FUROSEMIDE 10 MG/ML
20 INJECTION INTRAMUSCULAR; INTRAVENOUS ONCE
Status: COMPLETED | OUTPATIENT
Start: 2022-04-15 | End: 2022-04-15

## 2022-04-15 RX ORDER — PROPOFOL 10 MG/ML
5-50 INJECTION, EMULSION INTRAVENOUS
Status: DISCONTINUED | OUTPATIENT
Start: 2022-04-15 | End: 2022-04-15

## 2022-04-15 RX ORDER — LEVALBUTEROL 1.25 MG/.5ML
1.25 SOLUTION, CONCENTRATE RESPIRATORY (INHALATION) ONCE
Status: COMPLETED | OUTPATIENT
Start: 2022-04-15 | End: 2022-04-15

## 2022-04-15 RX ADMIN — ACETAMINOPHEN 650 MG: 650 SUSPENSION ORAL at 21:13

## 2022-04-15 RX ADMIN — DEXMEDETOMIDINE HYDROCHLORIDE 1.5 MCG/KG/HR: 400 INJECTION INTRAVENOUS at 20:03

## 2022-04-15 RX ADMIN — DEXMEDETOMIDINE HYDROCHLORIDE 1.5 MCG/KG/HR: 400 INJECTION INTRAVENOUS at 17:06

## 2022-04-15 RX ADMIN — HYDROMORPHONE HYDROCHLORIDE 1 MG: 1 INJECTION, SOLUTION INTRAMUSCULAR; INTRAVENOUS; SUBCUTANEOUS at 19:06

## 2022-04-15 RX ADMIN — Medication 1 PATCH: at 08:37

## 2022-04-15 RX ADMIN — MIDAZOLAM 2 MG: 1 INJECTION INTRAMUSCULAR; INTRAVENOUS at 21:22

## 2022-04-15 RX ADMIN — ACETYLCYSTEINE 3 ML: 200 SOLUTION ORAL; RESPIRATORY (INHALATION) at 12:57

## 2022-04-15 RX ADMIN — CHLORHEXIDINE GLUCONATE 0.12% ORAL RINSE 15 ML: 1.2 LIQUID ORAL at 20:17

## 2022-04-15 RX ADMIN — IPRATROPIUM BROMIDE 0.5 MG: 0.5 SOLUTION RESPIRATORY (INHALATION) at 07:15

## 2022-04-15 RX ADMIN — LIDOCAINE HYDROCHLORIDE 300 MG: 10 INJECTION, SOLUTION EPIDURAL; INFILTRATION; INTRACAUDAL; PERINEURAL at 15:30

## 2022-04-15 RX ADMIN — SODIUM CHLORIDE SOLN NEBU 3% 4 ML: 3 NEBU SOLN at 20:14

## 2022-04-15 RX ADMIN — HYDROMORPHONE HYDROCHLORIDE 1 MG: 1 INJECTION, SOLUTION INTRAMUSCULAR; INTRAVENOUS; SUBCUTANEOUS at 03:15

## 2022-04-15 RX ADMIN — LORAZEPAM 0.5 MG: 2 INJECTION INTRAMUSCULAR; INTRAVENOUS at 12:54

## 2022-04-15 RX ADMIN — IPRATROPIUM BROMIDE 0.5 MG: 0.5 SOLUTION RESPIRATORY (INHALATION) at 20:14

## 2022-04-15 RX ADMIN — DEXMEDETOMIDINE HYDROCHLORIDE 1.5 MCG/KG/HR: 400 INJECTION INTRAVENOUS at 22:32

## 2022-04-15 RX ADMIN — MIDAZOLAM 1 MG/HR: 5 INJECTION INTRAMUSCULAR; INTRAVENOUS at 08:40

## 2022-04-15 RX ADMIN — DEXMEDETOMIDINE HYDROCHLORIDE 1.5 MCG/KG/HR: 400 INJECTION INTRAVENOUS at 03:34

## 2022-04-15 RX ADMIN — DEXAMETHASONE SODIUM PHOSPHATE 4 MG: 4 INJECTION INTRA-ARTICULAR; INTRALESIONAL; INTRAMUSCULAR; INTRAVENOUS; SOFT TISSUE at 23:35

## 2022-04-15 RX ADMIN — CHLORHEXIDINE GLUCONATE 0.12% ORAL RINSE 15 ML: 1.2 LIQUID ORAL at 08:36

## 2022-04-15 RX ADMIN — DEXMEDETOMIDINE HYDROCHLORIDE 1.5 MCG/KG/HR: 400 INJECTION INTRAVENOUS at 08:39

## 2022-04-15 RX ADMIN — MIDAZOLAM 2 MG: 1 INJECTION INTRAMUSCULAR; INTRAVENOUS at 03:15

## 2022-04-15 RX ADMIN — MAGNESIUM HYDROXIDE 30 ML: 1200 LIQUID ORAL at 12:03

## 2022-04-15 RX ADMIN — THIAMINE HCL TAB 100 MG 100 MG: 100 TAB at 08:35

## 2022-04-15 RX ADMIN — BISACODYL 10 MG: 10 SUPPOSITORY RECTAL at 08:36

## 2022-04-15 RX ADMIN — MIDAZOLAM 2 MG: 1 INJECTION INTRAMUSCULAR; INTRAVENOUS at 08:13

## 2022-04-15 RX ADMIN — DEXAMETHASONE SODIUM PHOSPHATE 4 MG: 4 INJECTION INTRA-ARTICULAR; INTRALESIONAL; INTRAMUSCULAR; INTRAVENOUS; SOFT TISSUE at 18:14

## 2022-04-15 RX ADMIN — FUROSEMIDE 20 MG: 10 INJECTION, SOLUTION INTRAVENOUS at 19:33

## 2022-04-15 RX ADMIN — LEVALBUTEROL HYDROCHLORIDE 1.25 MG: 1.25 SOLUTION, CONCENTRATE RESPIRATORY (INHALATION) at 20:14

## 2022-04-15 RX ADMIN — RACEPINEPHRINE HYDROCHLORIDE: 11.25 SOLUTION RESPIRATORY (INHALATION) at 12:43

## 2022-04-15 RX ADMIN — Medication 1 TABLET: at 08:35

## 2022-04-15 RX ADMIN — DEXMEDETOMIDINE HYDROCHLORIDE 1.5 MCG/KG/HR: 400 INJECTION INTRAVENOUS at 11:23

## 2022-04-15 RX ADMIN — CEFEPIME HYDROCHLORIDE 2000 MG: 2 INJECTION, POWDER, FOR SOLUTION INTRAVENOUS at 23:20

## 2022-04-15 RX ADMIN — SODIUM CHLORIDE SOLN NEBU 3% 4 ML: 3 NEBU SOLN at 07:15

## 2022-04-15 RX ADMIN — CEFEPIME HYDROCHLORIDE 2000 MG: 2 INJECTION, POWDER, FOR SOLUTION INTRAVENOUS at 11:31

## 2022-04-15 RX ADMIN — LEVALBUTEROL HYDROCHLORIDE 1.25 MG: 1.25 SOLUTION, CONCENTRATE RESPIRATORY (INHALATION) at 07:15

## 2022-04-15 RX ADMIN — ALBUMIN (HUMAN) 12.5 G: 0.25 INJECTION, SOLUTION INTRAVENOUS at 16:19

## 2022-04-15 RX ADMIN — MIDAZOLAM 2 MG: 1 INJECTION INTRAMUSCULAR; INTRAVENOUS at 15:26

## 2022-04-15 RX ADMIN — LEVALBUTEROL HYDROCHLORIDE 1.25 MG: 1.25 SOLUTION, CONCENTRATE RESPIRATORY (INHALATION) at 14:10

## 2022-04-15 RX ADMIN — IPRATROPIUM BROMIDE 0.5 MG: 0.5 SOLUTION RESPIRATORY (INHALATION) at 14:10

## 2022-04-15 RX ADMIN — SODIUM CHLORIDE SOLN NEBU 3% 4 ML: 3 NEBU SOLN at 12:57

## 2022-04-15 RX ADMIN — MINERAL OIL 1 ENEMA: 100 ENEMA RECTAL at 08:36

## 2022-04-15 RX ADMIN — MIDAZOLAM 2 MG: 1 INJECTION INTRAMUSCULAR; INTRAVENOUS at 05:58

## 2022-04-15 RX ADMIN — PROPOFOL 40 MCG/KG/MIN: 10 INJECTION, EMULSION INTRAVENOUS at 14:23

## 2022-04-15 RX ADMIN — ALBUMIN (HUMAN) 12.5 G: 0.25 INJECTION, SOLUTION INTRAVENOUS at 21:22

## 2022-04-15 RX ADMIN — Medication 20 MG: at 08:36

## 2022-04-15 RX ADMIN — ACETYLCYSTEINE 600 MG: 200 SOLUTION ORAL; RESPIRATORY (INHALATION) at 20:14

## 2022-04-15 RX ADMIN — FOLIC ACID 1 MG: 1 TABLET ORAL at 08:35

## 2022-04-15 RX ADMIN — ALBUMIN (HUMAN) 12.5 G: 0.25 INJECTION, SOLUTION INTRAVENOUS at 08:36

## 2022-04-15 RX ADMIN — DEXMEDETOMIDINE HYDROCHLORIDE 1.5 MCG/KG/HR: 400 INJECTION INTRAVENOUS at 14:19

## 2022-04-15 RX ADMIN — HYDROMORPHONE HYDROCHLORIDE 1 MG: 1 INJECTION, SOLUTION INTRAMUSCULAR; INTRAVENOUS; SUBCUTANEOUS at 22:24

## 2022-04-15 RX ADMIN — POLYETHYLENE GLYCOL 3350 17 G: 17 POWDER, FOR SOLUTION ORAL at 08:37

## 2022-04-15 RX ADMIN — LEVALBUTEROL HYDROCHLORIDE 1.25 MG: 1.25 SOLUTION, CONCENTRATE RESPIRATORY (INHALATION) at 12:43

## 2022-04-15 RX ADMIN — DEXMEDETOMIDINE HYDROCHLORIDE 1.5 MCG/KG/HR: 400 INJECTION INTRAVENOUS at 00:48

## 2022-04-15 RX ADMIN — VANCOMYCIN HYDROCHLORIDE 1500 MG: 1 INJECTION, POWDER, LYOPHILIZED, FOR SOLUTION INTRAVENOUS at 12:03

## 2022-04-15 RX ADMIN — MIDAZOLAM 2 MG: 1 INJECTION INTRAMUSCULAR; INTRAVENOUS at 19:02

## 2022-04-15 RX ADMIN — Medication 100 MCG/HR: at 06:03

## 2022-04-15 RX ADMIN — DEXMEDETOMIDINE HYDROCHLORIDE 1.5 MCG/KG/HR: 400 INJECTION INTRAVENOUS at 05:58

## 2022-04-15 RX ADMIN — ALBUMIN (HUMAN) 12.5 G: 0.25 INJECTION, SOLUTION INTRAVENOUS at 03:15

## 2022-04-15 RX ADMIN — IPRATROPIUM BROMIDE 0.5 MG: 0.5 SOLUTION RESPIRATORY (INHALATION) at 12:43

## 2022-04-15 RX ADMIN — PROPOFOL 40 MCG/KG/MIN: 10 INJECTION, EMULSION INTRAVENOUS at 17:06

## 2022-04-15 RX ADMIN — LIDOCAINE 1 PATCH: 50 PATCH CUTANEOUS at 12:03

## 2022-04-15 RX ADMIN — ACETYLCYSTEINE 3 ML: 200 SOLUTION ORAL; RESPIRATORY (INHALATION) at 07:15

## 2022-04-15 RX ADMIN — ENOXAPARIN SODIUM 40 MG: 40 INJECTION SUBCUTANEOUS at 08:36

## 2022-04-15 RX ADMIN — MIDAZOLAM 2 MG: 1 INJECTION INTRAMUSCULAR; INTRAVENOUS at 15:33

## 2022-04-15 NOTE — CONSULTS
Vancomycin therapy has been discontinued  Pharmacy will sign off  Thank you for this consult  Please do not hesitate to call us with questions or re-consult us if the need arises       Jocelyn Zapata, PharmD, [address] and Internal Medicine Clinical Pharmacist  [phone] or via [employer]

## 2022-04-15 NOTE — ASSESSMENT & PLAN NOTE
Patient had elevated temp at 1:03 a m , tachycardia and hypotension yesterday requiring IV fluids and eventually pressors  Patient was also started on broad-spectrum antibiotics and started on Precedex for tachycardia  Patient's presentation is unlikely to be secondary to an infectious process given down trending leukocytosis, stable procalcitonin, cool extremities, and no identifiable source  SIRS response likely secondary to bronchoscopy versus decreased ECF  · Patient is off pressors  · Currently on cefepime and vancomycin  · Procalcitonin is down trending  · Cultures are still negative to date

## 2022-04-15 NOTE — PROGRESS NOTES
[address] [facility]  Progress Note - Francisca Johansen 1965, 60 y o  female MRN: [id number]  Unit/Bed#: ICU 11 Encounter: [id number]  Primary Care Provider: Chuck Sequeira DO   Date and time admitted to hospital: 4/8/2022  3:55 PM    * Acute respiratory failure with hypoxia [facility])  Assessment & Plan  Patient acute hypoxic on admission, was given dose of Lasix with marked improvement  However, this morning patient started to become more agitated, hypoxic and tachycardic unable to tolerate BiPAP, patient was intubated for airway protection in the setting of encephalopathy and potential DTs  Patient had bronchoscopy on 04/13/2020 to showing significant secretions throughout lungs  Patient failed weaning trial yesterday  Overnight patient developed fever of 103 and started to become hypotensive  Patient was given 1 L of fluids, cefepime, Flagyl, vancomycin  Patient was also started on Levophed and Precedex  · Patient's PEEP was increased to 14 with improvement seen x-ray  · Continues to have significant amount of secretions will likely require bronch again today  · Will try SBT today      Delirium tremens [facility])  Assessment & Plan  Patient drinks roughly 8-12 shots of whiskey daily  Patient's last drink was April 1, 2022  EtoH level on admission was 54  Has never had any history withdrawals or seizures  Patient was initially admitted for alcoholic pancreatitis, however, patient became encephalopathic and agitated requiring transfer to detox Unit  · Patient is maxed on Precedex and fentanyl  · Will start Versed drip, as patient is trying to self extubate    Alcohol-induced acute pancreatitis  Assessment & Plan  · Patient initially presented with a lipase of 53,000  Patient's acute pancreatitis is likely secondary to alcohol    · No surgical intervention of the pancreas  · Monitor TGL    Alcohol use disorder, severe, dependence ([facility] [address] )  Assessment & Plan  · Will continue with withdrawal management  · Case management consult in place  · Encouraged cessation   · Continue thiamine and folic acid       SIRS (systemic inflammatory response syndrome) ([facility] [address] )  Assessment & Plan  Patient had elevated temp at 1:03 a m , tachycardia and hypotension yesterday requiring IV fluids and eventually pressors  Patient was also started on broad-spectrum antibiotics and started on Precedex for tachycardia  Patient's presentation is unlikely to be secondary to an infectious process given down trending leukocytosis, stable procalcitonin, cool extremities, and no identifiable source  SIRS response likely secondary to bronchoscopy versus decreased ECF  · Patient is off pressors  · Currently on cefepime and vancomycin  · Procalcitonin is down trending  · Cultures are still negative to date  Ataxia  Assessment & Plan  On admission patient was reported to have ataxia as well as finger-to-nose ataxia  Patient was started on high-dose thiamine for Wernicke's encephalopathy    · MRI came back negative for any acute or chronic pathologies    Gastroesophageal reflux disease without esophagitis  Assessment & Plan  · Continue Protonix 20 mg mg daily       Hypertension  Assessment & Plan  · Patient is currently on 10 mg of lisinopril daily at home  Can be resumed once extubated  · Hydralazine p r n  For SBP greater than 170         Hypokalemia-resolved as of 4/10/2022  Assessment & Plan  · Likely secondary to diuretic administration  Resolved   · Monitor BMP        ----------------------------------------------------------------------------------------  HPI/24hr events:  No significant overnight events  Patient had T-max of 101°    Still has not had a bowel movement    Patient appropriate for transfer out of the ICU today?: No  Disposition: Continue Critical Care   Code Status: Level 1 - Full Code  ---------------------------------------------------------------------------------------  SUBJECTIVE  Patient opens eyes, follows commands  Review of Systems   Unable to perform ROS: Intubated     Review of systems was reviewed and negative unless stated above in HPI/24-hour events   ---------------------------------------------------------------------------------------  OBJECTIVE    Vitals   Vitals:    04/15/22 0535 04/15/22 0610 04/15/22 0700 04/15/22 0715   BP:  121/76     Pulse:  76     Resp:  14     Temp:   98 7 °F (37 1 °C)    TempSrc:   Temporal    SpO2:  97%  97%   Weight: 98 9 kg (218 lb 0 6 oz)      Height:         Temp (24hrs), Av 3 °F (37 9 °C), Min:98 7 °F (37 1 °C), Max:102 1 °F (38 9 °C)  Current: Temperature: 98 7 °F (37 1 °C)  Arterial Line BP: 86/72  Arterial Line MAP (mmHg): 80 mmHg    Respiratory:  SpO2: SpO2: 97 %       Invasive/non-invasive ventilation settings   Respiratory  Report   Lab Data (Last 4 hours)    None         O2/Vent Data (Last 4 hours)      04/15 0715           Vent Mode AC/PC       Patient safety screen outcome: Failed       Resp Rate (BPM) (BPM) 14       Pressure Control (cmH2O) (cm) 16       Insp Time (sec) (sec) 0 88       FiO2 (%) (%) 50       PEEP (cmH2O) (cmH2O) 12       MV 16 2                   Physical Exam  Constitutional:       Comments: Intubated  Very agitated    HENT:      Head: Normocephalic  Eyes:      Conjunctiva/sclera: Conjunctivae normal       Pupils: Pupils are equal, round, and reactive to light  Cardiovascular:      Rate and Rhythm: Regular rhythm  Tachycardia present  Pulses: Normal pulses  Heart sounds: No murmur heard  No gallop  Pulmonary:      Comments: On pressure support with a PEEP of 12  Abdominal:      General: There is distension  Tenderness: There is no abdominal tenderness  Comments: Severely diminished bowel sounds    Skin:     General: Skin is warm  Capillary Refill: Capillary refill takes less than 2 seconds               Laboratory and Diagnostics:  Results from last 7 days   Lab Units 04/15/22  8059 04/14/22  0503 04/13/22  0539 04/12/22  0527 04/10/22  0331 04/09/22  0924 04/09/22  0512 04/08/22 1949 04/08/22 1949   WBC Thousand/uL 9 83 6 83 10 64* 11 87* 10 35* 9 65 10 31*   < > 9 79   HEMOGLOBIN g/dL 9 0* 13 9 9 9* 9 9* 11 1* 11 4* 11 6   < > 11 4*   HEMATOCRIT % 27 3* 43 2 30 1* 29 2* 33 7* 34 2* 30 7*   < > 32 4*   PLATELETS Thousands/uL 284 214 283 274 216 193 244   < > 174   NEUTROS PCT %  --   --  81*  --   --   --   --   --   --    BANDS PCT %  --   --   --  10*  --   --   --   --  5   MONOS PCT %  --   --  8  --   --   --   --   --   --    MONO PCT %  --   --   --  4  --   --   --   --  13*    < > = values in this interval not displayed  Results from last 7 days   Lab Units 04/15/22  0443 04/14/22  0503 04/13/22  2155 04/13/22  0539 04/12/22  0527 04/10/22  0332 04/09/22  0922 04/09/22  0512 04/09/22  0512   SODIUM mmol/L 134* 140 140 138 139 137 139   < > 125*   POTASSIUM mmol/L 4 1 4 0 3 2* 3 3* 3 3* 3 5 3 5   < > 3 2*   CHLORIDE mmol/L 99* 101 99* 97* 100 101 102   < > 91*   CO2 mmol/L 26 33* 36* 30 34* 28 27   < > 24   ANION GAP mmol/L 9 6 5 11 5 8 10   < > 10   BUN mg/dL 10 10 9 6 5 6 9   < > 8   CREATININE mg/dL 0 44* 0 57* 0 60 0 48* 0 41* 0 39* 0 76   < > 0 53*   CALCIUM mg/dL 8 3 8 2* 7 9* 8 7 8 3 8 2* 8 4   < > 6 7*   GLUCOSE RANDOM mg/dL 138 145* 157* 124 140 79 100   < > 80   ALT U/L 47 51  --  50 52 43 35  --  26   AST U/L 49* 54*  --  67* 46* 65* 52*  --  51*   ALK PHOS U/L 211* 252*  --  276* 255* 153* 110  --  89   ALBUMIN g/dL 2 1* 1 7*  --  1 9*  1 9* 1 5* 1 9* 2 1*  --  1 9*   TOTAL BILIRUBIN mg/dL 0 45 0 51  --  0 41 0 37 0 56 0 64  --  0 91    < > = values in this interval not displayed       Results from last 7 days   Lab Units 04/14/22  0503 04/13/22  2155 04/12/22  0527 04/10/22  0332 04/09/22  0512 04/08/22  1949   MAGNESIUM mg/dL 2 5 1 8 2 1 2 2 1 9 2 1   PHOSPHORUS mg/dL 3 7 3 4 3 1 3 6  --  2 3*               Results from last 7 days   Lab Units 04/13/22  2340 04/08/22  1949   LACTIC ACID mmol/L 0 8 1 2     ABG:  Results from last 7 days   Lab Units 04/11/22  1035   PH ART  7 332*   PCO2 ART mm Hg 53 6*   PO2 ART mm Hg 71 8*   HCO3 ART mmol/L 27 8   BASE EXC ART mmol/L 1 2   ABG SOURCE  Line, Arterial     VBG:  Results from last 7 days   Lab Units 04/11/22  1035 04/09/22  0436 04/08/22  1311   PH TOM   --   --  7 345   PCO2 TOM mm Hg  --   --  51 8*   PO2 TOM mm Hg  --   --  46 0*   HCO3 TOM mmol/L  --   --  27 6   BASE EXC TOM mmol/L  --   --  1 2   ABG SOURCE  Line, Arterial   < >  --     < > = values in this interval not displayed  Results from last 7 days   Lab Units 04/15/22  0443 04/14/22  0025 04/12/22  0527 04/10/22  0332 04/09/22  0924 04/08/22 1949   PROCALCITONIN ng/ml 0 46* 0 64* 0 66* 0 63* 0 38* 0 34*       Micro  Results from last 7 days   Lab Units 04/13/22  2247 04/13/22  1017 04/12/22  1637 04/08/22 1953   BLOOD CULTURE  Received in Microbiology Lab  Culture in Progress  Received in Microbiology Lab  Culture in Progress  --   --  No Growth After 5 Days  No Growth After 5 Days  SPUTUM CULTURE   --   --  2+ Growth of   --    GRAM STAIN RESULT   --  1+ Polys  No organisms seen 3+ Polys*  1+ Gram positive cocci in pairs*  --        EKG:   Imaging: I have personally reviewed pertinent reports  Intake and Output  I/O       04/13 0701 04/14 0700 04/14 0701  04/15 0700 04/15 0701 04/16 0700    I V  (mL/kg) 1968 8 (19 9) 616 7 (6 2)     NG/GT 6188 493 5847    IV Piggyback 493 8 50     Feedings 569  604    Total Intake(mL/kg) 4441 6 (44 9) 1266 7 (12 8) 1804 (18 2)    Urine (mL/kg/hr) 2975 (1 3) 2075 (0 9)     Total Output 2975 2075     Net +1466 6 -808 3 +1804                 Height and Weights   Height: 5' 8" (172 7 cm)     Body mass index is 33 15 kg/m²    Weight (last 2 days)     Date/Time Weight    04/15/22 0535 98 9 (218 04)     04/14/22 0534 98 9 (218 04)     04/13/22 0200 97 8 (215 61)    Comments:   Weight: with cooling blanket at 04/15/22 0535   Weight: w/ cooling blanket on at 04/14/22 0534           Nutrition       Diet Orders   (From admission, onward)             Start     Ordered    04/12/22 0954  Diet Enteral/Parenteral; Tube Feeding No Oral Diet; Jevity 1 2 Hira; Continuous; 30; Prosource Protein Liquid - Three Packets; 240; Water; Every 4 hours  Diet effective now        References:    Nutrtion Support Algorithm Enteral vs  Parenteral   Question Answer Comment   Diet Type Enteral/Parenteral    Enteral/Parenteral Tube Feeding No Oral Diet    Tube Feeding Formula: Jevity 1 2 Hira    Bolus/Cyclic/Continuous Continuous    Tube Feeding Goal Rate (mL/hr): 30    Prosource Protein Liquid - No Carb Prosource Protein Liquid - Three Packets    Tube Feeding flush (mL): 240    Water Flush type: Water    Water flush frequency: Every 4 hours    RD to adjust diet per protocol?  No        04/12/22 0954                  Active Medications  Scheduled Meds:  Current Facility-Administered Medications   Medication Dose Route Frequency Provider Last Rate    acetaminophen  650 mg Oral Q6H PRN Gisel Moeller PA-C      acetylcysteine  3 mL Nebulization Q6H Wilhemina Dys Sonday, TETE      albumin human  12 5 g Intravenous Q6H Jud Moncada MD      bisacodyl  10 mg Rectal Daily Jud Moncada MD      cefepime  2,000 mg Intravenous Q12H TETE Monzon 2,000 mg (04/14/22 2117)    chlorhexidine  15 mL [facility], [address]      dexmedetomidine  0 1-1 5 mcg/kg/hr Intravenous Titrated Aquiles Caceres MD 1 5 mcg/kg/hr (04/15/22 0558)    enoxaparin  40 mg Subcutaneous Daily Jud Moncada MD      fentaNYL  100 mcg/hr Intravenous Continuous MINA VásquezNP 100 mcg/hr (76/88/92 0223)    folic acid  1 mg Oral Daily Jud Moncada MD      HYDROmorphone  1 mg Intravenous Q3H PRN TETE Vásquez      ipratropium  0 5 mg Nebulization TID Jud Moncada MD      levalbuterol  1 25 mg Nebulization TID [employer] Nohemi Nassar MD      lidocaine  1 patch Topical Q24H Jud Moncada MD      midazolam  1 mg/hr Intravenous Continuous Jud Moncada MD      midazolam  2 mg Intravenous Q2H PRN Jud Moncada MD      mineral oil  1 enema Rectal Once Jud Moncada MD      multivitamin-minerals  1 tablet Oral Daily Jud Moncada MD      nicotine  1 patch Transdermal Daily Jud Moncada MD      omeprazole (PRILOSEC) suspension 2 mg/mL  20 mg Oral Daily Missy Bey PA-C      ondansetron  4 mg Intravenous Q6H PRN Jud Moncada MD      polyethylene glycol  17 g Oral Daily Jud Moncada MD      senna-docusate sodium  2 tablet Per NG Tube HS TETE Perry      sodium chloride  4 mL Nebulization TID Aquiles Caceres MD      sodium phosphate-biphosphate  1 enema Rectal Once Jud Moncada MD      thiamine  100 mg Oral Daily Jud Moncada MD      vancomycin  20 mg/kg (Adjusted) Intravenous Q12H TETE Rincon       Continuous Infusions:  dexmedetomidine, 0 1-1 5 mcg/kg/hr, Last Rate: 1 5 mcg/kg/hr (04/15/22 0558)  fentaNYL, 100 mcg/hr, Last Rate: 100 mcg/hr (04/15/22 0603)  midazolam, 1 mg/hr      PRN Meds:   acetaminophen, 650 mg, Q6H PRN  HYDROmorphone, 1 mg, Q3H PRN  midazolam, 2 mg, Q2H PRN  ondansetron, 4 mg, Q6H PRN        Invasive Devices Review  Invasive Devices  Report    Peripheral Intravenous Line            Peripheral IV 04/12/22 Right;Ventral (anterior) Forearm 3 days    Peripheral IV 04/12/22 Proximal;Right;Ventral (anterior) Forearm 2 days    Peripheral IV 04/13/22 Left Antecubital 1 day          Drain            NG/OG/Enteral Tube Orogastric 14 Fr Left mouth 6 days          Airway            ETT  Oral 8 mm 6 days                Rationale for remaining devices:   ---------------------------------------------------------------------------------------  Advance Directive and Living Will:      Power of :    POLST: ---------------------------------------------------------------------------------------  Care Time Delivered:   Please see attending's attestation      Chau Stout MD      Portions of the record may have been created with voice recognition software  Occasional wrong word or "sound a like" substitutions may have occurred due to the inherent limitations of voice recognition software    Read the chart carefully and recognize, using context, where substitutions have occurred

## 2022-04-15 NOTE — PROGRESS NOTES
04/15/22 1400   Clinical Encounter Type   Visited With Family   Routine Visit Introduction   Continue Visiting Yes

## 2022-04-15 NOTE — ASSESSMENT & PLAN NOTE
Patient drinks roughly 8-12 shots of whiskey daily  Patient's last drink was April 1, 2022  EtoH level on admission was 54  Has never had any history withdrawals or seizures  Patient was initially admitted for alcoholic pancreatitis, however, patient became encephalopathic and agitated requiring transfer to detox Unit      · Patient is maxed on Precedex and fentanyl  · Will start Versed drip, as patient is trying to self extubate

## 2022-04-15 NOTE — ASSESSMENT & PLAN NOTE
Patient acute hypoxic on admission, was given dose of Lasix with marked improvement  However, this morning patient started to become more agitated, hypoxic and tachycardic unable to tolerate BiPAP, patient was intubated for airway protection in the setting of encephalopathy and potential DTs  Patient had bronchoscopy on 04/13/2020 to showing significant secretions throughout lungs  Patient failed weaning trial yesterday  Overnight patient developed fever of 103 and started to become hypotensive  Patient was given 1 L of fluids, cefepime, Flagyl, vancomycin  Patient was also started on Levophed and Precedex      · Patient's PEEP was increased to 14 with improvement seen x-ray  · Continues to have significant amount of secretions will likely require bronch again today  · Will try SBT today

## 2022-04-15 NOTE — PLAN OF CARE
Problem: Potential for Falls  Goal: Patient will remain free of falls  Description: INTERVENTIONS:  - Educate patient/family on patient safety including physical limitations  - Instruct patient to call for assistance with activity   - Consult OT/PT to assist with strengthening/mobility   - Keep Call bell within reach  - Keep bed low and locked with side rails adjusted as appropriate  - Keep care items and personal belongings within reach  - Initiate and maintain comfort rounds  - Make Fall Risk Sign visible to staff  - Apply yellow socks and bracelet for high fall risk patients  - Consider moving patient to room near nurses station  Outcome: Progressing     Problem: Prexisting or High Potential for Compromised Skin Integrity  Goal: Skin integrity is maintained or improved  Description: INTERVENTIONS:  - Identify patients at risk for skin breakdown  - Assess and monitor skin integrity  - Assess and monitor nutrition and hydration status  - Monitor labs   - Assess for incontinence   - Turn and reposition patient  - Assist with mobility/ambulation  - Relieve pressure over bony prominences  - Avoid friction and shearing  - Provide appropriate hygiene as needed including keeping skin clean and dry  - Evaluate need for skin moisturizer/barrier cream  - Collaborate with interdisciplinary team   - Patient/family teaching  - Consider wound care consult   Outcome: Progressing     Problem: SUBSTANCE USE/ABUSE  Goal: By discharge, will develop insight into their chemical dependency and sustain motivation to continue in recovery  Description: INTERVENTIONS:  - Attends all daily group sessions and scheduled AA groups  - Actively practices coping skills through participation in the therapeutic community and adherence to program rules  - Reviews and completes assignments from individual treatment plan  - Assist patient development of understanding of their personal cycle of addiction and relapse triggers  Outcome: Progressing  Goal: By discharge, patient will have ongoing treatment plan addressing chemical dependency  Description: INTERVENTIONS:  - Assist patient with resources and/or appointments for ongoing recovery based living  Outcome: Progressing     Problem: Nutrition/Hydration-ADULT  Goal: Nutrient/Hydration intake appropriate for improving, restoring or maintaining nutritional needs  Description: Monitor and assess patient's nutrition/hydration status for malnutrition  Collaborate with interdisciplinary team and initiate plan and interventions as ordered  Monitor patient's weight and dietary intake as ordered or per policy  Utilize nutrition screening tool and intervene as necessary  Determine patient's food preferences and provide high-protein, high-caloric foods as appropriate       INTERVENTIONS:  - Monitor oral intake, urinary output, labs, and treatment plans  - Assess nutrition and hydration status and recommend course of action  - Evaluate amount of meals eaten  - Assist patient with eating if necessary   - Allow adequate time for meals  - Recommend/ encourage appropriate diets, oral nutritional supplements, and vitamin/mineral supplements  - Order, calculate, and assess calorie counts as needed  - Recommend, monitor, and adjust tube feedings and TPN/PPN based on assessed needs  - Assess need for intravenous fluids  - Provide specific nutrition/hydration education as appropriate  - Include patient/family/caregiver in decisions related to nutrition  Outcome: Progressing     Problem: PAIN - ADULT  Goal: Verbalizes/displays adequate comfort level or baseline comfort level  Description: Interventions:  - Encourage patient to monitor pain and request assistance  - Assess pain using appropriate pain scale  - Administer analgesics based on type and severity of pain and evaluate response  - Implement non-pharmacological measures as appropriate and evaluate response  - Consider cultural and social influences on pain and pain management  - Notify physician/advanced practitioner if interventions unsuccessful or patient reports new pain  Outcome: Progressing     Problem: INFECTION - ADULT  Goal: Absence or prevention of progression during hospitalization  Description: INTERVENTIONS:  - Assess and monitor for signs and symptoms of infection  - Monitor lab/diagnostic results  - Monitor all insertion sites, i e  indwelling lines, tubes, and drains  - Monitor endotracheal if appropriate and nasal secretions for changes in amount and color  - [facility] appropriate cooling/warming therapies per order  - Administer medications as ordered  - Instruct and encourage patient and family to use good hand hygiene technique  - Identify and instruct in appropriate isolation precautions for identified infection/condition  Outcome: Progressing     Problem: SAFETY ADULT  Goal: Patient will remain free of falls  Description: INTERVENTIONS:  - Educate patient/family on patient safety including physical limitations  - Instruct patient to call for assistance with activity   - Consult OT/PT to assist with strengthening/mobility   - Keep Call bell within reach  - Keep bed low and locked with side rails adjusted as appropriate  - Keep care items and personal belongings within reach  - Initiate and maintain comfort rounds  - Make Fall Risk Sign visible to staff  - Apply yellow socks and bracelet for high fall risk patients  - Consider moving patient to room near nurses station  Outcome: Progressing     Problem: DISCHARGE PLANNING  Goal: Discharge to home or other facility with appropriate resources  Description: INTERVENTIONS:  - Identify barriers to discharge w/patient and caregiver  - Arrange for needed discharge resources and transportation as appropriate  - Identify discharge learning needs (meds, wound care, etc )  - Arrange for interpretive services to assist at discharge as needed  - Refer to Case Management Department for coordinating discharge planning if the patient needs post-hospital services based on physician/advanced practitioner order or complex needs related to functional status, cognitive ability, or social support system  Outcome: Progressing     Problem: Knowledge Deficit  Goal: Patient/family/caregiver demonstrates understanding of disease process, treatment plan, medications, and discharge instructions  Description: Complete learning assessment and assess knowledge base  Interventions:  - Provide teaching at level of understanding  - Provide teaching via preferred learning methods  Outcome: Progressing     Problem: NEUROSENSORY - ADULT  Goal: Achieves stable or improved neurological status  Description: INTERVENTIONS  - Monitor and report changes in neurological status  - Monitor vital signs such as temperature, blood pressure, glucose, and any other labs ordered   - Initiate measures to prevent increased intracranial pressure  - Monitor for seizure activity and implement precautions if appropriate      Outcome: Progressing  Goal: Remains free of injury related to seizures activity  Description: INTERVENTIONS  - Maintain airway, patient safety  and administer oxygen as ordered  - Monitor patient for seizure activity, document and report duration and description of seizure to physician/advanced practitioner  - If seizure occurs,  ensure patient safety during seizure  - Reorient patient post seizure  - Seizure pads on all 4 side rails  - Instruct patient/family to notify RN of any seizure activity including if an aura is experienced  - Instruct patient/family to call for assistance with activity based on nursing assessment  - Administer anti-seizure medications if ordered    Outcome: Progressing  Goal: Achieves maximal functionality and self care  Description: INTERVENTIONS  - Monitor swallowing and airway patency with patient fatigue and changes in neurological status  - Encourage and assist patient to increase activity and self care     - Encourage visually impaired, hearing impaired and aphasic patients to use assistive/communication devices  Outcome: Progressing     Problem: CARDIOVASCULAR - ADULT  Goal: Maintains optimal cardiac output and hemodynamic stability  Description: INTERVENTIONS:  - Monitor I/O, vital signs and rhythm  - Monitor for S/S and trends of decreased cardiac output  - Administer and titrate ordered vasoactive medications to optimize hemodynamic stability  - Assess quality of pulses, skin color and temperature  - Assess for signs of decreased coronary artery perfusion  - Instruct patient to report change in severity of symptoms  Outcome: Progressing  Goal: Absence of cardiac dysrhythmias or at baseline rhythm  Description: INTERVENTIONS:  - Continuous cardiac monitoring, vital signs, obtain 12 lead EKG if ordered  - Administer antiarrhythmic and heart rate control medications as ordered  - Monitor electrolytes and administer replacement therapy as ordered  Outcome: Progressing     Problem: RESPIRATORY - ADULT  Goal: Achieves optimal ventilation and oxygenation  Description: INTERVENTIONS:  - Assess for changes in respiratory status  - Assess for changes in mentation and behavior  - Position to facilitate oxygenation and minimize respiratory effort  - Oxygen administered by appropriate delivery if ordered  - Initiate smoking cessation education as indicated  - Encourage broncho-pulmonary hygiene including cough, deep breathe, Incentive Spirometry  - Assess the need for suctioning and aspirate as needed  - Assess and instruct to report SOB or any respiratory difficulty  - Respiratory Therapy support as indicated  Outcome: Progressing     Problem: GASTROINTESTINAL - ADULT  Goal: Minimal or absence of nausea and/or vomiting  Description: INTERVENTIONS:  - Administer IV fluids if ordered to ensure adequate hydration  - Maintain NPO status until nausea and vomiting are resolved  - Nasogastric tube if ordered  - Administer ordered antiemetic medications as needed  - Provide nonpharmacologic comfort measures as appropriate  - Advance diet as tolerated, if ordered  - Consider nutrition services referral to assist patient with adequate nutrition and appropriate food choices  Outcome: Progressing  Goal: Maintains or returns to baseline bowel function  Description: INTERVENTIONS:  - Assess bowel function  - Encourage oral fluids to ensure adequate hydration  - Administer IV fluids if ordered to ensure adequate hydration  - Administer ordered medications as needed  - Encourage mobilization and activity  - Consider nutritional services referral to assist patient with adequate nutrition and appropriate food choices  Outcome: Progressing  Goal: Maintains adequate nutritional intake  Description: INTERVENTIONS:  - Monitor percentage of each meal consumed  - Identify factors contributing to decreased intake, treat as appropriate  - Assist with meals as needed  - Monitor I&O, weight, and lab values if indicated  - Obtain nutrition services referral as needed  Outcome: Progressing  Goal: Oral mucous membranes remain intact  Description: INTERVENTIONS  - Assess oral mucosa and hygiene practices  - Implement preventative oral hygiene regimen  - Implement oral medicated treatments as ordered  - Initiate Nutrition services referral as needed  Outcome: Progressing     Problem: GENITOURINARY - ADULT  Goal: Maintains or returns to baseline urinary function  Description: INTERVENTIONS:  - Assess urinary function  - Encourage oral fluids to ensure adequate hydration if ordered  - Administer IV fluids as ordered to ensure adequate hydration  - Administer ordered medications as needed  - Offer frequent toileting  - Follow urinary retention protocol if ordered  Outcome: Progressing  Goal: Absence of urinary retention  Description: INTERVENTIONS:  - Assess patients ability to void and empty bladder  - Monitor I/O  - Bladder scan as needed  - Discuss with physician/AP medications to alleviate retention as needed  - Discuss catheterization for long term situations as appropriate  Outcome: Progressing  Goal: Urinary catheter remains patent  Description: INTERVENTIONS:  - Assess patency of urinary catheter  - If patient has a chronic vazquez, consider changing catheter if non-functioning  - Follow guidelines for intermittent irrigation of non-functioning urinary catheter  Outcome: Progressing     Problem: METABOLIC, FLUID AND ELECTROLYTES - ADULT  Goal: Electrolytes maintained within normal limits  Description: INTERVENTIONS:  - Monitor labs and assess patient for signs and symptoms of electrolyte imbalances  - Administer electrolyte replacement as ordered  - Monitor response to electrolyte replacements, including repeat lab results as appropriate  - Instruct patient on fluid and nutrition as appropriate  Outcome: Progressing  Goal: Fluid balance maintained  Description: INTERVENTIONS:  - Monitor labs   - Monitor I/O and WT  - Instruct patient on fluid and nutrition as appropriate  - Assess for signs & symptoms of volume excess or deficit  Outcome: Progressing     Problem: SAFETY,RESTRAINT: NV/NON-SELF DESTRUCTIVE BEHAVIOR  Goal: Remains free of harm/injury (restraint for non violent/non self-detsructive behavior)  Description: INTERVENTIONS:  - Instruct patient/family regarding restraint use   - Assess and monitor physiologic and psychological status   - Provide interventions and comfort measures to meet assessed patient needs   - Identify and implement measures to help patient regain control  - Assess readiness for release of restraint   Outcome: Progressing  Goal: Returns to optimal restraint-free functioning  Description: INTERVENTIONS:  - Assess the patient's behavior and symptoms that indicate continued need for restraint  - Identify and implement measures to help patient regain control  - Assess readiness for release of restraint   Outcome: Progressing

## 2022-04-15 NOTE — QUICK NOTE
Called to patient's bedside secondary to severe hypoxia and central cyanosis  Patient has been extubated approximately 40 minutes prior  She was unresponsive, mottled, cyanotic with oxygen sats in the 70s  She was bagged into the 90s and subsequently reintubated  See the intubation note for further information

## 2022-04-15 NOTE — PROCEDURES
Intubation    Date/Time: 4/15/2022 1:54 PM  Performed by: TETE Smallwood  Authorized by: TETE Smallwood     Patient location:  Bedside  Consent:     Consent obtained:  Emergent situation  Universal protocol:     Procedure explained and questions answered to patient or proxy's satisfaction: no      Relevant documents present and verified: yes      Test results available and properly labeled: yes      Radiology Images displayed and confirmed  If images not available, report reviewed: yes      Required blood products, implants, devices, and special equipment available: yes      Site/side marked: no      Immediately prior to procedure, a time out was called: yes      Patient identity confirmed:  Hospital-assigned identification number and arm band  Pre-procedure details:     Patient status:  Unresponsive    Mallampati score:  2    Pretreatment medications:  Etomidate    Paralytics:  None  Indications:     Indications for intubation: respiratory failure    Procedure details:     Preoxygenation:  Bag valve mask    CPR in progress: no      Intubation method:  Oral    Oral intubation technique:  Glidescope    Laryngoscope blade: Mac 4    Tube size (mm):  8 0    Tube type:  Cuffed    Number of attempts:  2    Ventilation between attempts: yes      Cricoid pressure: no      Tube visualized through cords: yes    Placement assessment:     Tube secured with:  ETT cid    Breath sounds:  Equal    Placement verification: chest rise, condensation, CXR verification, equal breath sounds, ETCO2 detector and tube exhalation      CXR findings:  ETT in proper place  Post-procedure details:     Patient tolerance of procedure:   Tolerated well, no immediate complications

## 2022-04-16 ENCOUNTER — APPOINTMENT (INPATIENT)
Dept: RADIOLOGY | Facility: HOSPITAL | Age: 57
DRG: 207 | End: 2022-04-16
Payer: COMMERCIAL

## 2022-04-16 LAB
ANION GAP SERPL CALCULATED.3IONS-SCNC: 5 MMOL/L (ref 4–13)
ATRIAL RATE: 67 BPM
ATRIAL RATE: 68 BPM
ATRIAL RATE: 69 BPM
BASOPHILS # BLD AUTO: 0.06 THOUSANDS/ΜL (ref 0–0.1)
BASOPHILS NFR BLD AUTO: 1 % (ref 0–1)
BUN SERPL-MCNC: 14 MG/DL (ref 5–25)
CALCIUM SERPL-MCNC: 8.5 MG/DL (ref 8.3–10.1)
CHLORIDE SERPL-SCNC: 104 MMOL/L (ref 100–108)
CO2 SERPL-SCNC: 31 MMOL/L (ref 21–32)
CREAT SERPL-MCNC: 0.44 MG/DL (ref 0.6–1.3)
EOSINOPHIL # BLD AUTO: 0 THOUSAND/ΜL (ref 0–0.61)
EOSINOPHIL NFR BLD AUTO: 0 % (ref 0–6)
ERYTHROCYTE [DISTWIDTH] IN BLOOD BY AUTOMATED COUNT: 12 % (ref 11.6–15.1)
GFR SERPL CREATININE-BSD FRML MDRD: 111 ML/MIN/1.73SQ M
GLUCOSE SERPL-MCNC: 164 MG/DL (ref 65–140)
GLUCOSE SERPL-MCNC: 209 MG/DL (ref 65–140)
GLUCOSE SERPL-MCNC: 279 MG/DL (ref 65–140)
GLUCOSE SERPL-MCNC: 294 MG/DL (ref 65–140)
GLUCOSE SERPL-MCNC: 323 MG/DL (ref 65–140)
GLUCOSE SERPL-MCNC: 345 MG/DL (ref 65–140)
HCT VFR BLD AUTO: 32.9 % (ref 34.8–46.1)
HGB BLD-MCNC: 10.8 G/DL (ref 11.5–15.4)
IMM GRANULOCYTES # BLD AUTO: 0.15 THOUSAND/UL (ref 0–0.2)
IMM GRANULOCYTES NFR BLD AUTO: 2 % (ref 0–2)
LYMPHOCYTES # BLD AUTO: 0.39 THOUSANDS/ΜL (ref 0.6–4.47)
LYMPHOCYTES NFR BLD AUTO: 6 % (ref 14–44)
MAGNESIUM SERPL-MCNC: 2.2 MG/DL (ref 1.6–2.6)
MAGNESIUM SERPL-MCNC: 2.3 MG/DL (ref 1.6–2.6)
MCH RBC QN AUTO: 31.4 PG (ref 26.8–34.3)
MCHC RBC AUTO-ENTMCNC: 32.8 G/DL (ref 31.4–37.4)
MCV RBC AUTO: 96 FL (ref 82–98)
MONOCYTES # BLD AUTO: 0.35 THOUSAND/ΜL (ref 0.17–1.22)
MONOCYTES NFR BLD AUTO: 5 % (ref 4–12)
NEUTROPHILS # BLD AUTO: 6.04 THOUSANDS/ΜL (ref 1.85–7.62)
NEUTS SEG NFR BLD AUTO: 86 % (ref 43–75)
NRBC BLD AUTO-RTO: 0 /100 WBCS
P AXIS: 22 DEGREES
P AXIS: 50 DEGREES
P AXIS: 53 DEGREES
PHOSPHATE SERPL-MCNC: 4.8 MG/DL (ref 2.7–4.5)
PLATELET # BLD AUTO: 256 THOUSANDS/UL (ref 149–390)
PMV BLD AUTO: 9.7 FL (ref 8.9–12.7)
POTASSIUM SERPL-SCNC: 3.6 MMOL/L (ref 3.5–5.3)
POTASSIUM SERPL-SCNC: 4 MMOL/L (ref 3.5–5.3)
PR INTERVAL: 200 MS
PR INTERVAL: 200 MS
PR INTERVAL: 204 MS
QRS AXIS: 68 DEGREES
QRS AXIS: 69 DEGREES
QRS AXIS: 71 DEGREES
QRSD INTERVAL: 83 MS
QRSD INTERVAL: 83 MS
QRSD INTERVAL: 88 MS
QT INTERVAL: 446 MS
QT INTERVAL: 446 MS
QT INTERVAL: 454 MS
QTC INTERVAL: 471 MS
QTC INTERVAL: 475 MS
QTC INTERVAL: 487 MS
RBC # BLD AUTO: 3.44 MILLION/UL (ref 3.81–5.12)
SODIUM SERPL-SCNC: 140 MMOL/L (ref 136–145)
T WAVE AXIS: 71 DEGREES
T WAVE AXIS: 78 DEGREES
T WAVE AXIS: 80 DEGREES
TRIGL SERPL-MCNC: 193 MG/DL
VENTRICULAR RATE: 67 BPM
VENTRICULAR RATE: 68 BPM
VENTRICULAR RATE: 69 BPM
WBC # BLD AUTO: 6.99 THOUSAND/UL (ref 4.31–10.16)

## 2022-04-16 PROCEDURE — 71045 X-RAY EXAM CHEST 1 VIEW: CPT

## 2022-04-16 PROCEDURE — 94668 MNPJ CHEST WALL SBSQ: CPT

## 2022-04-16 PROCEDURE — 82948 REAGENT STRIP/BLOOD GLUCOSE: CPT

## 2022-04-16 PROCEDURE — 85025 COMPLETE CBC W/AUTO DIFF WBC: CPT | Performed by: INTERNAL MEDICINE

## 2022-04-16 PROCEDURE — 93010 ELECTROCARDIOGRAM REPORT: CPT

## 2022-04-16 PROCEDURE — 80048 BASIC METABOLIC PNL TOTAL CA: CPT | Performed by: INTERNAL MEDICINE

## 2022-04-16 PROCEDURE — 93005 ELECTROCARDIOGRAM TRACING: CPT

## 2022-04-16 PROCEDURE — 83735 ASSAY OF MAGNESIUM: CPT | Performed by: INTERNAL MEDICINE

## 2022-04-16 PROCEDURE — 84132 ASSAY OF SERUM POTASSIUM: CPT | Performed by: INTERNAL MEDICINE

## 2022-04-16 PROCEDURE — 93010 ELECTROCARDIOGRAM REPORT: CPT | Performed by: INTERNAL MEDICINE

## 2022-04-16 PROCEDURE — 94003 VENT MGMT INPAT SUBQ DAY: CPT

## 2022-04-16 PROCEDURE — 94640 AIRWAY INHALATION TREATMENT: CPT

## 2022-04-16 PROCEDURE — 84100 ASSAY OF PHOSPHORUS: CPT | Performed by: INTERNAL MEDICINE

## 2022-04-16 PROCEDURE — 84478 ASSAY OF TRIGLYCERIDES: CPT | Performed by: INTERNAL MEDICINE

## 2022-04-16 PROCEDURE — 99291 CRITICAL CARE FIRST HOUR: CPT | Performed by: INTERNAL MEDICINE

## 2022-04-16 RX ORDER — FUROSEMIDE 10 MG/ML
20 INJECTION INTRAMUSCULAR; INTRAVENOUS ONCE
Status: COMPLETED | OUTPATIENT
Start: 2022-04-16 | End: 2022-04-16

## 2022-04-16 RX ORDER — POLYETHYLENE GLYCOL 3350 17 G/17G
17 POWDER, FOR SOLUTION ORAL DAILY
Status: DISCONTINUED | OUTPATIENT
Start: 2022-04-17 | End: 2022-04-19

## 2022-04-16 RX ORDER — PROPOFOL 10 MG/ML
5-50 INJECTION, EMULSION INTRAVENOUS
Status: DISCONTINUED | OUTPATIENT
Start: 2022-04-16 | End: 2022-04-19

## 2022-04-16 RX ORDER — POTASSIUM CHLORIDE 20 MEQ/1
40 TABLET, EXTENDED RELEASE ORAL ONCE
Status: COMPLETED | OUTPATIENT
Start: 2022-04-16 | End: 2022-04-16

## 2022-04-16 RX ADMIN — CEFEPIME HYDROCHLORIDE 2000 MG: 2 INJECTION, POWDER, FOR SOLUTION INTRAVENOUS at 10:10

## 2022-04-16 RX ADMIN — CHLORHEXIDINE GLUCONATE 0.12% ORAL RINSE 15 ML: 1.2 LIQUID ORAL at 20:36

## 2022-04-16 RX ADMIN — Medication 50 MCG/HR: at 19:42

## 2022-04-16 RX ADMIN — Medication 100 MCG/HR: at 04:38

## 2022-04-16 RX ADMIN — LEVALBUTEROL HYDROCHLORIDE 1.25 MG: 1.25 SOLUTION, CONCENTRATE RESPIRATORY (INHALATION) at 19:50

## 2022-04-16 RX ADMIN — MIDAZOLAM 2 MG: 1 INJECTION INTRAMUSCULAR; INTRAVENOUS at 06:33

## 2022-04-16 RX ADMIN — FUROSEMIDE 20 MG: 10 INJECTION, SOLUTION INTRAVENOUS at 10:00

## 2022-04-16 RX ADMIN — DEXAMETHASONE SODIUM PHOSPHATE 4 MG: 4 INJECTION INTRA-ARTICULAR; INTRALESIONAL; INTRAMUSCULAR; INTRAVENOUS; SOFT TISSUE at 05:51

## 2022-04-16 RX ADMIN — DEXMEDETOMIDINE HYDROCHLORIDE 1.5 MCG/KG/HR: 400 INJECTION INTRAVENOUS at 13:04

## 2022-04-16 RX ADMIN — CHLORHEXIDINE GLUCONATE 0.12% ORAL RINSE 15 ML: 1.2 LIQUID ORAL at 09:00

## 2022-04-16 RX ADMIN — Medication 20 MG: at 08:59

## 2022-04-16 RX ADMIN — POLYETHYLENE GLYCOL 3350 17 G: 17 POWDER, FOR SOLUTION ORAL at 08:59

## 2022-04-16 RX ADMIN — THIAMINE HCL TAB 100 MG 100 MG: 100 TAB at 08:59

## 2022-04-16 RX ADMIN — DEXMEDETOMIDINE HYDROCHLORIDE 1.5 MCG/KG/HR: 400 INJECTION INTRAVENOUS at 21:10

## 2022-04-16 RX ADMIN — DEXMEDETOMIDINE HYDROCHLORIDE 1.5 MCG/KG/HR: 400 INJECTION INTRAVENOUS at 07:01

## 2022-04-16 RX ADMIN — LIDOCAINE 1 PATCH: 50 PATCH CUTANEOUS at 10:01

## 2022-04-16 RX ADMIN — SODIUM CHLORIDE SOLN NEBU 3% 4 ML: 3 NEBU SOLN at 19:50

## 2022-04-16 RX ADMIN — ALBUMIN (HUMAN) 12.5 G: 0.25 INJECTION, SOLUTION INTRAVENOUS at 15:35

## 2022-04-16 RX ADMIN — ACETYLCYSTEINE 600 MG: 200 SOLUTION ORAL; RESPIRATORY (INHALATION) at 07:48

## 2022-04-16 RX ADMIN — DEXMEDETOMIDINE HYDROCHLORIDE 1.5 MCG/KG/HR: 400 INJECTION INTRAVENOUS at 15:35

## 2022-04-16 RX ADMIN — MIDAZOLAM 2 MG: 1 INJECTION INTRAMUSCULAR; INTRAVENOUS at 20:53

## 2022-04-16 RX ADMIN — Medication 1 PATCH: at 08:59

## 2022-04-16 RX ADMIN — INSULIN LISPRO 3 UNITS: 100 INJECTION, SOLUTION INTRAVENOUS; SUBCUTANEOUS at 23:36

## 2022-04-16 RX ADMIN — MIDAZOLAM 2 MG: 1 INJECTION INTRAMUSCULAR; INTRAVENOUS at 00:16

## 2022-04-16 RX ADMIN — ALBUMIN (HUMAN) 12.5 G: 0.25 INJECTION, SOLUTION INTRAVENOUS at 08:59

## 2022-04-16 RX ADMIN — DEXMEDETOMIDINE HYDROCHLORIDE 1.5 MCG/KG/HR: 400 INJECTION INTRAVENOUS at 01:36

## 2022-04-16 RX ADMIN — IPRATROPIUM BROMIDE 0.5 MG: 0.5 SOLUTION RESPIRATORY (INHALATION) at 13:46

## 2022-04-16 RX ADMIN — DEXMEDETOMIDINE HYDROCHLORIDE 1.5 MCG/KG/HR: 400 INJECTION INTRAVENOUS at 18:04

## 2022-04-16 RX ADMIN — SODIUM CHLORIDE SOLN NEBU 3% 4 ML: 3 NEBU SOLN at 07:49

## 2022-04-16 RX ADMIN — MIDAZOLAM 2 MG: 1 INJECTION INTRAMUSCULAR; INTRAVENOUS at 03:30

## 2022-04-16 RX ADMIN — DOCUSATE SODIUM AND SENNOSIDES 2 TABLET: 8.6; 5 TABLET, FILM COATED ORAL at 21:03

## 2022-04-16 RX ADMIN — PROPOFOL 5 MCG/KG/MIN: 10 INJECTION, EMULSION INTRAVENOUS at 10:14

## 2022-04-16 RX ADMIN — DEXAMETHASONE SODIUM PHOSPHATE 4 MG: 4 INJECTION INTRA-ARTICULAR; INTRALESIONAL; INTRAMUSCULAR; INTRAVENOUS; SOFT TISSUE at 18:04

## 2022-04-16 RX ADMIN — HYDROMORPHONE HYDROCHLORIDE 1 MG: 1 INJECTION, SOLUTION INTRAMUSCULAR; INTRAVENOUS; SUBCUTANEOUS at 03:14

## 2022-04-16 RX ADMIN — FUROSEMIDE 20 MG: 10 INJECTION, SOLUTION INTRAVENOUS at 19:52

## 2022-04-16 RX ADMIN — LEVALBUTEROL HYDROCHLORIDE 1.25 MG: 1.25 SOLUTION, CONCENTRATE RESPIRATORY (INHALATION) at 07:49

## 2022-04-16 RX ADMIN — IPRATROPIUM BROMIDE 0.5 MG: 0.5 SOLUTION RESPIRATORY (INHALATION) at 19:50

## 2022-04-16 RX ADMIN — FOLIC ACID 1 MG: 1 TABLET ORAL at 08:59

## 2022-04-16 RX ADMIN — INSULIN LISPRO 2 UNITS: 100 INJECTION, SOLUTION INTRAVENOUS; SUBCUTANEOUS at 20:10

## 2022-04-16 RX ADMIN — PROPOFOL 45 MCG/KG/MIN: 10 INJECTION, EMULSION INTRAVENOUS at 18:05

## 2022-04-16 RX ADMIN — DEXMEDETOMIDINE HYDROCHLORIDE 1.5 MCG/KG/HR: 400 INJECTION INTRAVENOUS at 10:00

## 2022-04-16 RX ADMIN — ENOXAPARIN SODIUM 40 MG: 40 INJECTION SUBCUTANEOUS at 09:00

## 2022-04-16 RX ADMIN — CEFEPIME HYDROCHLORIDE 2000 MG: 2 INJECTION, POWDER, FOR SOLUTION INTRAVENOUS at 22:37

## 2022-04-16 RX ADMIN — POTASSIUM CHLORIDE 40 MEQ: 1500 TABLET, EXTENDED RELEASE ORAL at 22:38

## 2022-04-16 RX ADMIN — MIDAZOLAM 2 MG: 1 INJECTION INTRAMUSCULAR; INTRAVENOUS at 08:37

## 2022-04-16 RX ADMIN — SODIUM CHLORIDE SOLN NEBU 3% 4 ML: 3 NEBU SOLN at 13:46

## 2022-04-16 RX ADMIN — ALBUMIN (HUMAN) 12.5 G: 0.25 INJECTION, SOLUTION INTRAVENOUS at 03:31

## 2022-04-16 RX ADMIN — LEVALBUTEROL HYDROCHLORIDE 1.25 MG: 1.25 SOLUTION, CONCENTRATE RESPIRATORY (INHALATION) at 13:46

## 2022-04-16 RX ADMIN — PROPOFOL 45.9 MCG/KG/MIN: 10 INJECTION, EMULSION INTRAVENOUS at 21:01

## 2022-04-16 RX ADMIN — IPRATROPIUM BROMIDE 0.5 MG: 0.5 SOLUTION RESPIRATORY (INHALATION) at 07:49

## 2022-04-16 RX ADMIN — DEXMEDETOMIDINE HYDROCHLORIDE 1.5 MCG/KG/HR: 400 INJECTION INTRAVENOUS at 03:45

## 2022-04-16 RX ADMIN — PROPOFOL 45 MCG/KG/MIN: 10 INJECTION, EMULSION INTRAVENOUS at 13:59

## 2022-04-16 RX ADMIN — DEXAMETHASONE SODIUM PHOSPHATE 4 MG: 4 INJECTION INTRA-ARTICULAR; INTRALESIONAL; INTRAMUSCULAR; INTRAVENOUS; SOFT TISSUE at 23:16

## 2022-04-16 RX ADMIN — Medication 1 TABLET: at 08:59

## 2022-04-16 RX ADMIN — DEXAMETHASONE SODIUM PHOSPHATE 4 MG: 4 INJECTION INTRA-ARTICULAR; INTRALESIONAL; INTRAMUSCULAR; INTRAVENOUS; SOFT TISSUE at 12:12

## 2022-04-16 NOTE — PLAN OF CARE
Problem: Potential for Falls  Goal: Patient will remain free of falls  Description: INTERVENTIONS:  - Educate patient/family on patient safety including physical limitations  - Instruct patient to call for assistance with activity   - Consult OT/PT to assist with strengthening/mobility   - Keep Call bell within reach  - Keep bed low and locked with side rails adjusted as appropriate  - Keep care items and personal belongings within reach  - Initiate and maintain comfort rounds  - Make Fall Risk Sign visible to staff  - Apply yellow socks and bracelet for high fall risk patients  - Consider moving patient to room near nurses station  Outcome: Progressing     Problem: Prexisting or High Potential for Compromised Skin Integrity  Goal: Skin integrity is maintained or improved  Description: INTERVENTIONS:  - Identify patients at risk for skin breakdown  - Assess and monitor skin integrity  - Assess and monitor nutrition and hydration status  - Monitor labs   - Assess for incontinence   - Turn and reposition patient  - Assist with mobility/ambulation  - Relieve pressure over bony prominences  - Avoid friction and shearing  - Provide appropriate hygiene as needed including keeping skin clean and dry  - Evaluate need for skin moisturizer/barrier cream  - Collaborate with interdisciplinary team   - Patient/family teaching  - Consider wound care consult   Outcome: Progressing     Problem: DISCHARGE PLANNING - CARE MANAGEMENT  Goal: Discharge to post-acute care or home with appropriate resources  Description: INTERVENTIONS:  - Conduct assessment to determine patient/family and health care team treatment goals, and need for post-acute services based on payer coverage, community resources, and patient preferences, and barriers to discharge  - Address psychosocial, clinical, and financial barriers to discharge as identified in assessment in conjunction with the patient/family and health care team  - Arrange appropriate level of post-acute services according to patients   needs and preference and payer coverage in collaboration with the physician and health care team  - Communicate with and update the patient/family, physician, and health care team regarding progress on the discharge plan  - Arrange appropriate transportation to post-acute venues  Outcome: Progressing     Problem: SUBSTANCE USE/ABUSE  Goal: By discharge, will develop insight into their chemical dependency and sustain motivation to continue in recovery  Description: INTERVENTIONS:  - Attends all daily group sessions and scheduled AA groups  - Actively practices coping skills through participation in the therapeutic community and adherence to program rules  - Reviews and completes assignments from individual treatment plan  - Assist patient development of understanding of their personal cycle of addiction and relapse triggers  Outcome: Progressing  Goal: By discharge, patient will have ongoing treatment plan addressing chemical dependency  Description: INTERVENTIONS:  - Assist patient with resources and/or appointments for ongoing recovery based living  Outcome: Progressing     Problem: Nutrition/Hydration-ADULT  Goal: Nutrient/Hydration intake appropriate for improving, restoring or maintaining nutritional needs  Description: Monitor and assess patient's nutrition/hydration status for malnutrition  Collaborate with interdisciplinary team and initiate plan and interventions as ordered  Monitor patient's weight and dietary intake as ordered or per policy  Utilize nutrition screening tool and intervene as necessary  Determine patient's food preferences and provide high-protein, high-caloric foods as appropriate       INTERVENTIONS:  - Monitor oral intake, urinary output, labs, and treatment plans  - Assess nutrition and hydration status and recommend course of action  - Evaluate amount of meals eaten  - Assist patient with eating if necessary   - Allow adequate time for meals  - Recommend/ encourage appropriate diets, oral nutritional supplements, and vitamin/mineral supplements  - Order, calculate, and assess calorie counts as needed  - Recommend, monitor, and adjust tube feedings and TPN/PPN based on assessed needs  - Assess need for intravenous fluids  - Provide specific nutrition/hydration education as appropriate  - Include patient/family/caregiver in decisions related to nutrition  Outcome: Progressing     Problem: PAIN - ADULT  Goal: Verbalizes/displays adequate comfort level or baseline comfort level  Description: Interventions:  - Encourage patient to monitor pain and request assistance  - Assess pain using appropriate pain scale  - Administer analgesics based on type and severity of pain and evaluate response  - Implement non-pharmacological measures as appropriate and evaluate response  - Consider cultural and social influences on pain and pain management  - Notify physician/advanced practitioner if interventions unsuccessful or patient reports new pain  Outcome: Progressing     Problem: INFECTION - ADULT  Goal: Absence or prevention of progression during hospitalization  Description: INTERVENTIONS:  - Assess and monitor for signs and symptoms of infection  - Monitor lab/diagnostic results  - Monitor all insertion sites, i e  indwelling lines, tubes, and drains  - Monitor endotracheal if appropriate and nasal secretions for changes in amount and color  - [place] appropriate cooling/warming therapies per order  - Administer medications as ordered  - Instruct and encourage patient and family to use good hand hygiene technique  - Identify and instruct in appropriate isolation precautions for identified infection/condition  Outcome: Progressing     Problem: SAFETY ADULT  Goal: Patient will remain free of falls  Description: INTERVENTIONS:  - Educate patient/family on patient safety including physical limitations  - Instruct patient to call for assistance with activity   - Consult OT/PT to assist with strengthening/mobility   - Keep Call bell within reach  - Keep bed low and locked with side rails adjusted as appropriate  - Keep care items and personal belongings within reach  - Initiate and maintain comfort rounds  - Make Fall Risk Sign visible to staff  - Apply yellow socks and bracelet for high fall risk patients  - Consider moving patient to room near nurses station  Outcome: Progressing     Problem: Knowledge Deficit  Goal: Patient/family/caregiver demonstrates understanding of disease process, treatment plan, medications, and discharge instructions  Description: Complete learning assessment and assess knowledge base    Interventions:  - Provide teaching at level of understanding  - Provide teaching via preferred learning methods  Outcome: Progressing     Problem: NEUROSENSORY - ADULT  Goal: Achieves stable or improved neurological status  Description: INTERVENTIONS  - Monitor and report changes in neurological status  - Monitor vital signs such as temperature, blood pressure, glucose, and any other labs ordered   - Initiate measures to prevent increased intracranial pressure  - Monitor for seizure activity and implement precautions if appropriate      Outcome: Progressing  Goal: Remains free of injury related to seizures activity  Description: INTERVENTIONS  - Maintain airway, patient safety  and administer oxygen as ordered  - Monitor patient for seizure activity, document and report duration and description of seizure to physician/advanced practitioner  - If seizure occurs,  ensure patient safety during seizure  - Reorient patient post seizure  - Seizure pads on all 4 side rails  - Instruct patient/family to notify RN of any seizure activity including if an aura is experienced  - Instruct patient/family to call for assistance with activity based on nursing assessment  - Administer anti-seizure medications if ordered    Outcome: Progressing  Goal: Achieves maximal functionality and self care  Description: INTERVENTIONS  - Monitor swallowing and airway patency with patient fatigue and changes in neurological status  - Encourage and assist patient to increase activity and self care     - Encourage visually impaired, hearing impaired and aphasic patients to use assistive/communication devices  Outcome: Progressing     Problem: CARDIOVASCULAR - ADULT  Goal: Maintains optimal cardiac output and hemodynamic stability  Description: INTERVENTIONS:  - Monitor I/O, vital signs and rhythm  - Monitor for S/S and trends of decreased cardiac output  - Administer and titrate ordered vasoactive medications to optimize hemodynamic stability  - Assess quality of pulses, skin color and temperature  - Assess for signs of decreased coronary artery perfusion  - Instruct patient to report change in severity of symptoms  Outcome: Progressing  Goal: Absence of cardiac dysrhythmias or at baseline rhythm  Description: INTERVENTIONS:  - Continuous cardiac monitoring, vital signs, obtain 12 lead EKG if ordered  - Administer antiarrhythmic and heart rate control medications as ordered  - Monitor electrolytes and administer replacement therapy as ordered  Outcome: Progressing     Problem: RESPIRATORY - ADULT  Goal: Achieves optimal ventilation and oxygenation  Description: INTERVENTIONS:  - Assess for changes in respiratory status  - Assess for changes in mentation and behavior  - Position to facilitate oxygenation and minimize respiratory effort  - Oxygen administered by appropriate delivery if ordered  - Initiate smoking cessation education as indicated  - Encourage broncho-pulmonary hygiene including cough, deep breathe, Incentive Spirometry  - Assess the need for suctioning and aspirate as needed  - Assess and instruct to report SOB or any respiratory difficulty  - Respiratory Therapy support as indicated  Outcome: Progressing     Problem: GASTROINTESTINAL - ADULT  Goal: Minimal or absence of nausea and/or vomiting  Description: INTERVENTIONS:  - Administer IV fluids if ordered to ensure adequate hydration  - Maintain NPO status until nausea and vomiting are resolved  - Nasogastric tube if ordered  - Administer ordered antiemetic medications as needed  - Provide nonpharmacologic comfort measures as appropriate  - Advance diet as tolerated, if ordered  - Consider nutrition services referral to assist patient with adequate nutrition and appropriate food choices  Outcome: Progressing  Goal: Maintains or returns to baseline bowel function  Description: INTERVENTIONS:  - Assess bowel function  - Encourage oral fluids to ensure adequate hydration  - Administer IV fluids if ordered to ensure adequate hydration  - Administer ordered medications as needed  - Encourage mobilization and activity  - Consider nutritional services referral to assist patient with adequate nutrition and appropriate food choices  Outcome: Progressing  Goal: Maintains adequate nutritional intake  Description: INTERVENTIONS:  - Monitor percentage of each meal consumed  - Identify factors contributing to decreased intake, treat as appropriate  - Assist with meals as needed  - Monitor I&O, weight, and lab values if indicated  - Obtain nutrition services referral as needed  Outcome: Progressing  Goal: Oral mucous membranes remain intact  Description: INTERVENTIONS  - Assess oral mucosa and hygiene practices  - Implement preventative oral hygiene regimen  - Implement oral medicated treatments as ordered  - Initiate Nutrition services referral as needed  Outcome: Progressing     Problem: GENITOURINARY - ADULT  Goal: Maintains or returns to baseline urinary function  Description: INTERVENTIONS:  - Assess urinary function  - Encourage oral fluids to ensure adequate hydration if ordered  - Administer IV fluids as ordered to ensure adequate hydration  - Administer ordered medications as needed  - Offer frequent toileting  - Follow urinary retention protocol if ordered  Outcome: Progressing  Goal: Absence of urinary retention  Description: INTERVENTIONS:  - Assess patients ability to void and empty bladder  - Monitor I/O  - Bladder scan as needed  - Discuss with physician/AP medications to alleviate retention as needed  - Discuss catheterization for long term situations as appropriate  Outcome: Progressing  Goal: Urinary catheter remains patent  Description: INTERVENTIONS:  - Assess patency of urinary catheter  - If patient has a chronic vazquez, consider changing catheter if non-functioning  - Follow guidelines for intermittent irrigation of non-functioning urinary catheter  Outcome: Progressing     Problem: METABOLIC, FLUID AND ELECTROLYTES - ADULT  Goal: Electrolytes maintained within normal limits  Description: INTERVENTIONS:  - Monitor labs and assess patient for signs and symptoms of electrolyte imbalances  - Administer electrolyte replacement as ordered  - Monitor response to electrolyte replacements, including repeat lab results as appropriate  - Instruct patient on fluid and nutrition as appropriate  Outcome: Progressing  Goal: Fluid balance maintained  Description: INTERVENTIONS:  - Monitor labs   - Monitor I/O and WT  - Instruct patient on fluid and nutrition as appropriate  - Assess for signs & symptoms of volume excess or deficit  Outcome: Progressing     Problem: SAFETY,RESTRAINT: NV/NON-SELF DESTRUCTIVE BEHAVIOR  Goal: Remains free of harm/injury (restraint for non violent/non self-detsructive behavior)  Description: INTERVENTIONS:  - Instruct patient/family regarding restraint use   - Assess and monitor physiologic and psychological status   - Provide interventions and comfort measures to meet assessed patient needs   - Identify and implement measures to help patient regain control  - Assess readiness for release of restraint   Outcome: Progressing  Goal: Returns to optimal restraint-free functioning  Description: INTERVENTIONS:  - Assess the patient's behavior and symptoms that indicate continued need for restraint  - Identify and implement measures to help patient regain control  - Assess readiness for release of restraint   Outcome: Progressing

## 2022-04-16 NOTE — ASSESSMENT & PLAN NOTE
Patient drinks roughly 8-12 shots of whiskey daily  Patient's last drink was April 1, 2022  EtoH level on admission was 54  Has never had any history withdrawals or seizures  Patient was initially admitted for alcoholic pancreatitis, however, patient became encephalopathic and agitated requiring transfer to detox Unit  · Patient is maxed on Precedex and fentanyl  · Versed 2 mg Q 2h p r n

## 2022-04-16 NOTE — ASSESSMENT & PLAN NOTE
Patient acute hypoxic on admission, was given dose of Lasix with marked improvement  However, this morning patient started to become more agitated, hypoxic and tachycardic unable to tolerate BiPAP, patient was intubated for airway protection in the setting of encephalopathy and potential DTs  Patient was extubated yesterday, however failed BiPAP, eventually requiring re-intubation  The patient cleared significant amount of secretions while she was extubated  Patient had repeat bronchoscopy yesterday which showed decrease secretions      · Patient's PEEP was decreased 10 overnight  · Continue monitor patient's secretions, seem to be a bit better today  · Can consider bronchoscopy plus extubation if patient is able to tolerate spontaneous with T-piece  · Obtain CXR

## 2022-04-16 NOTE — PROGRESS NOTES
[facility]  Progress Note - Francisca Randolph 1965, 60 y o  female MRN: [id number]  Unit/Bed#: ICU 11 Encounter: [id number]  Primary Care Provider: Sukhdev Barragan DO   Date and time admitted to hospital: 4/8/2022  3:55 PM    * Acute respiratory failure with hypoxia [facility])  Assessment & Plan  Patient acute hypoxic on admission, was given dose of Lasix with marked improvement  However, this morning patient started to become more agitated, hypoxic and tachycardic unable to tolerate BiPAP, patient was intubated for airway protection in the setting of encephalopathy and potential DTs  Patient was extubated yesterday, however failed BiPAP, eventually requiring re-intubation  The patient cleared significant amount of secretions while she was extubated  Patient had repeat bronchoscopy yesterday which showed decrease secretions  · Patient's PEEP was decreased 10 overnight  · Continue monitor patient's secretions, seem to be a bit better today  · Can consider bronchoscopy plus extubation if patient is able to tolerate spontaneous with T-piece  · Obtain CXR      Delirium tremens [facility])  Assessment & Plan  Patient drinks roughly 8-12 shots of whiskey daily  Patient's last drink was April 1, 2022  EtoH level on admission was 54  Has never had any history withdrawals or seizures  Patient was initially admitted for alcoholic pancreatitis, however, patient became encephalopathic and agitated requiring transfer to detox Unit  · Patient is maxed on Precedex and fentanyl  · Versed 2 mg Q 2h p r n  Alcohol-induced acute pancreatitis  Assessment & Plan  · Patient initially presented with a lipase of 53,000  Patient's acute pancreatitis is likely secondary to alcohol    · No surgical intervention of the pancreas  · Monitor TGL    Alcohol use disorder, severe, dependence ([facility] [address] )  Assessment & Plan  · Will continue with withdrawal management  · Case management consult in place  · Encouraged cessation   · Continue thiamine and folic acid       SIRS (systemic inflammatory response syndrome) ([facility] [address] )  Assessment & Plan  Patient had elevated temp at 1:03 a m , tachycardia and hypotension yesterday requiring IV fluids and eventually pressors  Patient was also started on broad-spectrum antibiotics and started on Precedex for tachycardia  Patient's presentation is unlikely to be secondary to an infectious process given down trending leukocytosis, stable procalcitonin, cool extremities, and no identifiable source  SIRS response likely secondary to bronchoscopy versus decreased ECF  · Patient is off pressors  · Currently on cefepime and vancomycin  · Procalcitonin is down trending  · Cultures are still negative to date  Ataxia  Assessment & Plan  On admission patient was reported to have ataxia as well as finger-to-nose ataxia  Patient was started on high-dose thiamine for Wernicke's encephalopathy    · MRI came back negative for any acute or chronic pathologies    Gastroesophageal reflux disease without esophagitis  Assessment & Plan  · Continue Protonix 20 mg mg daily       Hypertension  Assessment & Plan  · Patient is currently on 10 mg of lisinopril daily at home  Can be resumed once extubated  · Hydralazine p r n  For SBP greater than 170         Hypokalemia-resolved as of 4/10/2022  Assessment & Plan  · Likely secondary to diuretic administration  Resolved   · Monitor BMP        ----------------------------------------------------------------------------------------  HPI/24hr events: The patient was trialed off of mechanical ventilation yesterday and was extubated  However failed extubation requiring re-intubation  Patient also received bronchoscopy yesterday, with clearing of secretions  Patient was resumed back on Precedex and fentanyl       Patient appropriate for transfer out of the ICU today?: No  Disposition: Continue Critical Care   Code Status: Level 1 - Full Code  ---------------------------------------------------------------------------------------  SUBJECTIVE  Patient continues to be agitated requiring Versed Q 2    Review of Systems   Unable to perform ROS: Intubated     Review of systems was reviewed and negative unless stated above in HPI/24-hour events   ---------------------------------------------------------------------------------------  OBJECTIVE    Vitals   Vitals:    22 0400 22 0500 22 0600 22 0700   BP: 127/74 132/73 119/67 111/65   Pulse: 68 68 68 70   Resp: 13 13 13 14   Temp: 97 9 °F (36 6 °C) 97 9 °F (36 6 °C) 97 9 °F (36 6 °C) 98 2 °F (36 8 °C)   TempSrc:       SpO2: 100% 100% 100% 95%   Weight:   95 kg (209 lb 7 oz)    Height:         Temp (24hrs), Av °F (37 2 °C), Min:97 9 °F (36 6 °C), Max:100 8 °F (38 2 °C)  Current: Temperature: 98 2 °F (36 8 °C)  Arterial Line BP: 86/72  Arterial Line MAP (mmHg): 80 mmHg    Respiratory:  SpO2: SpO2: 95 %       Invasive/non-invasive ventilation settings   Respiratory  Report   Lab Data (Last 4 hours)    None         O2/Vent Data (Last 4 hours)    None                Physical Exam  Constitutional:       Comments: Intubated  Very agitated    HENT:      Head: Normocephalic  Eyes:      Conjunctiva/sclera: Conjunctivae normal       Pupils: Pupils are equal, round, and reactive to light  Cardiovascular:      Rate and Rhythm: Regular rhythm  Tachycardia present  Pulses: Normal pulses  Heart sounds: No murmur heard  No gallop  Pulmonary:      Comments: On pressure support with a PEEP of 10  Abdominal:      General: There is no distension  Tenderness: There is no abdominal tenderness  Comments: Severely diminished bowel sounds    Skin:     General: Skin is warm  Capillary Refill: Capillary refill takes less than 2 seconds                Laboratory and Diagnostics:  Results from last 7 days   Lab Units 22  0413 04/15/22  1010 04/15/22  0443 22  0503 04/13/22  0539 04/12/22  0527 04/10/22  0331   WBC Thousand/uL 6 99 10 44* 9 83 6 83 10 64* 11 87* 10 35*   HEMOGLOBIN g/dL 10 8* 9 3* 9 0* 13 9 9 9* 9 9* 11 1*   HEMATOCRIT % 32 9* 28 0* 27 3* 43 2 30 1* 29 2* 33 7*   PLATELETS Thousands/uL 256 315 284 214 283 274 216   NEUTROS PCT % 86* 79*  --   --  81*  --   --    BANDS PCT %  --   --   --   --   --  10*  --    MONOS PCT % 5 8  --   --  8  --   --    MONO PCT %  --   --   --   --   --  4  --      Results from last 7 days   Lab Units 04/16/22  0413 04/15/22  0443 04/14/22  0503 04/13/22  2155 04/13/22  0539 04/12/22  0527 04/10/22  0332 04/09/22  0922 04/09/22  0922   SODIUM mmol/L 140 134* 140 140 138 139 137   < > 139   POTASSIUM mmol/L 4 0 4 1 4 0 3 2* 3 3* 3 3* 3 5   < > 3 5   CHLORIDE mmol/L 104 99* 101 99* 97* 100 101   < > 102   CO2 mmol/L 31 26 33* 36* 30 34* 28   < > 27   ANION GAP mmol/L 5 9 6 5 11 5 8   < > 10   BUN mg/dL 14 10 10 9 6 5 6   < > 9   CREATININE mg/dL 0 44* 0 44* 0 57* 0 60 0 48* 0 41* 0 39*   < > 0 76   CALCIUM mg/dL 8 5 8 3 8 2* 7 9* 8 7 8 3 8 2*   < > 8 4   GLUCOSE RANDOM mg/dL 164* 138 145* 157* 124 140 79   < > 100   ALT U/L  --  47 51  --  50 52 43  --  35   AST U/L  --  49* 54*  --  67* 46* 65*  --  52*   ALK PHOS U/L  --  211* 252*  --  276* 255* 153*  --  110   ALBUMIN g/dL  --  2 1* 1 7*  --  1 9*  1 9* 1 5* 1 9*  --  2 1*   TOTAL BILIRUBIN mg/dL  --  0 45 0 51  --  0 41 0 37 0 56  --  0 64    < > = values in this interval not displayed       Results from last 7 days   Lab Units 04/16/22  0413 04/14/22  0503 04/13/22  2155 04/12/22  0527 04/10/22  0332   MAGNESIUM mg/dL 2 2 2 5 1 8 2 1 2 2   PHOSPHORUS mg/dL 4 8* 3 7 3 4 3 1 3 6               Results from last 7 days   Lab Units 04/13/22  2340   LACTIC ACID mmol/L 0 8     ABG:  Results from last 7 days   Lab Units 04/11/22  1035   PH ART  7 332*   PCO2 ART mm Hg 53 6*   PO2 ART mm Hg 71 8*   HCO3 ART mmol/L 27 8   BASE EXC ART mmol/L 1 2   ABG SOURCE  Line, Arterial VBG:  Results from last 7 days   Lab Units 04/11/22  1035   ABG SOURCE  Line, Arterial     Results from last 7 days   Lab Units 04/15/22  0443 04/14/22  0025 04/12/22  0527 04/10/22  0332 04/09/22  0924   PROCALCITONIN ng/ml 0 46* 0 64* 0 66* 0 63* 0 38*       Micro  Results from last 7 days   Lab Units 04/13/22  2247 04/13/22  1017 04/12/22  1637   BLOOD CULTURE  No Growth at 24 hrs  No Growth at 24 hrs   --   --    SPUTUM CULTURE   --   --  2+ Growth of    GRAM STAIN RESULT   --  1+ Polys  No organisms seen 3+ Polys*  1+ Gram positive cocci in pairs*   MRSA CULTURE ONLY  No Methicillin Resistant Staphlyococcus aureus (MRSA) isolated  --   --        EKG:   Imaging: I have personally reviewed pertinent reports  Intake and Output  I/O       04/14 0701  04/15 0700 04/15 0701 04/16 0700 04/16 0701 04/17 0700    I V  (mL/kg) 616 7 (6 2) 1851 7 (19 5) 47 5 (0 5)    NG/ 1680 40    IV Piggyback 50 300     Feedings  604     Total Intake(mL/kg) 1266 7 (12 8) 4435 7 (46 7) 87 5 (0 9)    Urine (mL/kg/hr) 2075 (0 9) 2775 (1 2) 60 (0 7)    Emesis/NG output  100     Total Output 2075 2875 60    Net -808 3 +1560 7 +27 5                 Height and Weights   Height: 5' 8" (172 7 cm)     Body mass index is 31 84 kg/m²    Weight (last 2 days)     Date/Time Weight    04/16/22 0600 95 (209 44)    04/16/22 0300 95 (209 44)    04/15/22 0535 98 9 (218 04)     04/14/22 0534 98 9 (218 04)     Comments:   Weight: with cooling blanket at 04/15/22 0535   Weight: w/ cooling blanket on at 04/14/22 0534           Nutrition       Diet Orders   (From admission, onward)             Start     Ordered    04/12/22 0954  Diet Enteral/Parenteral; Tube Feeding No Oral Diet; Jevity 1 2 Hira; Continuous; 30; Prosource Protein Liquid - Three Packets; 240; Water; Every 4 hours  Diet effective now        References:    Nutrtion Support Algorithm Enteral vs  Parenteral   Question Answer Comment   Diet Type Enteral/Parenteral Enteral/Parenteral Tube Feeding No Oral Diet    Tube Feeding Formula: Jevity 1 2 Hira    Bolus/Cyclic/Continuous Continuous    Tube Feeding Goal Rate (mL/hr): 30    Prosource Protein Liquid - No Carb Prosource Protein Liquid - Three Packets    Tube Feeding flush (mL): 240    Water Flush type: Water    Water flush frequency: Every 4 hours    RD to adjust diet per protocol?  No        04/12/22 0954                  Active Medications  Scheduled Meds:  Current Facility-Administered Medications   Medication Dose Route Frequency Provider Last Rate    acetaminophen  650 mg Oral Q6H PRN Lawyer Arina PA-C      acetylcysteine  3 mL Nebulization Q6H TETE Stafford      albumin human  12 5 g Intravenous Q6H Julisa Gordillo MD 0 g (04/15/22 2347)    bisacodyl  10 mg Rectal Daily Julisa Gordillo MD      cefepime  2,000 mg Intravenous Q12H TETE Mccormack 2,000 mg (04/15/22 3720)    chlorhexidine  15 mL [facility] Catherine Vásquez      dexamethasone  4 mg Intravenous Q6H [address] Julisa Gordillo MD      dexmedetomidine  0 1-1 5 mcg/kg/hr Intravenous Titrated Lizzie Bajwa MD 1 5 mcg/kg/hr (04/16/22 0701)    enoxaparin  40 mg Subcutaneous Daily Julisa Gordillo MD      fentaNYL  100 mcg/hr Intravenous Continuous TETE Mccormack 100 mcg/hr (34/59/47 4259)    folic acid  1 mg Oral Daily Julisa Gordillo MD      HYDROmorphone  1 mg Intravenous Q3H PRN TETE Vásquez      ipratropium  0 5 mg Nebulization TID Julisa Gordillo MD      levalbuterol  1 25 mg Nebulization TID Julisa Gordillo MD      lidocaine  1 patch Topical Q24H Julisa Gordillo MD      midazolam  2 mg Intravenous Q2H PRN Julisa Gordillo MD      multivitamin-minerals  1 tablet Oral Daily Julisa Gordillo MD      nicotine  1 patch Transdermal Daily Julisa Gordillo MD      omeprazole (PRILOSEC) suspension 2 mg/mL  20 mg [facility] Daily [facility]      ondansetron  4 mg Intravenous Q6H PRN Dejuan Rosa Maria Hi MD      polyethylene glycol  17 g Oral BID Yolette Haddad MD      senna-docusate sodium  2 tablet Per NG Tube HS TETE Myers      sodium chloride  4 mL Nebulization TID Leonel Abebe MD      thiamine  100 mg Oral Daily Yolette Haddad MD       Continuous Infusions:  dexmedetomidine, 0 1-1 5 mcg/kg/hr, Last Rate: 1 5 mcg/kg/hr (04/16/22 0701)  fentaNYL, 100 mcg/hr, Last Rate: 100 mcg/hr (04/16/22 0701)      PRN Meds:   acetaminophen, 650 mg, Q6H PRN  HYDROmorphone, 1 mg, Q3H PRN  midazolam, 2 mg, Q2H PRN  ondansetron, 4 mg, Q6H PRN        Invasive Devices Review  Invasive Devices  Report    Peripheral Intravenous Line            Peripheral IV 04/13/22 Left Antecubital 2 days    Peripheral IV 04/15/22 Left Forearm <1 day    Peripheral IV 04/15/22 Left;Upper;Ventral (anterior) Arm <1 day    Peripheral IV 04/16/22 Dorsal (posterior); Left;Upper Arm <1 day          Drain            NG/OG/Enteral Tube Orogastric 14 Fr Left mouth <1 day    Urethral Catheter 16 Fr  <1 day          Airway            ETT  Cuffed 8 mm <1 day                Rationale for remaining devices:   ---------------------------------------------------------------------------------------  Advance Directive and Living Will:      Power of :    POLST:    ---------------------------------------------------------------------------------------  Care Time Delivered:   Please see attending's attestation      Yolette Haddad MD      Portions of the record may have been created with voice recognition software  Occasional wrong word or "sound a like" substitutions may have occurred due to the inherent limitations of voice recognition software    Read the chart carefully and recognize, using context, where substitutions have occurred

## 2022-04-16 NOTE — ASSESSMENT & PLAN NOTE
On admission patient was reported to have ataxia as well as finger-to-nose ataxia   Patient was started on high-dose thiamine for Wernicke's encephalopathy    · MRI came back negative for any acute or chronic pathologies Patient is a 71y old  Male who presents with a chief complaint of Respiratory distress (2018 14:30)    PATIENT IS SEEN AND EXAMINED IN  ICU.   NGT [  X  ]    BRUNSON [ X  ]      ORAL ET +    ALLERGIES:  doxycycline (Unknown)  penicillin (Unknown)  tetracycline (Unknown)  Valtrex (Unknown)      Daily Weight in k.5 (2018 07:30)    VITALS:    Vital Signs Last 24 Hrs  T(C): 37.2 (2018 20:00), Max: 40 (2018 08:00)  T(F): 98.9 (2018 20:00), Max: 104 (2018 08:00)  HR: 99 (2018 20:57) (94 - 114)  BP: 88/50 (2018 20:30) (72/49 - 159/69)  BP(mean): 59 (2018 20:30) (54 - 116)  RR: 25 (2018 20:30) (17 - 29)  SpO2: 93% (2018 20:57) (93% - 100%)    LABS:  CBC Full  -  ( 2018 07:06 )  WBC Count : 4.8 K/uL  Hemoglobin : 13.5 g/dL  Hematocrit : 39.5 %  Platelet Count - Automated : 79 K/uL  Mean Cell Volume : 100.9 fl  Mean Cell Hemoglobin : 34.4 pg  Mean Cell Hemoglobin Concentration : 34.1 gm/dL  Auto Neutrophil # : x  Auto Lymphocyte # : x  Auto Monocyte # : x  Auto Eosinophil # : x  Auto Basophil # : x  Auto Neutrophil % : x  Auto Lymphocyte % : x  Auto Monocyte % : x  Auto Eosinophil % : x  Auto Basophil % : x    PTT - ( 2018 20:00 )  PTT:40.1 sec      136  |  106  |  53<H>  ----------------------------<  244<H>  5.3   |  18<L>  |  2.92<H>    Ca    7.4<L>      2018 07:06  Phos  1.3       Mg     1.8         TPro  5.5<L>  /  Alb  2.5<L>  /  TBili  2.1<H>  /  DBili  1.4<H>  /  AST  185<H>  /  ALT  175<H>  /  AlkPhos  43      CAPILLARY BLOOD GLUCOSE      POCT Blood Glucose.: 270 mg/dL (2018 18:32)  POCT Blood Glucose.: 259 mg/dL (2018 13:10)    CARDIAC MARKERS ( 2018 15:24 )  1.980 ng/mL / x     / 1395 U/L / x     / 2.9 ng/mL  CARDIAC MARKERS ( 2018 07:06 )  0.375 ng/mL / x     / 1224 U/L / x     / 1.4 ng/mL  CARDIAC MARKERS ( 2018 22:49 )  0.076 ng/mL / x     / 1298 U/L / x     / 2.4 ng/mL  CARDIAC MARKERS ( 2018 14:26 )  0.020 ng/mL / x     / 568 U/L / x     / 1.1 ng/mL      LIVER FUNCTIONS - ( 2018 07:06 )  Alb: 2.5 g/dL / Pro: 5.5 g/dL / ALK PHOS: 43 U/L / ALT: 175 U/L DA / AST: 185 U/L / GGT: x             ABG - ( 2018 04:29 )  pH: 7.42  /  pCO2: 26    /  pO2: 96    / HCO3: 17    / Base Excess: -5.8  /  SaO2: 98        .Blood Blood-Peripheral   @ 17:03   No growth to date.  --  --          MEDICATIONS:    MEDICATIONS  (STANDING):  aspirin enteric coated 81 milliGRAM(s) Oral daily  cefTRIAXone   IVPB 1 Gram(s) IV Intermittent every 24 hours  clopidogrel Tablet 75 milliGRAM(s) Oral daily  DOBUTamine Infusion 5 MICROgram(s)/kG/Min (13.395 mL/Hr) IV Continuous <Continuous>  finasteride 5 milliGRAM(s) Oral daily  heparin  Infusion.  Unit(s)/Hr (10 mL/Hr) IV Continuous <Continuous>  heparin  Injectable 5000 Unit(s) SubCutaneous every 12 hours  norepinephrine Infusion 0.5 MICROgram(s)/kG/Min (41.859 mL/Hr) IV Continuous <Continuous>  oseltamivir 30 milliGRAM(s) Oral every 12 hours  pregabalin 50 milliGRAM(s) Oral two times a day  propofol Infusion 5 MICROgram(s)/kG/Min (2.721 mL/Hr) IV Continuous <Continuous>  tamsulosin 0.4 milliGRAM(s) Oral at bedtime      MEDICATIONS  (PRN):  acetaminophen    Suspension 650 milliGRAM(s) Oral every 6 hours PRN For Temp greater than 38 C (100.4 F)      REVIEW OF SYSTEMS:                           ALL ROS DONE [ X   ]    CONSTITUTIONAL:  LETHARGIC [   ], FEVER [   ], UNRESPONSIVE [   ]  CVS:  CP  [   ], SOB, [   ], PALPITATIONS [   ], DIZZYNESS [   ]  RS: COUGH [   ], SPUTUM [   ]  GI: ABDOMINAL PAIN [   ], NAUSEA [   ], VOMITINGS [   ], DIARRHEA [   ], CONSTIPATION [   ]  :  DYSURIA [   ], NOCTURIA [   ], INCREASED FREQUENCY [   ], DRIBLING [   ],  SKELETAL: PAINFUL JOINTS [   ], SWOLLEN JOINTS [   ], NECK ACHE [   ], LOW BACK ACHE [   ],  SKIN : ULCERS [   ], RASH [   ], ITCHING [   ]  CNS: HEAD ACHE [   ], DOUBLE VISION [   ], BLURRED VISION [   ], AMS / CONFUSION [   ], SEIZURES [   ], WEAKNESS [   ],TINGLING / NUMBNESS [   ]    PHYSICAL EXAMINATION:  GENERAL APPEARANCE: NO DISTRESS  HEENT:  NO PALLOR, NO  JVD,  NO   NODES, NECK SUPPLE , ORAL ET , NGT +  CVS: S1 +, S2 +,   RS: AEEB,  B/L RALES +,  B/L RONCHI +  ABD: SOFT, NT, NO, BS +  EXT: PE +  SKIN: WARM,   SKELETAL:  ROM ACCEPTABLE  CNS:  AAO X 0   , NO  DEFICITS    RADIOLOGY :    < from: Xray Chest 1 View AP-PORTABLE IMMEDIATE (18 @ 19:50) >  EXAM:  XR CHEST PORTABLE IMMED 1V                            PROCEDURE DATE:  2018          INTERPRETATION:  Chest one view    HISTORY: Central line placement    COMPARISON STUDY: Earlier the same day    Frontal expiratory view of the chest shows the heart to be similar in   size. Right jugular line as and placed extending to the level of the   superior vena cava. Feeding tube tip remains in the stomach. Endotracheal   tube is unchanged. The lungs show no definite infiltrate and there is no   evidence of pneumothorax nor pleural effusion.    IMPRESSION:  Right jugular line. No pneumothorax.    < end of copied text >      ASSESSMENT :     Sepsis  Sleep apnea  Hyperlipidemia  CAD (coronary artery disease)  DM (diabetes mellitus)  HTN (hypertension)  S/P foot surgery      PLAN:  HPI:  71 M from [facility] h/o CKD, Anxiety disorder, CAD, BPH, Carpel tunnel syndrome, compulsive disorder, DM, OA, HTN, IBS sent for dyspnea for last 3 days. Dyspnea is constant, moderate to severe in intensity, associated with fever, generalized weakness, lethargy, frequent falls, cough, myalgias and URI symptoms. Cough is productive with non bloody yellowish sputum.     Patient denies chest pain, nausea, vomiting, LOC, focal neurological deficit, abdominal pain, hematochezia, current smoking, alcohol abuse and illicit drug use.    ICU Vital Signs Last 24 Hrs  T(C): 38.4 (2018 10:36), Max: 38.4 (2018 10:22)  T(F): 101.2 (2018 10:36), Max: 101.2 (2018 10:22)  HR: 88 (2018 13:01) (78 - 91)  BP: 130/61 (2018 12:44) (82/53 - 130/61)  RR: 25 (2018 12:44) (25 - 25)  SpO2: 99% (2018 13:01) (91% - 99%) (2018 14:30)    -  INFLUENZA B, ACUTE BRONCHITIS, SUSPECT LEFT LOWER LOBE PNEUMONIA, SEPSIS, HYPOXIC RESPIRATORY FAILURE  ON  TAMIFLU, IV ROCEPHIN, AZITHROMYCIN, ADD NEBS. ICU F/UP IS IN PROGRESS. S/P INTUBATION ON VENT   - AMS DUE TO METABOLIC ENCEPHALOPATHY  - ARF / CKD   - GI AND DVT PROPHYLAXIS  - DR. GARCIA

## 2022-04-17 ENCOUNTER — APPOINTMENT (INPATIENT)
Dept: RADIOLOGY | Facility: HOSPITAL | Age: 57
DRG: 207 | End: 2022-04-17
Payer: COMMERCIAL

## 2022-04-17 LAB
ANION GAP SERPL CALCULATED.3IONS-SCNC: 6 MMOL/L (ref 4–13)
ATRIAL RATE: 55 BPM
ATRIAL RATE: 59 BPM
ATRIAL RATE: 60 BPM
ATRIAL RATE: 60 BPM
BACTERIA BRONCH AEROBE CULT: NORMAL
BUN SERPL-MCNC: 20 MG/DL (ref 5–25)
CALCIUM SERPL-MCNC: 8.4 MG/DL (ref 8.3–10.1)
CHLORIDE SERPL-SCNC: 102 MMOL/L (ref 100–108)
CO2 SERPL-SCNC: 29 MMOL/L (ref 21–32)
CREAT SERPL-MCNC: 0.61 MG/DL (ref 0.6–1.3)
GFR SERPL CREATININE-BSD FRML MDRD: 100 ML/MIN/1.73SQ M
GLUCOSE SERPL-MCNC: 192 MG/DL (ref 65–140)
GLUCOSE SERPL-MCNC: 239 MG/DL (ref 65–140)
GLUCOSE SERPL-MCNC: 277 MG/DL (ref 65–140)
GLUCOSE SERPL-MCNC: 316 MG/DL (ref 65–140)
GLUCOSE SERPL-MCNC: 319 MG/DL (ref 65–140)
GRAM STN SPEC: NORMAL
GRAM STN SPEC: NORMAL
MAGNESIUM SERPL-MCNC: 2.6 MG/DL (ref 1.6–2.6)
P AXIS: 22 DEGREES
P AXIS: 34 DEGREES
P AXIS: 56 DEGREES
P AXIS: 6 DEGREES
POTASSIUM SERPL-SCNC: 4.3 MMOL/L (ref 3.5–5.3)
PR INTERVAL: 158 MS
PR INTERVAL: 163 MS
PR INTERVAL: 179 MS
PR INTERVAL: 192 MS
QRS AXIS: 61 DEGREES
QRS AXIS: 64 DEGREES
QRS AXIS: 67 DEGREES
QRS AXIS: 68 DEGREES
QRSD INTERVAL: 83 MS
QRSD INTERVAL: 88 MS
QT INTERVAL: 467 MS
QT INTERVAL: 467 MS
QT INTERVAL: 471 MS
QT INTERVAL: 479 MS
QTC INTERVAL: 447 MS
QTC INTERVAL: 463 MS
QTC INTERVAL: 471 MS
QTC INTERVAL: 479 MS
SODIUM SERPL-SCNC: 137 MMOL/L (ref 136–145)
T WAVE AXIS: 70 DEGREES
T WAVE AXIS: 72 DEGREES
T WAVE AXIS: 74 DEGREES
T WAVE AXIS: 74 DEGREES
TRIGL SERPL-MCNC: 230 MG/DL
VENTRICULAR RATE: 55 BPM
VENTRICULAR RATE: 59 BPM
VENTRICULAR RATE: 60 BPM
VENTRICULAR RATE: 60 BPM

## 2022-04-17 PROCEDURE — 83735 ASSAY OF MAGNESIUM: CPT | Performed by: NURSE PRACTITIONER

## 2022-04-17 PROCEDURE — 80048 BASIC METABOLIC PNL TOTAL CA: CPT | Performed by: NURSE PRACTITIONER

## 2022-04-17 PROCEDURE — 93010 ELECTROCARDIOGRAM REPORT: CPT | Performed by: INTERNAL MEDICINE

## 2022-04-17 PROCEDURE — 99291 CRITICAL CARE FIRST HOUR: CPT | Performed by: INTERNAL MEDICINE

## 2022-04-17 PROCEDURE — 94640 AIRWAY INHALATION TREATMENT: CPT

## 2022-04-17 PROCEDURE — 94668 MNPJ CHEST WALL SBSQ: CPT

## 2022-04-17 PROCEDURE — 71045 X-RAY EXAM CHEST 1 VIEW: CPT

## 2022-04-17 PROCEDURE — 82948 REAGENT STRIP/BLOOD GLUCOSE: CPT

## 2022-04-17 PROCEDURE — 93005 ELECTROCARDIOGRAM TRACING: CPT

## 2022-04-17 PROCEDURE — 93010 ELECTROCARDIOGRAM REPORT: CPT

## 2022-04-17 PROCEDURE — 94003 VENT MGMT INPAT SUBQ DAY: CPT

## 2022-04-17 PROCEDURE — 84478 ASSAY OF TRIGLYCERIDES: CPT | Performed by: INTERNAL MEDICINE

## 2022-04-17 RX ORDER — FUROSEMIDE 10 MG/ML
40 INJECTION INTRAMUSCULAR; INTRAVENOUS ONCE
Status: COMPLETED | OUTPATIENT
Start: 2022-04-17 | End: 2022-04-17

## 2022-04-17 RX ADMIN — LIDOCAINE 1 PATCH: 50 PATCH CUTANEOUS at 10:02

## 2022-04-17 RX ADMIN — DEXMEDETOMIDINE HYDROCHLORIDE 1.5 MCG/KG/HR: 400 INJECTION INTRAVENOUS at 00:08

## 2022-04-17 RX ADMIN — DEXAMETHASONE SODIUM PHOSPHATE 4 MG: 4 INJECTION INTRA-ARTICULAR; INTRALESIONAL; INTRAMUSCULAR; INTRAVENOUS; SOFT TISSUE at 05:20

## 2022-04-17 RX ADMIN — Medication 20 MG: at 08:50

## 2022-04-17 RX ADMIN — FOLIC ACID 1 MG: 1 TABLET ORAL at 08:50

## 2022-04-17 RX ADMIN — INSULIN LISPRO 8 UNITS: 100 INJECTION, SOLUTION INTRAVENOUS; SUBCUTANEOUS at 17:02

## 2022-04-17 RX ADMIN — DEXMEDETOMIDINE HYDROCHLORIDE 1.5 MCG/KG/HR: 400 INJECTION INTRAVENOUS at 21:39

## 2022-04-17 RX ADMIN — Medication 100 MCG/HR: at 10:02

## 2022-04-17 RX ADMIN — SODIUM CHLORIDE SOLN NEBU 3% 4 ML: 3 NEBU SOLN at 20:42

## 2022-04-17 RX ADMIN — POLYETHYLENE GLYCOL 3350 17 G: 17 POWDER, FOR SOLUTION ORAL at 08:50

## 2022-04-17 RX ADMIN — PROPOFOL 46 MCG/KG/MIN: 10 INJECTION, EMULSION INTRAVENOUS at 09:08

## 2022-04-17 RX ADMIN — MIDAZOLAM 2 MG: 1 INJECTION INTRAMUSCULAR; INTRAVENOUS at 08:49

## 2022-04-17 RX ADMIN — CHLORHEXIDINE GLUCONATE 0.12% ORAL RINSE 15 ML: 1.2 LIQUID ORAL at 08:50

## 2022-04-17 RX ADMIN — PROPOFOL 41.5 MCG/KG/MIN: 10 INJECTION, EMULSION INTRAVENOUS at 03:08

## 2022-04-17 RX ADMIN — CHLORHEXIDINE GLUCONATE 0.12% ORAL RINSE 15 ML: 1.2 LIQUID ORAL at 20:12

## 2022-04-17 RX ADMIN — ENOXAPARIN SODIUM 40 MG: 40 INJECTION SUBCUTANEOUS at 08:50

## 2022-04-17 RX ADMIN — PROPOFOL 46 MCG/KG/MIN: 10 INJECTION, EMULSION INTRAVENOUS at 21:35

## 2022-04-17 RX ADMIN — Medication 1 PATCH: at 08:56

## 2022-04-17 RX ADMIN — DEXMEDETOMIDINE HYDROCHLORIDE 1.5 MCG/KG/HR: 400 INJECTION INTRAVENOUS at 09:59

## 2022-04-17 RX ADMIN — DEXAMETHASONE SODIUM PHOSPHATE 4 MG: 4 INJECTION INTRA-ARTICULAR; INTRALESIONAL; INTRAMUSCULAR; INTRAVENOUS; SOFT TISSUE at 12:08

## 2022-04-17 RX ADMIN — SODIUM CHLORIDE SOLN NEBU 3% 4 ML: 3 NEBU SOLN at 08:04

## 2022-04-17 RX ADMIN — INSULIN LISPRO 12 UNITS: 100 INJECTION, SOLUTION INTRAVENOUS; SUBCUTANEOUS at 11:53

## 2022-04-17 RX ADMIN — IPRATROPIUM BROMIDE 0.5 MG: 0.5 SOLUTION RESPIRATORY (INHALATION) at 08:04

## 2022-04-17 RX ADMIN — DEXMEDETOMIDINE HYDROCHLORIDE 1.5 MCG/KG/HR: 400 INJECTION INTRAVENOUS at 03:08

## 2022-04-17 RX ADMIN — SODIUM CHLORIDE SOLN NEBU 3% 4 ML: 3 NEBU SOLN at 13:33

## 2022-04-17 RX ADMIN — HYDROMORPHONE HYDROCHLORIDE 1 MG: 1 INJECTION, SOLUTION INTRAMUSCULAR; INTRAVENOUS; SUBCUTANEOUS at 20:12

## 2022-04-17 RX ADMIN — DOCUSATE SODIUM AND SENNOSIDES 2 TABLET: 8.6; 5 TABLET, FILM COATED ORAL at 21:35

## 2022-04-17 RX ADMIN — LEVALBUTEROL HYDROCHLORIDE 1.25 MG: 1.25 SOLUTION, CONCENTRATE RESPIRATORY (INHALATION) at 13:33

## 2022-04-17 RX ADMIN — LEVALBUTEROL HYDROCHLORIDE 1.25 MG: 1.25 SOLUTION, CONCENTRATE RESPIRATORY (INHALATION) at 08:04

## 2022-04-17 RX ADMIN — CEFEPIME HYDROCHLORIDE 2000 MG: 2 INJECTION, POWDER, FOR SOLUTION INTRAVENOUS at 10:02

## 2022-04-17 RX ADMIN — DEXMEDETOMIDINE HYDROCHLORIDE 1.5 MCG/KG/HR: 400 INJECTION INTRAVENOUS at 13:25

## 2022-04-17 RX ADMIN — DEXMEDETOMIDINE HYDROCHLORIDE 1.5 MCG/KG/HR: 400 INJECTION INTRAVENOUS at 16:18

## 2022-04-17 RX ADMIN — IPRATROPIUM BROMIDE 0.5 MG: 0.5 SOLUTION RESPIRATORY (INHALATION) at 20:42

## 2022-04-17 RX ADMIN — PROPOFOL 46 MCG/KG/MIN: 10 INJECTION, EMULSION INTRAVENOUS at 16:19

## 2022-04-17 RX ADMIN — INSULIN LISPRO 3 UNITS: 100 INJECTION, SOLUTION INTRAVENOUS; SUBCUTANEOUS at 05:23

## 2022-04-17 RX ADMIN — THIAMINE HCL TAB 100 MG 100 MG: 100 TAB at 08:50

## 2022-04-17 RX ADMIN — IPRATROPIUM BROMIDE 0.5 MG: 0.5 SOLUTION RESPIRATORY (INHALATION) at 13:33

## 2022-04-17 RX ADMIN — DEXMEDETOMIDINE HYDROCHLORIDE 1.5 MCG/KG/HR: 400 INJECTION INTRAVENOUS at 06:44

## 2022-04-17 RX ADMIN — PROPOFOL 46 MCG/KG/MIN: 10 INJECTION, EMULSION INTRAVENOUS at 11:06

## 2022-04-17 RX ADMIN — FUROSEMIDE 40 MG: 10 INJECTION, SOLUTION INTRAVENOUS at 12:46

## 2022-04-17 RX ADMIN — INSULIN LISPRO 8 UNITS: 100 INJECTION, SOLUTION INTRAVENOUS; SUBCUTANEOUS at 23:40

## 2022-04-17 RX ADMIN — Medication 1 TABLET: at 08:50

## 2022-04-17 RX ADMIN — DEXMEDETOMIDINE HYDROCHLORIDE 1.5 MCG/KG/HR: 400 INJECTION INTRAVENOUS at 19:11

## 2022-04-17 RX ADMIN — LEVALBUTEROL HYDROCHLORIDE 1.25 MG: 1.25 SOLUTION, CONCENTRATE RESPIRATORY (INHALATION) at 20:42

## 2022-04-17 RX ADMIN — INSULIN LISPRO 5 UNITS: 100 INJECTION, SOLUTION INTRAVENOUS; SUBCUTANEOUS at 07:00

## 2022-04-17 NOTE — ASSESSMENT & PLAN NOTE
Patient acute hypoxic on admission, was given dose of Lasix with marked improvement  However, this morning patient started to become more agitated, hypoxic and tachycardic unable to tolerate BiPAP, patient was intubated for airway protection in the setting of encephalopathy and potential DTs  Patient was extubated on 4/14, however failed BiPAP, eventually requiring re-intubation  The patient cleared significant amount of secretions while she was extubated  Patient had repeat bronchoscopy 4/115 which showed decrease secretions      · Patient's PEEP was decreased 10 overnight  · Continue monitor patient's secretions, seem to be a bit better today  · Can consider bronchoscopy plus extubation if patient is able to tolerate spontaneous with T-piece  · CXR appears improved, less right hilar region consolidation

## 2022-04-17 NOTE — ASSESSMENT & PLAN NOTE
Patient had elevated temp at 1:03 a m , tachycardia and hypotension yesterday requiring IV fluids and eventually pressors  Patient was also started on broad-spectrum antibiotics and started on Precedex for tachycardia  Patient's presentation is unlikely to be secondary to an infectious process given down trending leukocytosis, stable procalcitonin, cool extremities, and no identifiable source  SIRS response likely secondary to bronchoscopy versus decreased ECF  · Patient is off pressors  · Currently on cefepime and vancomycin, currently day 5  · Procalcitonin is down trending  · Cultures are still negative to date

## 2022-04-17 NOTE — PLAN OF CARE
Problem: MOBILITY - ADULT  Goal: Maintain or return to baseline ADL function  Description: INTERVENTIONS:  -  Assess patient's ability to carry out ADLs; assess patient's baseline for ADL function and identify physical deficits which impact ability to perform ADLs (bathing, care of mouth/teeth, toileting, grooming, dressing, etc )  - Assess/evaluate cause of self-care deficits   - Assess range of motion  - Assess patient's mobility; develop plan if impaired  - Assess patient's need for assistive devices and provide as appropriate  - Encourage maximum independence but intervene and supervise when necessary  - Involve family in performance of ADLs  - Assess for home care needs following discharge   - Consider OT consult to assist with ADL evaluation and planning for discharge  - Provide patient education as appropriate  Outcome: Progressing  Goal: Maintains/Returns to pre admission functional level  Description: INTERVENTIONS:  - Perform BMAT or MOVE assessment daily    - Set and communicate daily mobility goal to care team and patient/family/caregiver     - Collaborate with rehabilitation services on mobility goals if consulted  - Out of bed for toileting  - Record patient progress and toleration of activity level   Outcome: Progressing     Problem: Potential for Falls  Goal: Patient will remain free of falls  Description: INTERVENTIONS:  - Educate patient/family on patient safety including physical limitations  - Instruct patient to call for assistance with activity   - Consult OT/PT to assist with strengthening/mobility   - Keep Call bell within reach  - Keep bed low and locked with side rails adjusted as appropriate  - Keep care items and personal belongings within reach  - Initiate and maintain comfort rounds  - Make Fall Risk Sign visible to staff  - Apply yellow socks and bracelet for high fall risk patients  - Consider moving patient to room near nurses station  Outcome: Progressing     Problem: Prexisting or High Potential for Compromised Skin Integrity  Goal: Skin integrity is maintained or improved  Description: INTERVENTIONS:  - Identify patients at risk for skin breakdown  - Assess and monitor skin integrity  - Assess and monitor nutrition and hydration status  - Monitor labs   - Assess for incontinence   - Turn and reposition patient  - Assist with mobility/ambulation  - Relieve pressure over bony prominences  - Avoid friction and shearing  - Provide appropriate hygiene as needed including keeping skin clean and dry  - Evaluate need for skin moisturizer/barrier cream  - Collaborate with interdisciplinary team   - Patient/family teaching  - Consider wound care consult   Outcome: Progressing     Problem: DISCHARGE PLANNING - CARE MANAGEMENT  Goal: Discharge to post-acute care or home with appropriate resources  Description: INTERVENTIONS:  - Conduct assessment to determine patient/family and health care team treatment goals, and need for post-acute services based on payer coverage, community resources, and patient preferences, and barriers to discharge  - Address psychosocial, clinical, and financial barriers to discharge as identified in assessment in conjunction with the patient/family and health care team  - Arrange appropriate level of post-acute services according to patients   needs and preference and payer coverage in collaboration with the physician and health care team  - Communicate with and update the patient/family, physician, and health care team regarding progress on the discharge plan  - Arrange appropriate transportation to post-acute venues  Outcome: Progressing     Problem: SUBSTANCE USE/ABUSE  Goal: By discharge, will develop insight into their chemical dependency and sustain motivation to continue in recovery  Description: INTERVENTIONS:  - Attends all daily group sessions and scheduled AA groups  - Actively practices coping skills through participation in the therapeutic community and adherence to program rules  - Reviews and completes assignments from individual treatment plan  - Assist patient development of understanding of their personal cycle of addiction and relapse triggers  Outcome: Progressing  Goal: By discharge, patient will have ongoing treatment plan addressing chemical dependency  Description: INTERVENTIONS:  - Assist patient with resources and/or appointments for ongoing recovery based living  Outcome: Progressing     Problem: Nutrition/Hydration-ADULT  Goal: Nutrient/Hydration intake appropriate for improving, restoring or maintaining nutritional needs  Description: Monitor and assess patient's nutrition/hydration status for malnutrition  Collaborate with interdisciplinary team and initiate plan and interventions as ordered  Monitor patient's weight and dietary intake as ordered or per policy  Utilize nutrition screening tool and intervene as necessary  Determine patient's food preferences and provide high-protein, high-caloric foods as appropriate       INTERVENTIONS:  - Monitor oral intake, urinary output, labs, and treatment plans  - Assess nutrition and hydration status and recommend course of action  - Evaluate amount of meals eaten  - Assist patient with eating if necessary   - Allow adequate time for meals  - Recommend/ encourage appropriate diets, oral nutritional supplements, and vitamin/mineral supplements  - Order, calculate, and assess calorie counts as needed  - Recommend, monitor, and adjust tube feedings and TPN/PPN based on assessed needs  - Assess need for intravenous fluids  - Provide specific nutrition/hydration education as appropriate  - Include patient/family/caregiver in decisions related to nutrition  Outcome: Progressing     Problem: PAIN - ADULT  Goal: Verbalizes/displays adequate comfort level or baseline comfort level  Description: Interventions:  - Encourage patient to monitor pain and request assistance  - Assess pain using appropriate pain scale  - Administer analgesics based on type and severity of pain and evaluate response  - Implement non-pharmacological measures as appropriate and evaluate response  - Consider cultural and social influences on pain and pain management  - Notify physician/advanced practitioner if interventions unsuccessful or patient reports new pain  Outcome: Progressing     Problem: INFECTION - ADULT  Goal: Absence or prevention of progression during hospitalization  Description: INTERVENTIONS:  - Assess and monitor for signs and symptoms of infection  - Monitor lab/diagnostic results  - Monitor all insertion sites, i e  indwelling lines, tubes, and drains  - Monitor endotracheal if appropriate and nasal secretions for changes in amount and color  - [place] appropriate cooling/warming therapies per order  - Administer medications as ordered  - Instruct and encourage patient and family to use good hand hygiene technique  - Identify and instruct in appropriate isolation precautions for identified infection/condition  Outcome: Progressing     Problem: SAFETY ADULT  Goal: Maintain or return to baseline ADL function  Description: INTERVENTIONS:  -  Assess patient's ability to carry out ADLs; assess patient's baseline for ADL function and identify physical deficits which impact ability to perform ADLs (bathing, care of mouth/teeth, toileting, grooming, dressing, etc )  - Assess/evaluate cause of self-care deficits   - Assess range of motion  - Assess patient's mobility; develop plan if impaired  - Assess patient's need for assistive devices and provide as appropriate  - Encourage maximum independence but intervene and supervise when necessary  - Involve family in performance of ADLs  - Assess for home care needs following discharge   - Consider OT consult to assist with ADL evaluation and planning for discharge  - Provide patient education as appropriate  Outcome: Progressing  Goal: Maintains/Returns to pre admission functional level  Description: INTERVENTIONS:  - Perform BMAT or MOVE assessment daily    - Set and communicate daily mobility goal to care team and patient/family/caregiver  - Collaborate with rehabilitation services on mobility goals if consulted  - Out of bed for toileting  - Record patient progress and toleration of activity level   Outcome: Progressing  Goal: Patient will remain free of falls  Description: INTERVENTIONS:  - Educate patient/family on patient safety including physical limitations  - Instruct patient to call for assistance with activity   - Consult OT/PT to assist with strengthening/mobility   - Keep Call bell within reach  - Keep bed low and locked with side rails adjusted as appropriate  - Keep care items and personal belongings within reach  - Initiate and maintain comfort rounds  - Make Fall Risk Sign visible to staff  - Apply yellow socks and bracelet for high fall risk patients  - Consider moving patient to room near nurses station  Outcome: Progressing     Problem: DISCHARGE PLANNING  Goal: Discharge to home or other facility with appropriate resources  Description: INTERVENTIONS:  - Identify barriers to discharge w/patient and caregiver  - Arrange for needed discharge resources and transportation as appropriate  - Identify discharge learning needs (meds, wound care, etc )  - Arrange for interpretive services to assist at discharge as needed  - Refer to Case Management Department for coordinating discharge planning if the patient needs post-hospital services based on physician/advanced practitioner order or complex needs related to functional status, cognitive ability, or social support system  Outcome: Progressing     Problem: Knowledge Deficit  Goal: Patient/family/caregiver demonstrates understanding of disease process, treatment plan, medications, and discharge instructions  Description: Complete learning assessment and assess knowledge base    Interventions:  - Provide teaching at level of understanding  - Provide teaching via preferred learning methods  Outcome: Progressing     Problem: NEUROSENSORY - ADULT  Goal: Achieves stable or improved neurological status  Description: INTERVENTIONS  - Monitor and report changes in neurological status  - Monitor vital signs such as temperature, blood pressure, glucose, and any other labs ordered   - Initiate measures to prevent increased intracranial pressure  - Monitor for seizure activity and implement precautions if appropriate      Outcome: Progressing  Goal: Remains free of injury related to seizures activity  Description: INTERVENTIONS  - Maintain airway, patient safety  and administer oxygen as ordered  - Monitor patient for seizure activity, document and report duration and description of seizure to physician/advanced practitioner  - If seizure occurs,  ensure patient safety during seizure  - Reorient patient post seizure  - Seizure pads on all 4 side rails  - Instruct patient/family to notify RN of any seizure activity including if an aura is experienced  - Instruct patient/family to call for assistance with activity based on nursing assessment  - Administer anti-seizure medications if ordered    Outcome: Progressing  Goal: Achieves maximal functionality and self care  Description: INTERVENTIONS  - Monitor swallowing and airway patency with patient fatigue and changes in neurological status  - Encourage and assist patient to increase activity and self care     - Encourage visually impaired, hearing impaired and aphasic patients to use assistive/communication devices  Outcome: Progressing     Problem: CARDIOVASCULAR - ADULT  Goal: Maintains optimal cardiac output and hemodynamic stability  Description: INTERVENTIONS:  - Monitor I/O, vital signs and rhythm  - Monitor for S/S and trends of decreased cardiac output  - Administer and titrate ordered vasoactive medications to optimize hemodynamic stability  - Assess quality of pulses, skin color and temperature  - Assess for signs of decreased coronary artery perfusion  - Instruct patient to report change in severity of symptoms  Outcome: Progressing  Goal: Absence of cardiac dysrhythmias or at baseline rhythm  Description: INTERVENTIONS:  - Continuous cardiac monitoring, vital signs, obtain 12 lead EKG if ordered  - Administer antiarrhythmic and heart rate control medications as ordered  - Monitor electrolytes and administer replacement therapy as ordered  Outcome: Progressing     Problem: RESPIRATORY - ADULT  Goal: Achieves optimal ventilation and oxygenation  Description: INTERVENTIONS:  - Assess for changes in respiratory status  - Assess for changes in mentation and behavior  - Position to facilitate oxygenation and minimize respiratory effort  - Oxygen administered by appropriate delivery if ordered  - Initiate smoking cessation education as indicated  - Encourage broncho-pulmonary hygiene including cough, deep breathe, Incentive Spirometry  - Assess the need for suctioning and aspirate as needed  - Assess and instruct to report SOB or any respiratory difficulty  - Respiratory Therapy support as indicated  Outcome: Progressing     Problem: GASTROINTESTINAL - ADULT  Goal: Minimal or absence of nausea and/or vomiting  Description: INTERVENTIONS:  - Administer IV fluids if ordered to ensure adequate hydration  - Maintain NPO status until nausea and vomiting are resolved  - Nasogastric tube if ordered  - Administer ordered antiemetic medications as needed  - Provide nonpharmacologic comfort measures as appropriate  - Advance diet as tolerated, if ordered  - Consider nutrition services referral to assist patient with adequate nutrition and appropriate food choices  Outcome: Progressing  Goal: Maintains or returns to baseline bowel function  Description: INTERVENTIONS:  - Assess bowel function  - Encourage oral fluids to ensure adequate hydration  - Administer IV fluids if ordered to ensure adequate hydration  - Administer ordered medications as needed  - Encourage mobilization and activity  - Consider nutritional services referral to assist patient with adequate nutrition and appropriate food choices  Outcome: Progressing  Goal: Maintains adequate nutritional intake  Description: INTERVENTIONS:  - Monitor percentage of each meal consumed  - Identify factors contributing to decreased intake, treat as appropriate  - Assist with meals as needed  - Monitor I&O, weight, and lab values if indicated  - Obtain nutrition services referral as needed  Outcome: Progressing  Goal: Oral mucous membranes remain intact  Description: INTERVENTIONS  - Assess oral mucosa and hygiene practices  - Implement preventative oral hygiene regimen  - Implement oral medicated treatments as ordered  - Initiate Nutrition services referral as needed  Outcome: Progressing     Problem: GENITOURINARY - ADULT  Goal: Maintains or returns to baseline urinary function  Description: INTERVENTIONS:  - Assess urinary function  - Encourage oral fluids to ensure adequate hydration if ordered  - Administer IV fluids as ordered to ensure adequate hydration  - Administer ordered medications as needed  - Offer frequent toileting  - Follow urinary retention protocol if ordered  Outcome: Progressing  Goal: Absence of urinary retention  Description: INTERVENTIONS:  - Assess patients ability to void and empty bladder  - Monitor I/O  - Bladder scan as needed  - Discuss with physician/AP medications to alleviate retention as needed  - Discuss catheterization for long term situations as appropriate  Outcome: Progressing  Goal: Urinary catheter remains patent  Description: INTERVENTIONS:  - Assess patency of urinary catheter  - If patient has a chronic vazquez, consider changing catheter if non-functioning  - Follow guidelines for intermittent irrigation of non-functioning urinary catheter  Outcome: Progressing     Problem: METABOLIC, FLUID AND ELECTROLYTES - ADULT  Goal: Electrolytes maintained within normal limits  Description: INTERVENTIONS:  - Monitor labs and assess patient for signs and symptoms of electrolyte imbalances  - Administer electrolyte replacement as ordered  - Monitor response to electrolyte replacements, including repeat lab results as appropriate  - Instruct patient on fluid and nutrition as appropriate  Outcome: Progressing  Goal: Fluid balance maintained  Description: INTERVENTIONS:  - Monitor labs   - Monitor I/O and WT  - Instruct patient on fluid and nutrition as appropriate  - Assess for signs & symptoms of volume excess or deficit  Outcome: Progressing     Problem: SKIN/TISSUE INTEGRITY - ADULT  Goal: Skin Integrity remains intact(Skin Breakdown Prevention)  Description: Assess:  -Assess extremities for adequate circulation and sensation     Bed Management:  -Have minimal linens on bed & keep smooth, unwrinkled  -Change linens as needed when moist or perspiring    Toileting:  -Offer bedside commode  -Encourage activity and walks on unit  -Encourage or provide ROM exercises     Skin Care:  -Avoid use of baby powder, tape, friction and shearing, hot water or constrictive clothing  Outcome: Progressing     Problem: SAFETY,RESTRAINT: NV/NON-SELF DESTRUCTIVE BEHAVIOR  Goal: Remains free of harm/injury (restraint for non violent/non self-detsructive behavior)  Description: INTERVENTIONS:  - Instruct patient/family regarding restraint use   - Assess and monitor physiologic and psychological status   - Provide interventions and comfort measures to meet assessed patient needs   - Identify and implement measures to help patient regain control  - Assess readiness for release of restraint   Outcome: Progressing  Goal: Returns to optimal restraint-free functioning  Description: INTERVENTIONS:  - Assess the patient's behavior and symptoms that indicate continued need for restraint  - Identify and implement measures to help patient regain control  - Assess readiness for release of restraint   Outcome: Progressing

## 2022-04-17 NOTE — PROGRESS NOTES
[facility]  Progress Note - Francisca Olivas Ear 1965, 60 y o  female MRN: [id number]  Unit/Bed#: ICU 11 Encounter: [id number]  Primary Care Provider: Rakesh Siegel DO   Date and time admitted to hospital: 4/8/2022  3:55 PM    * Acute respiratory failure with hypoxia [facility])  Assessment & Plan  Patient acute hypoxic on admission, was given dose of Lasix with marked improvement  However, this morning patient started to become more agitated, hypoxic and tachycardic unable to tolerate BiPAP, patient was intubated for airway protection in the setting of encephalopathy and potential DTs  Patient was extubated on 4/14, however failed BiPAP, eventually requiring re-intubation  The patient cleared significant amount of secretions while she was extubated  Patient had repeat bronchoscopy 4/115 which showed decrease secretions  · Patient's PEEP was decreased 10 overnight  · Continue monitor patient's secretions, seem to be a bit better today  · Can consider bronchoscopy plus extubation if patient is able to tolerate spontaneous with T-piece  · CXR appears improved, less right hilar region consolidation      SIRS (systemic inflammatory response syndrome) ([facility] [address] )  Assessment & Plan  Patient had elevated temp at 1:03 a m , tachycardia and hypotension yesterday requiring IV fluids and eventually pressors  Patient was also started on broad-spectrum antibiotics and started on Precedex for tachycardia  Patient's presentation is unlikely to be secondary to an infectious process given down trending leukocytosis, stable procalcitonin, cool extremities, and no identifiable source  SIRS response likely secondary to bronchoscopy versus decreased ECF  · Patient is off pressors  · Currently on cefepime and vancomycin, currently day 5  · Procalcitonin is down trending  · Cultures are still negative to date      Alcohol-induced acute pancreatitis  Assessment & Plan  · Patient initially presented with a lipase of 53,000  Patient's acute pancreatitis is likely secondary to alcohol  · No surgical intervention of the pancreas  · Monitor TGL    Ataxia  Assessment & Plan  On admission patient was reported to have ataxia as well as finger-to-nose ataxia  Patient was started on high-dose thiamine for Wernicke's encephalopathy    · MRI came back negative for any acute or chronic pathologies    Gastroesophageal reflux disease without esophagitis  Assessment & Plan  · Continue Protonix 20 mg mg daily       Alcohol use disorder, severe, dependence ([address] )  Assessment & Plan  · Case management consult in place  · Encouraged cessation   · Continue thiamine and folic acid       Hypertension  Assessment & Plan  · Patient is currently on 10 mg of lisinopril daily at home  Can be resumed once extubated  · Hydralazine p r n  For SBP greater than 170         Delirium tremens [facility])  Assessment & Plan  Patient drinks roughly 8-12 shots of whiskey daily  Patient's last drink was April 1, 2022  EtoH level on admission was 54  Has never had any history withdrawals or seizures  Patient was initially admitted for alcoholic pancreatitis, however, patient became encephalopathic and agitated requiring transfer to detox Unit      · Patient is maxed on Precedex and fentanyl  · Versed 2 mg Q 2h p r n     ----------------------------------------------------------------------------------------  HPI/24hr events: No events overnight    Patient appropriate for transfer out of the ICU today?: No  Disposition: Continue Critical Care   Code Status: Level 1 - Full Code  ---------------------------------------------------------------------------------------  SUBJECTIVE  Intubated and sedated    Review of Systems    ---------------------------------------------------------------------------------------  OBJECTIVE    Vitals   Vitals:    04/17/22 0254 04/17/22 0259 04/17/22 0400 04/17/22 0500   BP:   142/86 149/86   Pulse:   58 58   Resp:   14 14 Temp: 97 5 °F (36 4 °C)  98 6 °F (37 °C) 98 6 °F (37 °C)   TempSrc: Temporal      SpO2:  99% 94% 91%   Weight:       Height:         Temp (24hrs), Av 8 °F (37 1 °C), Min:97 5 °F (36 4 °C), Max:99 7 °F (37 6 °C)  Current: Temperature: 98 6 °F (37 °C)  Arterial Line BP: 86/72  Arterial Line MAP (mmHg): 80 mmHg    Respiratory:  SpO2: SpO2: 91 %, SpO2 Activity: SpO2 Activity: At Rest, SpO2 Device: O2 Device: Ventilator       Invasive/non-invasive ventilation settings   Respiratory  Report   Lab Data (Last 4 hours)    None         O2/Vent Data (Last 4 hours)       0259           Vent Mode AC/PC       Resp Rate (BPM) (BPM) 14       Pressure Control (cmH2O) (cm) 16       Insp Time (sec) (sec) 0 88       FiO2 (%) (%) 50       PEEP (cmH2O) (cmH2O) 10       MV 9                   Physical Exam  Constitutional:       Appearance: She is ill-appearing  HENT:      Head: Normocephalic  Nose: Nose normal    Eyes:      Pupils: Pupils are equal, round, and reactive to light  Cardiovascular:      Rate and Rhythm: Normal rate  Pulmonary:      Effort: Pulmonary effort is normal       Breath sounds: Rales present  Abdominal:      General: There is distension  Tenderness: There is abdominal tenderness  Musculoskeletal:         General: No swelling  Cervical back: Neck supple  Lymphadenopathy:      Cervical: No cervical adenopathy  Skin:     General: Skin is warm and dry  Neurological:      Mental Status: She is alert        Comments: Sedated but arousable to stimulation             Laboratory and Diagnostics:  Results from last 7 days   Lab Units 22  0413 04/15/22  1010 04/15/22  0443 22  0503 22  0539 22  0527   WBC Thousand/uL 6 99 10 44* 9 83 6 83 10 64* 11 87*   HEMOGLOBIN g/dL 10 8* 9 3* 9 0* 13 9 9 9* 9 9*   HEMATOCRIT % 32 9* 28 0* 27 3* 43 2 30 1* 29 2*   PLATELETS Thousands/uL 256 315 284 214 283 274   NEUTROS PCT % 86* 79*  --   --  81*  --    BANDS PCT %  -- --   --   --   --  10*   MONOS PCT % 5 8  --   --  8  --    MONO PCT %  --   --   --   --   --  4     Results from last 7 days   Lab Units 04/17/22  0439 04/16/22  2109 04/16/22  0413 04/15/22  0443 04/14/22  0503 04/13/22  2155 04/13/22  0539 04/12/22  0527 04/12/22  0527   SODIUM mmol/L 137  --  140 134* 140 140 138  --  139   POTASSIUM mmol/L 4 3 3 6 4 0 4 1 4 0 3 2* 3 3*   < > 3 3*   CHLORIDE mmol/L 102  --  104 99* 101 99* 97*  --  100   CO2 mmol/L 29  --  31 26 33* 36* 30  --  34*   ANION GAP mmol/L 6  --  5 9 6 5 11  --  5   BUN mg/dL 20  --  14 10 10 9 6  --  5   CREATININE mg/dL 0 61  --  0 44* 0 44* 0 57* 0 60 0 48*  --  0 41*   CALCIUM mg/dL 8 4  --  8 5 8 3 8 2* 7 9* 8 7  --  8 3   GLUCOSE RANDOM mg/dL 277*  --  164* 138 145* 157* 124  --  140   ALT U/L  --   --   --  47 51  --  50  --  52   AST U/L  --   --   --  49* 54*  --  67*  --  46*   ALK PHOS U/L  --   --   --  211* 252*  --  276*  --  255*   ALBUMIN g/dL  --   --   --  2 1* 1 7*  --  1 9*  1 9*  --  1 5*   TOTAL BILIRUBIN mg/dL  --   --   --  0 45 0 51  --  0 41  --  0 37    < > = values in this interval not displayed       Results from last 7 days   Lab Units 04/17/22 0439 04/16/22 2109 04/16/22 0413 04/14/22 0503 04/13/22 2155 04/12/22  0527   MAGNESIUM mg/dL 2 6 2 3 2 2 2 5 1 8 2 1   PHOSPHORUS mg/dL  --   --  4 8* 3 7 3 4 3 1               Results from last 7 days   Lab Units 04/13/22  2340   LACTIC ACID mmol/L 0 8     ABG:  Results from last 7 days   Lab Units 04/11/22  1035   PH ART  7 332*   PCO2 ART mm Hg 53 6*   PO2 ART mm Hg 71 8*   HCO3 ART mmol/L 27 8   BASE EXC ART mmol/L 1 2   ABG SOURCE  Line, Arterial     VBG:  Results from last 7 days   Lab Units 04/11/22  1035   ABG SOURCE  Line, Arterial     Results from last 7 days   Lab Units 04/15/22  0443 04/14/22  0025 04/12/22  0527   PROCALCITONIN ng/ml 0 46* 0 64* 0 66*       Micro  Results from last 7 days   Lab Units 04/13/22  2247 04/13/22  1017 04/12/22  1637   BLOOD CULTURE No Growth at 48 hrs  No Growth at 48 hrs   --   --    SPUTUM CULTURE   --   --  2+ Growth of    GRAM STAIN RESULT   --  1+ Polys  No organisms seen 3+ Polys*  1+ Gram positive cocci in pairs*   MRSA CULTURE ONLY  No Methicillin Resistant Staphlyococcus aureus (MRSA) isolated  --   --        EKG:   Imaging: I have personally reviewed pertinent reports  and I have personally reviewed pertinent films in PACS    Intake and Output  I/O       04/15 0701 04/16 0700 04/16 0701 04/17 0700    I V  (mL/kg) 1899 1 (20) 664 5 (7)    NG/GT 1680 40    IV Piggyback 300 100    Feedings 604 462    Total Intake(mL/kg) 4483 1 (47 2) 1266 5 (13 3)    Urine (mL/kg/hr) 2775 (1 2) 1195 (0 9)    Emesis/NG output 100     Total Output 2875 1195    Net +1608 1 +71 5                Height and Weights   Height: 5' 8" (172 7 cm)     Body mass index is 31 84 kg/m²  Weight (last 2 days)     Date/Time Weight    04/16/22 0600 95 (209 44)    04/16/22 0300 95 (209 44)    04/15/22 0535 98 9 (218 04)     Comments:   Weight: with cooling blanket at 04/15/22 0535           Nutrition       Diet Orders   (From admission, onward)             Start     Ordered    04/16/22 0827  Diet Enteral/Parenteral; Tube Feeding No Oral Diet; Jevity 1 2 Hira; Continuous; 30; Prosource Protein Liquid - Three Packets; 200; Water; Every 4 hours  Diet effective now        References:    Nutrtion Support Algorithm Enteral vs  Parenteral   Question Answer Comment   Diet Type Enteral/Parenteral    Enteral/Parenteral Tube Feeding No Oral Diet    Tube Feeding Formula: Jevity 1 2 Hira    Bolus/Cyclic/Continuous Continuous    Tube Feeding Goal Rate (mL/hr): 30    Prosource Protein Liquid - No Carb Prosource Protein Liquid - Three Packets    Tube Feeding flush (mL): 200    Water Flush type: Water    Water flush frequency: Every 4 hours    RD to adjust diet per protocol?  No        04/16/22 0826                  Active Medications  Scheduled Meds:  Current Facility-Administered Medications   Medication Dose Route Frequency Provider Last Rate    acetaminophen  650 mg Oral Q6H PRN Ernesto Stringer PA-C      cefepime  2,000 mg Intravenous Q12H Esteban Herrera MD 2,000 mg (04/16/22 2237)    chlorhexidine  15 mL [facility], [address]      dexamethasone  4 mg Intravenous Q6H [facility] Esteban Herrera MD      dexmedetomidine  0 1-1 5 mcg/kg/hr Intravenous Titrated Williams Zamora MD 1 5 mcg/kg/hr (04/17/22 0500)    enoxaparin  40 mg Subcutaneous Daily Esteban Herrera MD      fentaNYL  100 mcg/hr Intravenous Continuous Eliverto Self, CRNP 50 mcg/hr (09/84/30 0331)    folic acid  1 mg Oral Daily Esteban Herrera MD      HYDROmorphone  1 mg Intravenous Q3H PRN TETE Vásquez      insulin lispro  1-5 Units Subcutaneous Q6H [facility] Esteban Herrera MD      ipratropium  0 5 mg Nebulization TID Esteban Herrera MD      levalbuterol  1 25 mg Nebulization TID Esteban Herrera MD      lidocaine  1 patch Topical Q24H Esteban Herrera MD      midazolam  2 mg Intravenous Q2H PRN Esteban Herrera MD      multivitamin-minerals  1 tablet Oral Daily Esteban Herrera MD      nicotine  1 patch Transdermal Daily Esteban Herrera MD      omeprazole (PRILOSEC) suspension 2 mg/mL  20 mg Oral Daily Missy Salinas PA-C      ondansetron  4 mg Intravenous Q6H PRN Esteban Herrera MD      polyethylene glycol  17 g Oral Daily Esteban Herrera MD      propofol  5-50 mcg/kg/min (Adjusted) Intravenous Titrated Esteban Herrera MD 41 5 mcg/kg/min (04/17/22 0500)    senna-docusate sodium  2 tablet Per NG Tube HS Eure, TETE      sodium chloride  4 mL Nebulization TID Williams Zamora MD      thiamine  100 mg Oral Daily Esteban Herrera MD       Continuous Infusions:  dexmedetomidine, 0 1-1 5 mcg/kg/hr, Last Rate: 1 5 mcg/kg/hr (04/17/22 0500)  fentaNYL, 100 mcg/hr, Last Rate: 50 mcg/hr (04/17/22 0500)  propofol, 5-50 mcg/kg/min (Adjusted), Last Rate: 41 5 mcg/kg/min (04/17/22 0500)      PRN Meds:   acetaminophen, 650 mg, Q6H PRN  HYDROmorphone, 1 mg, Q3H PRN  midazolam, 2 mg, Q2H PRN  ondansetron, 4 mg, Q6H PRN        Invasive Devices Review  Invasive Devices  Report    Peripheral Intravenous Line            Peripheral IV 04/13/22 Left Antecubital 3 days    Peripheral IV 04/15/22 Left Forearm 1 day    Peripheral IV 04/15/22 Left;Upper;Ventral (anterior) Arm 1 day    Peripheral IV 04/16/22 Dorsal (posterior); Left;Upper Arm 1 day          Drain            NG/OG/Enteral Tube Orogastric 14 Fr Left mouth 1 day    Urethral Catheter 16 Fr  1 day          Airway            ETT  Cuffed 8 mm 1 day                Rationale for remaining devices:   ---------------------------------------------------------------------------------------  Advance Directive and Living Will:      Power of :    POLST:    ---------------------------------------------------------------------------------------  Care Time Delivered:         TETE Thomas      Portions of the record may have been created with voice recognition software  Occasional wrong word or "sound a like" substitutions may have occurred due to the inherent limitations of voice recognition software    Read the chart carefully and recognize, using context, where substitutions have occurred

## 2022-04-18 ENCOUNTER — APPOINTMENT (INPATIENT)
Dept: RADIOLOGY | Facility: HOSPITAL | Age: 57
DRG: 207 | End: 2022-04-18
Payer: COMMERCIAL

## 2022-04-18 ENCOUNTER — APPOINTMENT (INPATIENT)
Dept: CT IMAGING | Facility: HOSPITAL | Age: 57
DRG: 207 | End: 2022-04-18
Payer: COMMERCIAL

## 2022-04-18 LAB
ANION GAP SERPL CALCULATED.3IONS-SCNC: 8 MMOL/L (ref 4–13)
ANION GAP SERPL CALCULATED.3IONS-SCNC: 9 MMOL/L (ref 4–13)
ATRIAL RATE: 55 BPM
BASOPHILS # BLD AUTO: 0.04 THOUSANDS/ΜL (ref 0–0.1)
BASOPHILS NFR BLD AUTO: 0 % (ref 0–1)
BUN SERPL-MCNC: 18 MG/DL (ref 5–25)
BUN SERPL-MCNC: 20 MG/DL (ref 5–25)
CALCIUM SERPL-MCNC: 7.6 MG/DL (ref 8.3–10.1)
CALCIUM SERPL-MCNC: 8.8 MG/DL (ref 8.3–10.1)
CHLORIDE SERPL-SCNC: 101 MMOL/L (ref 100–108)
CHLORIDE SERPL-SCNC: 104 MMOL/L (ref 100–108)
CO2 SERPL-SCNC: 26 MMOL/L (ref 21–32)
CO2 SERPL-SCNC: 29 MMOL/L (ref 21–32)
CREAT SERPL-MCNC: 0.54 MG/DL (ref 0.6–1.3)
CREAT SERPL-MCNC: 0.68 MG/DL (ref 0.6–1.3)
EOSINOPHIL # BLD AUTO: 0.08 THOUSAND/ΜL (ref 0–0.61)
EOSINOPHIL NFR BLD AUTO: 1 % (ref 0–6)
ERYTHROCYTE [DISTWIDTH] IN BLOOD BY AUTOMATED COUNT: 12.1 % (ref 11.6–15.1)
GFR SERPL CREATININE-BSD FRML MDRD: 104 ML/MIN/1.73SQ M
GFR SERPL CREATININE-BSD FRML MDRD: 96 ML/MIN/1.73SQ M
GLUCOSE SERPL-MCNC: 161 MG/DL (ref 65–140)
GLUCOSE SERPL-MCNC: 171 MG/DL (ref 65–140)
GLUCOSE SERPL-MCNC: 171 MG/DL (ref 65–140)
GLUCOSE SERPL-MCNC: 219 MG/DL (ref 65–140)
GLUCOSE SERPL-MCNC: 54 MG/DL (ref 65–140)
GLUCOSE SERPL-MCNC: 72 MG/DL (ref 65–140)
GLUCOSE SERPL-MCNC: 85 MG/DL (ref 65–140)
HCT VFR BLD AUTO: 29 % (ref 34.8–46.1)
HGB BLD-MCNC: 9.7 G/DL (ref 11.5–15.4)
IMM GRANULOCYTES # BLD AUTO: 0.28 THOUSAND/UL (ref 0–0.2)
IMM GRANULOCYTES NFR BLD AUTO: 2 % (ref 0–2)
LYMPHOCYTES # BLD AUTO: 1.95 THOUSANDS/ΜL (ref 0.6–4.47)
LYMPHOCYTES NFR BLD AUTO: 17 % (ref 14–44)
MAGNESIUM SERPL-MCNC: 2.3 MG/DL (ref 1.6–2.6)
MCH RBC QN AUTO: 32.9 PG (ref 26.8–34.3)
MCHC RBC AUTO-ENTMCNC: 33.4 G/DL (ref 31.4–37.4)
MCV RBC AUTO: 98 FL (ref 82–98)
MONOCYTES # BLD AUTO: 0.77 THOUSAND/ΜL (ref 0.17–1.22)
MONOCYTES NFR BLD AUTO: 7 % (ref 4–12)
NEUTROPHILS # BLD AUTO: 8.34 THOUSANDS/ΜL (ref 1.85–7.62)
NEUTS SEG NFR BLD AUTO: 73 % (ref 43–75)
NRBC BLD AUTO-RTO: 0 /100 WBCS
P AXIS: 54 DEGREES
PHOSPHATE SERPL-MCNC: 3.7 MG/DL (ref 2.7–4.5)
PLATELET # BLD AUTO: 420 THOUSANDS/UL (ref 149–390)
PMV BLD AUTO: 10.4 FL (ref 8.9–12.7)
POTASSIUM SERPL-SCNC: 3.4 MMOL/L (ref 3.5–5.3)
POTASSIUM SERPL-SCNC: 3.9 MMOL/L (ref 3.5–5.3)
PR INTERVAL: 192 MS
PROCALCITONIN SERPL-MCNC: 0.18 NG/ML
QRS AXIS: 63 DEGREES
QRSD INTERVAL: 92 MS
QT INTERVAL: 488 MS
QTC INTERVAL: 467 MS
RBC # BLD AUTO: 2.95 MILLION/UL (ref 3.81–5.12)
SODIUM SERPL-SCNC: 136 MMOL/L (ref 136–145)
SODIUM SERPL-SCNC: 141 MMOL/L (ref 136–145)
T WAVE AXIS: 70 DEGREES
VENTRICULAR RATE: 55 BPM
WBC # BLD AUTO: 11.46 THOUSAND/UL (ref 4.31–10.16)

## 2022-04-18 PROCEDURE — 94640 AIRWAY INHALATION TREATMENT: CPT

## 2022-04-18 PROCEDURE — 36569 INSJ PICC 5 YR+ W/O IMAGING: CPT

## 2022-04-18 PROCEDURE — G1004 CDSM NDSC: HCPCS

## 2022-04-18 PROCEDURE — C1751 CATH, INF, PER/CENT/MIDLINE: HCPCS

## 2022-04-18 PROCEDURE — 94668 MNPJ CHEST WALL SBSQ: CPT

## 2022-04-18 PROCEDURE — 82948 REAGENT STRIP/BLOOD GLUCOSE: CPT

## 2022-04-18 PROCEDURE — 84100 ASSAY OF PHOSPHORUS: CPT

## 2022-04-18 PROCEDURE — 93005 ELECTROCARDIOGRAM TRACING: CPT

## 2022-04-18 PROCEDURE — 87205 SMEAR GRAM STAIN: CPT

## 2022-04-18 PROCEDURE — 99291 CRITICAL CARE FIRST HOUR: CPT | Performed by: INTERNAL MEDICINE

## 2022-04-18 PROCEDURE — 74177 CT ABD & PELVIS W/CONTRAST: CPT

## 2022-04-18 PROCEDURE — 84145 PROCALCITONIN (PCT): CPT

## 2022-04-18 PROCEDURE — 71045 X-RAY EXAM CHEST 1 VIEW: CPT

## 2022-04-18 PROCEDURE — 87070 CULTURE OTHR SPECIMN AEROBIC: CPT

## 2022-04-18 PROCEDURE — 83735 ASSAY OF MAGNESIUM: CPT

## 2022-04-18 PROCEDURE — 93010 ELECTROCARDIOGRAM REPORT: CPT | Performed by: INTERNAL MEDICINE

## 2022-04-18 PROCEDURE — 85025 COMPLETE CBC W/AUTO DIFF WBC: CPT | Performed by: INTERNAL MEDICINE

## 2022-04-18 PROCEDURE — 94003 VENT MGMT INPAT SUBQ DAY: CPT

## 2022-04-18 PROCEDURE — 02HV33Z INSERTION OF INFUSION DEVICE INTO SUPERIOR VENA CAVA, PERCUTANEOUS APPROACH: ICD-10-PCS | Performed by: INTERNAL MEDICINE

## 2022-04-18 PROCEDURE — 80048 BASIC METABOLIC PNL TOTAL CA: CPT | Performed by: INTERNAL MEDICINE

## 2022-04-18 PROCEDURE — 87040 BLOOD CULTURE FOR BACTERIA: CPT

## 2022-04-18 PROCEDURE — 80048 BASIC METABOLIC PNL TOTAL CA: CPT

## 2022-04-18 RX ORDER — POTASSIUM CHLORIDE 14.9 MG/ML
20 INJECTION INTRAVENOUS ONCE
Status: COMPLETED | OUTPATIENT
Start: 2022-04-18 | End: 2022-04-18

## 2022-04-18 RX ORDER — POTASSIUM CHLORIDE 20MEQ/15ML
40 LIQUID (ML) ORAL ONCE
Status: COMPLETED | OUTPATIENT
Start: 2022-04-18 | End: 2022-04-18

## 2022-04-18 RX ORDER — DEXAMETHASONE SODIUM PHOSPHATE 4 MG/ML
4 INJECTION, SOLUTION INTRA-ARTICULAR; INTRALESIONAL; INTRAMUSCULAR; INTRAVENOUS; SOFT TISSUE EVERY 6 HOURS SCHEDULED
Status: DISCONTINUED | OUTPATIENT
Start: 2022-04-18 | End: 2022-04-19

## 2022-04-18 RX ORDER — FUROSEMIDE 10 MG/ML
40 INJECTION INTRAMUSCULAR; INTRAVENOUS ONCE
Status: COMPLETED | OUTPATIENT
Start: 2022-04-18 | End: 2022-04-18

## 2022-04-18 RX ADMIN — CHLORHEXIDINE GLUCONATE 0.12% ORAL RINSE 15 ML: 1.2 LIQUID ORAL at 21:28

## 2022-04-18 RX ADMIN — PROPOFOL 50 MCG/KG/MIN: 10 INJECTION, EMULSION INTRAVENOUS at 16:44

## 2022-04-18 RX ADMIN — PROPOFOL 45.8 MCG/KG/MIN: 10 INJECTION, EMULSION INTRAVENOUS at 01:08

## 2022-04-18 RX ADMIN — DEXMEDETOMIDINE HYDROCHLORIDE 1.5 MCG/KG/HR: 400 INJECTION INTRAVENOUS at 10:21

## 2022-04-18 RX ADMIN — DEXMEDETOMIDINE HYDROCHLORIDE 1.4 MCG/KG/HR: 400 INJECTION INTRAVENOUS at 23:59

## 2022-04-18 RX ADMIN — IPRATROPIUM BROMIDE 0.5 MG: 0.5 SOLUTION RESPIRATORY (INHALATION) at 08:12

## 2022-04-18 RX ADMIN — DEXMEDETOMIDINE HYDROCHLORIDE 1.23 MCG/KG/HR: 400 INJECTION INTRAVENOUS at 01:09

## 2022-04-18 RX ADMIN — LEVALBUTEROL HYDROCHLORIDE 1.25 MG: 1.25 SOLUTION, CONCENTRATE RESPIRATORY (INHALATION) at 20:01

## 2022-04-18 RX ADMIN — PROPOFOL 46 MCG/KG/MIN: 10 INJECTION, EMULSION INTRAVENOUS at 06:21

## 2022-04-18 RX ADMIN — HYDROMORPHONE HYDROCHLORIDE 1 MG: 1 INJECTION, SOLUTION INTRAMUSCULAR; INTRAVENOUS; SUBCUTANEOUS at 03:13

## 2022-04-18 RX ADMIN — DEXMEDETOMIDINE HYDROCHLORIDE 1.5 MCG/KG/HR: 400 INJECTION INTRAVENOUS at 14:35

## 2022-04-18 RX ADMIN — THIAMINE HCL TAB 100 MG 100 MG: 100 TAB at 08:01

## 2022-04-18 RX ADMIN — PROPOFOL 46 MCG/KG/MIN: 10 INJECTION, EMULSION INTRAVENOUS at 05:28

## 2022-04-18 RX ADMIN — DEXMEDETOMIDINE HYDROCHLORIDE 1.5 MCG/KG/HR: 400 INJECTION INTRAVENOUS at 21:25

## 2022-04-18 RX ADMIN — IOHEXOL 100 ML: 350 INJECTION, SOLUTION INTRAVENOUS at 22:08

## 2022-04-18 RX ADMIN — Medication 20 MG: at 08:01

## 2022-04-18 RX ADMIN — POTASSIUM CHLORIDE 40 MEQ: 20 SOLUTION ORAL at 07:49

## 2022-04-18 RX ADMIN — MIDAZOLAM 2 MG: 1 INJECTION INTRAMUSCULAR; INTRAVENOUS at 03:49

## 2022-04-18 RX ADMIN — INSULIN LISPRO 8 UNITS: 100 INJECTION, SOLUTION INTRAVENOUS; SUBCUTANEOUS at 17:36

## 2022-04-18 RX ADMIN — DEXAMETHASONE SODIUM PHOSPHATE 4 MG: 4 INJECTION INTRA-ARTICULAR; INTRALESIONAL; INTRAMUSCULAR; INTRAVENOUS; SOFT TISSUE at 17:36

## 2022-04-18 RX ADMIN — POLYETHYLENE GLYCOL 3350 17 G: 17 POWDER, FOR SOLUTION ORAL at 08:01

## 2022-04-18 RX ADMIN — DEXAMETHASONE SODIUM PHOSPHATE 4 MG: 4 INJECTION INTRA-ARTICULAR; INTRALESIONAL; INTRAMUSCULAR; INTRAVENOUS; SOFT TISSUE at 23:09

## 2022-04-18 RX ADMIN — PROPOFOL 45 MCG/KG/MIN: 10 INJECTION, EMULSION INTRAVENOUS at 21:25

## 2022-04-18 RX ADMIN — Medication 100 MCG/HR: at 21:24

## 2022-04-18 RX ADMIN — Medication 100 MCG/HR: at 14:36

## 2022-04-18 RX ADMIN — LEVALBUTEROL HYDROCHLORIDE 1.25 MG: 1.25 SOLUTION, CONCENTRATE RESPIRATORY (INHALATION) at 13:54

## 2022-04-18 RX ADMIN — DEXMEDETOMIDINE HYDROCHLORIDE 1.5 MCG/KG/HR: 400 INJECTION INTRAVENOUS at 13:14

## 2022-04-18 RX ADMIN — DEXMEDETOMIDINE HYDROCHLORIDE 1.5 MCG/KG/HR: 400 INJECTION INTRAVENOUS at 07:11

## 2022-04-18 RX ADMIN — Medication 100 MCG/HR: at 03:29

## 2022-04-18 RX ADMIN — ACETAMINOPHEN 650 MG: 650 SUSPENSION ORAL at 14:40

## 2022-04-18 RX ADMIN — INSULIN LISPRO 4 UNITS: 100 INJECTION, SOLUTION INTRAVENOUS; SUBCUTANEOUS at 05:28

## 2022-04-18 RX ADMIN — IPRATROPIUM BROMIDE 0.5 MG: 0.5 SOLUTION RESPIRATORY (INHALATION) at 13:54

## 2022-04-18 RX ADMIN — IOHEXOL 50 ML: 240 INJECTION, SOLUTION INTRATHECAL; INTRAVASCULAR; INTRAVENOUS; ORAL at 22:08

## 2022-04-18 RX ADMIN — ACETAMINOPHEN 650 MG: 650 SUSPENSION ORAL at 21:28

## 2022-04-18 RX ADMIN — PROPOFOL 50 MCG/KG/MIN: 10 INJECTION, EMULSION INTRAVENOUS at 14:36

## 2022-04-18 RX ADMIN — PIPERACILLIN SODIUM AND TAZOBACTAM SODIUM 3.38 G: 36; 4.5 INJECTION, POWDER, LYOPHILIZED, FOR SOLUTION INTRAVENOUS at 18:34

## 2022-04-18 RX ADMIN — POTASSIUM CHLORIDE 20 MEQ: 14.9 INJECTION, SOLUTION INTRAVENOUS at 07:47

## 2022-04-18 RX ADMIN — MIDAZOLAM 2 MG: 1 INJECTION INTRAMUSCULAR; INTRAVENOUS at 07:57

## 2022-04-18 RX ADMIN — IPRATROPIUM BROMIDE 0.5 MG: 0.5 SOLUTION RESPIRATORY (INHALATION) at 20:01

## 2022-04-18 RX ADMIN — LIDOCAINE 1 PATCH: 50 PATCH CUTANEOUS at 10:21

## 2022-04-18 RX ADMIN — SODIUM CHLORIDE SOLN NEBU 3% 4 ML: 3 NEBU SOLN at 08:13

## 2022-04-18 RX ADMIN — FUROSEMIDE 40 MG: 10 INJECTION, SOLUTION INTRAVENOUS at 10:21

## 2022-04-18 RX ADMIN — MIDAZOLAM 2 MG: 1 INJECTION INTRAMUSCULAR; INTRAVENOUS at 00:18

## 2022-04-18 RX ADMIN — LEVALBUTEROL HYDROCHLORIDE 1.25 MG: 1.25 SOLUTION, CONCENTRATE RESPIRATORY (INHALATION) at 08:12

## 2022-04-18 RX ADMIN — DEXAMETHASONE SODIUM PHOSPHATE 4 MG: 4 INJECTION INTRA-ARTICULAR; INTRALESIONAL; INTRAMUSCULAR; INTRAVENOUS; SOFT TISSUE at 12:12

## 2022-04-18 RX ADMIN — Medication 1 TABLET: at 08:01

## 2022-04-18 RX ADMIN — CHLORHEXIDINE GLUCONATE 0.12% ORAL RINSE 15 ML: 1.2 LIQUID ORAL at 08:01

## 2022-04-18 RX ADMIN — MIDAZOLAM 2 MG/HR: 5 INJECTION INTRAMUSCULAR; INTRAVENOUS at 22:49

## 2022-04-18 RX ADMIN — PIPERACILLIN AND TAZOBACTAM 3.38 G: 3; .375 INJECTION, POWDER, LYOPHILIZED, FOR SOLUTION INTRAVENOUS at 21:31

## 2022-04-18 RX ADMIN — DEXMEDETOMIDINE HYDROCHLORIDE 1.1 MCG/KG/HR: 400 INJECTION INTRAVENOUS at 04:15

## 2022-04-18 RX ADMIN — DEXMEDETOMIDINE HYDROCHLORIDE 1.5 MCG/KG/HR: 400 INJECTION INTRAVENOUS at 16:38

## 2022-04-18 RX ADMIN — DEXMEDETOMIDINE HYDROCHLORIDE 1.5 MCG/KG/HR: 400 INJECTION INTRAVENOUS at 19:06

## 2022-04-18 RX ADMIN — FOLIC ACID 1 MG: 1 TABLET ORAL at 08:01

## 2022-04-18 RX ADMIN — ENOXAPARIN SODIUM 40 MG: 40 INJECTION SUBCUTANEOUS at 08:01

## 2022-04-18 RX ADMIN — MIDAZOLAM 2 MG: 1 INJECTION INTRAMUSCULAR; INTRAVENOUS at 14:01

## 2022-04-18 RX ADMIN — SODIUM CHLORIDE SOLN NEBU 3% 4 ML: 3 NEBU SOLN at 13:55

## 2022-04-18 RX ADMIN — SODIUM CHLORIDE SOLN NEBU 3% 4 ML: 3 NEBU SOLN at 20:00

## 2022-04-18 RX ADMIN — HYDROMORPHONE HYDROCHLORIDE 1 MG: 1 INJECTION, SOLUTION INTRAMUSCULAR; INTRAVENOUS; SUBCUTANEOUS at 14:40

## 2022-04-18 RX ADMIN — MIDAZOLAM 2 MG: 1 INJECTION INTRAMUSCULAR; INTRAVENOUS at 21:43

## 2022-04-18 RX ADMIN — MIDAZOLAM 2 MG/HR: 5 INJECTION INTRAMUSCULAR; INTRAVENOUS at 11:07

## 2022-04-18 RX ADMIN — MIDAZOLAM 2 MG: 1 INJECTION INTRAMUSCULAR; INTRAVENOUS at 12:12

## 2022-04-18 RX ADMIN — Medication 1 PATCH: at 10:57

## 2022-04-18 RX ADMIN — DOCUSATE SODIUM AND SENNOSIDES 2 TABLET: 8.6; 5 TABLET, FILM COATED ORAL at 21:28

## 2022-04-18 NOTE — ASSESSMENT & PLAN NOTE
On admission patient was reported to have ataxia as well as finger-to-nose ataxia   Patient was started on high-dose thiamine for Wernicke's encephalopathy    · MRI Brain came back negative for any acute or chronic pathologies

## 2022-04-18 NOTE — ASSESSMENT & PLAN NOTE
Patient had elevated temp at 1:03 a m , tachycardia and hypotension yesterday requiring IV fluids and eventually pressors  Patient was also started on broad-spectrum antibiotics and started on Precedex for tachycardia  Patient's presentation is unlikely to be secondary to an infectious process given down trending leukocytosis, stable procalcitonin, cool extremities, and no identifiable source  SIRS response likely secondary to bronchoscopy versus decreased ECF  · Patient is off pressors  · Completed 5 days of cefepime (04/13/22-4/17/22) and  3 days of vancomycin ( 04/13/22-04/15/22)  · Procalcitonin is down trending  · Cultures are still negative to date    · Trending up leukocytosis and fever on 4/18/22  · Started Zosyn yesterday, continue Day-2 4/19/22

## 2022-04-18 NOTE — PROCEDURES
Insert PICC line    Date/Time: 4/18/2022 1:02 PM  Performed by: Wai Browning RN  Authorized by: TETE Gaston     Patient location:  Bedside  Consent:     Consent obtained:  Verbal (via phone)    Consent given by:  Tomas Acosta (pts daughter Randee Mullins)    Procedural risks discussed: consent obtained by GURJIT  [place] protocol:     Procedure explained and questions answered to patient or proxy's satisfaction: yes      Relevant documents present and verified: yes      Test results available and properly labeled: yes      Radiology Images displayed and confirmed  If images not available, report reviewed: yes      Required blood products, implants, devices, and special equipment available: yes      Site/side marked: yes      Immediately prior to procedure, a time out was called: yes      Patient identity confirmed:  Arm band, hospital-assigned identification number and anonymous protocol, patient vented/unresponsive  Pre-procedure details:     Hand hygiene: Hand hygiene performed prior to insertion      Sterile barrier technique: All elements of maximal sterile technique followed      Skin preparation:  ChloraPrep    Skin preparation agent: Skin preparation agent completely dried prior to procedure    Indications:     PICC line indications: no peripheral vascular access    Anesthesia (see MAR for exact dosages):      Anesthesia method:  Local infiltration    Local anesthetic:  Lidocaine 1% w/o epi (3ml)  Procedure details:     Location:  Brachial    Vessel type: vein      Laterality:  Right    Approach: percutaneous technique used      Patient position:  Flat    Procedural supplies:  Triple lumen (confirmed with [facility])    Catheter size:  5 Fr    Landmarks identified: yes      Ultrasound guidance: yes      Ultrasound image availability:  Not saved    Sterile ultrasound techniques: Sterile gel and sterile probe covers were used      Number of attempts:  1    Successful placement: yes      Vessel of catheter tip end:  Sherlock 3CG confirmed (sherlock 3cg procedure record confirmed placement sent to medical records)    Total catheter length (cm):  40    Catheter out on skin (cm):  0    Max flow rate:  999ml/hr    Arm circumference:  37cm  Post-procedure details:     Post-procedure:  Dressing applied and securement device placed    Assessment:  Blood return through all ports and free fluid flow (sherlock 3cg)    Post-procedure complications: none      Patient tolerance of procedure: Tolerated well, no immediate complications  Comments:      Confirmed need for picc, difficult PIV obtained with US  Mulitple IV meds infusing   Pt febrille and WBC elevated 11 46

## 2022-04-18 NOTE — PLAN OF CARE
Problem: Nutrition/Hydration-ADULT  Goal: Nutrient/Hydration intake appropriate for improving, restoring or maintaining nutritional needs  Description: Monitor and assess patient's nutrition/hydration status for malnutrition  Collaborate with interdisciplinary team and initiate plan and interventions as ordered  Monitor patient's weight and dietary intake as ordered or per policy  Utilize nutrition screening tool and intervene as necessary  Determine patient's food preferences and provide high-protein, high-caloric foods as appropriate  INTERVENTIONS:  - Monitor oral intake, urinary output, labs, and treatment plans  - Assess nutrition and hydration status and recommend course of action  - Evaluate amount of meals eaten  - Assist patient with eating if necessary   - Allow adequate time for meals  - Recommend/ encourage appropriate diets, oral nutritional supplements, and vitamin/mineral supplements  - Order, calculate, and assess calorie counts as needed  - Recommend, monitor, and adjust tube feedings and TPN/PPN based on assessed needs  - Assess need for intravenous fluids  - Provide specific nutrition/hydration education as appropriate  - Include patient/family/caregiver in decisions related to nutrition  Outcome: Progressing   PT remains intubated, on propofol at 21  1mL/hr (provides 557cal)  Consider decreasing or d/cing propofol as able d/t PT having elevated triglycerides (4/17 triglycerides 230)  If continuing with current propofol rate at 21  1mL/hr recommend [email or web address], add 2 packs of prosource daily, water flushes 185mL every 4hrs provides total of 1973cal, 90g pro, 1982mL  If propofol d/carley recommend [email or web address], water flushes 100mL every 4hrs provides total of 1958cal, 90g pro, 1917mL

## 2022-04-18 NOTE — ASSESSMENT & PLAN NOTE
Patient acute hypoxic on admission, was given dose of Lasix with marked improvement  However, this morning patient started to become more agitated, hypoxic and tachycardic unable to tolerate BiPAP, patient was intubated for airway protection in the setting of encephalopathy and potential DTs  Patient was extubated on 4/14, however failed BiPAP, eventually requiring re-intubation  The patient cleared significant amount of secretions while she was extubated  Patient had repeat bronchoscopy 4/115 which showed decrease secretions  · Patient's PEEP was increased back to 10 with FiO2 60%  · Continue monitor patient's secretions   · Can consider bronchoscopy plus extubation if patient is able to tolerate spontaneous with T-piece  · CXR appears improved, less right hilar region consolidation  · Recheck CT C/A/P with contrast on 4/18 read by me looks the same finding with no significant changes to prior CT CAP  Follow up official CT readings  · Preliminary result of sputum culture yesterday (4/18) showed gram negative rods  · Zosyn had started   Continue Zosyn 3 375gm Q8Hr , Day 2  · Continue decadron 4 mg Inj Q6Hr

## 2022-04-18 NOTE — PROGRESS NOTES
PT remains intubated, on propofol at 21  1mL/hr (provides 557cal)  Consider decreasing or d/cing propofol as able d/t PT having elevated triglycerides (4/17 triglycerides 230)  If continuing with current propofol rate at 21  1mL/hr recommend [email or web address], add 2 packs of prosource daily, water flushes 185mL every 4hrs provides total of 1973cal, 90g pro, 1982mL  If propofol d/carley recommend [email or web address], water flushes 100mL every 4hrs provides total of 1958cal, 90g pro, 1917mL

## 2022-04-18 NOTE — UTILIZATION REVIEW
Continued Stay Review    Date: 2022                          Current Patient Class: inpt  Current Level of Care: icu    HPI:56 y o  female  2022  transferred from [facility] to [facility] ICU as inpatient admission for acute respiratory failure with hypoxia, alcohol induced acute pancreatitis, maxed on Precedex & fentanyl for deliruim tremens    Assessment/Plan: Agitated on vent despite significant sedation w significant oral & ET secretions; re intubated on  for failed intubation attempt & today  tolerated spontaneous breathing wean, coarse breath sounds; cont mechanical ventilation & attempt wean PEEP, add Versed to assist; IV Decadron for additional 48-72 HR, completed antibx  Recheck sputum culture  OFF pressors; completed 5 days cef & 3 days IV Vanco; pro tresa trending down & cultures negative to date  Monitor on a bowel regime, pancreatitis w ileus resolved  Grimace on exam on palpation on left lower ABD  No further evidence fo Alcohol withdrawal  Needs PICC line due to poor IV access    Vital Signs:   Vitals:     22 0600 22 0800 22 0821 22 0831   BP:   112/86       Pulse:   60       Resp:   14       Temp:   98 6 °F (37 °C)       TempSrc:   Bladder       SpO2:   100% 92% 95%   Weight: 92 4 kg (203 lb 11 3 oz)         Height: 5' 8" (1 727 m)               Temp (24hrs), Av 2 °F (36 8 °C), Min:96 8 °F (36 °C), Max:99 °F (37 2 °C)  Current: Temperature: 98 6 °F (37 °C)  Arterial Line BP: 86/72  Arterial Line MAP (mmHg): 80 mmHg    Pertinent Labs/Diagnostic Results:    CXR Endotracheal tube is approximately 2 2 cm above the antonietta  Right greater than left infiltrates similar to the prior study         Results from last 7 days   Lab Units 22  0353 22  0413 04/15/22  1010 04/15/22  0443 04/15/22  0443 22  0503 22  0503 22  0539 22  0527   WBC Thousand/uL 11 46* 6 99 10 44*   < > 9 83   < > 6 83   < > 11 87*   HEMOGLOBIN g/dL 9 7* 10 8* 9 3*  --  9 0*  --  13 9   < > 9 9*   HEMATOCRIT % 29 0* 32 9* 28 0*  --  27 3*  --  43 2   < > 29 2*   PLATELETS Thousands/uL 420* 256 315   < > 284   < > 214   < > 274   NEUTROS ABS Thousands/µL 8 34* 6 04 8 21*  --   --   --   --    < >  --    BANDS PCT %  --   --   --   --   --   --   --   --  10*    < > = values in this interval not displayed  Results from last 7 days   Lab Units 04/18/22  0353 04/17/22  0439 04/16/22  2109 04/16/22  0413 04/15/22  0443 04/14/22  0503 04/14/22  0503 04/13/22  2155 04/13/22  2155 04/13/22  0539 04/12/22  0527   SODIUM mmol/L 141 137  --  140 134*  --  140   < > 140   < > 139   POTASSIUM mmol/L 3 4* 4 3 3 6 4 0 4 1   < > 4 0   < > 3 2*   < > 3 3*   CHLORIDE mmol/L 104 102  --  104 99*  --  101   < > 99*   < > 100   CO2 mmol/L 29 29  --  31 26  --  33*   < > 36*   < > 34*   ANION GAP mmol/L 8 6  --  5 9  --  6   < > 5   < > 5   BUN mg/dL 20 20  --  14 10  --  10   < > 9   < > 5   CREATININE mg/dL 0 54* 0 61  --  0 44* 0 44*  --  0 57*   < > 0 60   < > 0 41*   EGFR ml/min/1 73sq m 104 100  --  111 111  --  102   < > 100   < > 114   CALCIUM mg/dL 8 8 8 4  --  8 5 8 3  --  8 2*   < > 7 9*   < > 8 3   CALCIUM, IONIZED mmol/L  --   --   --   --   --   --  1 02*  --   --   --   --    MAGNESIUM mg/dL 2 3 2 6 2 3 2 2  --   --  2 5   < > 1 8  --  2 1   PHOSPHORUS mg/dL 3 7  --   --  4 8*  --   --  3 7  --  3 4  --  3 1    < > = values in this interval not displayed       Results from last 7 days   Lab Units 04/15/22  0443 04/14/22  0503 04/13/22  0539 04/12/22  0527   AST U/L 49* 54* 67* 46*   ALT U/L 47 51 50 52   ALK PHOS U/L 211* 252* 276* 255*   TOTAL PROTEIN g/dL 6 4 6 5 6 4 6 0*   ALBUMIN g/dL 2 1* 1 7* 1 9*  1 9* 1 5*   TOTAL BILIRUBIN mg/dL 0 45 0 51 0 41 0 37   BILIRUBIN DIRECT mg/dL  --   --  0 24*  --      Results from last 7 days   Lab Units 04/18/22  1130 04/18/22  0526 04/17/22  2336 04/17/22  1701 04/17/22  1151 04/17/22  0522 04/16/22  2328 04/16/22  2327 04/16/22  2004 04/16/22  1753 04/16/22  1153 04/15/22  1801   POC GLUCOSE mg/dl 54* 171* 192* 239* 316* 319* 345* 323* 279* 294* 209* 136     Results from last 7 days   Lab Units 04/18/22  0353 04/17/22  0439 04/16/22  0413 04/15/22  0443 04/14/22  0503 04/13/22  2155 04/13/22  0539 04/12/22  0527   GLUCOSE RANDOM mg/dL 161* 277* 164* 138 145* 157* 124 140             No results found for: BETA-HYDROXYBUTYRATE                                   Results from last 7 days   Lab Units 04/15/22  0443 04/14/22  0025 04/12/22  0527   PROCALCITONIN ng/ml 0 46* 0 64* 0 66*     Results from last 7 days   Lab Units 04/13/22  2340   LACTIC ACID mmol/L 0 8         Results from last 7 days   Lab Units 04/13/22  2247 04/13/22  1017 04/12/22  1637   BLOOD CULTURE  No Growth After 4 Days  No Growth After 4 Days    --   --    SPUTUM CULTURE   --   --  2+ Growth of    GRAM STAIN RESULT   --  1+ Polys  No organisms seen 3+ Polys*  1+ Gram positive cocci in pairs*     Medications:   Scheduled Medications:  chlorhexidine, 15 mL, Swish & Spit, Q12H [address]  dexamethasone, 4 mg, Intravenous, Q6H THIAGO  enoxaparin, 40 mg, Subcutaneous, Daily  folic acid, 1 mg, Oral, Daily  insulin lispro, 4-20 Units, Subcutaneous, Q6H THIAGO  ipratropium, 0 5 mg, Nebulization, TID  levalbuterol, 1 25 mg, Nebulization, TID  lidocaine, 1 patch, Topical, Q24H  multivitamin-minerals, 1 tablet, Oral, Daily  nicotine, 1 patch, Transdermal, Daily  omeprazole (PRILOSEC) suspension 2 mg/mL, 20 mg, Oral, Daily  polyethylene glycol, 17 g, Oral, Daily  senna-docusate sodium, 2 tablet, Per NG Tube, HS  sodium chloride, 4 mL, Nebulization, TID  thiamine, 100 mg, Oral, Daily  Continuous IV Infusions:  dexmedetomidine, 0 1-1 5 mcg/kg/hr, Intravenous, Titrated  fentaNYL, 100 mcg/hr, Intravenous, Continuous  midazolam, 2 mg/hr, Intravenous, Continuous  propofol, 5-50 mcg/kg/min (Adjusted), Intravenous, Titrated    PRN Meds:  acetaminophen, 650 mg, Oral, Q6H PRN  HYDROmorphone, 1 mg, Intravenous, Q3H PRN  midazolam, 2 mg, Intravenous, Q2H PRN  ondansetron, 4 mg, Intravenous, Q6H PRN    Discharge Plan: D    Network Utilization Review Department  ATTENTION: Please call with any questions or concerns to [phone] and carefully listen to the prompts so that you are directed to the right person  All voicemails are confidential   Tao Perez all requests for admission clinical reviews, approved or denied determinations and any other requests to dedicated fax number below belonging to the campus where the patient is receiving treatment   List of dedicated fax numbers for the Facilities:  [address] DENIALS (Administrative/Medical Necessity) [phone]   [address] (Maternity/NICU/Pediatrics) [phone]   [address]  [address] [facility] [address] [address] [place] [address] [address] [facility] [place] [address] [phone]

## 2022-04-18 NOTE — ASSESSMENT & PLAN NOTE
Patient had elevated temp at 1:03 a m , tachycardia and hypotension yesterday requiring IV fluids and eventually pressors  Patient was also started on broad-spectrum antibiotics and started on Precedex for tachycardia  Patient's presentation is unlikely to be secondary to an infectious process given down trending leukocytosis, stable procalcitonin, cool extremities, and no identifiable source  SIRS response likely secondary to bronchoscopy versus decreased ECF  · Patient is off pressors  · Completed 5 days of cefepime (04/13/22-4/17/22) and  3 days of vancomycin ( 04/13/22-04/15/22)  · Procalcitonin is down trending  · Cultures are still negative to date

## 2022-04-18 NOTE — ASSESSMENT & PLAN NOTE
Patient acute hypoxic on admission, was given dose of Lasix with marked improvement  However, this morning patient started to become more agitated, hypoxic and tachycardic unable to tolerate BiPAP, patient was intubated for airway protection in the setting of encephalopathy and potential DTs  Patient was extubated on 4/14, however failed BiPAP, eventually requiring re-intubation  The patient cleared significant amount of secretions while she was extubated  Patient had repeat bronchoscopy 4/115 which showed decrease secretions      · Patient's PEEP was decreased to 8  · Continue monitor patient's secretions   · Can consider bronchoscopy plus extubation if patient is able to tolerate spontaneous with T-piece  · CXR appears improved, less right hilar region consolidation  · Will consider to recheck CT C/A/P with contrast

## 2022-04-18 NOTE — ASSESSMENT & PLAN NOTE
Patient drinks roughly 8-12 shots of whiskey daily  Patient's last drink was April 1, 2022  EtoH level on admission was 54  Has never had any history withdrawals or seizures  Patient was initially admitted for alcoholic pancreatitis, however, patient became encephalopathic and agitated requiring transfer to detox Unit  · Patient is maxed on Precedex and fentanyl  · Continue versed infusion to assist with sedation

## 2022-04-18 NOTE — PROGRESS NOTES
[facility]  Progress Note - Francisca Fernandez 1965, 60 y o  female MRN: [id number]  Unit/Bed#: ICU 11 Encounter: [id number]  Primary Care Provider: Margaret Singh DO   Date and time admitted to hospital: 4/8/2022  3:55 PM    * Acute respiratory failure with hypoxia [facility])  Assessment & Plan  Patient acute hypoxic on admission, was given dose of Lasix with marked improvement  However, this morning patient started to become more agitated, hypoxic and tachycardic unable to tolerate BiPAP, patient was intubated for airway protection in the setting of encephalopathy and potential DTs  Patient was extubated on 4/14, however failed BiPAP, eventually requiring re-intubation  The patient cleared significant amount of secretions while she was extubated  Patient had repeat bronchoscopy 4/115 which showed decrease secretions  · Patient's PEEP was decreased to 8  · Continue monitor patient's secretions   · Can consider bronchoscopy plus extubation if patient is able to tolerate spontaneous with T-piece  · CXR appears improved, less right hilar region consolidation  · Will consider to recheck CT C/A/P with contrast       SIRS (systemic inflammatory response syndrome) ([facility] [address] )  Assessment & Plan  Patient had elevated temp at 1:03 a m , tachycardia and hypotension yesterday requiring IV fluids and eventually pressors  Patient was also started on broad-spectrum antibiotics and started on Precedex for tachycardia  Patient's presentation is unlikely to be secondary to an infectious process given down trending leukocytosis, stable procalcitonin, cool extremities, and no identifiable source  SIRS response likely secondary to bronchoscopy versus decreased ECF  · Patient is off pressors  · Completed 5 days of cefepime (04/13/22-4/17/22) and  3 days of vancomycin ( 04/13/22-04/15/22)  · Procalcitonin is down trending  · Cultures are still negative to date      Ataxia  Assessment & Plan  On admission patient was reported to have ataxia as well as finger-to-nose ataxia  Patient was started on high-dose thiamine for Wernicke's encephalopathy    · MRI Brain came back negative for any acute or chronic pathologies    Gastroesophageal reflux disease without esophagitis  Assessment & Plan  · Continue Protonix 20 mg mg daily       Alcohol use disorder, severe, dependence ([facility] [address] )  Assessment & Plan  · Case management consult in place  · Encouraged cessation   · Continue thiamine and folic acid       Hypertension  Assessment & Plan  · Patient is currently on 10 mg of lisinopril daily at home  Can be resumed once extubated  · Hydralazine p r n  For SBP greater than 170         Delirium tremens [facility])  Assessment & Plan  Patient drinks roughly 8-12 shots of whiskey daily  Patient's last drink was April 1, 2022  EtoH level on admission was 54  Has never had any history withdrawals or seizures  Patient was initially admitted for alcoholic pancreatitis, however, patient became encephalopathic and agitated requiring transfer to detox Unit  · Patient is maxed on Precedex and fentanyl  · Versed 2 mg Q 2h p r n  Alcohol-induced acute pancreatitis  Assessment & Plan  · Patient initially presented with a lipase of 53,000  Patient's acute pancreatitis is likely secondary to alcohol  · No surgical intervention of the pancreas  · Monitor TGL    ----------------------------------------------------------------------------------------  HPI/24hr events: No significant event overnight  Patient appropriate for transfer out of the ICU today?: No  Disposition: Continue Critical Care   Code Status: Level 1 - Full Code  ---------------------------------------------------------------------------------------  SUBJECTIVE  Pt is intubated   She is alert on touch and calling her name  She made grimice on palpation on the left lower abdomen       Review of Systems   Unable to perform ROS: Acuity of condition     Review of systems was unable to be performed secondary to intubation   ---------------------------------------------------------------------------------------  OBJECTIVE    Vitals   Vitals:    22 0600 22 0800 22 0821 22 0831   BP:  112/86     Pulse:  60     Resp:  14     Temp:  98 6 °F (37 °C)     TempSrc:  Bladder     SpO2:  100% 92% 95%   Weight: 92 4 kg (203 lb 11 3 oz)      Height: 5' 8" (1 727 m)        Temp (24hrs), Av 2 °F (36 8 °C), Min:96 8 °F (36 °C), Max:99 °F (37 2 °C)  Current: Temperature: 98 6 °F (37 °C)  Arterial Line BP: 86/72  Arterial Line MAP (mmHg): 80 mmHg    Respiratory:  SpO2: SpO2: 95 %       Invasive/non-invasive ventilation settings   Respiratory  Report   Lab Data (Last 4 hours)    None         O2/Vent Data (Last 4 hours)      821           Vent Mode CPAP/PS Spont       FIO2 (%) (%) 40       PEEP (cmH2O) (cmH2O) 8       Pressure Support (cmH2O) (cmH20) 10       MV (Obs) 11 5       RSBI 41               PEEP 8, FiO2 40%    Physical Exam  Vitals and nursing note reviewed  Constitutional:       General: She is not in acute distress  Appearance: She is well-developed  She is obese  She is ill-appearing  HENT:      Head: Normocephalic and atraumatic  Eyes:      Extraocular Movements: Extraocular movements intact  Conjunctiva/sclera: Conjunctivae normal    Cardiovascular:      Rate and Rhythm: Normal rate and regular rhythm  Heart sounds: Normal heart sounds  No murmur heard  Pulmonary:      Effort: Pulmonary effort is normal  No respiratory distress  Breath sounds: Rhonchi present  Comments: On pressure support with a PEEP of 8 0  Abdominal:      General: There is distension  Tenderness: There is abdominal tenderness  Musculoskeletal:         General: No swelling  Cervical back: Neck supple  Right lower leg: No edema  Left lower leg: No edema  Skin:     General: Skin is warm and dry        Coloration: Skin is not jaundiced  Findings: No bruising or lesion  Neurological:      Mental Status: She is alert  Laboratory and Diagnostics:  Results from last 7 days   Lab Units 04/18/22  0353 04/16/22  0413 04/15/22  1010 04/15/22  0443 04/14/22  0503 04/13/22  0539 04/12/22  0527   WBC Thousand/uL 11 46* 6 99 10 44* 9 83 6 83 10 64* 11 87*   HEMOGLOBIN g/dL 9 7* 10 8* 9 3* 9 0* 13 9 9 9* 9 9*   HEMATOCRIT % 29 0* 32 9* 28 0* 27 3* 43 2 30 1* 29 2*   PLATELETS Thousands/uL 420* 256 315 284 214 283 274   NEUTROS PCT % 73 86* 79*  --   --  81*  --    BANDS PCT %  --   --   --   --   --   --  10*   MONOS PCT % 7 5 8  --   --  8  --    MONO PCT %  --   --   --   --   --   --  4     Results from last 7 days   Lab Units 04/18/22  0353 04/17/22  0439 04/16/22  2109 04/16/22 0413 04/15/22  0443 04/14/22  0503 04/13/22  2155 04/13/22  0539 04/13/22  0539 04/12/22  0527 04/12/22  0527   SODIUM mmol/L 141 137  --  140 134* 140 140  --  138   < > 139   POTASSIUM mmol/L 3 4* 4 3 3 6 4 0 4 1 4 0 3 2*   < > 3 3*   < > 3 3*   CHLORIDE mmol/L 104 102  --  104 99* 101 99*  --  97*   < > 100   CO2 mmol/L 29 29  --  31 26 33* 36*  --  30   < > 34*   ANION GAP mmol/L 8 6  --  5 9 6 5  --  11   < > 5   BUN mg/dL 20 20  --  14 10 10 9  --  6   < > 5   CREATININE mg/dL 0 54* 0 61  --  0 44* 0 44* 0 57* 0 60  --  0 48*   < > 0 41*   CALCIUM mg/dL 8 8 8 4  --  8 5 8 3 8 2* 7 9*  --  8 7   < > 8 3   GLUCOSE RANDOM mg/dL 161* 277*  --  164* 138 145* 157*  --  124   < > 140   ALT U/L  --   --   --   --  47 51  --   --  50  --  52   AST U/L  --   --   --   --  49* 54*  --   --  67*  --  46*   ALK PHOS U/L  --   --   --   --  211* 252*  --   --  276*  --  255*   ALBUMIN g/dL  --   --   --   --  2 1* 1 7*  --   --  1 9*  1 9*  --  1 5*   TOTAL BILIRUBIN mg/dL  --   --   --   --  0 45 0 51  --   --  0 41  --  0 37    < > = values in this interval not displayed       Results from last 7 days   Lab Units 04/18/22  0353 04/17/22  0439 04/16/22  7354 04/16/22  0413 04/14/22  0503 04/13/22  2155 04/12/22  0527   MAGNESIUM mg/dL 2 3 2 6 2 3 2 2 2 5 1 8 2 1   PHOSPHORUS mg/dL 3 7  --   --  4 8* 3 7 3 4 3 1               Results from last 7 days   Lab Units 04/13/22  2340   LACTIC ACID mmol/L 0 8     ABG:  Results from last 7 days   Lab Units 04/11/22  1035   PH ART  7 332*   PCO2 ART mm Hg 53 6*   PO2 ART mm Hg 71 8*   HCO3 ART mmol/L 27 8   BASE EXC ART mmol/L 1 2   ABG SOURCE  Line, Arterial     VBG:  Results from last 7 days   Lab Units 04/11/22  1035   ABG SOURCE  Line, Arterial     Results from last 7 days   Lab Units 04/15/22  0443 04/14/22  0025 04/12/22  0527   PROCALCITONIN ng/ml 0 46* 0 64* 0 66*       Micro  Results from last 7 days   Lab Units 04/13/22  2247 04/13/22  1017 04/12/22  1637   BLOOD CULTURE  No Growth After 4 Days  No Growth After 4 Days  --   --    SPUTUM CULTURE   --   --  2+ Growth of    GRAM STAIN RESULT   --  1+ Polys  No organisms seen 3+ Polys*  1+ Gram positive cocci in pairs*   MRSA CULTURE ONLY  No Methicillin Resistant Staphlyococcus aureus (MRSA) isolated  --   --        EKG: Sinus bradycardia 55 BPM  Imaging: I have personally reviewed pertinent reports  Intake and Output  I/O       04/16 0701 04/17 0700 04/17 0701  04/18 0700 04/18 0701 04/19 0700    I V  (mL/kg) 1480 5 (15 7) 1553 2 (16 8)     NG/GT 1050 870     IV Piggyback 100      Feedings 462 254     Total Intake(mL/kg) 3092 5 (32 9) 2677 2 (29)     Urine (mL/kg/hr) 3145 (1 4) 3255 (1 5)     Emesis/NG output       Total Output 3145 3255     Net -52 5 -577 8                  Height and Weights   Height: 5' 8" (172 7 cm)     Body mass index is 30 97 kg/m²    Weight (last 2 days)     Date/Time Weight    04/18/22 0600 92 4 (203 71)    04/18/22 0500 92 4 (203 71)    04/17/22 0540 94 1 (207 45)    04/16/22 0600 95 (209 44)    04/16/22 0300 95 (209 44)            Nutrition       Diet Orders   (From admission, onward)             Start     Ordered    04/17/22 0604 Diet Enteral/Parenteral; Tube Feeding No Oral Diet; Jevity 1 2 Hira; Continuous; 30; Prosource Protein Liquid - Three Packets; 100; Water; Every 4 hours  Diet effective now        References:    Nutrtion Support Algorithm Enteral vs  Parenteral   Question Answer Comment   Diet Type Enteral/Parenteral    Enteral/Parenteral Tube Feeding No Oral Diet    Tube Feeding Formula: Jevity 1 2 Hira    Bolus/Cyclic/Continuous Continuous    Tube Feeding Goal Rate (mL/hr): 30    Prosource Protein Liquid - No Carb Prosource Protein Liquid - Three Packets    Tube Feeding flush (mL): 100    Water Flush type: Water    Water flush frequency: Every 4 hours    RD to adjust diet per protocol?  No        04/17/22 0604                  Active Medications  Scheduled Meds:  Current Facility-Administered Medications   Medication Dose Route Frequency Provider Last Rate    acetaminophen  650 mg Oral Q6H PRN Lawyer Arina PA-C      chlorhexidine  15 mL Swish & Spit Q12H [facility] Yoko Minor, [address]      dexmedetomidine  0 1-1 5 mcg/kg/hr Intravenous Titrated Imelda Oglesby MD 1 5 mcg/kg/hr (04/18/22 0711)    enoxaparin  40 mg Subcutaneous Daily Julisa Gordillo MD      fentaNYL  100 mcg/hr Intravenous Continuous TETE Mccormack 100 mcg/hr (60/33/34 7766)    folic acid  1 mg Oral Daily Julisa Gordillo MD      HYDROmorphone  1 mg Intravenous Q3H PRN TETE Amaro      insulin lispro  4-20 Units Subcutaneous Q6H [facility] TETE Doe      ipratropium  0 5 mg Nebulization TID Julisa Gordillo MD      levalbuterol  1 25 mg Nebulization TID Julisa Gordillo MD      lidocaine  1 patch Topical Q24H Julisa Gordillo MD      midazolam  2 mg Intravenous Q2H PRN Julisa Gordillo MD      multivitamin-minerals  1 tablet Oral Daily Julisa Gordillo MD      nicotine  1 patch Transdermal Daily Julisa Gordillo MD      omeprazole (PRILOSEC) suspension 2 mg/mL  20 mg Oral Daily [employer], PA-C      ondansetron  4 mg Intravenous Q6H PRN Roberto Polanco MD      polyethylene glycol  17 g Oral Daily Roberto Polanco MD      potassium chloride  20 mEq Intravenous Once May Carolannu MD Ede 20 mEq (04/18/22 0747)    propofol  5-50 mcg/kg/min (Adjusted) Intravenous Titrated Roberto Polanco MD 46 mcg/kg/min (04/18/22 0700)    senna-docusate sodium  2 tablet Per NG Tube HS TETE Mckeon      sodium chloride  4 mL Nebulization TID Constance Navarro MD      thiamine  100 mg Oral Daily Roberto Polanco MD       Continuous Infusions:  dexmedetomidine, 0 1-1 5 mcg/kg/hr, Last Rate: 1 5 mcg/kg/hr (04/18/22 0711)  fentaNYL, 100 mcg/hr, Last Rate: 100 mcg/hr (04/18/22 0700)  propofol, 5-50 mcg/kg/min (Adjusted), Last Rate: 46 mcg/kg/min (04/18/22 0700)      PRN Meds:   acetaminophen, 650 mg, Q6H PRN  HYDROmorphone, 1 mg, Q3H PRN  midazolam, 2 mg, Q2H PRN  ondansetron, 4 mg, Q6H PRN        Invasive Devices Review  Invasive Devices  Report    Peripheral Intravenous Line            Peripheral IV 04/15/22 Left Forearm 2 days    Peripheral IV 04/15/22 Left;Upper;Ventral (anterior) Arm 2 days    Peripheral IV 04/16/22 Dorsal (posterior); Left;Upper Arm 2 days          Drain            Urethral Catheter 16 Fr  3 days    NG/OG/Enteral Tube Orogastric 14 Fr Left mouth 2 days          Airway            ETT  Cuffed 8 mm 2 days                Rationale for remaining devices:   ---------------------------------------------------------------------------------------  Advance Directive and Living Will:      Power of :    POLST:    ---------------------------------------------------------------------------------------  Care Time Delivered:   N/A      Shirley Moreno MD      Portions of the record may have been created with voice recognition software  Occasional wrong word or "sound a like" substitutions may have occurred due to the inherent limitations of voice recognition software    Read the chart carefully and recognize, using context, where substitutions have occurred

## 2022-04-18 NOTE — PROGRESS NOTES
The patients standard-infusion Piperacillin-Tazobactam / Zosyn (infused over 30-60 minutes) has been converted to extended-infusion (infused over 4 hours) per [facility] Extended-Infusion Piperacillin-Tazobactam Protocol for Adults as approved by the Pharmacy and Therapeutics Committee (accessible here on MyNET)       The patient met ALL eligible criteria:    Age >= 25years old   Critical Care patient    And did NOT have ANY exclusions:     Emergency Department or Operating Room patient  Drug incompatibilities that could NOT be avoided with timing or separate line administration    The following are reminders for Nursing regarding administration:  Infuse the first dose of Zosyn over 30min as a load (if new start), and then all subsequent doses will be given as an extended-infusion over 4 hours (see dosing below)  Use primary tubing as an intermittent infusion; change out primary tubing every 24 hours   Ensure full dose of the medication is given at the appropriate rate  Most incompatible drugs can be scheduled during times when the Zosyn is not being infused; however, if one requires administration during the same time, a separate site or lumen MUST be used  If access is limited and an incompatible medication urgently needs to be given, the Zosyn extended-infusion can be held for up to 30min (remember to flush line before/after)  Extended-infusion Zosyn does NOT require special timing around hemodialysis (it can even be given simultaneously)  If a patient needs an urgent MRI while Zosyn is infusing and there is not a MRI-compatible pump available for use, finish the infusion over the traditional length (30min) and ask Pharmacy to reschedule the next doses so that they start a few hours earlier  Pharmacy will assist nursing in troubleshooting other administration issues as they arise  Dosing for Piperacillin-Tazobactam  CrCl (mL/min) Traditional Dosing Extended-Infusion Dosing #   CrCl > 40 High-Dose  CrCl > 40 Low-Dose 4 5g Q6H (over 30min)  3 375g IV Q6H (over 30min) 3 375g IV Q8H (over 4hr)*    *1st dose loaded over 30min, then start extended-infusion dosing 4hr later   CrCl 20-40 High-Dose  CrCl 20-40 Low-Dose 3 375g IV Q6H (over 30min)  2 25g IV Q6H (over 30min)    CrCl < 20 High-Dose  CrCl < 20 Low-Dose 2 25g IV Q6H (over 30min)  2 25g IV Q8H (over 30min) 3 375g IV Q12H (over 4hr)*    *1st dose loaded over 30min, then start extended-infusion dosing 6hr later   Hemo/Peritoneal Dialysis High-Dose  Hemo/Peritoneal Dialysis Low-Dose 2 25g IV Q6H (over 30min)  2 25g IV Q8H (over 30min)    CVVH/D High-Dose  CVVH/D Low-Dose 3 375g IV Q6H (over 30min)  2 25 IV Q6H (over 30min) 3 375g IV Q8H (over 4hr)*    *1st dose loaded over 30min, then start extended-infusion dosing 4hr later   # = Use 4 5g dosing (same interval) if morbidly obese (BMI ?40)    Please call the Pharmacy with any questions or concerns

## 2022-04-19 PROBLEM — R60.0 EDEMA OF UPPER EXTREMITY: Status: ACTIVE | Noted: 2022-04-19

## 2022-04-19 LAB
ALBUMIN SERPL BCP-MCNC: 2.3 G/DL (ref 3.5–5)
ALP SERPL-CCNC: 153 U/L (ref 46–116)
ALT SERPL W P-5'-P-CCNC: 190 U/L (ref 12–78)
ANION GAP SERPL CALCULATED.3IONS-SCNC: 11 MMOL/L (ref 4–13)
AST SERPL W P-5'-P-CCNC: 60 U/L (ref 5–45)
BACTERIA BLD CULT: NORMAL
BACTERIA BLD CULT: NORMAL
BILIRUB SERPL-MCNC: 0.51 MG/DL (ref 0.2–1)
BUN SERPL-MCNC: 16 MG/DL (ref 5–25)
CA-I BLD-SCNC: 1.08 MMOL/L (ref 1.12–1.32)
CALCIUM ALBUM COR SERPL-MCNC: 9.4 MG/DL (ref 8.3–10.1)
CALCIUM SERPL-MCNC: 8 MG/DL (ref 8.3–10.1)
CHLORIDE SERPL-SCNC: 102 MMOL/L (ref 100–108)
CO2 SERPL-SCNC: 25 MMOL/L (ref 21–32)
CREAT SERPL-MCNC: 0.57 MG/DL (ref 0.6–1.3)
ERYTHROCYTE [DISTWIDTH] IN BLOOD BY AUTOMATED COUNT: 12.2 % (ref 11.6–15.1)
GFR SERPL CREATININE-BSD FRML MDRD: 102 ML/MIN/1.73SQ M
GLUCOSE SERPL-MCNC: 116 MG/DL (ref 65–140)
GLUCOSE SERPL-MCNC: 125 MG/DL (ref 65–140)
GLUCOSE SERPL-MCNC: 184 MG/DL (ref 65–140)
GLUCOSE SERPL-MCNC: 200 MG/DL (ref 65–140)
GLUCOSE SERPL-MCNC: 221 MG/DL (ref 65–140)
GLUCOSE SERPL-MCNC: 223 MG/DL (ref 65–140)
GLUCOSE SERPL-MCNC: 224 MG/DL (ref 65–140)
HCT VFR BLD AUTO: 24.9 % (ref 34.8–46.1)
HGB BLD-MCNC: 8.8 G/DL (ref 11.5–15.4)
MAGNESIUM SERPL-MCNC: 2 MG/DL (ref 1.6–2.6)
MCH RBC QN AUTO: 34.5 PG (ref 26.8–34.3)
MCHC RBC AUTO-ENTMCNC: 35.3 G/DL (ref 31.4–37.4)
MCV RBC AUTO: 98 FL (ref 82–98)
PHOSPHATE SERPL-MCNC: 5 MG/DL (ref 2.7–4.5)
PLATELET # BLD AUTO: 530 THOUSANDS/UL (ref 149–390)
PMV BLD AUTO: 10.9 FL (ref 8.9–12.7)
POTASSIUM SERPL-SCNC: 3.8 MMOL/L (ref 3.5–5.3)
PROT SERPL-MCNC: 6.1 G/DL (ref 6.4–8.2)
RBC # BLD AUTO: 2.55 MILLION/UL (ref 3.81–5.12)
SODIUM SERPL-SCNC: 138 MMOL/L (ref 136–145)
WBC # BLD AUTO: 13.76 THOUSAND/UL (ref 4.31–10.16)

## 2022-04-19 PROCEDURE — 99291 CRITICAL CARE FIRST HOUR: CPT | Performed by: INTERNAL MEDICINE

## 2022-04-19 PROCEDURE — 82330 ASSAY OF CALCIUM: CPT

## 2022-04-19 PROCEDURE — 85027 COMPLETE CBC AUTOMATED: CPT

## 2022-04-19 PROCEDURE — 99223 1ST HOSP IP/OBS HIGH 75: CPT | Performed by: INTERNAL MEDICINE

## 2022-04-19 PROCEDURE — 83735 ASSAY OF MAGNESIUM: CPT

## 2022-04-19 PROCEDURE — 84100 ASSAY OF PHOSPHORUS: CPT

## 2022-04-19 PROCEDURE — 82948 REAGENT STRIP/BLOOD GLUCOSE: CPT

## 2022-04-19 PROCEDURE — 94668 MNPJ CHEST WALL SBSQ: CPT

## 2022-04-19 PROCEDURE — 94669 MECHANICAL CHEST WALL OSCILL: CPT

## 2022-04-19 PROCEDURE — 94640 AIRWAY INHALATION TREATMENT: CPT

## 2022-04-19 PROCEDURE — 94002 VENT MGMT INPAT INIT DAY: CPT

## 2022-04-19 PROCEDURE — C9113 INJ PANTOPRAZOLE SODIUM, VIA: HCPCS | Performed by: PHYSICIAN ASSISTANT

## 2022-04-19 PROCEDURE — 80053 COMPREHEN METABOLIC PANEL: CPT

## 2022-04-19 PROCEDURE — 94003 VENT MGMT INPAT SUBQ DAY: CPT

## 2022-04-19 RX ORDER — DEXMEDETOMIDINE HYDROCHLORIDE 4 UG/ML
0.4 INJECTION, SOLUTION INTRAVENOUS
Status: DISCONTINUED | OUTPATIENT
Start: 2022-04-19 | End: 2022-04-20

## 2022-04-19 RX ORDER — CALCIUM GLUCONATE 20 MG/ML
1 INJECTION, SOLUTION INTRAVENOUS ONCE
Status: COMPLETED | OUTPATIENT
Start: 2022-04-19 | End: 2022-04-19

## 2022-04-19 RX ORDER — ACETAMINOPHEN 650 MG/1
650 SUPPOSITORY RECTAL EVERY 6 HOURS PRN
Status: DISCONTINUED | OUTPATIENT
Start: 2022-04-19 | End: 2022-04-22

## 2022-04-19 RX ORDER — PANTOPRAZOLE SODIUM 40 MG/1
40 INJECTION, POWDER, FOR SOLUTION INTRAVENOUS
Status: DISCONTINUED | OUTPATIENT
Start: 2022-04-19 | End: 2022-04-21

## 2022-04-19 RX ORDER — METOPROLOL TARTRATE 5 MG/5ML
2.5 INJECTION INTRAVENOUS EVERY 6 HOURS PRN
Status: DISCONTINUED | OUTPATIENT
Start: 2022-04-19 | End: 2022-04-19

## 2022-04-19 RX ORDER — LORAZEPAM 2 MG/ML
2 INJECTION INTRAMUSCULAR EVERY 4 HOURS PRN
Status: DISCONTINUED | OUTPATIENT
Start: 2022-04-19 | End: 2022-04-21

## 2022-04-19 RX ORDER — POTASSIUM CHLORIDE 20MEQ/15ML
40 LIQUID (ML) ORAL ONCE
Status: COMPLETED | OUTPATIENT
Start: 2022-04-19 | End: 2022-04-19

## 2022-04-19 RX ADMIN — PIPERACILLIN AND TAZOBACTAM 3.38 G: 3; .375 INJECTION, POWDER, LYOPHILIZED, FOR SOLUTION INTRAVENOUS at 05:38

## 2022-04-19 RX ADMIN — DEXAMETHASONE SODIUM PHOSPHATE 4 MG: 4 INJECTION INTRA-ARTICULAR; INTRALESIONAL; INTRAMUSCULAR; INTRAVENOUS; SOFT TISSUE at 05:39

## 2022-04-19 RX ADMIN — LEVALBUTEROL HYDROCHLORIDE 1.25 MG: 1.25 SOLUTION, CONCENTRATE RESPIRATORY (INHALATION) at 14:36

## 2022-04-19 RX ADMIN — FOLIC ACID 1 MG: 1 TABLET ORAL at 09:00

## 2022-04-19 RX ADMIN — Medication 100 MCG/HR: at 05:43

## 2022-04-19 RX ADMIN — INSULIN LISPRO 8 UNITS: 100 INJECTION, SOLUTION INTRAVENOUS; SUBCUTANEOUS at 00:03

## 2022-04-19 RX ADMIN — LORAZEPAM 2 MG: 2 INJECTION INTRAMUSCULAR; INTRAVENOUS at 13:54

## 2022-04-19 RX ADMIN — CALCIUM GLUCONATE 1 G: 20 INJECTION, SOLUTION INTRAVENOUS at 09:00

## 2022-04-19 RX ADMIN — SODIUM CHLORIDE SOLN NEBU 3% 4 ML: 3 NEBU SOLN at 09:37

## 2022-04-19 RX ADMIN — DEXMEDETOMIDINE HYDROCHLORIDE 0.1 MCG/KG/HR: 400 INJECTION INTRAVENOUS at 17:58

## 2022-04-19 RX ADMIN — MIDAZOLAM 2 MG: 1 INJECTION INTRAMUSCULAR; INTRAVENOUS at 05:43

## 2022-04-19 RX ADMIN — THIAMINE HCL TAB 100 MG 100 MG: 100 TAB at 09:00

## 2022-04-19 RX ADMIN — PROPOFOL 50 MCG/KG/MIN: 10 INJECTION, EMULSION INTRAVENOUS at 10:35

## 2022-04-19 RX ADMIN — ENOXAPARIN SODIUM 40 MG: 40 INJECTION SUBCUTANEOUS at 09:00

## 2022-04-19 RX ADMIN — CHLORHEXIDINE GLUCONATE 0.12% ORAL RINSE 15 ML: 1.2 LIQUID ORAL at 09:00

## 2022-04-19 RX ADMIN — LORAZEPAM 2 MG: 2 INJECTION INTRAMUSCULAR; INTRAVENOUS at 20:13

## 2022-04-19 RX ADMIN — Medication 20 MG: at 08:59

## 2022-04-19 RX ADMIN — POLYETHYLENE GLYCOL 3350 17 G: 17 POWDER, FOR SOLUTION ORAL at 09:00

## 2022-04-19 RX ADMIN — LEVALBUTEROL HYDROCHLORIDE 1.25 MG: 1.25 SOLUTION, CONCENTRATE RESPIRATORY (INHALATION) at 20:20

## 2022-04-19 RX ADMIN — IPRATROPIUM BROMIDE 0.5 MG: 0.5 SOLUTION RESPIRATORY (INHALATION) at 14:36

## 2022-04-19 RX ADMIN — POTASSIUM CHLORIDE 40 MEQ: 20 SOLUTION ORAL at 08:59

## 2022-04-19 RX ADMIN — Medication 1 TABLET: at 09:00

## 2022-04-19 RX ADMIN — INSULIN LISPRO 4 UNITS: 100 INJECTION, SOLUTION INTRAVENOUS; SUBCUTANEOUS at 12:52

## 2022-04-19 RX ADMIN — PROPOFOL 45 MCG/KG/MIN: 10 INJECTION, EMULSION INTRAVENOUS at 00:04

## 2022-04-19 RX ADMIN — PROPOFOL 40 MCG/KG/MIN: 10 INJECTION, EMULSION INTRAVENOUS at 05:38

## 2022-04-19 RX ADMIN — DEXMEDETOMIDINE HYDROCHLORIDE 0.8 MCG/KG/HR: 400 INJECTION INTRAVENOUS at 05:43

## 2022-04-19 RX ADMIN — IPRATROPIUM BROMIDE 0.5 MG: 0.5 SOLUTION RESPIRATORY (INHALATION) at 20:20

## 2022-04-19 RX ADMIN — PANTOPRAZOLE SODIUM 40 MG: 40 INJECTION, POWDER, FOR SOLUTION INTRAVENOUS at 12:52

## 2022-04-19 RX ADMIN — INSULIN LISPRO 8 UNITS: 100 INJECTION, SOLUTION INTRAVENOUS; SUBCUTANEOUS at 06:12

## 2022-04-19 RX ADMIN — Medication 1 PATCH: at 09:00

## 2022-04-19 RX ADMIN — IPRATROPIUM BROMIDE 0.5 MG: 0.5 SOLUTION RESPIRATORY (INHALATION) at 09:37

## 2022-04-19 RX ADMIN — ACETAMINOPHEN 650 MG: 650 SUPPOSITORY RECTAL at 16:51

## 2022-04-19 RX ADMIN — LEVALBUTEROL HYDROCHLORIDE 1.25 MG: 1.25 SOLUTION, CONCENTRATE RESPIRATORY (INHALATION) at 09:37

## 2022-04-19 NOTE — PROGRESS NOTES
[address] [facility]  Progress Note - Francisca León Emms 1965, 60 y o  female MRN: [id number]  Unit/Bed#: ICU 11 Encounter: [id number]  Primary Care Provider: Keira Lopez,    Date and time admitted to hospital: 4/8/2022  3:55 PM    * Acute respiratory failure with hypoxia [facility])  Assessment & Plan  Patient acute hypoxic on admission, was given dose of Lasix with marked improvement  However, this morning patient started to become more agitated, hypoxic and tachycardic unable to tolerate BiPAP, patient was intubated for airway protection in the setting of encephalopathy and potential DTs  Patient was extubated on 4/14, however failed BiPAP, eventually requiring re-intubation  The patient cleared significant amount of secretions while she was extubated  Patient had repeat bronchoscopy 4/115 which showed decrease secretions  · Patient's PEEP was increased back to 10 with FiO2 60%  · Continue monitor patient's secretions   · Can consider bronchoscopy plus extubation if patient is able to tolerate spontaneous with T-piece  · CXR appears improved, less right hilar region consolidation  · Recheck CT C/A/P with contrast on 4/18 read by me looks the same finding with no significant changes to prior CT CAP  Follow up official CT readings  · Preliminary result of sputum culture yesterday (4/18) showed gram negative rods  · Zosyn had started  Continue Zosyn 3 375gm Q8Hr , Day 2  · Continue decadron 4 mg Inj Q6Hr      Edema of upper extremity  Assessment & Plan  PICC line inserted on Rt brachial vein on 4/18/22 for upper extremities swelling due to poor peripheral venous access  SIRS (systemic inflammatory response syndrome) ([facility] [address] )  Assessment & Plan  Patient had elevated temp at 1:03 a m , tachycardia and hypotension yesterday requiring IV fluids and eventually pressors  Patient was also started on broad-spectrum antibiotics and started on Precedex for tachycardia      Patient's presentation is unlikely to be secondary to an infectious process given down trending leukocytosis, stable procalcitonin, cool extremities, and no identifiable source  SIRS response likely secondary to bronchoscopy versus decreased ECF  · Patient is off pressors  · Completed 5 days of cefepime (04/13/22-4/17/22) and  3 days of vancomycin ( 04/13/22-04/15/22)  · Procalcitonin is down trending  · Cultures are still negative to date  · Trending up leukocytosis and fever on 4/18/22  · Started Zosyn yesterday, continue Day-2 4/19/22    Ataxia  Assessment & Plan  On admission patient was reported to have ataxia as well as finger-to-nose ataxia  Patient was started on high-dose thiamine for Wernicke's encephalopathy    · MRI Brain came back negative for any acute or chronic pathologies    Gastroesophageal reflux disease without esophagitis  Assessment & Plan  · Continue Protonix 20 mg mg daily       Alcohol use disorder, severe, dependence ([facility] [address] )  Assessment & Plan  · Case management consult in place  · Encouraged cessation   · Continue thiamine and folic acid       Hypertension  Assessment & Plan  · Patient is currently on 10 mg of lisinopril daily at home  Can be resumed once extubated        Delirium tremens [facility])  Assessment & Plan  Patient drinks roughly 8-12 shots of whiskey daily  Patient's last drink was April 1, 2022  EtoH level on admission was 54  Has never had any history withdrawals or seizures  Patient was initially admitted for alcoholic pancreatitis, however, patient became encephalopathic and agitated requiring transfer to detox Unit  · Patient is maxed on Precedex and fentanyl  · Continue versed infusion to assist with sedation  Alcohol-induced acute pancreatitis  Assessment & Plan  · Patient initially presented with a lipase of 53,000  Patient's acute pancreatitis is likely secondary to alcohol    · No surgical intervention of the pancreas  · Monitor TGL    Daily Progress Note - Critical Care   Francisca Thomas So 60 y o  female MRN: [id number]  Unit/Bed#: ICU 11 Encounter: [id number]        ----------------------------------------------------------------------------------------  HPI/24hr events: None    ---------------------------------------------------------------------------------------  SUBJECTIVE  Pt was sedated  She tried to open her eyes when I call her name  She moveed her feet under the blanket  She did not grimace on abdominal palpation  Review of Systems   Unable to perform ROS: Acuity of condition     Review of systems was unable to be performed secondary to intubatation   ---------------------------------------------------------------------------------------    Patient appropriate for transfer out of the ICU today?: No  Disposition: Continue Critical Care   Code Status: Level 1 - Full Code  ---------------------------------------------------------------------------------------  ICU CORE MEASURES    Prophylaxis   VTE Pharmacologic Prophylaxis: Enoxaparin (Lovenox)  VTE Mechanical Prophylaxis: sequential compression device  Stress Ulcer Prophylaxis: Omeprazole suspension via feeding tube Prilosec 20mg daily    ABCDE Protocol (if indicated)  Plan to perform spontaneous awakening trial today? No  Plan to perform spontaneous breathing trial today? No  Obvious barriers to extubation? Yes  CAM-ICU: delirium absent "    Invasive Devices Review  Invasive Devices  Report    Peripherally Inserted Central Catheter Line            PICC Line 04/18/22 Right Brachial <1 day          Peripheral Intravenous Line            Peripheral IV 04/15/22 Left Forearm 3 days    Peripheral IV 04/15/22 Left;Upper;Ventral (anterior) Arm 3 days    Peripheral IV 04/16/22 Dorsal (posterior); Left;Upper Arm 3 days          Drain            NG/OG/Enteral Tube Orogastric 14 Fr Left mouth 3 days    Urethral Catheter 16 Fr  3 days          Airway            ETT  Cuffed 8 mm 3 days              Can any invasive devices be discontinued today? No  ---------------------------------------------------------------------------------------  OBJECTIVE    Vitals   Vitals:    22 0530 22 0533 22 0600 22 0630   BP: 122/72  112/64 124/77   BP Location:       Pulse: 66  60 58   Resp: (!) 23  13 14   Temp: 97 5 °F (36 4 °C)  97 9 °F (36 6 °C) 97 9 °F (36 6 °C)   TempSrc:       SpO2: 100%  95% 98%   Weight:  90 2 kg (198 lb 13 7 oz)     Height:         Temp (24hrs), Av 6 °F (37 °C), Min:97 2 °F (36 2 °C), Max:101 8 °F (38 8 °C)  Current: Temperature: 97 9 °F (36 6 °C)    Respiratory:  SpO2: SpO2: 98 %       Invasive/non-invasive ventilation settings   Respiratory  Report   Lab Data (Last 4 hours)    None         O2/Vent Data (Last 4 hours)    None                Physical Exam  Vitals and nursing note reviewed  Constitutional:       General: She is not in acute distress  Appearance: She is well-developed  She is obese  She is ill-appearing  HENT:      Head: Normocephalic and atraumatic  Eyes:      Conjunctiva/sclera: Conjunctivae normal    Cardiovascular:      Rate and Rhythm: Normal rate and regular rhythm  Heart sounds: Normal heart sounds  No murmur heard  Pulmonary:      Effort: Pulmonary effort is normal  No respiratory distress  Breath sounds: Rhonchi present  Abdominal:      Palpations: Abdomen is soft  Tenderness: There is no abdominal tenderness  Comments: Diminished bowel sounds   Musculoskeletal:         General: Swelling present  Cervical back: Neck supple  Right lower leg: No edema  Left lower leg: No edema  Comments: Upper extremity swelling ( Lt>Rt)   Skin:     General: Skin is warm and dry  Coloration: Skin is not jaundiced or pale  Findings: No bruising or lesion  Neurological:      Mental Status: She is alert               Laboratory and Diagnostics:  Results from last 7 days   Lab Units 22  0551 22  0353 22  0413 04/15/22  1010 04/15/22  0443 04/14/22  0503 04/13/22  0539   WBC Thousand/uL 13 76* 11 46* 6 99 10 44* 9 83 6 83 10 64*   HEMOGLOBIN g/dL 8 8* 9 7* 10 8* 9 3* 9 0* 13 9 9 9*   HEMATOCRIT % 24 9* 29 0* 32 9* 28 0* 27 3* 43 2 30 1*   PLATELETS Thousands/uL 530* 420* 256 315 284 214 283   NEUTROS PCT %  --  73 86* 79*  --   --  81*   MONOS PCT %  --  7 5 8  --   --  8     Results from last 7 days   Lab Units 04/18/22  1624 04/18/22  0353 04/17/22  0439 04/16/22  2109 04/16/22  0413 04/15/22  0443 04/14/22  0503 04/13/22  2155 04/13/22  2155 04/13/22  0539 04/13/22  0539   SODIUM mmol/L 136 141 137  --  140 134* 140  --  140   < > 138   POTASSIUM mmol/L 3 9 3 4* 4 3 3 6 4 0 4 1 4 0   < > 3 2*   < > 3 3*   CHLORIDE mmol/L 101 104 102  --  104 99* 101  --  99*   < > 97*   CO2 mmol/L 26 29 29  --  31 26 33*  --  36*   < > 30   ANION GAP mmol/L 9 8 6  --  5 9 6  --  5   < > 11   BUN mg/dL 18 20 20  --  14 10 10  --  9   < > 6   CREATININE mg/dL 0 68 0 54* 0 61  --  0 44* 0 44* 0 57*  --  0 60   < > 0 48*   CALCIUM mg/dL 7 6* 8 8 8 4  --  8 5 8 3 8 2*  --  7 9*   < > 8 7   GLUCOSE RANDOM mg/dL 171* 161* 277*  --  164* 138 145*  --  157*   < > 124   ALT U/L  --   --   --   --   --  47 51  --   --   --  50   AST U/L  --   --   --   --   --  49* 54*  --   --   --  67*   ALK PHOS U/L  --   --   --   --   --  211* 252*  --   --   --  276*   ALBUMIN g/dL  --   --   --   --   --  2 1* 1 7*  --   --   --  1 9*  1 9*   TOTAL BILIRUBIN mg/dL  --   --   --   --   --  0 45 0 51  --   --   --  0 41    < > = values in this interval not displayed       Results from last 7 days   Lab Units 04/19/22  0551 04/18/22  0353 04/17/22  0439 04/16/22  2109 04/16/22  0413 04/14/22  0503 04/13/22  2155   MAGNESIUM mg/dL 2 0 2 3 2 6 2 3 2 2 2 5 1 8   PHOSPHORUS mg/dL  --  3 7  --   --  4 8* 3 7 3 4               Results from last 7 days   Lab Units 04/13/22  2340   LACTIC ACID mmol/L 0 8     ABG:    VBG:    Results from last 7 days   Lab Units 04/18/22  0353 04/15/22  0443 04/14/22  0025   PROCALCITONIN ng/ml 0 18 0 46* 0 64*       Micro  Results from last 7 days   Lab Units 04/18/22  1817 04/18/22  1027 04/13/22  2247 04/13/22  1017 04/12/22  1637   BLOOD CULTURE  Received in Microbiology Lab  Culture in Progress  Received in Microbiology Lab  Culture in Progress  --  No Growth After 4 Days  No Growth After 4 Days  --   --    SPUTUM CULTURE   --   --   --   --  2+ Growth of    GRAM STAIN RESULT   --  1+ Polys*  2+ Gram negative rods*  Rare Gram positive cocci in pairs*  --  1+ Polys  No organisms seen 3+ Polys*  1+ Gram positive cocci in pairs*   MRSA CULTURE ONLY   --   --  No Methicillin Resistant Staphlyococcus aureus (MRSA) isolated  --   --        EKG: Sinus bradycardia with HR 55BPM  Imaging:  I have personally reviewed pertinent reports  and I have personally reviewed pertinent films in PACS    Intake and Output  I/O       04/17 0701 04/18 0700 04/18 0701  04/19 0700 04/19 0701 04/20 0700    I V  (mL/kg) 1748 9 (18 9) 1590 9 (17 6)     NG/ 1251     IV Piggyback  100     Feedings 254 702     Total Intake(mL/kg) 2932 9 (31 7) 3643 9 (40 4)     Urine (mL/kg/hr) 3315 (1 5) 6960 (3 2)     Total Output 3315 6960     Net -382 1 -3316 1                  Height and Weights   Height: 5' 8" (172 7 cm)     Body mass index is 30 24 kg/m²  Weight (last 2 days)     Date/Time Weight    04/19/22 0533 90 2 (198 86)    04/18/22 0600 92 4 (203 71)    04/18/22 0500 92 4 (203 71)    04/17/22 0540 94 1 (207 45)            Nutrition       Diet Orders   (From admission, onward)             Start     Ordered    04/19/22 0649  Diet Enteral/Parenteral; Tube Feeding No Oral Diet; Jevity 1 2 Hira; Continuous; 45; Prosource Protein Liquid - Two Packets; 100;  Water; Every 4 hours  Diet effective now        References:    Nutrtion Support Algorithm Enteral vs  Parenteral   Question Answer Comment   Diet Type Enteral/Parenteral    Enteral/Parenteral Tube Feeding No Oral Diet    Tube Feeding Formula: Jevity 1 2 Hira    Bolus/Cyclic/Continuous Continuous    Tube Feeding Goal Rate (mL/hr): 45    Prosource Protein Liquid - No Carb Prosource Protein Liquid - Two Packets    Tube Feeding flush (mL): 100    Water Flush type: Water    Water flush frequency: Every 4 hours    RD to adjust diet per protocol? No        04/19/22 0648              TF currently running at 45 ml/hr   Formula: Jevity 1 2 Hira       Active Medications  Scheduled Meds:  Current Facility-Administered Medications   Medication Dose Route Frequency Provider Last Rate    acetaminophen  650 mg Oral Q6H PRN Brianna Rosen PA-C      chlorhexidine  15 mL [facility] [place], [address]      dexamethasone  4 mg Intravenous Q6H [address] Cristal TETE Collins      dexmedetomidine  0 1-1 5 mcg/kg/hr Intravenous Titrated Sher Monroe MD 0 9 mcg/kg/hr (04/19/22 2226)    enoxaparin  40 mg Subcutaneous Daily James Cavazos MD      fentaNYL  100 mcg/hr Intravenous Continuous TETE Rosales 100 mcg/hr (99/39/07 4542)    folic acid  1 mg Oral Daily James Cavazos MD      HYDROmorphone  1 mg Intravenous Q3H PRN TETE Vásquez      insulin lispro  4-20 Units Subcutaneous Q6H [address], TETE      ipratropium  0 5 mg Nebulization TID James Cavazos MD      levalbuterol  1 25 mg Nebulization TID James Cavazos MD      lidocaine  1 patch Topical Q24H James Cavazos MD      midazolam  2 mg/hr Intravenous Continuous TETE Rosales 2 mg/hr (04/18/22 2269)    midazolam  2 mg Intravenous Q2H PRN James Cavazos MD      multivitamin-minerals  1 tablet Oral Daily James Cavazos MD      nicotine  1 patch Transdermal Daily James Cavazos MD      omeprazole (PRILOSEC) suspension 2 mg/mL  20 mg Oral Daily Missy Salinas PA-C      ondansetron  4 mg Intravenous Q6H PRN James Cavazos MD      piperacillin-tazobactam (ZOSYN) 3 375 g (EXTENDED-INFUSION)  3 375 g Intravenous Chucho Gomez MD 3 375 g (04/19/22 6079)    polyethylene glycol  17 g Oral Daily Mehrdad Chaudhari MD      propofol  5-50 mcg/kg/min (Adjusted) Intravenous Titrated Mehrdad Chaudhari MD 50 mcg/kg/min (04/19/22 3920)    senna-docusate sodium  2 tablet Per NG Tube HS Maxx DianacamilleTETE      sodium chloride  4 mL Nebulization TID Jonnie Gillette MD      thiamine  100 mg Oral Daily Mehrdad Chaudhari MD       Continuous Infusions:  dexmedetomidine, 0 1-1 5 mcg/kg/hr, Last Rate: 0 9 mcg/kg/hr (04/19/22 2363)  fentaNYL, 100 mcg/hr, Last Rate: 100 mcg/hr (04/19/22 3031)  midazolam, 2 mg/hr, Last Rate: 2 mg/hr (04/18/22 1335)  propofol, 5-50 mcg/kg/min (Adjusted), Last Rate: 50 mcg/kg/min (04/19/22 3217)      PRN Meds:   acetaminophen, 650 mg, Q6H PRN  HYDROmorphone, 1 mg, Q3H PRN  midazolam, 2 mg, Q2H PRN  ondansetron, 4 mg, Q6H PRN        Allergies   Allergies   Allergen Reactions    Latex Rash    Neomycin-Bacitracin Zn-Polymyx Rash     ---------------------------------------------------------------------------------------  Advance Directive and Living Will:      Power of :    POLST:    ---------------------------------------------------------------------------------------  Care Time Delivered:   No Critical Care time spent     May Lilli Alan MD      Portions of the record may have been created with voice recognition software  Occasional wrong word or "sound a like" substitutions may have occurred due to the inherent limitations of voice recognition software    Read the chart carefully and recognize, using context, where substitutions have occurred

## 2022-04-19 NOTE — CONSULTS
Consultation - Infectious Disease   Francisca Flores 60 y o  female MRN: [id number]  Unit/Bed#: ICU 11 Encounter: [id number]    IMPRESSION & RECOMMENDATIONS:   1  SIRS vs sepsis  Evolving since admission  Fever, tachycardia, tachypnea, leukocytosis  Concern this may be related to a developing peripancreatic abscess as worsening fluid and debris are noted on CT imaging  Consider respiratory source as patient has had significant secretions before successful extubation  Cultures obtained during 2 bronchoscopies only grew mixed maia  No other clear source appreciated  Previous blood cultures were negative  New blood cultures are pending  UA was unremarkable  Recent NIKO was a technically difficult study but showed no evidence of valvular vegetation  At this time the patient is clinically stable and does not require pressor support  She has been extubated  She is receiving IV Zosyn which she appears to be tolerating without difficulty  Recommend having IR evaluate imaging to see if peripancreatic fluid can be aspirated for culture  Given patient's stability I will hold additional antibiotic for now while we await plan   -stop IV zosyn  -monitor patient off antibiotics  -recommend IR evaluation of peripancreatic fluid for aspiration/culture  -check CBCD and CMP tomorrow  -follow up blood cultures  -monitor vitals  -supportive care    2  Alcohol induced pancreatitis  With concern for developing peripancreatic abscess  Patient admits to heavy alcohol use  Lipase upon admission was 53,700  I personally reviewed her abdominal CT and ultrasound imaging  Patient initially showing peripancreatic inflammation with some fluid tracking posteriorly to the spleen  Now with new CT imaging from earlier today which is concerning for ongoing necrosis and increased fluid/debris suggesting phlegmon/abscess  Recommend IR evaluation for possible aspiration/culture   General surgery is following   -antibiotic as above  -check CBCD and CMP Pt admitted to  8AB10 via cheelchair from ED. Complaints:chest pain with ETOH detox  IV in right antecubital, connected to tubing and pump. Not infusing. IV site free of s/s of infection or infiltration. Vital signs obtained. Oriented to room. Policies and procedures for 8AB explained. All questions answered with no further questions at this time. Fall prevention and safety brochure discussed with patient. Bed alarm on. Call light in reach. Oriented to room. Explained patients right to have family, representative or physician notified of their admission. Patient has declined for physician to be notified. Patient has declined for family/representative to be notified. Patient would like family notified once per shift?  No. tomorrow  -trend lipase per critical care team  -serial abdominal exams  -monitor GI symptoms  -recommend IR evaluation for aspiration/culture    3  Acute hypoxic respiratory failure  In setting of recent encephalopathy during alcohol detox, and volume overload  Patient with some lung base collapse on previous chest imaging  She's had significant secretions  I personally reviewed her most recent chest xray which showed a stable right perihilar infiltrate, a left lower lobs consolidation vs effusion, and resolution of previous small effusions  She has undergone bronchoscopy x2  While significant secretions were noted she only grew mixed maia on her cultures  She completed 5-days of broad spectrum antibiotic treatment  The patient was successfully extubated today and her O2 saturation remains stable on mid-flow nasal cannula  -monitor vitals  -monitor respiratory status  -O2 support per critical care team    4  Alcohol abuse  Patient previously on the detox unit  She experienced delirium tremens  Now extubated and feeling anxious requiring ativan  Patient already speaking about her drinking and how she needs to stop immediately  Recommend close monitoring by medical toxicology team  Consider transition back to detox unit as needed if medically stable  Recommend offering support/resources before discharge for ongoing care  -monitor for withdraw  -recommend ongoing follow up by medical toxicology     5  Obesity  BMI = 30 24  Above plan was discussed in detail with patient at the bedside  Above plan was discussed in detail with critical care team     HISTORY OF PRESENT ILLNESS:  Reason for Consult: SIRS  HPI: Francisca Randolph is a 60y o  year old female presented to the [facility] Emergency Department on 04/05/2022 with abdominal pain and vomiting  Patient reported a history of pancreatitis and alcohol abuse  She reported this pain was similar to her previous episode of pancreatitis      Upon arrival to the ED the patient's temperature was 97 3° F  Heart rate was 75  Respiratory was 19  O2 saturation was 93% on room air  BP was 145/88  Patient's WBC was 12 04  Lactic acid was 3 1  Creatinine was 0 88 with GFR = 72  COVID-19/flu/RSV PCR was negative  UA was benign  Lipase was >53,000  She completed a CT of the abdomen/pelvis which showed acute pancreatitis with fluid tracking posteriorly adjacent to spleen  She was admitted for additional medical management  After patient's admission she was started on IV fluids  Gastroenterology was consulted  She experience worsening right upper quadrant pain and underwent a right upper quad ultrasound  Slight gallbladder wall thickening was noted  Her WBC count was trending  Procalcitonin was negative  On 04/07/2021 she experience respiratory distress, hypoxia, and tachycardia  She was 6 L positive  She received Lasix  There was concern for alcohol withdrawal   Medical toxicology was consulted and medications were adjusted  She was then transferred to the [facility] detox [facility]  After her transfer to [facility] she was reassessed by Gastroenterology  She was made NPO with a focus on pain control and acid suppression  She experienced agitation and required a Precedex drip  Patient then experienced worsening delirium and hypoxia  She required intubation and was transferred to [place] [address]  After arriving at [place] [address] she remained on mechanical ventilation  She had a repeat CT of the abdomen/pelvis which showed worsening acute pancreatitis with an area of new necrosis and increased peripancreatic edema  General surgery assessed  No plan for surgical intervention at that time  Patient unable to wean from vent  Repeat chest imaging showed collapsed lung bases  She underwent bronchoscopy  Sputum culture grew mixed respiratory maia  Thick secretions were noted    Patient later became hypotensive and had a significantly elevated lactic acid  She received IV fluid  An MRI of the head showed no acute pathology  She was started on broad-spectrum antibiotics and pressor support  Patient required 2nd bronchoscopy and thick excessive secretions were noted again  Again sputum culture grew mixed respiratory maia  She failed extubation attempt and required re-intubation on 04/15/2022  She was started on Decadron for airway edema  There was concern for Wernicke's encephalopathy and she was started on high dose Thiamine  She finished five days of antibiotic treatment  Pressor support was weaned off  Now patient has developed new fevers  She was started on Zosyn  New blood cultures are pending  She had a CT of the abdomen/pelvis  There is concern for a peripancreatic phlegmon as fluid and debris are noted in the peripancreatic region extending into the lesser sac, pericolic gutter, and posterior aspect the left perinephric space  She continues to have significant respiratory secretions but was extubated this afternoon  We have been asked for formal consult for SIRS  Patient has hypertension, high cholesterol, alcohol abuse  She has a history of hypokalemia, pancreatitis, and foot surgery  She smokes cigarettes and E cigarettes  She has allergies to latex and neomycin-bacitracin-polymyxin  REVIEW OF SYSTEMS:  ROS limited as patient is just waking up after her extubation  She is reporting a dry mouth and would like something to drink  She denies difficulty breathing but feels congested, believes she needs to cough out a lot of phlegm  I showed her how to use yankaur suction and she was able to bring out some thin clear/white secretions with deep breathing  She is reporting some abdominal pain but can't describe or identify which areas hurt the most  She denies nausea  A complete 12 point system-based review of systems is otherwise negative      PAST MEDICAL HISTORY:  Past Medical History: Diagnosis Date    Alcohol abuse     High cholesterol     Hypertension     Hypokalemia 2022    Pancreatitis      Past Surgical History:   Procedure Laterality Date    FOOT SURGERY       FAMILY HISTORY:  Non-contributory    SOCIAL HISTORY:  Social History   Social History     Substance and Sexual Activity   Alcohol Use Yes    Alcohol/week: 12 0 standard drinks    Types: 12 Shots of liquor per week    Comment: daily 4-8 whiskey drinks (doubles)     Social History     Substance and Sexual Activity   Drug Use Never     Social History     Tobacco Use   Smoking Status Current Every Day Smoker    Types: Cigarettes    Last attempt to quit: 2017    Years since quittin 2   Smokeless Tobacco Never Used   Tobacco Comment    vape     ALLERGIES:  Allergies   Allergen Reactions    Latex Rash    Neomycin-Bacitracin Zn-Polymyx Rash     MEDICATIONS:  All current active medications have been reviewed  ANTIBIOTICS:  Zosyn 2  Antibiotics 2 (plus 5 days antibiotics earlier in hospitalization)    PHYSICAL EXAM:  Temp:  [97 2 °F (36 2 °C)-101 8 °F (38 8 °C)] 100 °F (37 8 °C)  HR:  [] 120  Resp:  [13-28] 26  BP: ()/(52-97) 157/85  SpO2:  [86 %-100 %] 86 %  Temp (24hrs), Av 5 °F (36 9 °C), Min:97 2 °F (36 2 °C), Max:101 8 °F (38 8 °C)  Current: Temperature: 100 °F (37 8 °C)    Intake/Output Summary (Last 24 hours) at 2022 1348  Last data filed at 2022 1255  Gross per 24 hour   Intake 3613 28 ml   Output 5140 ml   Net -1526 72 ml     General Appearance:  Appearing anxious, ill, somewhat disoriented  She is cooperative  She appears comfortable sitting up in bed  Head:  Normocephalic, without obvious abnormality, atraumatic  Eyes:  Conjunctiva pink and sclera anicteric, both eyes  Nose: Nares normal, mucosa normal, no drainage  Throat: Oropharynx moist without lesions  Neck: Supple, symmetrical, no adenopathy, no tenderness/mass/nodules     Back:   ROM normal, she is able to push herself up and reposition her seated position, no CVA tenderness  Lungs:   Coarse throughout upper lobes to auscultation bilaterally, respirations unlabored on mid-flow nasal cannula O2  Chest Wall:  No tenderness or deformity  Heart:  Tachycardic; no murmur, rub or gallop  Abdomen:   Soft, non-distended, hypoactive bowel sounds throughout  She reports tenderness as I palpated the upper abdominal region, both sides  Extremities: No cyanosis, clubbing or edema of the B/L LE  Venodynes on  B/L UE with edema, no overlying erythema  Skin: No rashes, lesions, or draining wounds noted on exposed skin  Lymph nodes: Cervical, supraclavicular nodes normal    Neurologic: Alert, oriented to person, I did inform her she was now in [place] and she was able to recall that she started at [facility]  She follows commands, has equal hand grasps, and symmetric facial movement  Speech is a whisper but thoughts are organized and sentences are appropriate and on topic  LABS, IMAGING, & OTHER STUDIES:  Lab Results:  I have personally reviewed pertinent labs  Results from last 7 days   Lab Units 04/19/22  0551 04/18/22  0353 04/16/22  0413   WBC Thousand/uL 13 76* 11 46* 6 99   HEMOGLOBIN g/dL 8 8* 9 7* 10 8*   PLATELETS Thousands/uL 530* 420* 256     Results from last 7 days   Lab Units 04/19/22  0551 04/16/22  0413 04/15/22  0443 04/14/22  0503 04/14/22  0503   POTASSIUM mmol/L 3 8   < > 4 1   < > 4 0   CHLORIDE mmol/L 102   < > 99*   < > 101   CO2 mmol/L 25   < > 26   < > 33*   BUN mg/dL 16   < > 10   < > 10   CREATININE mg/dL 0 57*   < > 0 44*   < > 0 57*   EGFR ml/min/1 73sq m 102   < > 111   < > 102   CALCIUM mg/dL 8 0*   < > 8 3   < > 8 2*   AST U/L 60*  --  49*  --  54*   ALT U/L 190*  --  47   < > 51   ALK PHOS U/L 153*  --  211*   < > 252*    < > = values in this interval not displayed       Results from last 7 days   Lab Units 04/18/22  1817 04/18/22  1027 04/13/22  2247 04/13/22  1017 04/12/22  1637   BLOOD CULTURE  Received in Microbiology Lab  Culture in Progress  Received in Microbiology Lab  Culture in Progress  --  No Growth After 5 Days  No Growth After 5 Days  --   --    SPUTUM CULTURE   --  2+ Growth of   --   --  2+ Growth of    GRAM STAIN RESULT   --  1+ Polys*  2+ Gram negative rods*  Rare Gram positive cocci in pairs*  --  1+ Polys  No organisms seen 3+ Polys*  1+ Gram positive cocci in pairs*   MRSA CULTURE ONLY   --   --  No Methicillin Resistant Staphlyococcus aureus (MRSA) isolated  --   --      Results from last 7 days   Lab Units 04/18/22  0353 04/15/22  0443 04/14/22  0025   PROCALCITONIN ng/ml 0 18 0 46* 0 64*     Imaging Studies:   I have personally reviewed pertinent imaging study reports and images in PACS  CT ABDOMEN PELVIS W CONTRAST 4/19/2022 - I personally reviewed this image which showed peripancreatic fluid with increased debris extending posteriorly  Concern for developing phelgmon/abscess  There is area of pancreatic necrosis measuring 3 7cm  XR CHEST PORTABLE ICU 4/18/2022 - I personally reviewed this image which showed resolution of previous effusions  Patient with L basilar consolidation vs effusion and stable R perihilar infiltrations  Other Studies:   Repeat blood cultures are pending  I have personally reviewed pertinent reports:    04/05/2022 0421 04/05/2022 0515 COVID/FLU/RSV [[id number]]    Nares from Nose    Final result [facility] PATIENTS - copy/paste COVID Guidelines URL to browser: [email or web address]  [employer]   SARS-CoV-2 assay is a Nucleic Acid Amplification assay intended for the   qualitative detection of nucleic acid from SARS-CoV-2 in nasopharyngeal   swabs  Results are for the presumptive identification of SARS-CoV-2 RNA     Positive results are indicative of infection with SARS-CoV-2, the virus   causing COVID-19, but do not rule out bacterial infection or co-infection   with other viruses  Laboratories within the [place] and its   territories are required to report all positive results to the appropriate   public health authorities  Negative results do not preclude SARS-CoV-2   infection and should not be used as the sole basis for treatment or other   patient management decisions  Negative results must be combined with   clinical observations, patient history, and epidemiological information  This test has not been FDA cleared or approved  This test has been authorized by FDA under an Emergency Use Authorization   (EUA)  This test is only authorized for the duration of time the   declaration that circumstances exist justifying the authorization of the   emergency use of an in vitro diagnostic tests for detection of SARS-CoV-2   virus and/or diagnosis of COVID-19 infection under section 564(b)(1) of   the Act, 21 U  S C  787CVW-8(P)(8), unless the authorization is terminated   or revoked sooner  The test has been validated but independent review by FDA   and CLIA is pending  Test performed using [employer] Gene[employer]pert: This RT-PCR assay targets N2,   a region unique to SARS-CoV-2  A conserved region in the E-gene was chosen   for pan-Sarbecovirus detection which includes SARS-CoV-2      Component Value   SARS-CoV-2 Negative   INFLUENZA A PCR Negative   INFLUENZA B PCR Negative   RSV PCR Negative

## 2022-04-19 NOTE — ASSESSMENT & PLAN NOTE
Patient acute hypoxic on admission, was given dose of Lasix with marked improvement  However, this morning patient started to become more agitated, hypoxic and tachycardic unable to tolerate BiPAP, patient was intubated for airway protection in the setting of encephalopathy and potential DTs  Patient was extubated on 4/14, however failed BiPAP, eventually requiring re-intubation  The patient cleared significant amount of secretions while she was extubated  Patient had repeat bronchoscopy 4/15 which showed decrease secretions  Successfully extubated to 15L/min mid flow on 4/19  Receiving 40L/min HFNC overnight due to desats to mid 80s       · Continue monitor patient's secretions   · Continue 40L/min HFNC  · CXR appears improved, less right hilar region consolidation  · Recheck CT C/A/P with contrast on 4/18 showed stable focal area of pancreatic necrosis in the neck of the pancreas, no well encapsulated fluid collection with air-fluid level  · Preliminary result of sputum culture yesterday (4/18) showed gram negative rods  · ID consult appreciated, D/C zosyn  · IR and Gen Sx follow up appreciated

## 2022-04-19 NOTE — ASSESSMENT & PLAN NOTE
Patient had elevated temp at 1:03 a m , tachycardia and hypotension yesterday requiring IV fluids and eventually pressors  Patient was also started on broad-spectrum antibiotics and started on Precedex for tachycardia  Patient's presentation is unlikely to be secondary to an infectious process given down trending leukocytosis, stable procalcitonin, cool extremities, and no identifiable source  SIRS response likely secondary to bronchoscopy versus decreased ECF  · Patient is off pressors  · Completed 5 days of cefepime (04/13/22-4/17/22) and  3 days of vancomycin ( 04/13/22-04/15/22)  · Procalcitonin is down trending  · Cultures are still negative to date    · Stable leukocytosis and fever up to 100°F on 4/20/22 between 3-5 am

## 2022-04-19 NOTE — ASSESSMENT & PLAN NOTE
Patient drinks roughly 8-12 shots of whiskey daily  Patient's last drink was April 1, 2022  EtoH level on admission was 54  Has never had any history withdrawals or seizures  Patient was initially admitted for alcoholic pancreatitis, however, patient became encephalopathic and agitated requiring transfer to detox Unit

## 2022-04-19 NOTE — PLAN OF CARE
Problem: MOBILITY - ADULT  Goal: Maintain or return to baseline ADL function  Description: INTERVENTIONS:  -  Assess patient's ability to carry out ADLs; assess patient's baseline for ADL function and identify physical deficits which impact ability to perform ADLs (bathing, care of mouth/teeth, toileting, grooming, dressing, etc )  - Assess/evaluate cause of self-care deficits   - Assess range of motion  - Assess patient's mobility; develop plan if impaired  - Assess patient's need for assistive devices and provide as appropriate  - Encourage maximum independence but intervene and supervise when necessary  - Involve family in performance of ADLs  - Assess for home care needs following discharge   - Consider OT consult to assist with ADL evaluation and planning for discharge  - Provide patient education as appropriate  Outcome: Progressing  Goal: Maintains/Returns to pre admission functional level  Description: INTERVENTIONS:  - Perform BMAT or MOVE assessment daily    - Set and communicate daily mobility goal to care team and patient/family/caregiver     - Collaborate with rehabilitation services on mobility goals if consulted  - Out of bed for toileting  - Record patient progress and toleration of activity level   Outcome: Progressing     Problem: Potential for Falls  Goal: Patient will remain free of falls  Description: INTERVENTIONS:  - Educate patient/family on patient safety including physical limitations  - Instruct patient to call for assistance with activity   - Consult OT/PT to assist with strengthening/mobility   - Keep Call bell within reach  - Keep bed low and locked with side rails adjusted as appropriate  - Keep care items and personal belongings within reach  - Initiate and maintain comfort rounds  - Make Fall Risk Sign visible to staff  - Apply yellow socks and bracelet for high fall risk patients  - Consider moving patient to room near nurses station  Outcome: Progressing     Problem: Prexisting or High Potential for Compromised Skin Integrity  Goal: Skin integrity is maintained or improved  Description: INTERVENTIONS:  - Identify patients at risk for skin breakdown  - Assess and monitor skin integrity  - Assess and monitor nutrition and hydration status  - Monitor labs   - Assess for incontinence   - Turn and reposition patient  - Assist with mobility/ambulation  - Relieve pressure over bony prominences  - Avoid friction and shearing  - Provide appropriate hygiene as needed including keeping skin clean and dry  - Evaluate need for skin moisturizer/barrier cream  - Collaborate with interdisciplinary team   - Patient/family teaching  - Consider wound care consult   Outcome: Progressing     Problem: DISCHARGE PLANNING - CARE MANAGEMENT  Goal: Discharge to post-acute care or home with appropriate resources  Description: INTERVENTIONS:  - Conduct assessment to determine patient/family and health care team treatment goals, and need for post-acute services based on payer coverage, community resources, and patient preferences, and barriers to discharge  - Address psychosocial, clinical, and financial barriers to discharge as identified in assessment in conjunction with the patient/family and health care team  - Arrange appropriate level of post-acute services according to patients   needs and preference and payer coverage in collaboration with the physician and health care team  - Communicate with and update the patient/family, physician, and health care team regarding progress on the discharge plan  - Arrange appropriate transportation to post-acute venues  Outcome: Progressing     Problem: SUBSTANCE USE/ABUSE  Goal: By discharge, will develop insight into their chemical dependency and sustain motivation to continue in recovery  Description: INTERVENTIONS:  - Attends all daily group sessions and scheduled AA groups  - Actively practices coping skills through participation in the therapeutic community and adherence to program rules  - Reviews and completes assignments from individual treatment plan  - Assist patient development of understanding of their personal cycle of addiction and relapse triggers  Outcome: Progressing  Goal: By discharge, patient will have ongoing treatment plan addressing chemical dependency  Description: INTERVENTIONS:  - Assist patient with resources and/or appointments for ongoing recovery based living  Outcome: Progressing     Problem: Nutrition/Hydration-ADULT  Goal: Nutrient/Hydration intake appropriate for improving, restoring or maintaining nutritional needs  Description: Monitor and assess patient's nutrition/hydration status for malnutrition  Collaborate with interdisciplinary team and initiate plan and interventions as ordered  Monitor patient's weight and dietary intake as ordered or per policy  Utilize nutrition screening tool and intervene as necessary  Determine patient's food preferences and provide high-protein, high-caloric foods as appropriate       INTERVENTIONS:  - Monitor oral intake, urinary output, labs, and treatment plans  - Assess nutrition and hydration status and recommend course of action  - Evaluate amount of meals eaten  - Assist patient with eating if necessary   - Allow adequate time for meals  - Recommend/ encourage appropriate diets, oral nutritional supplements, and vitamin/mineral supplements  - Order, calculate, and assess calorie counts as needed  - Recommend, monitor, and adjust tube feedings and TPN/PPN based on assessed needs  - Assess need for intravenous fluids  - Provide specific nutrition/hydration education as appropriate  - Include patient/family/caregiver in decisions related to nutrition  Outcome: Progressing     Problem: PAIN - ADULT  Goal: Verbalizes/displays adequate comfort level or baseline comfort level  Description: Interventions:  - Encourage patient to monitor pain and request assistance  - Assess pain using appropriate pain scale  - Administer analgesics based on type and severity of pain and evaluate response  - Implement non-pharmacological measures as appropriate and evaluate response  - Consider cultural and social influences on pain and pain management  - Notify physician/advanced practitioner if interventions unsuccessful or patient reports new pain  Outcome: Progressing     Problem: INFECTION - ADULT  Goal: Absence or prevention of progression during hospitalization  Description: INTERVENTIONS:  - Assess and monitor for signs and symptoms of infection  - Monitor lab/diagnostic results  - Monitor all insertion sites, i e  indwelling lines, tubes, and drains  - Monitor endotracheal if appropriate and nasal secretions for changes in amount and color  - [place] appropriate cooling/warming therapies per order  - Administer medications as ordered  - Instruct and encourage patient and family to use good hand hygiene technique  - Identify and instruct in appropriate isolation precautions for identified infection/condition  Outcome: Progressing     Problem: SAFETY ADULT  Goal: Maintain or return to baseline ADL function  Description: INTERVENTIONS:  -  Assess patient's ability to carry out ADLs; assess patient's baseline for ADL function and identify physical deficits which impact ability to perform ADLs (bathing, care of mouth/teeth, toileting, grooming, dressing, etc )  - Assess/evaluate cause of self-care deficits   - Assess range of motion  - Assess patient's mobility; develop plan if impaired  - Assess patient's need for assistive devices and provide as appropriate  - Encourage maximum independence but intervene and supervise when necessary  - Involve family in performance of ADLs  - Assess for home care needs following discharge   - Consider OT consult to assist with ADL evaluation and planning for discharge  - Provide patient education as appropriate  Outcome: Progressing  Goal: Maintains/Returns to pre admission functional level  Description: INTERVENTIONS:  - Perform BMAT or MOVE assessment daily    - Set and communicate daily mobility goal to care team and patient/family/caregiver  - Collaborate with rehabilitation services on mobility goals if consulted  - Out of bed for toileting  - Record patient progress and toleration of activity level   Outcome: Progressing  Goal: Patient will remain free of falls  Description: INTERVENTIONS:  - Educate patient/family on patient safety including physical limitations  - Instruct patient to call for assistance with activity   - Consult OT/PT to assist with strengthening/mobility   - Keep Call bell within reach  - Keep bed low and locked with side rails adjusted as appropriate  - Keep care items and personal belongings within reach  - Initiate and maintain comfort rounds  - Make Fall Risk Sign visible to staff  - Apply yellow socks and bracelet for high fall risk patients  - Consider moving patient to room near nurses station  Outcome: Progressing     Problem: DISCHARGE PLANNING  Goal: Discharge to home or other facility with appropriate resources  Description: INTERVENTIONS:  - Identify barriers to discharge w/patient and caregiver  - Arrange for needed discharge resources and transportation as appropriate  - Identify discharge learning needs (meds, wound care, etc )  - Arrange for interpretive services to assist at discharge as needed  - Refer to Case Management Department for coordinating discharge planning if the patient needs post-hospital services based on physician/advanced practitioner order or complex needs related to functional status, cognitive ability, or social support system  Outcome: Progressing     Problem: Knowledge Deficit  Goal: Patient/family/caregiver demonstrates understanding of disease process, treatment plan, medications, and discharge instructions  Description: Complete learning assessment and assess knowledge base    Interventions:  - Provide teaching at level of understanding  - Provide teaching via preferred learning methods  Outcome: Progressing     Problem: NEUROSENSORY - ADULT  Goal: Achieves stable or improved neurological status  Description: INTERVENTIONS  - Monitor and report changes in neurological status  - Monitor vital signs such as temperature, blood pressure, glucose, and any other labs ordered   - Initiate measures to prevent increased intracranial pressure  - Monitor for seizure activity and implement precautions if appropriate      Outcome: Progressing  Goal: Remains free of injury related to seizures activity  Description: INTERVENTIONS  - Maintain airway, patient safety  and administer oxygen as ordered  - Monitor patient for seizure activity, document and report duration and description of seizure to physician/advanced practitioner  - If seizure occurs,  ensure patient safety during seizure  - Reorient patient post seizure  - Seizure pads on all 4 side rails  - Instruct patient/family to notify RN of any seizure activity including if an aura is experienced  - Instruct patient/family to call for assistance with activity based on nursing assessment  - Administer anti-seizure medications if ordered    Outcome: Progressing  Goal: Achieves maximal functionality and self care  Description: INTERVENTIONS  - Monitor swallowing and airway patency with patient fatigue and changes in neurological status  - Encourage and assist patient to increase activity and self care     - Encourage visually impaired, hearing impaired and aphasic patients to use assistive/communication devices  Outcome: Progressing     Problem: CARDIOVASCULAR - ADULT  Goal: Maintains optimal cardiac output and hemodynamic stability  Description: INTERVENTIONS:  - Monitor I/O, vital signs and rhythm  - Monitor for S/S and trends of decreased cardiac output  - Administer and titrate ordered vasoactive medications to optimize hemodynamic stability  - Assess quality of pulses, skin color and temperature  - Assess for signs of decreased coronary artery perfusion  - Instruct patient to report change in severity of symptoms  Outcome: Progressing  Goal: Absence of cardiac dysrhythmias or at baseline rhythm  Description: INTERVENTIONS:  - Continuous cardiac monitoring, vital signs, obtain 12 lead EKG if ordered  - Administer antiarrhythmic and heart rate control medications as ordered  - Monitor electrolytes and administer replacement therapy as ordered  Outcome: Progressing     Problem: RESPIRATORY - ADULT  Goal: Achieves optimal ventilation and oxygenation  Description: INTERVENTIONS:  - Assess for changes in respiratory status  - Assess for changes in mentation and behavior  - Position to facilitate oxygenation and minimize respiratory effort  - Oxygen administered by appropriate delivery if ordered  - Initiate smoking cessation education as indicated  - Encourage broncho-pulmonary hygiene including cough, deep breathe, Incentive Spirometry  - Assess the need for suctioning and aspirate as needed  - Assess and instruct to report SOB or any respiratory difficulty  - Respiratory Therapy support as indicated  Outcome: Progressing     Problem: GASTROINTESTINAL - ADULT  Goal: Minimal or absence of nausea and/or vomiting  Description: INTERVENTIONS:  - Administer IV fluids if ordered to ensure adequate hydration  - Maintain NPO status until nausea and vomiting are resolved  - Nasogastric tube if ordered  - Administer ordered antiemetic medications as needed  - Provide nonpharmacologic comfort measures as appropriate  - Advance diet as tolerated, if ordered  - Consider nutrition services referral to assist patient with adequate nutrition and appropriate food choices  Outcome: Progressing  Goal: Maintains or returns to baseline bowel function  Description: INTERVENTIONS:  - Assess bowel function  - Encourage oral fluids to ensure adequate hydration  - Administer IV fluids if ordered to ensure adequate hydration  - Administer ordered medications as needed  - Encourage mobilization and activity  - Consider nutritional services referral to assist patient with adequate nutrition and appropriate food choices  Outcome: Progressing  Goal: Maintains adequate nutritional intake  Description: INTERVENTIONS:  - Monitor percentage of each meal consumed  - Identify factors contributing to decreased intake, treat as appropriate  - Assist with meals as needed  - Monitor I&O, weight, and lab values if indicated  - Obtain nutrition services referral as needed  Outcome: Progressing  Goal: Oral mucous membranes remain intact  Description: INTERVENTIONS  - Assess oral mucosa and hygiene practices  - Implement preventative oral hygiene regimen  - Implement oral medicated treatments as ordered  - Initiate Nutrition services referral as needed  Outcome: Progressing     Problem: GENITOURINARY - ADULT  Goal: Maintains or returns to baseline urinary function  Description: INTERVENTIONS:  - Assess urinary function  - Encourage oral fluids to ensure adequate hydration if ordered  - Administer IV fluids as ordered to ensure adequate hydration  - Administer ordered medications as needed  - Offer frequent toileting  - Follow urinary retention protocol if ordered  Outcome: Progressing  Goal: Absence of urinary retention  Description: INTERVENTIONS:  - Assess patients ability to void and empty bladder  - Monitor I/O  - Bladder scan as needed  - Discuss with physician/AP medications to alleviate retention as needed  - Discuss catheterization for long term situations as appropriate  Outcome: Progressing  Goal: Urinary catheter remains patent  Description: INTERVENTIONS:  - Assess patency of urinary catheter  - If patient has a chronic vazquez, consider changing catheter if non-functioning  - Follow guidelines for intermittent irrigation of non-functioning urinary catheter  Outcome: Progressing     Problem: METABOLIC, FLUID AND ELECTROLYTES - ADULT  Goal: Electrolytes maintained within normal limits  Description: INTERVENTIONS:  - Monitor labs and assess patient for signs and symptoms of electrolyte imbalances  - Administer electrolyte replacement as ordered  - Monitor response to electrolyte replacements, including repeat lab results as appropriate  - Instruct patient on fluid and nutrition as appropriate  Outcome: Progressing  Goal: Fluid balance maintained  Description: INTERVENTIONS:  - Monitor labs   - Monitor I/O and WT  - Instruct patient on fluid and nutrition as appropriate  - Assess for signs & symptoms of volume excess or deficit  Outcome: Progressing     Problem: SKIN/TISSUE INTEGRITY - ADULT  Goal: Skin Integrity remains intact(Skin Breakdown Prevention)  Description: Assess:  -Inspect skin when repositioning, toileting, and assisting with ADLS  -Assess extremities for adequate circulation and sensation     Bed Management:  -Have minimal linens on bed & keep smooth, unwrinkled  -Change linens as needed when moist or perspiring    Toileting:  -Offer bedside commode    Activity:  -Encourage activity and walks on unit  -Encourage or provide ROM exercises     Skin Care:  -Avoid use of baby powder, tape, friction and shearing, hot water or constrictive clothing  -Do not massage red bony areas    Outcome: Progressing     Problem: SAFETY,RESTRAINT: NV/NON-SELF DESTRUCTIVE BEHAVIOR  Goal: Remains free of harm/injury (restraint for non violent/non self-detsructive behavior)  Description: INTERVENTIONS:  - Instruct patient/family regarding restraint use   - Assess and monitor physiologic and psychological status   - Provide interventions and comfort measures to meet assessed patient needs   - Identify and implement measures to help patient regain control  - Assess readiness for release of restraint   Outcome: Progressing  Goal: Returns to optimal restraint-free functioning  Description: INTERVENTIONS:  - Assess the patient's behavior and symptoms that indicate continued need for restraint  - Identify and implement measures to help patient regain control  - Assess readiness for release of restraint   Outcome: Progressing

## 2022-04-19 NOTE — QUICK NOTE
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

## 2022-04-19 NOTE — PLAN OF CARE
Problem: DISCHARGE PLANNING - CARE MANAGEMENT  Goal: Discharge to post-acute care or home with appropriate resources  Description: INTERVENTIONS:  - Conduct assessment to determine patient/family and health care team treatment goals, and need for post-acute services based on payer coverage, community resources, and patient preferences, and barriers to discharge  - Address psychosocial, clinical, and financial barriers to discharge as identified in assessment in conjunction with the patient/family and health care team  - Arrange appropriate level of post-acute services according to patients   needs and preference and payer coverage in collaboration with the physician and health care team  - Communicate with and update the patient/family, physician, and health care team regarding progress on the discharge plan  - Arrange appropriate transportation to post-acute venues  Outcome: Progressing     Problem: SUBSTANCE USE/ABUSE  Goal: By discharge, will develop insight into their chemical dependency and sustain motivation to continue in recovery  Description: INTERVENTIONS:  - Attends all daily group sessions and scheduled AA groups  - Actively practices coping skills through participation in the therapeutic community and adherence to program rules  - Reviews and completes assignments from individual treatment plan  - Assist patient development of understanding of their personal cycle of addiction and relapse triggers  Outcome: Progressing     Problem: Nutrition/Hydration-ADULT  Goal: Nutrient/Hydration intake appropriate for improving, restoring or maintaining nutritional needs  Description: Monitor and assess patient's nutrition/hydration status for malnutrition  Collaborate with interdisciplinary team and initiate plan and interventions as ordered  Monitor patient's weight and dietary intake as ordered or per policy  Utilize nutrition screening tool and intervene as necessary   Determine patient's food preferences and provide high-protein, high-caloric foods as appropriate       INTERVENTIONS:  - Monitor oral intake, urinary output, labs, and treatment plans  - Assess nutrition and hydration status and recommend course of action  - Evaluate amount of meals eaten  - Assist patient with eating if necessary   - Allow adequate time for meals  - Recommend/ encourage appropriate diets, oral nutritional supplements, and vitamin/mineral supplements  - Order, calculate, and assess calorie counts as needed  - Recommend, monitor, and adjust tube feedings and TPN/PPN based on assessed needs  - Assess need for intravenous fluids  - Provide specific nutrition/hydration education as appropriate  - Include patient/family/caregiver in decisions related to nutrition  Outcome: Progressing     Problem: PAIN - ADULT  Goal: Verbalizes/displays adequate comfort level or baseline comfort level  Description: Interventions:  - Encourage patient to monitor pain and request assistance  - Assess pain using appropriate pain scale  - Administer analgesics based on type and severity of pain and evaluate response  - Implement non-pharmacological measures as appropriate and evaluate response  - Consider cultural and social influences on pain and pain management  - Notify physician/advanced practitioner if interventions unsuccessful or patient reports new pain  Outcome: Progressing     Problem: NEUROSENSORY - ADULT  Goal: Achieves stable or improved neurological status  Description: INTERVENTIONS  - Monitor and report changes in neurological status  - Monitor vital signs such as temperature, blood pressure, glucose, and any other labs ordered   - Initiate measures to prevent increased intracranial pressure  - Monitor for seizure activity and implement precautions if appropriate      Outcome: Progressing     Problem: GASTROINTESTINAL - ADULT  Goal: Minimal or absence of nausea and/or vomiting  Description: INTERVENTIONS:  - Administer IV fluids if ordered to ensure adequate hydration  - Maintain NPO status until nausea and vomiting are resolved  - Nasogastric tube if ordered  - Administer ordered antiemetic medications as needed  - Provide nonpharmacologic comfort measures as appropriate  - Advance diet as tolerated, if ordered  - Consider nutrition services referral to assist patient with adequate nutrition and appropriate food choices  Outcome: Progressing  Goal: Maintains or returns to baseline bowel function  Description: INTERVENTIONS:  - Assess bowel function  - Encourage oral fluids to ensure adequate hydration  - Administer IV fluids if ordered to ensure adequate hydration  - Administer ordered medications as needed  - Encourage mobilization and activity  - Consider nutritional services referral to assist patient with adequate nutrition and appropriate food choices  Outcome: Progressing     Problem: METABOLIC, FLUID AND ELECTROLYTES - ADULT  Goal: Fluid balance maintained  Description: INTERVENTIONS:  - Monitor labs   - Monitor I/O and WT  - Instruct patient on fluid and nutrition as appropriate  - Assess for signs & symptoms of volume excess or deficit  Outcome: Progressing

## 2022-04-19 NOTE — QUICK NOTE
Patient extubated to 15L midflow NC at 12 noon  She made good cough with sputum production  Oxygen saturation of 90%  Family informed

## 2022-04-19 NOTE — ASSESSMENT & PLAN NOTE
PICC line inserted on Rt brachial vein on 4/18/22 for upper extremities swelling due to poor peripheral venous access

## 2022-04-20 LAB
ANION GAP SERPL CALCULATED.3IONS-SCNC: 11 MMOL/L (ref 4–13)
BACTERIA SPT RESP CULT: ABNORMAL
BUN SERPL-MCNC: 17 MG/DL (ref 5–25)
CALCIUM SERPL-MCNC: 8.7 MG/DL (ref 8.3–10.1)
CHLORIDE SERPL-SCNC: 108 MMOL/L (ref 100–108)
CO2 SERPL-SCNC: 26 MMOL/L (ref 21–32)
CREAT SERPL-MCNC: 0.68 MG/DL (ref 0.6–1.3)
ERYTHROCYTE [DISTWIDTH] IN BLOOD BY AUTOMATED COUNT: 12.6 % (ref 11.6–15.1)
GFR SERPL CREATININE-BSD FRML MDRD: 96 ML/MIN/1.73SQ M
GLUCOSE SERPL-MCNC: 102 MG/DL (ref 65–140)
GLUCOSE SERPL-MCNC: 114 MG/DL (ref 65–140)
GLUCOSE SERPL-MCNC: 121 MG/DL (ref 65–140)
GLUCOSE SERPL-MCNC: 121 MG/DL (ref 65–140)
GLUCOSE SERPL-MCNC: 128 MG/DL (ref 65–140)
GRAM STN SPEC: ABNORMAL
HCT VFR BLD AUTO: 27.4 % (ref 34.8–46.1)
HGB BLD-MCNC: 8.6 G/DL (ref 11.5–15.4)
MCH RBC QN AUTO: 32.5 PG (ref 26.8–34.3)
MCHC RBC AUTO-ENTMCNC: 31.4 G/DL (ref 31.4–37.4)
MCV RBC AUTO: 103 FL (ref 82–98)
PLATELET # BLD AUTO: 565 THOUSANDS/UL (ref 149–390)
PMV BLD AUTO: 9.8 FL (ref 8.9–12.7)
POTASSIUM SERPL-SCNC: 3.5 MMOL/L (ref 3.5–5.3)
PROCALCITONIN SERPL-MCNC: 0.27 NG/ML
RBC # BLD AUTO: 2.65 MILLION/UL (ref 3.81–5.12)
SODIUM SERPL-SCNC: 145 MMOL/L (ref 136–145)
WBC # BLD AUTO: 13.17 THOUSAND/UL (ref 4.31–10.16)

## 2022-04-20 PROCEDURE — C9113 INJ PANTOPRAZOLE SODIUM, VIA: HCPCS | Performed by: PHYSICIAN ASSISTANT

## 2022-04-20 PROCEDURE — 85027 COMPLETE CBC AUTOMATED: CPT

## 2022-04-20 PROCEDURE — 94640 AIRWAY INHALATION TREATMENT: CPT

## 2022-04-20 PROCEDURE — 82948 REAGENT STRIP/BLOOD GLUCOSE: CPT

## 2022-04-20 PROCEDURE — 99232 SBSQ HOSP IP/OBS MODERATE 35: CPT | Performed by: INTERNAL MEDICINE

## 2022-04-20 PROCEDURE — 80048 BASIC METABOLIC PNL TOTAL CA: CPT

## 2022-04-20 PROCEDURE — 84145 PROCALCITONIN (PCT): CPT | Performed by: INTERNAL MEDICINE

## 2022-04-20 PROCEDURE — NC001 PR NO CHARGE: Performed by: SURGERY

## 2022-04-20 RX ORDER — POTASSIUM CHLORIDE 20 MEQ/1
40 TABLET, EXTENDED RELEASE ORAL ONCE
Status: COMPLETED | OUTPATIENT
Start: 2022-04-20 | End: 2022-04-20

## 2022-04-20 RX ORDER — DEXMEDETOMIDINE HYDROCHLORIDE 4 UG/ML
0.2 INJECTION, SOLUTION INTRAVENOUS CONTINUOUS
Status: DISCONTINUED | OUTPATIENT
Start: 2022-04-20 | End: 2022-04-21

## 2022-04-20 RX ORDER — CLONIDINE HYDROCHLORIDE 0.1 MG/1
0.2 TABLET ORAL EVERY 8 HOURS SCHEDULED
Status: DISCONTINUED | OUTPATIENT
Start: 2022-04-20 | End: 2022-04-23

## 2022-04-20 RX ORDER — HYDRALAZINE HYDROCHLORIDE 20 MG/ML
10 INJECTION INTRAMUSCULAR; INTRAVENOUS ONCE
Status: COMPLETED | OUTPATIENT
Start: 2022-04-20 | End: 2022-04-20

## 2022-04-20 RX ADMIN — SODIUM CHLORIDE 0.2 MCG/KG/HR: 9 INJECTION, SOLUTION INTRAVENOUS at 17:40

## 2022-04-20 RX ADMIN — IPRATROPIUM BROMIDE 0.5 MG: 0.5 SOLUTION RESPIRATORY (INHALATION) at 14:11

## 2022-04-20 RX ADMIN — IPRATROPIUM BROMIDE 0.5 MG: 0.5 SOLUTION RESPIRATORY (INHALATION) at 20:12

## 2022-04-20 RX ADMIN — LORAZEPAM 2 MG: 2 INJECTION INTRAMUSCULAR; INTRAVENOUS at 17:47

## 2022-04-20 RX ADMIN — ENOXAPARIN SODIUM 40 MG: 40 INJECTION SUBCUTANEOUS at 08:20

## 2022-04-20 RX ADMIN — POTASSIUM CHLORIDE 40 MEQ: 1500 TABLET, EXTENDED RELEASE ORAL at 17:10

## 2022-04-20 RX ADMIN — IPRATROPIUM BROMIDE 0.5 MG: 0.5 SOLUTION RESPIRATORY (INHALATION) at 07:25

## 2022-04-20 RX ADMIN — CLONIDINE HYDROCHLORIDE 0.2 MG: 0.1 TABLET ORAL at 22:24

## 2022-04-20 RX ADMIN — CLONIDINE HYDROCHLORIDE 0.2 MG: 0.1 TABLET ORAL at 15:55

## 2022-04-20 RX ADMIN — PANTOPRAZOLE SODIUM 40 MG: 40 INJECTION, POWDER, FOR SOLUTION INTRAVENOUS at 08:20

## 2022-04-20 RX ADMIN — Medication 1 PATCH: at 08:25

## 2022-04-20 RX ADMIN — LEVALBUTEROL HYDROCHLORIDE 1.25 MG: 1.25 SOLUTION, CONCENTRATE RESPIRATORY (INHALATION) at 07:25

## 2022-04-20 RX ADMIN — LEVALBUTEROL HYDROCHLORIDE 1.25 MG: 1.25 SOLUTION, CONCENTRATE RESPIRATORY (INHALATION) at 14:11

## 2022-04-20 RX ADMIN — DEXMEDETOMIDINE HYDROCHLORIDE 0.7 MCG/KG/HR: 400 INJECTION INTRAVENOUS at 06:42

## 2022-04-20 RX ADMIN — DEXMEDETOMIDINE HYDROCHLORIDE 0.7 MCG/KG/HR: 400 INJECTION INTRAVENOUS at 00:43

## 2022-04-20 RX ADMIN — LORAZEPAM 2 MG: 2 INJECTION INTRAMUSCULAR; INTRAVENOUS at 00:43

## 2022-04-20 RX ADMIN — LEVALBUTEROL HYDROCHLORIDE 1.25 MG: 1.25 SOLUTION, CONCENTRATE RESPIRATORY (INHALATION) at 20:13

## 2022-04-20 RX ADMIN — HYDRALAZINE HYDROCHLORIDE 10 MG: 20 INJECTION INTRAMUSCULAR; INTRAVENOUS at 15:56

## 2022-04-20 NOTE — QUICK NOTE
Talked to patient's significant other regarding pt's medical conditions, improvements and answered questions and concerns

## 2022-04-20 NOTE — PLAN OF CARE
Problem: MOBILITY - ADULT  Goal: Maintain or return to baseline ADL function  Description: INTERVENTIONS:  -  Assess patient's ability to carry out ADLs; assess patient's baseline for ADL function and identify physical deficits which impact ability to perform ADLs (bathing, care of mouth/teeth, toileting, grooming, dressing, etc )  - Assess/evaluate cause of self-care deficits   - Assess range of motion  - Assess patient's mobility; develop plan if impaired  - Assess patient's need for assistive devices and provide as appropriate  - Encourage maximum independence but intervene and supervise when necessary  - Involve family in performance of ADLs  - Assess for home care needs following discharge   - Consider OT consult to assist with ADL evaluation and planning for discharge  - Provide patient education as appropriate  Outcome: Progressing  Goal: Maintains/Returns to pre admission functional level  Description: INTERVENTIONS:  - Perform BMAT or MOVE assessment daily    - Set and communicate daily mobility goal to care team and patient/family/caregiver     - Collaborate with rehabilitation services on mobility goals if consulted  - Out of bed for toileting  - Record patient progress and toleration of activity level   Outcome: Progressing     Problem: Potential for Falls  Goal: Patient will remain free of falls  Description: INTERVENTIONS:  - Educate patient/family on patient safety including physical limitations  - Instruct patient to call for assistance with activity   - Consult OT/PT to assist with strengthening/mobility   - Keep Call bell within reach  - Keep bed low and locked with side rails adjusted as appropriate  - Keep care items and personal belongings within reach  - Initiate and maintain comfort rounds  - Make Fall Risk Sign visible to staff  - Apply yellow socks and bracelet for high fall risk patients  - Consider moving patient to room near nurses station  Outcome: Progressing     Problem: Prexisting or High Potential for Compromised Skin Integrity  Goal: Skin integrity is maintained or improved  Description: INTERVENTIONS:  - Identify patients at risk for skin breakdown  - Assess and monitor skin integrity  - Assess and monitor nutrition and hydration status  - Monitor labs   - Assess for incontinence   - Turn and reposition patient  - Assist with mobility/ambulation  - Relieve pressure over bony prominences  - Avoid friction and shearing  - Provide appropriate hygiene as needed including keeping skin clean and dry  - Evaluate need for skin moisturizer/barrier cream  - Collaborate with interdisciplinary team   - Patient/family teaching  - Consider wound care consult   Outcome: Progressing     Problem: DISCHARGE PLANNING - CARE MANAGEMENT  Goal: Discharge to post-acute care or home with appropriate resources  Description: INTERVENTIONS:  - Conduct assessment to determine patient/family and health care team treatment goals, and need for post-acute services based on payer coverage, community resources, and patient preferences, and barriers to discharge  - Address psychosocial, clinical, and financial barriers to discharge as identified in assessment in conjunction with the patient/family and health care team  - Arrange appropriate level of post-acute services according to patients   needs and preference and payer coverage in collaboration with the physician and health care team  - Communicate with and update the patient/family, physician, and health care team regarding progress on the discharge plan  - Arrange appropriate transportation to post-acute venues  Outcome: Progressing     Problem: SUBSTANCE USE/ABUSE  Goal: By discharge, will develop insight into their chemical dependency and sustain motivation to continue in recovery  Description: INTERVENTIONS:  - Attends all daily group sessions and scheduled AA groups  - Actively practices coping skills through participation in the therapeutic community and adherence to program rules  - Reviews and completes assignments from individual treatment plan  - Assist patient development of understanding of their personal cycle of addiction and relapse triggers  Outcome: Progressing  Goal: By discharge, patient will have ongoing treatment plan addressing chemical dependency  Description: INTERVENTIONS:  - Assist patient with resources and/or appointments for ongoing recovery based living  Outcome: Progressing     Problem: Nutrition/Hydration-ADULT  Goal: Nutrient/Hydration intake appropriate for improving, restoring or maintaining nutritional needs  Description: Monitor and assess patient's nutrition/hydration status for malnutrition  Collaborate with interdisciplinary team and initiate plan and interventions as ordered  Monitor patient's weight and dietary intake as ordered or per policy  Utilize nutrition screening tool and intervene as necessary  Determine patient's food preferences and provide high-protein, high-caloric foods as appropriate       INTERVENTIONS:  - Monitor oral intake, urinary output, labs, and treatment plans  - Assess nutrition and hydration status and recommend course of action  - Evaluate amount of meals eaten  - Assist patient with eating if necessary   - Allow adequate time for meals  - Recommend/ encourage appropriate diets, oral nutritional supplements, and vitamin/mineral supplements  - Order, calculate, and assess calorie counts as needed  - Recommend, monitor, and adjust tube feedings and TPN/PPN based on assessed needs  - Assess need for intravenous fluids  - Provide specific nutrition/hydration education as appropriate  - Include patient/family/caregiver in decisions related to nutrition  Outcome: Progressing     Problem: PAIN - ADULT  Goal: Verbalizes/displays adequate comfort level or baseline comfort level  Description: Interventions:  - Encourage patient to monitor pain and request assistance  - Assess pain using appropriate pain scale  - Administer analgesics based on type and severity of pain and evaluate response  - Implement non-pharmacological measures as appropriate and evaluate response  - Consider cultural and social influences on pain and pain management  - Notify physician/advanced practitioner if interventions unsuccessful or patient reports new pain  Outcome: Progressing     Problem: INFECTION - ADULT  Goal: Absence or prevention of progression during hospitalization  Description: INTERVENTIONS:  - Assess and monitor for signs and symptoms of infection  - Monitor lab/diagnostic results  - Monitor all insertion sites, i e  indwelling lines, tubes, and drains  - Monitor endotracheal if appropriate and nasal secretions for changes in amount and color  - [place] appropriate cooling/warming therapies per order  - Administer medications as ordered  - Instruct and encourage patient and family to use good hand hygiene technique  - Identify and instruct in appropriate isolation precautions for identified infection/condition  Outcome: Progressing     Problem: SAFETY ADULT  Goal: Maintain or return to baseline ADL function  Description: INTERVENTIONS:  -  Assess patient's ability to carry out ADLs; assess patient's baseline for ADL function and identify physical deficits which impact ability to perform ADLs (bathing, care of mouth/teeth, toileting, grooming, dressing, etc )  - Assess/evaluate cause of self-care deficits   - Assess range of motion  - Assess patient's mobility; develop plan if impaired  - Assess patient's need for assistive devices and provide as appropriate  - Encourage maximum independence but intervene and supervise when necessary  - Involve family in performance of ADLs  - Assess for home care needs following discharge   - Consider OT consult to assist with ADL evaluation and planning for discharge  - Provide patient education as appropriate  Outcome: Progressing  Goal: Maintains/Returns to pre admission functional level  Description: INTERVENTIONS:  - Perform BMAT or MOVE assessment daily    - Set and communicate daily mobility goal to care team and patient/family/caregiver  - Collaborate with rehabilitation services on mobility goals if consulted  - Out of bed for toileting  - Record patient progress and toleration of activity level   Outcome: Progressing  Goal: Patient will remain free of falls  Description: INTERVENTIONS:  - Educate patient/family on patient safety including physical limitations  - Instruct patient to call for assistance with activity   - Consult OT/PT to assist with strengthening/mobility   - Keep Call bell within reach  - Keep bed low and locked with side rails adjusted as appropriate  - Keep care items and personal belongings within reach  - Initiate and maintain comfort rounds  - Make Fall Risk Sign visible to staff  - Apply yellow socks and bracelet for high fall risk patients  - Consider moving patient to room near nurses station  Outcome: Progressing     Problem: DISCHARGE PLANNING  Goal: Discharge to home or other facility with appropriate resources  Description: INTERVENTIONS:  - Identify barriers to discharge w/patient and caregiver  - Arrange for needed discharge resources and transportation as appropriate  - Identify discharge learning needs (meds, wound care, etc )  - Arrange for interpretive services to assist at discharge as needed  - Refer to Case Management Department for coordinating discharge planning if the patient needs post-hospital services based on physician/advanced practitioner order or complex needs related to functional status, cognitive ability, or social support system  Outcome: Progressing     Problem: Knowledge Deficit  Goal: Patient/family/caregiver demonstrates understanding of disease process, treatment plan, medications, and discharge instructions  Description: Complete learning assessment and assess knowledge base    Interventions:  - Provide teaching at level of understanding  - Provide teaching via preferred learning methods  Outcome: Progressing     Problem: NEUROSENSORY - ADULT  Goal: Achieves stable or improved neurological status  Description: INTERVENTIONS  - Monitor and report changes in neurological status  - Monitor vital signs such as temperature, blood pressure, glucose, and any other labs ordered   - Initiate measures to prevent increased intracranial pressure  - Monitor for seizure activity and implement precautions if appropriate      Outcome: Progressing  Goal: Remains free of injury related to seizures activity  Description: INTERVENTIONS  - Maintain airway, patient safety  and administer oxygen as ordered  - Monitor patient for seizure activity, document and report duration and description of seizure to physician/advanced practitioner  - If seizure occurs,  ensure patient safety during seizure  - Reorient patient post seizure  - Seizure pads on all 4 side rails  - Instruct patient/family to notify RN of any seizure activity including if an aura is experienced  - Instruct patient/family to call for assistance with activity based on nursing assessment  - Administer anti-seizure medications if ordered    Outcome: Progressing  Goal: Achieves maximal functionality and self care  Description: INTERVENTIONS  - Monitor swallowing and airway patency with patient fatigue and changes in neurological status  - Encourage and assist patient to increase activity and self care     - Encourage visually impaired, hearing impaired and aphasic patients to use assistive/communication devices  Outcome: Progressing     Problem: CARDIOVASCULAR - ADULT  Goal: Maintains optimal cardiac output and hemodynamic stability  Description: INTERVENTIONS:  - Monitor I/O, vital signs and rhythm  - Monitor for S/S and trends of decreased cardiac output  - Administer and titrate ordered vasoactive medications to optimize hemodynamic stability  - Assess quality of pulses, skin color and temperature  - Assess for signs of decreased coronary artery perfusion  - Instruct patient to report change in severity of symptoms  Outcome: Progressing  Goal: Absence of cardiac dysrhythmias or at baseline rhythm  Description: INTERVENTIONS:  - Continuous cardiac monitoring, vital signs, obtain 12 lead EKG if ordered  - Administer antiarrhythmic and heart rate control medications as ordered  - Monitor electrolytes and administer replacement therapy as ordered  Outcome: Progressing     Problem: RESPIRATORY - ADULT  Goal: Achieves optimal ventilation and oxygenation  Description: INTERVENTIONS:  - Assess for changes in respiratory status  - Assess for changes in mentation and behavior  - Position to facilitate oxygenation and minimize respiratory effort  - Oxygen administered by appropriate delivery if ordered  - Initiate smoking cessation education as indicated  - Encourage broncho-pulmonary hygiene including cough, deep breathe, Incentive Spirometry  - Assess the need for suctioning and aspirate as needed  - Assess and instruct to report SOB or any respiratory difficulty  - Respiratory Therapy support as indicated  Outcome: Progressing     Problem: GASTROINTESTINAL - ADULT  Goal: Minimal or absence of nausea and/or vomiting  Description: INTERVENTIONS:  - Administer IV fluids if ordered to ensure adequate hydration  - Maintain NPO status until nausea and vomiting are resolved  - Nasogastric tube if ordered  - Administer ordered antiemetic medications as needed  - Provide nonpharmacologic comfort measures as appropriate  - Advance diet as tolerated, if ordered  - Consider nutrition services referral to assist patient with adequate nutrition and appropriate food choices  Outcome: Progressing  Goal: Maintains or returns to baseline bowel function  Description: INTERVENTIONS:  - Assess bowel function  - Encourage oral fluids to ensure adequate hydration  - Administer IV fluids if ordered to ensure adequate hydration  - Administer ordered medications as needed  - Encourage mobilization and activity  - Consider nutritional services referral to assist patient with adequate nutrition and appropriate food choices  Outcome: Progressing  Goal: Maintains adequate nutritional intake  Description: INTERVENTIONS:  - Monitor percentage of each meal consumed  - Identify factors contributing to decreased intake, treat as appropriate  - Assist with meals as needed  - Monitor I&O, weight, and lab values if indicated  - Obtain nutrition services referral as needed  Outcome: Progressing  Goal: Oral mucous membranes remain intact  Description: INTERVENTIONS  - Assess oral mucosa and hygiene practices  - Implement preventative oral hygiene regimen  - Implement oral medicated treatments as ordered  - Initiate Nutrition services referral as needed  Outcome: Progressing     Problem: GENITOURINARY - ADULT  Goal: Maintains or returns to baseline urinary function  Description: INTERVENTIONS:  - Assess urinary function  - Encourage oral fluids to ensure adequate hydration if ordered  - Administer IV fluids as ordered to ensure adequate hydration  - Administer ordered medications as needed  - Offer frequent toileting  - Follow urinary retention protocol if ordered  Outcome: Progressing  Goal: Absence of urinary retention  Description: INTERVENTIONS:  - Assess patients ability to void and empty bladder  - Monitor I/O  - Bladder scan as needed  - Discuss with physician/AP medications to alleviate retention as needed  - Discuss catheterization for long term situations as appropriate  Outcome: Progressing  Goal: Urinary catheter remains patent  Description: INTERVENTIONS:  - Assess patency of urinary catheter  - If patient has a chronic vazquez, consider changing catheter if non-functioning  - Follow guidelines for intermittent irrigation of non-functioning urinary catheter  Outcome: Progressing     Problem: METABOLIC, FLUID AND ELECTROLYTES - ADULT  Goal: Electrolytes maintained within normal limits  Description: INTERVENTIONS:  - Monitor labs and assess patient for signs and symptoms of electrolyte imbalances  - Administer electrolyte replacement as ordered  - Monitor response to electrolyte replacements, including repeat lab results as appropriate  - Instruct patient on fluid and nutrition as appropriate  Outcome: Progressing  Goal: Fluid balance maintained  Description: INTERVENTIONS:  - Monitor labs   - Monitor I/O and WT  - Instruct patient on fluid and nutrition as appropriate  - Assess for signs & symptoms of volume excess or deficit  Outcome: Progressing     Problem: SKIN/TISSUE INTEGRITY - ADULT  Goal: Skin Integrity remains intact(Skin Breakdown Prevention)  Description: Assess:  -Inspect skin when repositioning, toileting, and assisting with ADLS  -Assess extremities for adequate circulation and sensation     Bed Management:  -Have minimal linens on bed & keep smooth, unwrinkled  -Change linens as needed when moist or perspiring    Toileting:  -Offer bedside commode    Activity:  -Encourage activity and walks on unit  -Encourage or provide ROM exercises   -Use appropriate equipment to lift or move patient in bed    Skin Care:  -Avoid use of baby powder, tape, friction and shearing, hot water or constrictive clothing  -Do not massage red bony areas    Outcome: Progressing

## 2022-04-20 NOTE — DISCHARGE INSTR - OTHER ORDERS
Wound Care Plan:   1-Moisturize lips every 4 hours and as needed  2-Elevate heels off of bed/chair surface to offload pressure  3-Offloading air cushion in chair when out of bed  4-Moisturize skin daily with lotion  5-Turn/reposition every 2 hours while in bed and weight shift frequently while in chair for pressure re-distribution on skin  6-Hydraguard lotion to bilateral heels, buttocks, and sacrum three times daily and as needed with incontinence care

## 2022-04-20 NOTE — PROGRESS NOTES
[address] [facility]  Progress Note - Francisca Velez 1965, 60 y o  female MRN: [id number]  Unit/Bed#: ICU 11 Encounter: [id number]  Primary Care Provider: Shereen Goncalves DO   Date and time admitted to hospital: 4/8/2022  3:55 PM    * Acute respiratory failure with hypoxia [facility])  Assessment & Plan  Patient acute hypoxic on admission, was given dose of Lasix with marked improvement  However, this morning patient started to become more agitated, hypoxic and tachycardic unable to tolerate BiPAP, patient was intubated for airway protection in the setting of encephalopathy and potential DTs  Patient was extubated on 4/14, however failed BiPAP, eventually requiring re-intubation  The patient cleared significant amount of secretions while she was extubated  Patient had repeat bronchoscopy 4/15 which showed decrease secretions  Successfully extubated to 15L/min mid flow on 4/19  Receiving 40L/min HFNC overnight due to desats to mid 80s       · Continue monitor patient's secretions   · Continue 40L/min HFNC  · CXR appears improved, less right hilar region consolidation  · Recheck CT C/A/P with contrast on 4/18 showed stable focal area of pancreatic necrosis in the neck of the pancreas, no well encapsulated fluid collection with air-fluid level  · Preliminary result of sputum culture yesterday (4/18) showed gram negative rods  · ID consult appreciated, D/C zosyn  · IR and Gen Sx follow up appreciated  Edema of upper extremity  Assessment & Plan  PICC line inserted on Rt brachial vein on 4/18/22 for upper extremities swelling due to poor peripheral venous access  SIRS (systemic inflammatory response syndrome) ([facility] [address] )  Assessment & Plan  Patient had elevated temp at 1:03 a m , tachycardia and hypotension yesterday requiring IV fluids and eventually pressors  Patient was also started on broad-spectrum antibiotics and started on Precedex for tachycardia      Patient's presentation is unlikely to be secondary to an infectious process given down trending leukocytosis, stable procalcitonin, cool extremities, and no identifiable source  SIRS response likely secondary to bronchoscopy versus decreased ECF  · Patient is off pressors  · Completed 5 days of cefepime (04/13/22-4/17/22) and  3 days of vancomycin ( 04/13/22-04/15/22)  · Procalcitonin is down trending  · Cultures are still negative to date  · Stable leukocytosis and fever up to 100°F on 4/20/22 between 3-5 am       Ataxia  Assessment & Plan  On admission patient was reported to have ataxia as well as finger-to-nose ataxia  Patient was started on high-dose thiamine for Wernicke's encephalopathy    · MRI Brain came back negative for any acute or chronic pathologies    Gastroesophageal reflux disease without esophagitis  Assessment & Plan  · Continue Pantoprazole 40 mg daily       Alcohol use disorder, severe, dependence ([facility] [address] )  Assessment & Plan  · Case management consult in place  · Encouraged cessation   · Continue thiamine and folic acid       Hypertension  Assessment & Plan  · Patient is currently on 10 mg of lisinopril daily at home  Can be resumed once extubated        Delirium tremens [facility])  Assessment & Plan  Patient drinks roughly 8-12 shots of whiskey daily  Patient's last drink was April 1, 2022  EtoH level on admission was 54  Has never had any history withdrawals or seizures  Patient was initially admitted for alcoholic pancreatitis, however, patient became encephalopathic and agitated requiring transfer to detox Unit  Alcohol-induced acute pancreatitis  Assessment & Plan  · Patient initially presented with a lipase of 53,000  Patient's acute pancreatitis is likely secondary to alcohol  · No surgical intervention of the pancreas  · Monitor TGL        ----------------------------------------------------------------------------------------  HPI/24hr events:  2 liquid bowel movement after extubation   4 episodes of watery diarrhea overnight  Fever up to 100 F early this morning  Patient appropriate for transfer out of the ICU today?: No  Disposition: Continue Critical Care   Code Status: Level 1 - Full Code  ---------------------------------------------------------------------------------------  SUBJECTIVE  Patient's complains of mid abdominal pain which is crampy and diarrhea  Denied fever, chills, chest pain, shortness a breath  Review of Systems   Constitutional: Negative for chills and fever  HENT: Negative for ear pain and sore throat  Eyes: Negative for pain and visual disturbance  Respiratory: Negative for cough and shortness of breath  Cardiovascular: Negative for chest pain and palpitations  Gastrointestinal: Positive for abdominal pain and diarrhea  Negative for vomiting  Genitourinary: Negative for dysuria and hematuria  Musculoskeletal: Negative for arthralgias and back pain  Skin: Negative for color change and rash  Neurological: Negative for seizures and syncope  Hematological: Negative  All other systems reviewed and are negative      Review of systems was reviewed and negative unless stated above in HPI/24-hour events   ---------------------------------------------------------------------------------------  OBJECTIVE    Vitals   Vitals:    22 0300 22 0400 22 0500 22 0600   BP: 110/67 124/77 131/85 142/70   BP Location:  Right arm     Pulse: 88 90 94 84   Resp: (!) 24 19 21 (!) 25   Temp: 100 °F (37 8 °C) 100 °F (37 8 °C) 100 °F (37 8 °C) 99 3 °F (37 4 °C)   TempSrc:  Bladder     SpO2: 99% 100% 99%    Weight:    86 3 kg (190 lb 4 1 oz)   Height:         Temp (24hrs), Av 1 °F (37 8 °C), Min:97 9 °F (36 6 °C), Max:101 1 °F (38 4 °C)  Current: Temperature: 99 3 °F (37 4 °C)  Arterial Line BP: 86/72  Arterial Line MAP (mmHg): 80 mmHg    Respiratory:  SpO2: SpO2: 98 %  Nasal Cannula O2 Flow Rate (L/min): 40 L/min    Invasive/non-invasive ventilation settings   Respiratory  Report   Lab Data (Last 4 hours)    None         O2/Vent Data (Last 4 hours)    None                Physical Exam  Vitals and nursing note reviewed  Constitutional:       General: She is not in acute distress  Appearance: She is well-developed  HENT:      Head: Normocephalic and atraumatic  Eyes:      Extraocular Movements: Extraocular movements intact  Conjunctiva/sclera: Conjunctivae normal    Cardiovascular:      Rate and Rhythm: Normal rate and regular rhythm  Heart sounds: No murmur heard  Pulmonary:      Effort: Pulmonary effort is normal  No respiratory distress  Breath sounds: Normal breath sounds  Abdominal:      General: Bowel sounds are normal  There is no distension  Palpations: Abdomen is soft  Tenderness: There is abdominal tenderness  There is no guarding  Comments: tenderness in the middle of abdominal   Musculoskeletal:         General: No swelling  Cervical back: Neck supple  Right lower leg: No edema  Left lower leg: No edema  Skin:     General: Skin is warm and dry  Capillary Refill: Capillary refill takes less than 2 seconds  Coloration: Skin is not jaundiced  Findings: No bruising or lesion  Neurological:      Mental Status: She is alert and oriented to person, place, and time               Laboratory and Diagnostics:  Results from last 7 days   Lab Units 04/20/22  0442 04/19/22  0551 04/18/22  0353 04/16/22  0413 04/15/22  1010 04/15/22  0443 04/14/22  0503   WBC Thousand/uL 13 17* 13 76* 11 46* 6 99 10 44* 9 83 6 83   HEMOGLOBIN g/dL 8 6* 8 8* 9 7* 10 8* 9 3* 9 0* 13 9   HEMATOCRIT % 27 4* 24 9* 29 0* 32 9* 28 0* 27 3* 43 2   PLATELETS Thousands/uL 565* 530* 420* 256 315 284 214   NEUTROS PCT %  --   --  73 86* 79*  --   --    MONOS PCT %  --   --  7 5 8  --   --      Results from last 7 days   Lab Units 04/20/22  0442 04/19/22  0551 04/18/22  1624 04/18/22  0353 04/17/22  0439 04/16/22  2109 04/16/22  0413 04/15/22  0443 04/15/22  0443 04/14/22  0503 04/14/22  0503   SODIUM mmol/L 145 138 136 141 137  --  140  --  134*   < > 140   POTASSIUM mmol/L 3 5 3 8 3 9 3 4* 4 3 3 6 4 0   < > 4 1   < > 4 0   CHLORIDE mmol/L 108 102 101 104 102  --  104  --  99*   < > 101   CO2 mmol/L 26 25 26 29 29  --  31  --  26   < > 33*   ANION GAP mmol/L 11 11 9 8 6  --  5  --  9   < > 6   BUN mg/dL 17 16 18 20 20  --  14  --  10   < > 10   CREATININE mg/dL 0 68 0 57* 0 68 0 54* 0 61  --  0 44*  --  0 44*   < > 0 57*   CALCIUM mg/dL 8 7 8 0* 7 6* 8 8 8 4  --  8 5  --  8 3   < > 8 2*   GLUCOSE RANDOM mg/dL 102 223* 171* 161* 277*  --  164*  --  138   < > 145*   ALT U/L  --  190*  --   --   --   --   --   --  47  --  51   AST U/L  --  60*  --   --   --   --   --   --  49*  --  54*   ALK PHOS U/L  --  153*  --   --   --   --   --   --  211*  --  252*   ALBUMIN g/dL  --  2 3*  --   --   --   --   --   --  2 1*  --  1 7*   TOTAL BILIRUBIN mg/dL  --  0 51  --   --   --   --   --   --  0 45  --  0 51    < > = values in this interval not displayed  Results from last 7 days   Lab Units 04/19/22  0551 04/18/22  0353 04/17/22 0439 04/16/22 2109 04/16/22 0413 04/14/22  0503 04/13/22  2155   MAGNESIUM mg/dL 2 0 2 3 2 6 2 3 2 2 2 5 1 8   PHOSPHORUS mg/dL 5 0* 3 7  --   --  4 8* 3 7 3 4               Results from last 7 days   Lab Units 04/13/22  2340   LACTIC ACID mmol/L 0 8     ABG:    VBG:    Results from last 7 days   Lab Units 04/20/22  0442 04/18/22  0353 04/15/22  0443 04/14/22  0025   PROCALCITONIN ng/ml 0 27* 0 18 0 46* 0 64*       Micro  Results from last 7 days   Lab Units 04/18/22  1817 04/18/22  1027 04/13/22  2247 04/13/22  1017   BLOOD CULTURE  No Growth at 24 hrs  No Growth at 24 hrs   --  No Growth After 5 Days  No Growth After 5 Days    --    SPUTUM CULTURE   --  2+ Growth of   --   --    GRAM STAIN RESULT   --  1+ Polys*  2+ Gram negative rods*  Rare Gram positive cocci in pairs*  --  1+ Polys No organisms seen   MRSA CULTURE ONLY   --   --  No Methicillin Resistant Staphlyococcus aureus (MRSA) isolated  --        Imaging: I have personally reviewed pertinent reports  and I have personally reviewed pertinent films in PACS    Intake and Output  I/O       04/18 0701 04/19 0700 04/19 0701 04/20 0700    I V  (mL/kg) 1590 9 (17 6) 495 2 (5 7)    NG/GT 1251 100    IV Piggyback 100     Feedings 702     Total Intake(mL/kg) 3643 9 (40 4) 595 2 (6 9)    Urine (mL/kg/hr) 6960 (3 2) 2265 (1 1)    Stool  0    Total Output 6960 2265    Net -3316 1 -1669 8          Unmeasured Stool Occurrence  2 x          Height and Weights   Height: 5' 8" (172 7 cm)     Body mass index is 28 93 kg/m²  Weight (last 2 days)     Date/Time Weight    04/20/22 0600 86 3 (190 26)    04/19/22 0533 90 2 (198 86)    04/18/22 0600 92 4 (203 71)    04/18/22 0500 92 4 (203 71)            Nutrition       Diet Orders   (From admission, onward)             Start     Ordered    04/19/22 1211  Diet NPO  Diet effective now        References:    Nutrtion Support Algorithm Enteral vs  Parenteral   Question Answer Comment   Diet Type NPO    RD to adjust diet per protocol?  No        04/19/22 1210                  Active Medications  Scheduled Meds:  Current Facility-Administered Medications   Medication Dose Route Frequency Provider Last Rate    acetaminophen  650 mg Rectal Q6H PRN Toyin Driscoll PA-C      dexmedetomidine  0 1-0 7 mcg/kg/hr Intravenous Titrated Alveda Pulse, CRNP 0 7 mcg/kg/hr (04/20/22 6056)    enoxaparin  40 mg Subcutaneous Daily Jake Hobson MD      folic acid  1 mg Oral Daily Jake Hobson MD      insulin lispro  4-20 Units Subcutaneous Q6H [address] TETE Doe      ipratropium  0 5 mg Nebulization TID Jake Hobson MD      levalbuterol  1 25 mg Nebulization TID Jake Hobson MD      lidocaine  1 patch Topical Q24H Jake Hobson MD      LORazepam  2 mg Intravenous Q4H PRN MD Bruna Aranda multivitamin-minerals  1 tablet Oral Daily Carin Higuera MD      nicotine  1 patch Transdermal Daily Carin Higuera MD      ondansetron  4 mg Intravenous Q6H PRN Carin Higuera MD      pantoprazole  40 mg Intravenous Q24H [address] Lyle Martino PA-C      senna-docusate sodium  2 tablet Per NG Tube HS Escobar PerryTETE      thiamine  100 mg Oral Daily Carin Higuera MD       Continuous Infusions:  dexmedetomidine, 0 1-0 7 mcg/kg/hr, Last Rate: 0 7 mcg/kg/hr (04/20/22 6151)      PRN Meds:   acetaminophen, 650 mg, Q6H PRN  LORazepam, 2 mg, Q4H PRN  ondansetron, 4 mg, Q6H PRN        Invasive Devices Review  Invasive Devices  Report    Peripherally Inserted Central Catheter Line            PICC Line 04/18/22 Right Brachial 1 day          Peripheral Intravenous Line            Peripheral IV 04/16/22 Dorsal (posterior); Left;Upper Arm 4 days          Drain            Urethral Catheter 16 Fr  4 days              ---------------------------------------------------------------------------------------  Advance Directive and Living Will:      Power of :    POLST:    ---------------------------------------------------------------------------------------  Care Time Delivered:   No Critical Care time spent       May Lilli Argueta MD      Portions of the record may have been created with voice recognition software  Occasional wrong word or "sound a like" substitutions may have occurred due to the inherent limitations of voice recognition software    Read the chart carefully and recognize, using context, where substitutions have occurred

## 2022-04-20 NOTE — PROGRESS NOTES
Progress Note - Infectious Disease   Francisca Adler 60 y o  female MRN: [id number]  Unit/Bed#: ICU 11 Encounter: [id number]    Impression/Plan:  1  SIRS vs sepsis  Evolving since admission  Fever, tachycardia, tachypnea, leukocytosis  Concern this may be related to a developing peripancreatic abscess as worsening fluid and debris are noted on CT imaging  Consider respiratory source as patient has had significant secretions before successful extubation  Cultures obtained during 2 bronchoscopies only grew mixed maia  No other clear source appreciated  Previous blood cultures were negative  New blood cultures are negative so far  UA was unremarkable  Recent NIKO was a technically difficult study but showed no evidence of valvular vegetation  At this time the patient is clinically stable and does not require pressor support  She has been extubated  Antibiotic treatment has been discontinued  Recommend having IR evaluate imaging to see if peripancreatic fluid can be aspirated for culture  Also consider fevers may be related to Precedex use   -monitor patient off antibiotics  -recommend IR evaluation of peripancreatic fluid for aspiration/culture  -check CBCD and CMP tomorrow  -follow up blood cultures  -monitor vitals  -supportive care     2  Alcohol induced pancreatitis  With concern for developing peripancreatic abscess  Patient admits to heavy alcohol use  Lipase upon admission was 53,700  Abdominal CT and ultrasound imaging showing peripancreatic inflammation with some fluid tracking posteriorly to the spleen  Now with new CT imaging is concerning for ongoing necrosis and increased fluid/debris suggesting phlegmon/abscess  Recommend IR evaluation for possible aspiration/culture   General surgery is following   -antibiotic as above  -check CBCD and CMP tomorrow  -trend lipase per critical care team  -serial abdominal exams  -monitor GI symptoms  -recommend IR evaluation for aspiration/culture  -continue follow up with general surgery     3  Acute hypoxic respiratory failure  In setting of recent encephalopathy during alcohol detox, and volume overload  Patient with some lung base collapse on previous chest imaging  She's had significant secretions  Most recent chest xray showed a stable right perihilar infiltrate, a left lower lobs consolidation vs effusion, and resolution of previous small effusions  She has undergone bronchoscopy x2  While significant secretions were noted she only grew mixed maia on her cultures  She completed 5-days of broad spectrum antibiotic treatment  The patient was successfully extubated today and her O2 saturation remains stable on mid-flow nasal cannula  -monitor vitals  -monitor respiratory status  -O2 support per critical care team     4  Alcohol abuse  Patient previously on the detox unit  She experienced delirium tremens  Now extubated and feeling anxious requiring ativan  Patient already speaking about her drinking and how she needs to stop immediately  Recommend close monitoring by medical toxicology team  Consider transition back to detox unit as needed if medically stable  Recommend offering support/resources before discharge for ongoing care  -monitor for withdraw  -recommend ongoing follow up by medical toxicology      5  Obesity  BMI = 28 93     Above plan was discussed in detail with patient at the bedside  Above plan was discussed in detail with critical care  Antibiotics:  No current antibiotic use    Subjective:  Patient reports she is very thirsty  She is requesting water and is very fixated on this during my visit  She denies pain in her chest and abdomen  She denies difficulty breathing but then tells me she is short of breath  She offers no other symptoms  RN informed me that she was having increased stool output, appeared like tube feed, she is going to hold patient's senna      Objective:  Vitals:  Temp:  [97 9 °F (36 6 °C)-101 1 °F (38 4 °C)] 100 °F (37 8 °C)  HR:  [] 90  Resp:  [13-33] 17  BP: (109-176)/(66-97) 148/81  SpO2:  [85 %-100 %] 100 %  Temp (24hrs), Av 1 °F (37 8 °C), Min:97 9 °F (36 6 °C), Max:101 1 °F (38 4 °C)  Current: Temperature: 100 °F (37 8 °C)    Physical Exam:   General Appearance:  Alert, interactive, anxious, less tremulous today  She appears comfortable but restless supine in bed, in no acute distress  Throat: Oropharynx moist without lesions  Lungs:   Course to auscultation bilaterally but improved since yesterday; no wheezing or rales; respirations unlabored on mid flow nasal cannula O2  Heart:  Tachycardic; no murmur, rub or gallop  Abdomen:   Soft, non-tender, non-distended, positive bowel sounds  Extremities: No clubbing or cyanosis; mild upper extremity edema, no lower extremity edema  Skin: No new rashes or lesions noted on exposed skin  Labs, Imaging, & Other studies:   All pertinent labs and imaging studies were personally reviewed  Results from last 7 days   Lab Units 22  0442 22  0551 22  0353   WBC Thousand/uL 13 17* 13 76* 11 46*   HEMOGLOBIN g/dL 8 6* 8 8* 9 7*   PLATELETS Thousands/uL 565* 530* 420*     Results from last 7 days   Lab Units 22  0442 22  0551 22  0551 22  0413 04/15/22  0443 22  0503 22  0503   POTASSIUM mmol/L 3 5   < > 3 8   < > 4 1   < > 4 0   CHLORIDE mmol/L 108   < > 102   < > 99*   < > 101   CO2 mmol/L 26   < > 25   < > 26   < > 33*   BUN mg/dL 17   < > 16   < > 10   < > 10   CREATININE mg/dL 0 68   < > 0 57*   < > 0 44*   < > 0 57*   EGFR ml/min/1 73sq m 96   < > 102   < > 111   < > 102   CALCIUM mg/dL 8 7   < > 8 0*   < > 8 3   < > 8 2*   AST U/L  --   --  60*  --  49*  --  54*   ALT U/L  --   --  190*  --  47   < > 51   ALK PHOS U/L  --   --  153*  --  211*   < > 252*    < > = values in this interval not displayed       Results from last 7 days   Lab Units 22  1817 22  1027 22  2247 22  1017   BLOOD CULTURE  No Growth at 24 hrs  No Growth at 24 hrs   --  No Growth After 5 Days  No Growth After 5 Days    --    SPUTUM CULTURE   --  2+ Growth of   --   --    GRAM STAIN RESULT   --  1+ Polys*  2+ Gram negative rods*  Rare Gram positive cocci in pairs*  --  1+ Polys  No organisms seen   MRSA CULTURE ONLY   --   --  No Methicillin Resistant Staphlyococcus aureus (MRSA) isolated  --      Results from last 7 days   Lab Units 04/20/22  0442 04/18/22  0353 04/15/22  0443 04/14/22  0025   PROCALCITONIN ng/ml 0 27* 0 18 0 46* 0 64*

## 2022-04-20 NOTE — WOUND OSTOMY CARE
Progress Note - Wound   Francisca Fernandez 60 y o  female MRN: [id number]  Unit/Bed#: ICU 11 Encounter: [id number]        Assessment:   Patient seen for wound care follow-up  She is agreeable to assessment  Able to turn in bed with minimal assist x 1  On low air-loss mattress in ICU  Incontinent of bowel and bladder  Justa-anal care provided  Bilateral heels intact, pink, blanchable--patient frequently sitting up legs bent/heels digging into mattress  Does not tolerated heel elevation per nursing team   Buttocks and sacrum intact without redness--patient having frequently loose stools--fecal pouch attempted, but unsuccessful  1   Hospital acquired pressure injury to mid upper lip--wound is pink, scabbed, dry  Completely offloaded now that patient is extubated  Healing  See flowsheet for wound details  Wound Care Plan:   1-Apply Allevyn Life foam dressing to bilateral heels for prevention  Fernie with P   Peel back at least daily for skin assessment and re-apply  Change dressing every 3 days and PRN  2-Elevate heels off of bed/chair surface to offload pressure  3-Offloading air cushion in chair when out of bed  4-Moisturize skin daily with skin nourishing cream   5-Turn/reposition every 2 hours while in bed and weight shift frequently while in chair for pressure re-distribution on skin  6-Hydraguard lotion to bilateral buttocks and sacrum three times daily and as needed with incontinence care  7-Avoid using purewick on patient due to latex allergy  8-Provide oral care per protocol and moisturize lips at least every 4 hours  Wound care team to follow  Wound 04/13/22 Pressure Injury Mouth Upper (Active)   Wound Image   04/20/22 1502   Wound Description Pink, scabbed 04/20/22 1502   Justa-wound Assessment Clean;Dry; Intact 04/20/22 1604   Wound Length (cm) 0 6 cm 04/20/22 1502   Wound Width (cm) 0 6 cm 04/20/22 1502   Wound Depth (cm) 0 cm 04/20/22 1502   Wound Surface Area (cm^2) 0 36 cm^2 04/20/22 1502   Wound Volume (cm^3) 0 cm^3 04/20/22 1502   Calculated Wound Volume (cm^3) 0 cm^3 04/20/22 1502   Change in Wound Size % 100 04/20/22 1502   Drainage Amount None 04/20/22 1604   Dressing Open to air 04/20/22 [address] BSN, RN, [facility]

## 2022-04-20 NOTE — UTILIZATION REVIEW
Continued Stay Review    Date: 4/20/2022                          Current Patient Class: inpt  Current Level of Care: icu     HPI:56 y o  female initially admit 4/8/2022  transferred from [address] ICU as inpatient admission for acute respiratory failure with hypoxia, alcohol induced acute pancreatitis, maxed on Precedex & fentanyl for deliruim tremens    Assessment/Plan: Extubated yesterday HFNC weaned to [facility]  80% FiO2 15 L ,  continues w coarse voice; wet cough & rhonchi  BP adequate  Obtain bbedside swallow eval if unable will need formal speech eval; if able adv to oral diet; Per ID SIRs evolving since admit & monitor off antibx, cont w low grade temps; On Precedex goal to wean off may req Clonidine if unable to wean; use Lorazepam if needed; ABD w tenderness; 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; surgery following  DC vazquez   Hold laxatives due to diarrhea yesterday    Vital Signs:   Date/Time Temp Pulse Resp BP MAP (mmHg) SpO2 FiO2 (%) Calculated FIO2 (%) - Nasal Cannula Nasal Cannula O2 Flow Rate (L/min) O2 Device O2 Interface Device Patient Position - Orthostatic VS   04/20/22 1200 -- 94 31 Abnormal  169/97 113 94 % -- 80 15 L/min Mid flow nasal cannula -- --   04/20/22 1100 100 °F (37 8 °C) 88 16 154/92 111 94 % -- -- -- -- -- --   04/20/22 1000 100 4 °F (38 °C) 94 17 154/87 107 86 % Abnormal  -- 80 15 L/min Mid flow nasal cannula -- --   04/20/22 0947 -- -- -- -- -- 90 % -- 80 15 L/min Mid flow nasal cannula -- --   04/20/22 0800 100 °F (37 8 °C) 88 13  150/81 123 96 % 40 180 40 L/min High flow nasal cannula -- Lying   04/20/22 0725 -- -- -- -- -- 98 % -- -- -- -- HFNC prongs --   04/20/22 0700 100 °F (37 8 °C) 90 17 148/81 99 100 % -- -- -- -- -- --   04/20/22 0600 99 3 °F (37 4 °C) 84 25 Abnormal  142/70 94 -- -- -- -- -- -- --   04/20/22 0530 -- -- -- -- -- -- 50 -- -- -- -- --   04/20/22 0500 100 °F (37 8 °C) 94 21 131/85 108 99 % -- -- -- -- -- --   04/20/22 0400 100 °F (37 8 °C) 90 19 124/77 91 100 % 60 180 40 L/min High flow nasal cannula -- --   04/20/22 0300 100 °F (37 8 °C) 88 24 Abnormal  110/67 88 99 % -- -- -- -- -- --   04/20/22 0205 -- -- -- -- -- 99 % 60 180 40 L/min High flow nasal cannula -- --   04/20/22 0200 98 6 °F (37 °C) 86 27 Abnormal  132/79 94 100 % -- -- -- -- -- --   04/20/22 0100 100 4 °F (38 °C) 88 26 Abnormal  131/78 106 100 % -- -- -- -- -- --   04/20/22 0000 100 4 °F (38 °C) 90 18 133/79 95 99 % 70 180 40 L/min High flow nasal cannula -- Lying   04/19/22 2300 100 4 °F (38 °C) 100 23 Abnormal  116/66 79 99 % -- -- -- -- -- --         Pertinent Labs/Diagnostic Results:       Results from last 7 days   Lab Units 04/20/22  0442 04/19/22  0551 04/18/22  0353 04/16/22  0413 04/16/22  0413 04/15/22  1010 04/15/22  1010   WBC Thousand/uL 13 17* 13 76* 11 46*   < > 6 99   < > 10 44*   HEMOGLOBIN g/dL 8 6* 8 8* 9 7*  --  10 8*  --  9 3*   HEMATOCRIT % 27 4* 24 9* 29 0*  --  32 9*  --  28 0*   PLATELETS Thousands/uL 565* 530* 420*   < > 256   < > 315   NEUTROS ABS Thousands/µL  --   --  8 34*  --  6 04  --  8 21*    < > = values in this interval not displayed           Results from last 7 days   Lab Units 04/20/22  0442 04/19/22  0551 04/18/22  1624 04/18/22  0353 04/17/22  0439 04/16/22  2109 04/16/22  0413 04/16/22  0413 04/15/22  0443 04/14/22  0503 04/13/22 2155 04/13/22 2155   SODIUM mmol/L 145 138 136 141 137  --    < > 140   < > 140   < > 140   POTASSIUM mmol/L 3 5 3 8 3 9 3 4* 4 3 3 6   < > 4 0   < > 4 0   < > 3 2*   CHLORIDE mmol/L 108 102 101 104 102  --    < > 104   < > 101   < > 99*   CO2 mmol/L 26 25 26 29 29  --    < > 31   < > 33*   < > 36*   ANION GAP mmol/L 11 11 9 8 6  --    < > 5   < > 6   < > 5   BUN mg/dL 17 16 18 20 20  --    < > 14   < > 10   < > 9   CREATININE mg/dL 0 68 0 57* 0 68 0 54* 0 61  --    < > 0 44*   < > 0 57*   < > 0 60   EGFR ml/min/1 73sq m 96 102 96 104 100  --    < > 111   < > 102   < > 100 CALCIUM mg/dL 8 7 8 0* 7 6* 8 8 8 4  --    < > 8 5   < > 8 2*   < > 7 9*   CALCIUM, IONIZED mmol/L  --  1 08*  --   --   --   --   --   --   --  1 02*  --   --    MAGNESIUM mg/dL  --  2 0  --  2 3 2 6 2 3  --  2 2  --  2 5   < > 1 8   PHOSPHORUS mg/dL  --  5 0*  --  3 7  --   --   --  4 8*  --  3 7  --  3 4    < > = values in this interval not displayed  Results from last 7 days   Lab Units 04/19/22  0551 04/15/22  0443 04/14/22  0503   AST U/L 60* 49* 54*   ALT U/L 190* 47 51   ALK PHOS U/L 153* 211* 252*   TOTAL PROTEIN g/dL 6 1* 6 4 6 5   ALBUMIN g/dL 2 3* 2 1* 1 7*   TOTAL BILIRUBIN mg/dL 0 51 0 45 0 51     Results from last 7 days   Lab Units 04/20/22  0526 04/19/22  2351 04/19/22  1741 04/19/22  1237 04/19/22  0729 04/19/22  0558 04/19/22  0002 04/18/22  1736 04/18/22  1330 04/18/22  1135 04/18/22  1130 04/18/22  0526   POC GLUCOSE mg/dl 114 116 125 184* 200* 221* 224* 219* 85 72 54* 171*     Results from last 7 days   Lab Units 04/20/22  0442 04/19/22  0551 04/18/22  1624 04/18/22  0353 04/17/22  0439 04/16/22  0413 04/15/22  0443 04/14/22  0503 04/13/22  2155   GLUCOSE RANDOM mg/dL 102 223* 171* 161* 277* 164* 138 145* 157*             No results found for: BETA-HYDROXYBUTYRATE                                   Results from last 7 days   Lab Units 04/20/22  0442 04/18/22  0353 04/15/22  0443 04/14/22  0025   PROCALCITONIN ng/ml 0 27* 0 18 0 46* 0 64*     Results from last 7 days   Lab Units 04/13/22  2340   LACTIC ACID mmol/L 0 8           Results from last 7 days   Lab Units 04/18/22  1817 04/18/22  1027 04/13/22  2247   BLOOD CULTURE  No Growth at 24 hrs  No Growth at 24 hrs   --  No Growth After 5 Days  No Growth After 5 Days     SPUTUM CULTURE   --  2+ Growth of   --    GRAM STAIN RESULT   --  1+ Polys*  2+ Gram negative rods*  Rare Gram positive cocci in pairs*  --      Medications:   Scheduled Medications:  enoxaparin, 40 mg, Subcutaneous, Daily  folic acid, 1 mg, Oral, Daily  insulin lispro, 4-20 Units, Subcutaneous, Q6H THIAGO  ipratropium, 0 5 mg, Nebulization, TID  levalbuterol, 1 25 mg, Nebulization, TID  lidocaine, 1 patch, Topical, Q24H  multivitamin-minerals, 1 tablet, Oral, Daily  nicotine, 1 patch, Transdermal, Daily  pantoprazole, 40 mg, Intravenous, Q24H THIAGO  thiamine, 100 mg, Oral, Daily    Continuous IV Infusions:  dexmedetomidine, 0 4 mcg/kg/hr, Intravenous, Continuous    PRN Meds:  acetaminophen, 650 mg, Rectal, Q6H PRN  ipratropium, 0 5 mg, Nebulization, Q6H PRN  LORazepam, 2 mg, Intravenous, Q4H PRN  ondansetron, 4 mg, Intravenous, Q6H PRN    Discharge Plan: [facility]  Network Utilization Review Department  ATTENTION: Please call with any questions or concerns to [phone] and carefully listen to the prompts so that you are directed to the right person  All voicemails are confidential   Heidi Justin all requests for admission clinical reviews, approved or denied determinations and any other requests to dedicated fax number below belonging to the campus where the patient is receiving treatment   List of dedicated fax numbers for the Facilities:  [address] DENIALS (Administrative/Medical Necessity) [phone]   [address] (Maternity/NICU/Pediatrics) [phone]   [address]  [address] [facility] [place] [address] [id number] [place] [address] [address] [facility] [place] [address] [phone]

## 2022-04-20 NOTE — PLAN OF CARE
Problem: Potential for Falls  Goal: Patient will remain free of falls  Description: INTERVENTIONS:  - Educate patient/family on patient safety including physical limitations  - Instruct patient to call for assistance with activity   - Consult OT/PT to assist with strengthening/mobility   - Keep Call bell within reach  - Keep bed low and locked with side rails adjusted as appropriate  - Keep care items and personal belongings within reach  - Initiate and maintain comfort rounds  - Make Fall Risk Sign visible to staff  - Offer Toileting every 2 Hours, in advance of need  - Initiate/Maintain bed alarm  - Obtain necessary fall risk management equipment: call bell, bed alarm  - Apply yellow socks and bracelet for high fall risk patients  - Consider moving patient to room near nurses station  Outcome: Progressing     Problem: Nutrition/Hydration-ADULT  Goal: Nutrient/Hydration intake appropriate for improving, restoring or maintaining nutritional needs  Description: Monitor and assess patient's nutrition/hydration status for malnutrition  Collaborate with interdisciplinary team and initiate plan and interventions as ordered  Monitor patient's weight and dietary intake as ordered or per policy  Utilize nutrition screening tool and intervene as necessary  Determine patient's food preferences and provide high-protein, high-caloric foods as appropriate       INTERVENTIONS:  - Monitor oral intake, urinary output, labs, and treatment plans  - Assess nutrition and hydration status and recommend course of action  - Evaluate amount of meals eaten  - Assist patient with eating if necessary   - Allow adequate time for meals  - Recommend/ encourage appropriate diets, oral nutritional supplements, and vitamin/mineral supplements  - Order, calculate, and assess calorie counts as needed  - Recommend, monitor, and adjust tube feedings and TPN/PPN based on assessed needs  - Assess need for intravenous fluids  - Provide specific nutrition/hydration education as appropriate  - Include patient/family/caregiver in decisions related to nutrition  Outcome: Progressing     Problem: PAIN - ADULT  Goal: Verbalizes/displays adequate comfort level or baseline comfort level  Description: Interventions:  - Encourage patient to monitor pain and request assistance  - Assess pain using appropriate pain scale  - Administer analgesics based on type and severity of pain and evaluate response  - Implement non-pharmacological measures as appropriate and evaluate response  - Consider cultural and social influences on pain and pain management  - Notify physician/advanced practitioner if interventions unsuccessful or patient reports new pain  Outcome: Progressing     Problem: INFECTION - ADULT  Goal: Absence or prevention of progression during hospitalization  Description: INTERVENTIONS:  - Assess and monitor for signs and symptoms of infection  - Monitor lab/diagnostic results  - Monitor all insertion sites, i e  indwelling lines, tubes, and drains  - Monitor endotracheal if appropriate and nasal secretions for changes in amount and color  - [place] appropriate cooling/warming therapies per order  - Administer medications as ordered  - Instruct and encourage patient and family to use good hand hygiene technique  - Identify and instruct in appropriate isolation precautions for identified infection/condition  Outcome: Progressing     Problem: SAFETY ADULT  Goal: Patient will remain free of falls  Description: INTERVENTIONS:  - Educate patient/family on patient safety including physical limitations  - Instruct patient to call for assistance with activity   - Consult OT/PT to assist with strengthening/mobility   - Keep Call bell within reach  - Keep bed low and locked with side rails adjusted as appropriate  - Keep care items and personal belongings within reach  - Initiate and maintain comfort rounds  - Make Fall Risk Sign visible to staff  - Offer Toileting every 2 Hours, in advance of need  - Initiate/Maintain bed alarm  - Obtain necessary fall risk management equipment: call bell, bed alarm  - Apply yellow socks and bracelet for high fall risk patients  - Consider moving patient to room near nurses station  Outcome: Progressing     Problem: NEUROSENSORY - ADULT  Goal: Achieves stable or improved neurological status  Description: INTERVENTIONS  - Monitor and report changes in neurological status  - Monitor vital signs such as temperature, blood pressure, glucose, and any other labs ordered   - Initiate measures to prevent increased intracranial pressure  - Monitor for seizure activity and implement precautions if appropriate      Outcome: Progressing     Problem: CARDIOVASCULAR - ADULT  Goal: Absence of cardiac dysrhythmias or at baseline rhythm  Description: INTERVENTIONS:  - Continuous cardiac monitoring, vital signs, obtain 12 lead EKG if ordered  - Administer antiarrhythmic and heart rate control medications as ordered  - Monitor electrolytes and administer replacement therapy as ordered  Outcome: Progressing     Problem: RESPIRATORY - ADULT  Goal: Achieves optimal ventilation and oxygenation  Description: INTERVENTIONS:  - Assess for changes in respiratory status  - Assess for changes in mentation and behavior  - Position to facilitate oxygenation and minimize respiratory effort  - Oxygen administered by appropriate delivery if ordered  - Initiate smoking cessation education as indicated  - Encourage broncho-pulmonary hygiene including cough, deep breathe, Incentive Spirometry  - Assess the need for suctioning and aspirate as needed  - Assess and instruct to report SOB or any respiratory difficulty  - Respiratory Therapy support as indicated  Outcome: Progressing     Problem: GENITOURINARY - ADULT  Goal: Maintains or returns to baseline urinary function  Description: INTERVENTIONS:  - Assess urinary function  - Encourage oral fluids to ensure adequate hydration if ordered  - Administer IV fluids as ordered to ensure adequate hydration  - Administer ordered medications as needed  - Offer frequent toileting  - Follow urinary retention protocol if ordered  Outcome: Progressing     Problem: METABOLIC, FLUID AND ELECTROLYTES - ADULT  Goal: Electrolytes maintained within normal limits  Description: INTERVENTIONS:  - Monitor labs and assess patient for signs and symptoms of electrolyte imbalances  - Administer electrolyte replacement as ordered  - Monitor response to electrolyte replacements, including repeat lab results as appropriate  - Instruct patient on fluid and nutrition as appropriate  Outcome: Progressing  Goal: Fluid balance maintained  Description: INTERVENTIONS:  - Monitor labs   - Monitor I/O and WT  - Instruct patient on fluid and nutrition as appropriate  - Assess for signs & symptoms of volume excess or deficit  Outcome: Progressing

## 2022-04-20 NOTE — PROGRESS NOTES
Progress Note    Francisca Garrido 60 y o  female MRN: [id number]  Unit/Bed#: ICU 6 Encounter: [id number]    Assessment:  54-yr old F w/ acute (recurrent) alcohol-induced pancreatitis w/ WOPN  Extubated yesterday; on HFNC: 64%/40L/min   AVSS  Tmax: 101 1 (6 pm)  White count: 13 (stable)  PLAN:  - oral diet as tolerated  - wean HFNC   - aggressive pulmonary toilet/IS/chest PT   - surgery will follow as needed  Subjective:   - no new complaints this morning   - denies any significant abdominal pain  Objective:     Vitals: Temp:  [97 9 °F (36 6 °C)-101 1 °F (38 4 °C)] 100 °F (37 8 °C)  HR:  [] 90  Resp:  [13-33] 17  BP: (109-176)/(66-97) 148/81  FiO2 (%):  [50-70] 50  Body mass index is 28 93 kg/m²  I/O       04/18 0701  04/19 0700 04/19 0701  04/20 0700 04/20 0701  04/21 0700    I V  (mL/kg) 1590 9 (17 6) 495 2 (5 7)     NG/GT 1251 100     IV Piggyback 100      Feedings 702      Total Intake(mL/kg) 3643 9 (40 4) 595 2 (6 9)     Urine (mL/kg/hr) 6960 (3 2) 2265 (1 1)     Stool  0     Total Output 6960 2265     Net -3316 1 -1669 8            Unmeasured Stool Occurrence  2 x           Physical Exam:  GEN: NAD  HEENT: atraumatic  CV: RRR  Lung: Normal effort on HFNC  Ab: Soft, ND, non tender  Extrem: No CCE  Neuro: A+Ox3    Lab, Imaging and other studies:   I have personally reviewed pertinent reports    , CBC with diff:   Lab Results   Component Value Date    WBC 13 17 (H) 04/20/2022    HGB 8 6 (L) 04/20/2022    HCT 27 4 (L) 04/20/2022     (H) 04/20/2022     (H) 04/20/2022    MCH 32 5 04/20/2022    MCHC 31 4 04/20/2022    RDW 12 6 04/20/2022    MPV 9 8 04/20/2022   , BMP/CMP:   Lab Results   Component Value Date    SODIUM 145 04/20/2022    K 3 5 04/20/2022     04/20/2022    CO2 26 04/20/2022    BUN 17 04/20/2022    CREATININE 0 68 04/20/2022    CALCIUM 8 7 04/20/2022    EGFR 96 04/20/2022   , Magnesium: No components found for: MAG  VTE Pharmacologic Prophylaxis: Enoxaparin (Lovenox)  VTE Mechanical Prophylaxis: sequential compression device

## 2022-04-21 LAB
ALBUMIN SERPL BCP-MCNC: 2.7 G/DL (ref 3.5–5)
ALP SERPL-CCNC: 133 U/L (ref 46–116)
ALT SERPL W P-5'-P-CCNC: 118 U/L (ref 12–78)
ANION GAP SERPL CALCULATED.3IONS-SCNC: 8 MMOL/L (ref 4–13)
AST SERPL W P-5'-P-CCNC: 39 U/L (ref 5–45)
BILIRUB SERPL-MCNC: 0.58 MG/DL (ref 0.2–1)
BUN SERPL-MCNC: 11 MG/DL (ref 5–25)
CALCIUM ALBUM COR SERPL-MCNC: 10 MG/DL (ref 8.3–10.1)
CALCIUM SERPL-MCNC: 9 MG/DL (ref 8.3–10.1)
CHLORIDE SERPL-SCNC: 106 MMOL/L (ref 100–108)
CO2 SERPL-SCNC: 27 MMOL/L (ref 21–32)
CREAT SERPL-MCNC: 0.56 MG/DL (ref 0.6–1.3)
ERYTHROCYTE [DISTWIDTH] IN BLOOD BY AUTOMATED COUNT: 12 % (ref 11.6–15.1)
GFR SERPL CREATININE-BSD FRML MDRD: 103 ML/MIN/1.73SQ M
GLUCOSE SERPL-MCNC: 109 MG/DL (ref 65–140)
GLUCOSE SERPL-MCNC: 115 MG/DL (ref 65–140)
GLUCOSE SERPL-MCNC: 118 MG/DL (ref 65–140)
GLUCOSE SERPL-MCNC: 136 MG/DL (ref 65–140)
HCT VFR BLD AUTO: 27.1 % (ref 34.8–46.1)
HGB BLD-MCNC: 9.1 G/DL (ref 11.5–15.4)
MAGNESIUM SERPL-MCNC: 1.9 MG/DL (ref 1.6–2.6)
MCH RBC QN AUTO: 31.9 PG (ref 26.8–34.3)
MCHC RBC AUTO-ENTMCNC: 33.6 G/DL (ref 31.4–37.4)
MCV RBC AUTO: 95 FL (ref 82–98)
PHOSPHATE SERPL-MCNC: 3.8 MG/DL (ref 2.7–4.5)
PLATELET # BLD AUTO: 580 THOUSANDS/UL (ref 149–390)
PMV BLD AUTO: 9.5 FL (ref 8.9–12.7)
POTASSIUM SERPL-SCNC: 3.9 MMOL/L (ref 3.5–5.3)
PROCALCITONIN SERPL-MCNC: 0.11 NG/ML
PROT SERPL-MCNC: 6.6 G/DL (ref 6.4–8.2)
RBC # BLD AUTO: 2.85 MILLION/UL (ref 3.81–5.12)
SODIUM SERPL-SCNC: 141 MMOL/L (ref 136–145)
WBC # BLD AUTO: 13.2 THOUSAND/UL (ref 4.31–10.16)

## 2022-04-21 PROCEDURE — 99232 SBSQ HOSP IP/OBS MODERATE 35: CPT | Performed by: INTERNAL MEDICINE

## 2022-04-21 PROCEDURE — 84145 PROCALCITONIN (PCT): CPT | Performed by: INTERNAL MEDICINE

## 2022-04-21 PROCEDURE — 83735 ASSAY OF MAGNESIUM: CPT

## 2022-04-21 PROCEDURE — 80053 COMPREHEN METABOLIC PANEL: CPT

## 2022-04-21 PROCEDURE — 97167 OT EVAL HIGH COMPLEX 60 MIN: CPT

## 2022-04-21 PROCEDURE — 84100 ASSAY OF PHOSPHORUS: CPT

## 2022-04-21 PROCEDURE — 94640 AIRWAY INHALATION TREATMENT: CPT

## 2022-04-21 PROCEDURE — 97163 PT EVAL HIGH COMPLEX 45 MIN: CPT | Performed by: PHYSICAL THERAPIST

## 2022-04-21 PROCEDURE — 85027 COMPLETE CBC AUTOMATED: CPT

## 2022-04-21 PROCEDURE — 82948 REAGENT STRIP/BLOOD GLUCOSE: CPT

## 2022-04-21 RX ORDER — LORAZEPAM 2 MG/ML
1 INJECTION INTRAMUSCULAR EVERY 4 HOURS PRN
Status: DISCONTINUED | OUTPATIENT
Start: 2022-04-21 | End: 2022-04-22

## 2022-04-21 RX ORDER — QUETIAPINE FUMARATE 25 MG/1
25 TABLET, FILM COATED ORAL
Status: DISCONTINUED | OUTPATIENT
Start: 2022-04-21 | End: 2022-04-23

## 2022-04-21 RX ORDER — LEVALBUTEROL 1.25 MG/.5ML
1.25 SOLUTION, CONCENTRATE RESPIRATORY (INHALATION) EVERY 6 HOURS PRN
Status: DISCONTINUED | OUTPATIENT
Start: 2022-04-21 | End: 2022-04-24 | Stop reason: HOSPADM

## 2022-04-21 RX ORDER — DEXTROSE MONOHYDRATE 25 G/50ML
25 INJECTION, SOLUTION INTRAVENOUS ONCE
Status: DISCONTINUED | OUTPATIENT
Start: 2022-04-21 | End: 2022-04-21

## 2022-04-21 RX ORDER — PANTOPRAZOLE SODIUM 40 MG/1
40 TABLET, DELAYED RELEASE ORAL
Status: DISCONTINUED | OUTPATIENT
Start: 2022-04-21 | End: 2022-04-24 | Stop reason: HOSPADM

## 2022-04-21 RX ADMIN — Medication 1 TABLET: at 09:37

## 2022-04-21 RX ADMIN — QUETIAPINE FUMARATE 25 MG: 25 TABLET ORAL at 21:14

## 2022-04-21 RX ADMIN — ENOXAPARIN SODIUM 40 MG: 40 INJECTION SUBCUTANEOUS at 09:33

## 2022-04-21 RX ADMIN — THIAMINE HCL TAB 100 MG 100 MG: 100 TAB at 09:33

## 2022-04-21 RX ADMIN — FOLIC ACID 1 MG: 1 TABLET ORAL at 09:34

## 2022-04-21 RX ADMIN — LORAZEPAM 2 MG: 2 INJECTION INTRAMUSCULAR; INTRAVENOUS at 01:30

## 2022-04-21 RX ADMIN — Medication 1 PATCH: at 09:34

## 2022-04-21 RX ADMIN — PANTOPRAZOLE SODIUM 40 MG: 40 TABLET, DELAYED RELEASE ORAL at 09:34

## 2022-04-21 RX ADMIN — CLONIDINE HYDROCHLORIDE 0.2 MG: 0.1 TABLET ORAL at 05:21

## 2022-04-21 RX ADMIN — CLONIDINE HYDROCHLORIDE 0.2 MG: 0.1 TABLET ORAL at 21:14

## 2022-04-21 RX ADMIN — LEVALBUTEROL HYDROCHLORIDE 1.25 MG: 1.25 SOLUTION, CONCENTRATE RESPIRATORY (INHALATION) at 07:28

## 2022-04-21 RX ADMIN — IPRATROPIUM BROMIDE 0.5 MG: 0.5 SOLUTION RESPIRATORY (INHALATION) at 07:28

## 2022-04-21 NOTE — PLAN OF CARE
Problem: OCCUPATIONAL THERAPY ADULT  Goal: Performs self-care activities at highest level of function for planned discharge setting  See evaluation for individualized goals  Description: Treatment Interventions: ADL retraining,Functional transfer training,UE strengthening/ROM,Endurance training,Patient/family training,Equipment evaluation/education,Compensatory technique education,Continued evaluation,Energy conservation,Activityengagement          See flowsheet documentation for full assessment, interventions and recommendations  Note: Limitation: Decreased ADL status,Decreased UE strength,Decreased Safe judgement during ADL,Decreased cognition,Decreased endurance,Decreased self-care trans,Decreased high-level ADLs  Prognosis: Fair  Assessment: Pt is a 60 y o  female seen for OT evaluation s/p adm to [address] on 4/8/2022 as a transfer from [facility] w/ Acute respiratory failure with hypoxia, SIRS, Ataxia, Alcohol-induced acute pancreatitis  Pt intubated 4/8/22 and extubated 4/14, however failed BiPAP and required re-intubation  Patient had repeat bronchoscopy 4/15 which showed decrease secretions  Successfully extubated to 15L/min mid flow on 4/19  Pt on 6L O2 at time of eval  MRI brain (-) for acute infarction, intracranial hemorrhage or mass effect  Comorbidities affecting pts functional performance include a significant PMH of Alcohol abuse, HTN, and HLD  Pt with active OT orders and activity orders for Activity as tolerated  Pt lives with significant other and dtr in a two level house with ramped entrance and stair glide to 2nd floor  Dtr is W/C bound so home is handicap accessible  At baseline, pt was I w/ ADLs, IADLs, and functional transfers/mobility w/o use of AD  (+)   Denies falls PTA   Upon evaluation, pt currently requires Min A for UB ADLs, Mod A for LB ADLs, Mod A for toileting, Mod A for supine>sit, Min A for sit>supine, and Min A of 2 for functional mobility/transfers 2* the following deficits impacting occupational performance: decreased strength, decreased balance, decreased tolerance, impaired memory, impaired problem solving and decreased safety awareness  These impairments, as well at pts fall risk, multiple lines, new O2 requirements, difficulty performing ADLS and limited insight into deficits limit pts ability to safely engage in all baseline areas of occupation  Based on the aforementioned OT evaluation, functional performance deficits, and assessments, pt has been identified as a High complexity evaluation  Pt to continue to benefit from continued acute OT services during hospital stay to address defined deficits and to maximize level of functional independence in the following Occupational Performance areas: grooming, bathing/shower, toilet hygiene, dressing, medication management, health maintenance, functional mobility, community mobility, clothing management, cleaning, meal prep and household maintenance  From OT standpoint, recommend STR upon D/C   OT will continue to follow pt 3-5x/wk to address the following goals to  w/in 10-14 days:     OT Discharge Recommendation: Post acute rehabilitation services  OT - OK to Discharge: Yes (when medically cleared to rehab)

## 2022-04-21 NOTE — PROGRESS NOTES
[facility]  Progress Note - Francisca Begum 1965, 60 y o  female MRN: [id number]  Unit/Bed#: ICU 11 Encounter: [id number]  Primary Care Provider: Jomar Amezquita DO   Date and time admitted to hospital: 4/8/2022  3:55 PM    * Acute respiratory failure with hypoxia [facility])  Assessment & Plan  Patient acute hypoxic on admission, was given dose of Lasix with marked improvement  However, this morning patient started to become more agitated, hypoxic and tachycardic unable to tolerate BiPAP, patient was intubated for airway protection in the setting of encephalopathy and potential DTs  · Patient was extubated on 4/14, however failed BiPAP, eventually requiring re-intubation  The patient cleared significant amount of secretions while she was extubated  Patient had repeat bronchoscopy 4/15 which showed decrease secretions  Successfully extubated to 15L/min mid flow on 4/19  ·   · Continue monitor patient's secretions   · Continue 6L/min O2 via NC  · CXR appears improved, less right hilar region consolidation  · Recheck CT C/A/P with contrast on 4/18 showed stable focal area of pancreatic necrosis in the neck of the pancreas, no well encapsulated fluid collection with air-fluid level  · Preliminary result of sputum culture yesterday (4/18) showed gram negative rods  · ID consult appreciated, D/C zosyn  · IR and Gen Sx follow up appreciated  Edema of upper extremity  Assessment & Plan  PICC line inserted on Rt brachial vein on 4/18/22 for upper extremities swelling due to poor peripheral venous access  SIRS (systemic inflammatory response syndrome) ([facility] [address] )  Assessment & Plan  Patient had elevated temp at 1:03 a m , tachycardia and hypotension yesterday requiring IV fluids and eventually pressors  Patient was also started on broad-spectrum antibiotics and started on Precedex for tachycardia      Patient's presentation is unlikely to be secondary to an infectious process given down trending leukocytosis, stable procalcitonin, cool extremities, and no identifiable source  SIRS response likely secondary to bronchoscopy versus decreased ECF  · Patient is off pressors  · Completed 5 days of cefepime (04/13/22-4/17/22) and  3 days of vancomycin ( 04/13/22-04/15/22)  · Procalcitonin is down trending  · Cultures are still negative to date  · Stable leukocytosis and fever up to 100°F on 4/20/22 between 3-5 am       Ataxia  Assessment & Plan  On admission patient was reported to have ataxia as well as finger-to-nose ataxia  Patient was started on high-dose thiamine for Wernicke's encephalopathy    · MRI Brain came back negative for any acute or chronic pathologies    Gastroesophageal reflux disease without esophagitis  Assessment & Plan  · Continue Pantoprazole 40 mg daily       Alcohol use disorder, severe, dependence ([facility] [address] )  Assessment & Plan  · Case management consult in place  · Encouraged cessation   · Continue thiamine and folic acid       Hypertension  Assessment & Plan  · Patient is currently on 10 mg of lisinopril daily at home  Can be resumed once extubated        Delirium tremens [facility])  Assessment & Plan  Patient drinks roughly 8-12 shots of whiskey daily  Patient's last drink was April 1, 2022  EtoH level on admission was 54  Has never had any history withdrawals or seizures  Patient was initially admitted for alcoholic pancreatitis, however, patient became encephalopathic and agitated requiring transfer to detox Unit  Alcohol-induced acute pancreatitis  Assessment & Plan  · Patient initially presented with a lipase of 53,000  Patient's acute pancreatitis is likely secondary to alcohol    · No surgical intervention of the pancreas  · Monitor TGL        Daily Progress Note - Critical Care   Francisca Duckworth 60 y o  female MRN: [id number]  Unit/Bed#: ICU 11 Encounter: [id number]        ----------------------------------------------------------------------------------------  HPI/24hr events: No significant overnight event     ---------------------------------------------------------------------------------------  SUBJECTIVE  Pt did not complain nausea, vomiting, abd pain, fever, chills  Pt tolerate thin liquid diet  Review of Systems   Constitutional: Negative for chills, fatigue and fever  HENT: Negative for ear pain and sore throat  Eyes: Negative for pain and visual disturbance  Respiratory: Negative for cough and shortness of breath  Cardiovascular: Negative for chest pain and palpitations  Gastrointestinal: Negative for abdominal distention, abdominal pain and vomiting  Genitourinary: Negative for dysuria and hematuria  Musculoskeletal: Negative for arthralgias and back pain  Skin: Negative for color change and rash  Neurological: Negative for dizziness, seizures and syncope  All other systems reviewed and are negative      Review of systems was reviewed and negative unless stated above in HPI/24-hour events   ---------------------------------------------------------------------------------------    Patient appropriate for transfer out of the ICU today?: No  Disposition: Continue Critical Care   Code Status: Level 1 - Full Code  ---------------------------------------------------------------------------------------  ICU CORE MEASURES    Prophylaxis   VTE Pharmacologic Prophylaxis: Heparin  VTE Mechanical Prophylaxis: sequential compression device  Stress Ulcer Prophylaxis: Pantoprazole IV       Invasive Devices Review  Invasive Devices  Report    Peripherally Inserted Central Catheter Line            PICC Line 04/18/22 Right Brachial 2 days              ---------------------------------------------------------------------------------------  OBJECTIVE    Vitals   Vitals:    04/21/22 0400 04/21/22 0500 04/21/22 0600 04/21/22 0728   BP: 121/91 145/85 116/64    BP Location: Right arm      Pulse: 94 90 90    Resp: (!) 32 (!) 23 (!) 27    Temp: 98 7 °F (37 1 °C)      TempSrc: Tympanic      SpO2: 97% 99% 99% 97%   Weight:   83 6 kg (184 lb 4 9 oz)    Height:         Temp (24hrs), Av 3 °F (37 4 °C), Min:98 1 °F (36 7 °C), Max:100 4 °F (38 °C)  Current: Temperature: 98 7 °F (37 1 °C)    Respiratory:  SpO2: SpO2: 97 %  Nasal Cannula O2 Flow Rate (L/min): 6 L/min    Invasive/non-invasive ventilation settings   Respiratory  Report   Lab Data (Last 4 hours)    None         O2/Vent Data (Last 4 hours)    None                Physical Exam  Vitals and nursing note reviewed  Constitutional:       General: She is not in acute distress  Appearance: She is well-developed  HENT:      Head: Normocephalic and atraumatic  Eyes:      Extraocular Movements: Extraocular movements intact  Conjunctiva/sclera: Conjunctivae normal    Cardiovascular:      Rate and Rhythm: Normal rate and regular rhythm  Heart sounds: Normal heart sounds  No murmur heard  Pulmonary:      Effort: Pulmonary effort is normal  No respiratory distress  Breath sounds: Rhonchi present  Comments: Less rhonchi compared to yesterday  Abdominal:      Palpations: Abdomen is soft  Tenderness: There is no abdominal tenderness  Musculoskeletal:         General: No swelling  Cervical back: Neck supple  Right lower leg: No edema  Left lower leg: No edema  Skin:     General: Skin is warm and dry  Capillary Refill: Capillary refill takes less than 2 seconds  Findings: Lesion present  Comments: Upper lip lesion healing   Neurological:      Mental Status: She is alert               Laboratory and Diagnostics:  Results from last 7 days   Lab Units 22  0503 22  0442 22  0551 22  0353 22  0413 04/15/22  1010 04/15/22  0443   WBC Thousand/uL 13 20* 13 17* 13 76* 11 46* 6 99 10 44* 9 83   HEMOGLOBIN g/dL 9 1* 8 6* 8 8* 9 7* 10 8* 9 3* 9 0*   HEMATOCRIT % 27 1* 27 4* 24 9* 29 0* 32 9* 28 0* 27 3*   PLATELETS Thousands/uL 580* 565* 530* 420* 256 315 284   NEUTROS PCT %  --   --   --  73 86* 79*  --    MONOS PCT %  --   --   --  7 5 8  --      Results from last 7 days   Lab Units 04/21/22  0503 04/20/22  0442 04/19/22  0551 04/18/22  1624 04/18/22  0353 04/17/22  0439 04/16/22  2109 04/16/22  0413 04/16/22  0413 04/15/22  0443 04/15/22  0443   SODIUM mmol/L 141 145 138 136 141 137  --   --  140   < > 134*   POTASSIUM mmol/L 3 9 3 5 3 8 3 9 3 4* 4 3 3 6   < > 4 0   < > 4 1   CHLORIDE mmol/L 106 108 102 101 104 102  --   --  104   < > 99*   CO2 mmol/L 27 26 25 26 29 29  --   --  31   < > 26   ANION GAP mmol/L 8 11 11 9 8 6  --   --  5   < > 9   BUN mg/dL 11 17 16 18 20 20  --   --  14   < > 10   CREATININE mg/dL 0 56* 0 68 0 57* 0 68 0 54* 0 61  --   --  0 44*   < > 0 44*   CALCIUM mg/dL 9 0 8 7 8 0* 7 6* 8 8 8 4  --   --  8 5   < > 8 3   GLUCOSE RANDOM mg/dL 118 102 223* 171* 161* 277*  --   --  164*   < > 138   ALT U/L 118*  --  190*  --   --   --   --   --   --   --  47   AST U/L 39  --  60*  --   --   --   --   --   --   --  49*   ALK PHOS U/L 133*  --  153*  --   --   --   --   --   --   --  211*   ALBUMIN g/dL 2 7*  --  2 3*  --   --   --   --   --   --   --  2 1*   TOTAL BILIRUBIN mg/dL 0 58  --  0 51  --   --   --   --   --   --   --  0 45    < > = values in this interval not displayed  Results from last 7 days   Lab Units 04/21/22  0503 04/19/22  0551 04/18/22  0353 04/17/22  0439 04/16/22  2109 04/16/22  0413   MAGNESIUM mg/dL 1 9 2 0 2 3 2 6 2 3 2 2   PHOSPHORUS mg/dL 3 8 5 0* 3 7  --   --  4 8*                   ABG:    VBG:    Results from last 7 days   Lab Units 04/21/22  0503 04/20/22  0442 04/18/22  0353 04/15/22  0443   PROCALCITONIN ng/ml 0 11 0 27* 0 18 0 46*       Micro  Results from last 7 days   Lab Units 04/18/22  1817 04/18/22  1027   BLOOD CULTURE  No Growth at 48 hrs  No Growth at 48 hrs    --    SPUTUM CULTURE   --  2+ Growth of    GRAM STAIN RESULT   --  1+ Polys*  2+ Gram negative rods*  Rare Gram positive cocci in pairs*       EKG: NSR 90/min  Imaging:  I have personally reviewed pertinent reports  Intake and Output  I/O       04/19 0701 04/20 0700 04/20 0701 04/21 0700 04/21 0701 04/22 0700    P  O   220     I V  (mL/kg) 495 2 (5 7) 184 1 (2 2)     NG/      IV Piggyback       Feedings       Total Intake(mL/kg) 595 2 (6 9) 404 1 (4 8)     Urine (mL/kg/hr) 2265 (1 1) 1600 (0 8)     Stool 0 0     Total Output 2265 1600     Net -1669 8 -1195 9            Unmeasured Urine Occurrence  2 x     Unmeasured Stool Occurrence 4 x 5 x           Height and Weights   Height: 5' 8" (172 7 cm)     Body mass index is 28 02 kg/m²  Weight (last 2 days)     Date/Time Weight    04/21/22 0600 83 6 (184 3)    04/20/22 0600 86 3 (190 26)    04/19/22 0533 90 2 (198 86)            Nutrition       Diet Orders   (From admission, onward)             Start     Ordered    04/20/22 1531  Diet Dysphagia/Modified Consistency; Dysphagia 3-Dental Soft; Thin Liquid  Diet effective now        References:    Nutrtion Support Algorithm Enteral vs  Parenteral   Question Answer Comment   Diet Type Dysphagia/Modified Consistency    Dysphagia/Modified Consistency Dysphagia 3-Dental Soft    Liquid Modifier Thin Liquid    RD to adjust diet per protocol?  No        04/20/22 1532                  Active Medications  Scheduled Meds:  Current Facility-Administered Medications   Medication Dose Route Frequency Provider Last Rate    acetaminophen  650 mg Rectal Q6H PRN Farnaz Ferguson PA-C      cloNIDine  0 2 mg Oral Q8H [facility] TETE Herzog      dexmedetomidine  0 2 mcg/kg/hr Intravenous Continuous TETE Herzog Stopped (04/21/22 0501)    enoxaparin  40 mg Subcutaneous Daily Meche Mcconnell MD      folic acid  1 mg Oral Daily Meche Mcconnell MD      insulin lispro  4-20 Units Subcutaneous Q6H [address], TETE  ipratropium  0 5 mg Nebulization TID Harrison Greer MD      ipratropium  0 5 mg Nebulization Q6H PRN TETE Carr      levalbuterol  1 25 mg Nebulization TID Harrison Greer MD      lidocaine  1 patch Topical Q24H Harrison Greer MD      LORazepam  2 mg Intravenous Q4H PRN Duglas Lomas MD      multivitamin-minerals  1 tablet Oral Daily Harrison Greer MD      nicotine  1 patch Transdermal Daily Harrison Greer MD      ondansetron  4 mg Intravenous Q6H PRN Harrison Greer MD      pantoprazole  40 mg Intravenous Q24H [address] Susie Castro PA-C      thiamine  100 mg Oral Daily Harrison Greer MD       Continuous Infusions:  dexmedetomidine, 0 2 mcg/kg/hr, Last Rate: Stopped (04/21/22 0507)      PRN Meds:   acetaminophen, 650 mg, Q6H PRN  ipratropium, 0 5 mg, Q6H PRN  LORazepam, 2 mg, Q4H PRN  ondansetron, 4 mg, Q6H PRN        Allergies   Allergies   Allergen Reactions    Latex Rash    Neomycin-Bacitracin Zn-Polymyx Rash     ---------------------------------------------------------------------------------------  Advance Directive and Living Will:      Power of :    POLST:    ---------------------------------------------------------------------------------------  Care Time Delivered:   No Critical Care time spent     May Lilli Oquendo MD      Portions of the record may have been created with voice recognition software  Occasional wrong word or "sound a like" substitutions may have occurred due to the inherent limitations of voice recognition software    Read the chart carefully and recognize, using context, where substitutions have occurred

## 2022-04-21 NOTE — ASSESSMENT & PLAN NOTE
Patient acute hypoxic on admission, was given dose of Lasix with marked improvement  However, this morning patient started to become more agitated, hypoxic and tachycardic unable to tolerate BiPAP, patient was intubated for airway protection in the setting of encephalopathy and potential DTs  · Patient was extubated on 4/14, however failed BiPAP, eventually requiring re-intubation  The patient cleared significant amount of secretions while she was extubated  Patient had repeat bronchoscopy 4/15 which showed decrease secretions  Successfully extubated to 15L/min mid flow on 4/19  ·   · Continue monitor patient's secretions   · Continue 6L/min O2 via NC  · CXR appears improved, less right hilar region consolidation  · Recheck CT C/A/P with contrast on 4/18 showed stable focal area of pancreatic necrosis in the neck of the pancreas, no well encapsulated fluid collection with air-fluid level  · Preliminary result of sputum culture yesterday (4/18) showed gram negative rods  · ID consult appreciated, D/C zosyn  · IR and Gen Sx follow up appreciated

## 2022-04-21 NOTE — PLAN OF CARE
PT on dental soft, thin liquids, eating about 25% at meals, requiring assistance and encouragement at meals, will trial ensure enlive BID to aid with pro, tresa intake  Problem: Nutrition/Hydration-ADULT  Goal: Nutrient/Hydration intake appropriate for improving, restoring or maintaining nutritional needs  Description: Monitor and assess patient's nutrition/hydration status for malnutrition  Collaborate with interdisciplinary team and initiate plan and interventions as ordered  Monitor patient's weight and dietary intake as ordered or per policy  Utilize nutrition screening tool and intervene as necessary  Determine patient's food preferences and provide high-protein, high-caloric foods as appropriate       INTERVENTIONS:  - Monitor oral intake, urinary output, labs, and treatment plans  - Assess nutrition and hydration status and recommend course of action  - Evaluate amount of meals eaten  - Assist patient with eating if necessary   - Allow adequate time for meals  - Recommend/ encourage appropriate diets, oral nutritional supplements, and vitamin/mineral supplements  - Order, calculate, and assess calorie counts as needed  - Recommend, monitor, and adjust tube feedings and TPN/PPN based on assessed needs  - Assess need for intravenous fluids  - Provide specific nutrition/hydration education as appropriate  - Include patient/family/caregiver in decisions related to nutrition  Outcome: Progressing

## 2022-04-21 NOTE — CASE MANAGEMENT
Case Management Discharge Planning Note    Patient name Brain Eastern  Location ICU 07/ICU 07 MRN [id number]  : 1965 Date 2022       Current Admission Date: 2022  Current Admission Diagnosis:Acute respiratory failure with hypoxia [facility])   Patient Active Problem List    Diagnosis Date Noted    Edema of upper extremity 2022    [facility]ic [facility] response syndrome) [facility] ) 2022    Acute respiratory failure with hypoxia ([facility] ) 2022    Alcohol use disorder, severe, dependence ([facility] ) 2022    Current every day nicotine vaping 2022    Hepatic steatosis 2022    Gastroesophageal reflux disease without esophagitis 2022    Hyperlipidemia 2022    Ataxia 2022    Other constipation 2022    Alcohol-induced acute pancreatitis 2022    Delirium tremens ([facility] ) 2022    Hypertension       LOS (days): 13  Geometric Mean LOS (GMLOS) (days):   Days to GMLOS:     OBJECTIVE:  Risk of Unplanned Readmission Score: 19         Current admission status: Inpatient   Preferred Pharmacy:   [address] [place] [address]  Phone: [phone] Fax: [phone]    Primary Care Provider: Margaret Singh DO    Primary Insurance: BLUE CROSS  Secondary Insurance:     DISCHARGE DETAILS:    Discharge planning discussed with[de-identified] Patient's SO and daughter  Freedom of Choice: Yes  Comments - Freedom of Choice: SO and daughter agreeable to blanket referral to STR and Acute w/in 10-15 mile radius of address    CM contacted family/caregiver?: Yes  Were Treatment Team discharge recommendations reviewed with patient/caregiver?: Yes          Contacts  Patient Contacts: Holly Parks  Relationship to Patient[de-identified] Family  Contact Method: Phone  Phone Number: [phone] Juarez Ferrari [phone] [facility]  Reason/Outcome: Emergency Contact,Discharge Planning              Other Referral/Resources/Interventions Provided:  Interventions: Short Term Rehab,Acute Rehab  Referral Comments: Referrals sent w/in 10-15 mile radius of home zipcode

## 2022-04-21 NOTE — OCCUPATIONAL THERAPY NOTE
Occupational Therapy Evaluation     Patient Name: Naomi Lawler  UKZEI'P Date: 4/21/2022  Problem List  Principal Problem:    Acute respiratory failure with hypoxia (HCC)  Active Problems:    Alcohol-induced acute pancreatitis    Delirium tremens ([facility] [address] )    Hypertension    Alcohol use disorder, severe, dependence ([facility] [address] )    Gastroesophageal reflux disease without esophagitis    Ataxia    SIRS (systemic inflammatory response syndrome) (HCC)    Edema of upper extremity    Past Medical History  Past Medical History:   Diagnosis Date    Alcohol abuse     High cholesterol     Hypertension     Hypokalemia 4/5/2022    Pancreatitis      Past Surgical History  Past Surgical History:   Procedure Laterality Date    FOOT SURGERY             04/21/22 1001   OT Last Visit   OT Visit Date 04/21/22   Note Type   Note type Evaluation   Restrictions/Precautions   Weight Bearing Precautions Per Order No   Other Precautions Cognitive; Chair Alarm; Bed Alarm;Multiple lines;Telemetry;O2;Fall Risk  (6L O2)   Pain Assessment   Pain Assessment Tool 0-10   Pain Score No Pain   Home Living   Type of Home House   Home Layout Two level;Bed/bath upstairs; Ramped entrance  (Ramped entrance, stair glide to 2nd floor)   Bathroom Shower/Tub Walk-in shower   [address] [place] chair   [address]   (no personal DME per pt)   Additional Comments Pt lives with significant other and dtr in a two level house with ramped entrance and stair glide to 2nd floor  Dtr is W/C bound so home is handicap accessible  Prior Function   Level of [place] Independent with ADLs and functional mobility   Lives With Significant other;Daughter   Receives Help From Family   ADL Assistance Independent   IADLs Independent   Falls in the last 6 months 0   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[employer])   Comments At baseline, pt was I w/ ADLs, IADLs, and functional transfers/mobility w/o use of AD  (+)   Denies falls PTA  Lifestyle   Autonomy At baseline, pt was I w/ ADLs, IADLs, and functional transfers/mobility w/o use of AD  (+)   Denies falls PTA  Reciprocal Relationships Significant other, dtr   Service to Others FT employed- Supervisor at [employer]   Where Assessed Edge of bed   Eating Assistance 5  [address]  [address] 3  Moderate Assistance   [address] 4  [address] 3  Moderate [address]  3  Moderate Assistance   Functional Assistance 3  Moderate Assistance   Bed Mobility   Supine to Sit 3  Moderate assistance   Additional items Assist x 1;HOB elevated; Bedrails; Increased time required;Verbal cues;LE management   Sit to Supine 4  Minimal assistance   Additional items Assist x 1; Increased time required;Verbal cues;LE management   Additional Comments BP supine: 113/76  Pt lying supine with bed alarm activated at end of session  Call bell and phone within reach  All needs met and pt reports no further questions for OT at this time  Transfers   Sit to Stand 4  Minimal assistance   Additional items Assist x 2; Increased time required;Verbal cues   Stand to Sit 4  Minimal assistance   Additional items Assist x 2; Increased time required;Verbal cues   Toilet transfer 4  Minimal assistance   Additional items Assist x 2; Increased time required;Verbal cues; Commode   Additional Comments Cues for safe technique and hand placement   Functional Mobility   Functional Mobility 4  Minimal assistance   Additional Comments Assist x2 for balance/steadying w/ B/L hand held assistance  BP seated EOB post-mobility: 125/83   SpO2: 93-98% on 6L O2   Additional items Hand hold assistance   Balance   Static Sitting Fair   Dynamic Sitting Fair -   Static Standing Poor +   Dynamic Standing Poor   Ambulatory Poor Activity Tolerance   Activity Tolerance Treatment limited secondary to medical complications (Comment); Patient limited by fatigue;Patient tolerated treatment well   Medical Staff Made Aware Brain Band, PT   Nurse Made Aware yes; Manju Mcdaniel, RN   RUE Assessment   RUE Assessment [facility]   RU Strength   RUE Overall Strength Within Functional Limits - able to perform ADL tasks with strength  (4-/5 throughout)   LUE Assessment   LUE Assessment WFL   LUE Strength   LUE Overall Strength Within Functional Limits - able to perform ADL tasks with strength  (4-/5 throughout)   Hand Function   Gross Motor Coordination Functional   Fine Motor Coordination Functional   Sensation   Light Touch No apparent deficits   Proprioception   Proprioception No apparent deficits   Vision-Basic Assessment   Current Vision Wears glasses all the time   Vision - Complex Assessment   Acuity Able to read clock/calendar on wall without difficulty   Perception   Inattention/Neglect Appears intact   Cognition   Overall Cognitive Status Impaired   Arousal/Participation Alert; Cooperative   Attention Attends with cues to redirect   Orientation Level Oriented to person;Oriented to place; Disoriented to time;Disoriented to situation  (able to state year, unable to state day/month)   Memory Decreased recall of precautions;Decreased recall of recent events;Decreased short term memory   Following Commands Follows one step commands with increased time or repetition   Comments Increased processing time, limited insight into deficits   Assessment   Limitation Decreased ADL status; Decreased UE strength;Decreased Safe judgement during ADL;Decreased cognition;Decreased endurance;Decreased self-care trans;Decreased high-level ADLs   Prognosis Fair   Assessment Pt is a 60 y o  female seen for OT evaluation s/p adm to [facility]  on 4/8/2022 as a transfer from [facility] w/ Acute respiratory failure with hypoxia, SIRS, Ataxia, Alcohol-induced acute pancreatitis  Pt intubated 4/8/22 and extubated 4/14, however failed BiPAP and required re-intubation  Patient had repeat bronchoscopy 4/15 which showed decrease secretions  Successfully extubated to 15L/min mid flow on 4/19  Pt on 6L O2 at time of eval  MRI brain (-) for acute infarction, intracranial hemorrhage or mass effect  Comorbidities affecting pts functional performance include a significant PMH of Alcohol abuse, HTN, and HLD  Pt with active OT orders and activity orders for Activity as tolerated  Pt lives with significant other and dtr in a two level house with ramped entrance and stair glide to 2nd floor  Dtr is W/C bound so home is handicap accessible  At baseline, pt was I w/ ADLs, IADLs, and functional transfers/mobility w/o use of AD  (+)   Denies falls PTA  Upon evaluation, pt currently requires Min A for UB ADLs, Mod A for LB ADLs, Mod A for toileting, Mod A for supine>sit, Min A for sit>supine, and Min A of 2 for functional mobility/transfers 2* the following deficits impacting occupational performance: decreased strength, decreased balance, decreased tolerance, impaired memory, impaired problem solving and decreased safety awareness  These impairments, as well at pts fall risk, multiple lines, new O2 requirements, difficulty performing ADLS and limited insight into deficits limit pts ability to safely engage in all baseline areas of occupation  Based on the aforementioned OT evaluation, functional performance deficits, and assessments, pt has been identified as a High complexity evaluation   Pt to continue to benefit from continued acute OT services during hospital stay to address defined deficits and to maximize level of functional independence in the following Occupational Performance areas: grooming, bathing/shower, toilet hygiene, dressing, medication management, health maintenance, functional mobility, community mobility, clothing management, cleaning, meal prep and household maintenance  From OT standpoint, recommend STR upon D/C  OT will continue to follow pt 3-5x/wk to address the following goals to  w/in 10-14 days:   Goals   Patient Goals To use the bathroom   LTG Time Frame 10-14   Long Term Goal Please refer to LTGs listed below   Plan   Treatment Interventions ADL retraining;Functional transfer training;UE strengthening/ROM; Endurance training;Patient/family training;Equipment evaluation/education; Compensatory technique education;Continued evaluation; Energy conservation; Activityengagement   Goal Expiration Date 22   OT Treatment Day 0   OT Frequency 3-5x/wk   Recommendation   OT Discharge Recommendation Post acute rehabilitation services   OT - OK to Discharge Yes  (when medically cleared to rehab)   Additional Comments  The patient's raw score on the AM-PAC Daily Activity inpatient short form is 16, standardized score is 35 96, less than 39 4  Patients at this level are likely to benefit from discharge to post-acute rehabilitation services  Please refer to the recommendation of the Occupational Therapist for safe discharge planning  AM-PAC Daily Activity Inpatient   Lower Body Dressing 2   Bathing 2   Toileting 2   Upper Body Dressing 3   Grooming 3   Eating 4   Daily Activity Raw Score 16   Daily Activity Standardized Score (Calc for Raw Score >=11) 35 96   AM-[facility] Applied Cognition Inpatient   Following a Speech/Presentation 3   Understanding Ordinary Conversation 3   Taking Medications 2   Remembering Where Things Are Placed or Put Away 2   Remembering List of 4-5 Errands 2   Taking Care of Complicated Tasks 2   Applied Cognition Raw Score 14   Applied Cognition Standardized Score 32 02       GOALS    1  Pt will improve activity tolerance to G for min 30 min txment sessions for increase engagement in functional tasks    2  Pt will complete bed mobility at a Mod I level w/ G balance/safety demonstrated to decrease caregiver assistance required     3   Pt will complete UB dressing/self care w/ mod I using adaptive device and DME as needed     4  Pt will complete LB dressing/self care w/ mod I using adaptive device and DME as needed    5  Pt will complete toileting w/ mod I w/ G hygiene/thoroughness using DME as needed    6  Pt will improve functional transfers to Mod I on/off all surfaces using DME as needed w/ G balance/safety     7  Pt will improve functional mobility during ADL/IADL/leisure tasks to Mod I using DME as needed w/ G balance/safety     8  Pt will be attentive 100% of the time during ongoing cognitive assessment w/ G participation to assist w/ safe d/c planning/recommendations    9  Pt will demonstrate G carryover of pt/caregiver education and training as appropriate w/o cues w/ good tolerance to increase safety during functional tasks    10  Pt will demonstrate 100% carryover of energy conservation techniques t/o functional I/ADL/leisure tasks w/o cues s/p skilled education to increase endurance during functional tasks    11  Pt will increase BUE strength by 1MM grade via AROM/AAROM/PROM exercises to increase independence in ADLs and transfers    12  Pt will verbalize 3 potential fall hazards and identify appropriate compensatory techniques to decrease fall risk in home environment     13   Pt will increase standing tolerance to 8-10 mins with Fair+ dynamic standing balance to increase safety during participation in ADLs       Loree Goncalves OTR/L

## 2022-04-21 NOTE — PLAN OF CARE
Problem: PHYSICAL THERAPY ADULT  Goal: Performs mobility at highest level of function for planned discharge setting  See evaluation for individualized goals  Description: Treatment/Interventions: Functional transfer training,LE strengthening/ROM,Elevations,Therapeutic exercise,Endurance training,Cognitive reorientation,Patient/family training,Equipment eval/education,Bed mobility,Gait training,Spoke to nursing,OT  Equipment Recommended:  (tbd)       See flowsheet documentation for full assessment, interventions and recommendations  Note: Prognosis: Good  Problem List: Decreased strength,Decreased endurance,Impaired balance,Decreased mobility,Decreased coordination,Decreased cognition,Impaired judgement,Decreased safety awareness,Impaired vision  Assessment: pt admitted severe abdominal pain and vomiting  pt dx with etoh pancreatitis, Wernicke's encephalopathy, sirs, rue edema, tachycardia and hypotension, DT's  pt referred to PT  pt was living with her daughter and girlfriend  pt was indep without assistive device  pt has w/c bound daughter and therefore home is handicapped accessible  pt demonstrated moderate functional limitations due to chronic etoh use, recent illness and debility from bedrest  pt needed min assist of 2 persons for bed mobiolity, transfers, amb without assistive device  pt was ataxic, reporting lightheadedness but has normal BP  pt will need skilled PT for deficits in strength, balance, vestibular functiona, coordination, gait stability and sequencing, cognition  pt at this time would need rehab   depending on length of stay and progress, may be able to return home with PT  Barriers to Discharge: None        PT Discharge Recommendation: Post acute rehabilitation services (pending home with ? OPPT)          See flowsheet documentation for full assessment

## 2022-04-21 NOTE — PLAN OF CARE
Problem: MOBILITY - ADULT  Goal: Maintain or return to baseline ADL function  Description: INTERVENTIONS:  -  Assess patient's ability to carry out ADLs; assess patient's baseline for ADL function and identify physical deficits which impact ability to perform ADLs (bathing, care of mouth/teeth, toileting, grooming, dressing, etc )  - Assess/evaluate cause of self-care deficits   - Assess range of motion  - Assess patient's mobility; develop plan if impaired  - Assess patient's need for assistive devices and provide as appropriate  - Encourage maximum independence but intervene and supervise when necessary  - Involve family in performance of ADLs  - Assess for home care needs following discharge   - Consider OT consult to assist with ADL evaluation and planning for discharge  - Provide patient education as appropriate  Outcome: Progressing  Goal: Maintains/Returns to pre admission functional level  Description: INTERVENTIONS:  - Perform BMAT or MOVE assessment daily    - Set and communicate daily mobility goal to care team and patient/family/caregiver     - Collaborate with rehabilitation services on mobility goals if consulted  - Out of bed for toileting  - Record patient progress and toleration of activity level   Outcome: Progressing     Problem: Potential for Falls  Goal: Patient will remain free of falls  Description: INTERVENTIONS:  - Educate patient/family on patient safety including physical limitations  - Instruct patient to call for assistance with activity   - Consult OT/PT to assist with strengthening/mobility   - Keep Call bell within reach  - Keep bed low and locked with side rails adjusted as appropriate  - Keep care items and personal belongings within reach  - Initiate and maintain comfort rounds  - Make Fall Risk Sign visible to staff  - Apply yellow socks and bracelet for high fall risk patients  - Consider moving patient to room near nurses station  Outcome: Progressing     Problem: Prexisting or High Potential for Compromised Skin Integrity  Goal: Skin integrity is maintained or improved  Description: INTERVENTIONS:  - Identify patients at risk for skin breakdown  - Assess and monitor skin integrity  - Assess and monitor nutrition and hydration status  - Monitor labs   - Assess for incontinence   - Turn and reposition patient  - Assist with mobility/ambulation  - Relieve pressure over bony prominences  - Avoid friction and shearing  - Provide appropriate hygiene as needed including keeping skin clean and dry  - Evaluate need for skin moisturizer/barrier cream  - Collaborate with interdisciplinary team   - Patient/family teaching  - Consider wound care consult   Outcome: Progressing     Problem: DISCHARGE PLANNING - CARE MANAGEMENT  Goal: Discharge to post-acute care or home with appropriate resources  Description: INTERVENTIONS:  - Conduct assessment to determine patient/family and health care team treatment goals, and need for post-acute services based on payer coverage, community resources, and patient preferences, and barriers to discharge  - Address psychosocial, clinical, and financial barriers to discharge as identified in assessment in conjunction with the patient/family and health care team  - Arrange appropriate level of post-acute services according to patients   needs and preference and payer coverage in collaboration with the physician and health care team  - Communicate with and update the patient/family, physician, and health care team regarding progress on the discharge plan  - Arrange appropriate transportation to post-acute venues  Outcome: Progressing     Problem: SUBSTANCE USE/ABUSE  Goal: By discharge, will develop insight into their chemical dependency and sustain motivation to continue in recovery  Description: INTERVENTIONS:  - Attends all daily group sessions and scheduled AA groups  - Actively practices coping skills through participation in the therapeutic community and adherence to program rules  - Reviews and completes assignments from individual treatment plan  - Assist patient development of understanding of their personal cycle of addiction and relapse triggers  Outcome: Progressing  Goal: By discharge, patient will have ongoing treatment plan addressing chemical dependency  Description: INTERVENTIONS:  - Assist patient with resources and/or appointments for ongoing recovery based living  Outcome: Progressing     Problem: Nutrition/Hydration-ADULT  Goal: Nutrient/Hydration intake appropriate for improving, restoring or maintaining nutritional needs  Description: Monitor and assess patient's nutrition/hydration status for malnutrition  Collaborate with interdisciplinary team and initiate plan and interventions as ordered  Monitor patient's weight and dietary intake as ordered or per policy  Utilize nutrition screening tool and intervene as necessary  Determine patient's food preferences and provide high-protein, high-caloric foods as appropriate       INTERVENTIONS:  - Monitor oral intake, urinary output, labs, and treatment plans  - Assess nutrition and hydration status and recommend course of action  - Evaluate amount of meals eaten  - Assist patient with eating if necessary   - Allow adequate time for meals  - Recommend/ encourage appropriate diets, oral nutritional supplements, and vitamin/mineral supplements  - Order, calculate, and assess calorie counts as needed  - Recommend, monitor, and adjust tube feedings and TPN/PPN based on assessed needs  - Assess need for intravenous fluids  - Provide specific nutrition/hydration education as appropriate  - Include patient/family/caregiver in decisions related to nutrition  Outcome: Progressing     Problem: PAIN - ADULT  Goal: Verbalizes/displays adequate comfort level or baseline comfort level  Description: Interventions:  - Encourage patient to monitor pain and request assistance  - Assess pain using appropriate pain scale  - Administer analgesics based on type and severity of pain and evaluate response  - Implement non-pharmacological measures as appropriate and evaluate response  - Consider cultural and social influences on pain and pain management  - Notify physician/advanced practitioner if interventions unsuccessful or patient reports new pain  Outcome: Progressing     Problem: INFECTION - ADULT  Goal: Absence or prevention of progression during hospitalization  Description: INTERVENTIONS:  - Assess and monitor for signs and symptoms of infection  - Monitor lab/diagnostic results  - Monitor all insertion sites, i e  indwelling lines, tubes, and drains  - Monitor endotracheal if appropriate and nasal secretions for changes in amount and color  - [place] appropriate cooling/warming therapies per order  - Administer medications as ordered  - Instruct and encourage patient and family to use good hand hygiene technique  - Identify and instruct in appropriate isolation precautions for identified infection/condition  Outcome: Progressing     Problem: SAFETY ADULT  Goal: Maintain or return to baseline ADL function  Description: INTERVENTIONS:  -  Assess patient's ability to carry out ADLs; assess patient's baseline for ADL function and identify physical deficits which impact ability to perform ADLs (bathing, care of mouth/teeth, toileting, grooming, dressing, etc )  - Assess/evaluate cause of self-care deficits   - Assess range of motion  - Assess patient's mobility; develop plan if impaired  - Assess patient's need for assistive devices and provide as appropriate  - Encourage maximum independence but intervene and supervise when necessary  - Involve family in performance of ADLs  - Assess for home care needs following discharge   - Consider OT consult to assist with ADL evaluation and planning for discharge  - Provide patient education as appropriate  Outcome: Progressing  Goal: Maintains/Returns to pre admission functional level  Description: INTERVENTIONS:  - Perform BMAT or MOVE assessment daily    - Set and communicate daily mobility goal to care team and patient/family/caregiver  - Collaborate with rehabilitation services on mobility goals if consulted  - Out of bed for toileting  - Record patient progress and toleration of activity level   Outcome: Progressing  Goal: Patient will remain free of falls  Description: INTERVENTIONS:  - Educate patient/family on patient safety including physical limitations  - Instruct patient to call for assistance with activity   - Consult OT/PT to assist with strengthening/mobility   - Keep Call bell within reach  - Keep bed low and locked with side rails adjusted as appropriate  - Keep care items and personal belongings within reach  - Initiate and maintain comfort rounds  - Make Fall Risk Sign visible to staff  - Apply yellow socks and bracelet for high fall risk patients  - Consider moving patient to room near nurses station  Outcome: Progressing     Problem: DISCHARGE PLANNING  Goal: Discharge to home or other facility with appropriate resources  Description: INTERVENTIONS:  - Identify barriers to discharge w/patient and caregiver  - Arrange for needed discharge resources and transportation as appropriate  - Identify discharge learning needs (meds, wound care, etc )  - Arrange for interpretive services to assist at discharge as needed  - Refer to Case Management Department for coordinating discharge planning if the patient needs post-hospital services based on physician/advanced practitioner order or complex needs related to functional status, cognitive ability, or social support system  Outcome: Progressing     Problem: Knowledge Deficit  Goal: Patient/family/caregiver demonstrates understanding of disease process, treatment plan, medications, and discharge instructions  Description: Complete learning assessment and assess knowledge base    Interventions:  - Provide teaching at level of understanding  - Provide teaching via preferred learning methods  Outcome: Progressing     Problem: NEUROSENSORY - ADULT  Goal: Achieves stable or improved neurological status  Description: INTERVENTIONS  - Monitor and report changes in neurological status  - Monitor vital signs such as temperature, blood pressure, glucose, and any other labs ordered   - Initiate measures to prevent increased intracranial pressure  - Monitor for seizure activity and implement precautions if appropriate      Outcome: Progressing  Goal: Remains free of injury related to seizures activity  Description: INTERVENTIONS  - Maintain airway, patient safety  and administer oxygen as ordered  - Monitor patient for seizure activity, document and report duration and description of seizure to physician/advanced practitioner  - If seizure occurs,  ensure patient safety during seizure  - Reorient patient post seizure  - Seizure pads on all 4 side rails  - Instruct patient/family to notify RN of any seizure activity including if an aura is experienced  - Instruct patient/family to call for assistance with activity based on nursing assessment  - Administer anti-seizure medications if ordered    Outcome: Progressing  Goal: Achieves maximal functionality and self care  Description: INTERVENTIONS  - Monitor swallowing and airway patency with patient fatigue and changes in neurological status  - Encourage and assist patient to increase activity and self care     - Encourage visually impaired, hearing impaired and aphasic patients to use assistive/communication devices  Outcome: Progressing     Problem: CARDIOVASCULAR - ADULT  Goal: Maintains optimal cardiac output and hemodynamic stability  Description: INTERVENTIONS:  - Monitor I/O, vital signs and rhythm  - Monitor for S/S and trends of decreased cardiac output  - Administer and titrate ordered vasoactive medications to optimize hemodynamic stability  - Assess quality of pulses, skin color and temperature  - Assess for signs of decreased coronary artery perfusion  - Instruct patient to report change in severity of symptoms  Outcome: Progressing  Goal: Absence of cardiac dysrhythmias or at baseline rhythm  Description: INTERVENTIONS:  - Continuous cardiac monitoring, vital signs, obtain 12 lead EKG if ordered  - Administer antiarrhythmic and heart rate control medications as ordered  - Monitor electrolytes and administer replacement therapy as ordered  Outcome: Progressing     Problem: RESPIRATORY - ADULT  Goal: Achieves optimal ventilation and oxygenation  Description: INTERVENTIONS:  - Assess for changes in respiratory status  - Assess for changes in mentation and behavior  - Position to facilitate oxygenation and minimize respiratory effort  - Oxygen administered by appropriate delivery if ordered  - Initiate smoking cessation education as indicated  - Encourage broncho-pulmonary hygiene including cough, deep breathe, Incentive Spirometry  - Assess the need for suctioning and aspirate as needed  - Assess and instruct to report SOB or any respiratory difficulty  - Respiratory Therapy support as indicated  Outcome: Progressing     Problem: GASTROINTESTINAL - ADULT  Goal: Minimal or absence of nausea and/or vomiting  Description: INTERVENTIONS:  - Administer IV fluids if ordered to ensure adequate hydration  - Maintain NPO status until nausea and vomiting are resolved  - Nasogastric tube if ordered  - Administer ordered antiemetic medications as needed  - Provide nonpharmacologic comfort measures as appropriate  - Advance diet as tolerated, if ordered  - Consider nutrition services referral to assist patient with adequate nutrition and appropriate food choices  Outcome: Progressing  Goal: Maintains or returns to baseline bowel function  Description: INTERVENTIONS:  - Assess bowel function  - Encourage oral fluids to ensure adequate hydration  - Administer IV fluids if ordered to ensure adequate hydration  - Administer ordered medications as needed  - Encourage mobilization and activity  - Consider nutritional services referral to assist patient with adequate nutrition and appropriate food choices  Outcome: Progressing  Goal: Maintains adequate nutritional intake  Description: INTERVENTIONS:  - Monitor percentage of each meal consumed  - Identify factors contributing to decreased intake, treat as appropriate  - Assist with meals as needed  - Monitor I&O, weight, and lab values if indicated  - Obtain nutrition services referral as needed  Outcome: Progressing  Goal: Oral mucous membranes remain intact  Description: INTERVENTIONS  - Assess oral mucosa and hygiene practices  - Implement preventative oral hygiene regimen  - Implement oral medicated treatments as ordered  - Initiate Nutrition services referral as needed  Outcome: Progressing     Problem: GENITOURINARY - ADULT  Goal: Maintains or returns to baseline urinary function  Description: INTERVENTIONS:  - Assess urinary function  - Encourage oral fluids to ensure adequate hydration if ordered  - Administer IV fluids as ordered to ensure adequate hydration  - Administer ordered medications as needed  - Offer frequent toileting  - Follow urinary retention protocol if ordered  Outcome: Progressing  Goal: Absence of urinary retention  Description: INTERVENTIONS:  - Assess patients ability to void and empty bladder  - Monitor I/O  - Bladder scan as needed  - Discuss with physician/AP medications to alleviate retention as needed  - Discuss catheterization for long term situations as appropriate  Outcome: Progressing  Goal: Urinary catheter remains patent  Description: INTERVENTIONS:  - Assess patency of urinary catheter  - If patient has a chronic vazquez, consider changing catheter if non-functioning  - Follow guidelines for intermittent irrigation of non-functioning urinary catheter  Outcome: Progressing     Problem: METABOLIC, FLUID AND ELECTROLYTES - ADULT  Goal: Electrolytes maintained within normal limits  Description: INTERVENTIONS:  - Monitor labs and assess patient for signs and symptoms of electrolyte imbalances  - Administer electrolyte replacement as ordered  - Monitor response to electrolyte replacements, including repeat lab results as appropriate  - Instruct patient on fluid and nutrition as appropriate  Outcome: Progressing  Goal: Fluid balance maintained  Description: INTERVENTIONS:  - Monitor labs   - Monitor I/O and WT  - Instruct patient on fluid and nutrition as appropriate  - Assess for signs & symptoms of volume excess or deficit  Outcome: Progressing     Problem: SKIN/TISSUE INTEGRITY - ADULT  Goal: Skin Integrity remains intact(Skin Breakdown Prevention)  Description: Assess:  -Inspect skin when repositioning, toileting, and assisting with ADLS  -Assess extremities for adequate circulation and sensation     Bed Management:  -Have minimal linens on bed & keep smooth, unwrinkled  -Change linens as needed when moist or perspiring    Toileting:  -Offer bedside commode    Activity:  -Encourage activity and walks on unit  -Encourage or provide ROM exercises   -Use appropriate equipment to lift or move patient in bed  Skin Care:  -Avoid use of baby powder, tape, friction and shearing, hot water or constrictive clothing  -Do not massage red bony areas  Outcome: Progressing

## 2022-04-21 NOTE — PHYSICAL THERAPY NOTE
Physical Therapy Evaluation    Performed at least 2 patient identifiers during session:  Patient Active Problem List   Diagnosis    Alcohol-induced acute pancreatitis    Delirium tremens ([facility] [address] )    Hypertension    Other constipation    Acute respiratory failure with hypoxia (HCC)    Alcohol use disorder, severe, dependence ([facility] [address] )    Current every day nicotine vaping    Hepatic steatosis    Gastroesophageal reflux disease without esophagitis    Hyperlipidemia    Ataxia    SIRS (systemic inflammatory response syndrome) (HCC)    Edema of upper extremity       Past Medical History:   Diagnosis Date    Alcohol abuse     High cholesterol     Hypertension     Hypokalemia 4/5/2022    Pancreatitis        Past Surgical History:   Procedure Laterality Date    FOOT SURGERY          04/21/22 1424   PT Last Visit   PT Visit Date 04/21/22   Note Type   Note type Evaluation   Pain Assessment   Pain Assessment Tool 0-10   Pain Score No Pain   Restrictions/Precautions   Weight Bearing Precautions Per Order No   Other Precautions Cognitive; Bed Alarm;Multiple lines;Telemetry; Fall Risk   Home Living   Type of [address] [place] [address] Two level;Bed/bath upstairs; Ramped entrance   [address] chair   Home Equipment Stair glide   Additional Comments pt lives with significan other and daughter, her daughter is w/c bound  Prior Function   Level of [place] Independent with ADLs and functional mobility   Lives With Significant other;Daughter   Receives Help From Family   ADL Assistance Independent   IADLs Independent   Falls in the last 6 months 0   Comments pt was indep without assistive device  no falls  pt drives, cares for handicapped daughter   etoh abuse   General   Family/Caregiver Present No   Cognition   Overall Cognitive Status Impaired   Orientation Level Oriented to person;Oriented to place   Subjective   Subjective would like to get OOBryderince robert audible due to losw volume and altered voice clarity  RUE Assessment   RUE Assessment WFL   LUE Assessment   LUE Assessment WFL   RLE Assessment   RLE Assessment X  (str 4+/5)   LLE Assessment   LLE Assessment X  (str 4+/5)   Vision-Basic Assessment   Current Vision Wears glasses all the time   Coordination   Movements are Fluid and Coordinated 0   Coordination and Movement Description trunkal ataxia   Sensation WFL   Bed Mobility   Supine to Sit 3  Moderate assistance   Additional items Assist x 1;HOB elevated; Increased time required;Verbal cues;LE management   Sit to Supine 4  Minimal assistance   Additional items Assist x 1;HOB elevated; Increased time required;Verbal cues;LE management   Additional Comments BP supine 113/96, sitting 125/83  pt reported light headedness  pt had stable HR 90-95 bpm and spO2 99% on 6L  Transfers   Sit to Stand 4  Minimal assistance   Additional items Assist x 2;Bedrails; Impulsive;Verbal cues   Stand to Sit 4  Minimal assistance   Additional items Assist x 2;Bedrails; Increased time required;Verbal cues; Impulsive   Toilet transfer 4  Minimal assistance   Additional items Assist x 2; Increased time required;Verbal cues; Commode   Additional Comments pt had very soft bm,    Ambulation/Elevation   Gait pattern Decreased foot clearance; Inconsistent emmy; Ataxia; Excessively slow   Gait Assistance 4  Minimal assist   Additional items Assist x 2;Verbal cues; Tactile cues   Assistive Device Other (Comment); None;Rollabout   Distance 20'   Balance   Static Sitting Fair   Dynamic Sitting Fair -   Static Standing Poor +   Dynamic Standing Poor   Ambulatory Poor   Endurance Deficit   Endurance Deficit No   Activity Tolerance   Activity Tolerance Treatment limited secondary to medical complications (Comment)   Medical Staff Made Aware OT   Nurse Made Aware yes   Assessment   Prognosis Good   Problem List Decreased strength;Decreased endurance; Impaired balance;Decreased mobility; Decreased coordination;Decreased cognition; Impaired judgement;Decreased safety awareness; Impaired vision   Assessment pt admitted severe abdominal pain and vomiting  pt dx with etoh pancreatitis, Wernicke's encephalopathy, sirs, rue edema, tachycardia and hypotension, DT's  pt referred to PT  pt was living with her daughter and girlfriend  pt was indep without assistive device  pt has w/c bound daughter and therefore home is handicapped accessible  pt demonstrated moderate functional limitations due to chronic etoh use, recent illness and debility from bedrest  pt needed min assist of 2 persons for bed mobiolity, transfers, amb without assistive device  pt was ataxic, reporting lightheadedness but has normal BP  pt will need skilled PT for deficits in strength, balance, vestibular functiona, coordination, gait stability and sequencing, cognition  pt at this time would need rehab   depending on length of stay and progress, may be able to return home with PT  Barriers to Discharge None   Goals   Patient Goals use bathroom   STG Expiration Date 05/05/22   Short Term Goal #1 indep with bed mobility, transfers, amb without assistive device, improve strength and balance to wnl  demonstrate good safety practices  indep stairw with railing  Plan   Treatment/Interventions Functional transfer training;LE strengthening/ROM; Elevations; Therapeutic exercise; Endurance training;Cognitive reorientation;Patient/family training;Equipment eval/education; Bed mobility;Gait training;Spoke to nursing;OT   PT Frequency 3-5x/wk   Recommendation   PT Discharge Recommendation Post acute rehabilitation services  (pending home with ?  OPPT)   Equipment Recommended   (tbd)   AM-PAC Basic Mobility Inpatient   Turning in Bed Without Bedrails 4   Lying on Back to Sitting on Edge of Flat Bed 3   Moving Bed to Chair 3   Standing Up From Chair 3   Walk in Room 3   Climb 3-5 Stairs 2   Basic Mobility Inpatient Raw Score 18   Basic Mobility Standardized Score 41 05   Highest Level Of Mobility -M Goal 6: Walk 10 steps or more   -[facility] Highest Level of Mobility 6: Walk 10 steps or more       An AM-PAC Basic Mobility standardized score less than 42 9 suggests the patient may benefit from discharge to post-acute rehab services      History: co - morbidities, fall risk, use of assistive device, assist for adl's, cognition, multiple lines  Exam: impairments in locomotion, musculoskeletal, balance,coordination, pulmonary, cognition   Clinical: unstable/unpredictable- Ox2, impulsive  Complexity:high  Stephie Butterfield, PT

## 2022-04-21 NOTE — PROGRESS NOTES
Progress Note - Infectious Disease   Francisca Chávez 60 y o  female MRN: [id number]  Unit/Bed#: ICU 11 Encounter: [id number]    Impression/Plan:  1  SIRS vs sepsis  Evolving since admission  Fever, tachycardia, tachypnea, leukocytosis  Concern this may be related to a developing peripancreatic abscess as worsening fluid and debris are noted on CT imaging  Consider respiratory source as patient has had significant secretions before successful extubation  Cultures obtained during 2 bronchoscopies only grew mixed maia  Consider possibility of Precedex fevers vs other drug fevers  No other clear source appreciated  Previous blood cultures were negative  New blood cultures are negative so far  UA was unremarkable  Recent NIKO was a technically difficult study but showed no evidence of valvular vegetation  At this time the patient is clinically stable and does not require pressor support  She has been extubated  IR has evaluated collection and believe it is more of a phlegmon and not drainable  This morning she is afebrile  Procalcitonin has normalized  -monitor patient off antibiotics  -monitor CBC and CMP  -follow up blood cultures  -monitor vitals  -supportive care     2  Alcohol induced pancreatitis  With concern for developing peripancreatic abscess  Patient admits to heavy alcohol use  Lipase upon admission was 53,700  Abdominal CT and ultrasound imaging showing peripancreatic inflammation with some fluid tracking posteriorly to the spleen  Now with new CT imaging is concerning for ongoing necrosis and increased fluid/debris suggesting phlegmon/abscess  General surgery is following  IR has evaluated and area is likely not amenable to drainage as it is more representative of phlegmon   -monitor patient off antibiotics  -monitor CBC and CMP  -serial abdominal exams  -monitor GI symptoms  -continue follow up with general surgery     3  Acute hypoxic respiratory failure   In setting of recent encephalopathy during alcohol detox, and volume overload  Patient with some lung base collapse on previous chest imaging  She's had significant secretions  Most recent chest xray showed a stable right perihilar infiltrate, a left lower lobs consolidation vs effusion, and resolution of previous small effusions  She has undergone bronchoscopy x2  While significant secretions were noted she only grew mixed maia on her cultures  She completed 5-days of broad spectrum antibiotic treatment  The patient was successfully extubated and her O2 saturation remains stable on nasal cannula  -monitor vitals  -monitor respiratory status  -O2 support per critical care team     4  Alcohol abuse  Patient previously on the detox unit  She experienced delirium tremens  Now extubated and feeling anxious  Recommend close monitoring by medical toxicology team  Consider transition back to detox unit as needed if medically stable  Recommend offering support/resources before discharge for ongoing care  -monitor for withdraw  -recommend ongoing follow up by medical toxicology      5  Obesity  BMI = 28 02     Above plan was discussed in detail with patient at the bedside  Above plan was discussed in detail with critical care  Antibiotics:  No current antibiotic use    Subjective:  Patient very sleepy today and minimally interactive  She denies pain in her stomach and chest   She denies back pain  She denies chills, sweats, and shakes  She does not answer any of other questions  She offers no other symptoms  No new symptoms  Objective:  Vitals:  Temp:  [98 1 °F (36 7 °C)-100 4 °F (38 °C)] 98 7 °F (37 1 °C)  HR:  [] 90  Resp:  [12-35] 27  BP: (108-169)/() 116/64  SpO2:  [86 %-99 %] 97 %  Temp (24hrs), Av 3 °F (37 4 °C), Min:98 1 °F (36 7 °C), Max:100 4 °F (38 °C)  Current: Temperature: 98 7 °F (37 1 °C)    Physical Exam:   General Appearance:  Tired, somewhat interactive but drowsy and falling asleep during our conversation    She appears comfortable lying on her left side in bed  She appears chronically ill  She is in no acute distress  Throat: Oropharynx dry without lesions  Lungs:   Coarse throughout to auscultation bilaterally; no wheezing or rales; respirations unlabored on nasal cannula O2  Heart:  Tachycardic; no murmur, rub or gallop  Abdomen:   Soft, non-tender, non-distended, positive bowel sounds  Extremities: No clubbing or cyanosis; upper extremity edema is improving of the left arm is difficult to fully assess given her positioning  Skin: No new rashes or lesions noted on exposed skin  Labs, Imaging, & Other studies:   All pertinent labs and imaging studies were personally reviewed  Results from last 7 days   Lab Units 04/21/22  0503 04/20/22  0442 04/19/22  0551   WBC Thousand/uL 13 20* 13 17* 13 76*   HEMOGLOBIN g/dL 9 1* 8 6* 8 8*   PLATELETS Thousands/uL 580* 565* 530*     Results from last 7 days   Lab Units 04/21/22  0503 04/20/22  0442 04/19/22  0551 04/16/22  0413 04/15/22  0443   POTASSIUM mmol/L 3 9   < > 3 8   < > 4 1   CHLORIDE mmol/L 106   < > 102   < > 99*   CO2 mmol/L 27   < > 25   < > 26   BUN mg/dL 11   < > 16   < > 10   CREATININE mg/dL 0 56*   < > 0 57*   < > 0 44*   EGFR ml/min/1 73sq m 103   < > 102   < > 111   CALCIUM mg/dL 9 0   < > 8 0*   < > 8 3   AST U/L 39  --  60*  --  49*   ALT U/L 118*  --  190*  --  47   ALK PHOS U/L 133*  --  153*  --  211*    < > = values in this interval not displayed  Results from last 7 days   Lab Units 04/18/22  1817 04/18/22  1027   BLOOD CULTURE  No Growth at 48 hrs  No Growth at 48 hrs    --    SPUTUM CULTURE   --  2+ Growth of    GRAM STAIN RESULT   --  1+ Polys*  2+ Gram negative rods*  Rare Gram positive cocci in pairs*     Results from last 7 days   Lab Units 04/21/22  0503 04/20/22  0442 04/18/22  0353 04/15/22  0443   PROCALCITONIN ng/ml 0 11 0 27* 0 18 0 46*

## 2022-04-21 NOTE — PLAN OF CARE
Problem: MOBILITY - ADULT  Goal: Maintain or return to baseline ADL function  Description: INTERVENTIONS:  -  Assess patient's ability to carry out ADLs; assess patient's baseline for ADL function and identify physical deficits which impact ability to perform ADLs (bathing, care of mouth/teeth, toileting, grooming, dressing, etc )  - Assess/evaluate cause of self-care deficits   - Assess range of motion  - Assess patient's mobility; develop plan if impaired  - Assess patient's need for assistive devices and provide as appropriate  - Encourage maximum independence but intervene and supervise when necessary  - Involve family in performance of ADLs  - Assess for home care needs following discharge   - Consider OT consult to assist with ADL evaluation and planning for discharge  - Provide patient education as appropriate  Outcome: Progressing  Goal: Maintains/Returns to pre admission functional level  Description: INTERVENTIONS:  - Perform BMAT or MOVE assessment daily    - Set and communicate daily mobility goal to care team and patient/family/caregiver     - Collaborate with rehabilitation services on mobility goals if consulted  - Out of bed for toileting  - Record patient progress and toleration of activity level   Outcome: Progressing     Problem: Potential for Falls  Goal: Patient will remain free of falls  Description: INTERVENTIONS:  - Educate patient/family on patient safety including physical limitations  - Instruct patient to call for assistance with activity   - Consult OT/PT to assist with strengthening/mobility   - Keep Call bell within reach  - Keep bed low and locked with side rails adjusted as appropriate  - Keep care items and personal belongings within reach  - Initiate and maintain comfort rounds  - Make Fall Risk Sign visible to staff  - Apply yellow socks and bracelet for high fall risk patients  - Consider moving patient to room near nurses station  Outcome: Progressing     Problem: Prexisting or High Potential for Compromised Skin Integrity  Goal: Skin integrity is maintained or improved  Description: INTERVENTIONS:  - Identify patients at risk for skin breakdown  - Assess and monitor skin integrity  - Assess and monitor nutrition and hydration status  - Monitor labs   - Assess for incontinence   - Turn and reposition patient  - Assist with mobility/ambulation  - Relieve pressure over bony prominences  - Avoid friction and shearing  - Provide appropriate hygiene as needed including keeping skin clean and dry  - Evaluate need for skin moisturizer/barrier cream  - Collaborate with interdisciplinary team   - Patient/family teaching  - Consider wound care consult   Outcome: Progressing     Problem: SUBSTANCE USE/ABUSE  Goal: By discharge, will develop insight into their chemical dependency and sustain motivation to continue in recovery  Description: INTERVENTIONS:  - Attends all daily group sessions and scheduled AA groups  - Actively practices coping skills through participation in the therapeutic community and adherence to program rules  - Reviews and completes assignments from individual treatment plan  - Assist patient development of understanding of their personal cycle of addiction and relapse triggers  Outcome: Progressing  Goal: By discharge, patient will have ongoing treatment plan addressing chemical dependency  Description: INTERVENTIONS:  - Assist patient with resources and/or appointments for ongoing recovery based living  Outcome: Progressing     Problem: Nutrition/Hydration-ADULT  Goal: Nutrient/Hydration intake appropriate for improving, restoring or maintaining nutritional needs  Description: Monitor and assess patient's nutrition/hydration status for malnutrition  Collaborate with interdisciplinary team and initiate plan and interventions as ordered  Monitor patient's weight and dietary intake as ordered or per policy  Utilize nutrition screening tool and intervene as necessary   Determine patient's food preferences and provide high-protein, high-caloric foods as appropriate       INTERVENTIONS:  - Monitor oral intake, urinary output, labs, and treatment plans  - Assess nutrition and hydration status and recommend course of action  - Evaluate amount of meals eaten  - Assist patient with eating if necessary   - Allow adequate time for meals  - Recommend/ encourage appropriate diets, oral nutritional supplements, and vitamin/mineral supplements  - Order, calculate, and assess calorie counts as needed  - Recommend, monitor, and adjust tube feedings and TPN/PPN based on assessed needs  - Assess need for intravenous fluids  - Provide specific nutrition/hydration education as appropriate  - Include patient/family/caregiver in decisions related to nutrition  Outcome: Progressing     Problem: PAIN - ADULT  Goal: Verbalizes/displays adequate comfort level or baseline comfort level  Description: Interventions:  - Encourage patient to monitor pain and request assistance  - Assess pain using appropriate pain scale  - Administer analgesics based on type and severity of pain and evaluate response  - Implement non-pharmacological measures as appropriate and evaluate response  - Consider cultural and social influences on pain and pain management  - Notify physician/advanced practitioner if interventions unsuccessful or patient reports new pain  Outcome: Progressing     Problem: INFECTION - ADULT  Goal: Absence or prevention of progression during hospitalization  Description: INTERVENTIONS:  - Assess and monitor for signs and symptoms of infection  - Monitor lab/diagnostic results  - Monitor all insertion sites, i e  indwelling lines, tubes, and drains  - Monitor endotracheal if appropriate and nasal secretions for changes in amount and color  - [place] appropriate cooling/warming therapies per order  - Administer medications as ordered  - Instruct and encourage patient and family to use good hand hygiene technique  - Identify and instruct in appropriate isolation precautions for identified infection/condition  Outcome: Progressing     Problem: SAFETY ADULT  Goal: Maintain or return to baseline ADL function  Description: INTERVENTIONS:  -  Assess patient's ability to carry out ADLs; assess patient's baseline for ADL function and identify physical deficits which impact ability to perform ADLs (bathing, care of mouth/teeth, toileting, grooming, dressing, etc )  - Assess/evaluate cause of self-care deficits   - Assess range of motion  - Assess patient's mobility; develop plan if impaired  - Assess patient's need for assistive devices and provide as appropriate  - Encourage maximum independence but intervene and supervise when necessary  - Involve family in performance of ADLs  - Assess for home care needs following discharge   - Consider OT consult to assist with ADL evaluation and planning for discharge  - Provide patient education as appropriate  Outcome: Progressing  Goal: Maintains/Returns to pre admission functional level  Description: INTERVENTIONS:  - Perform BMAT or MOVE assessment daily    - Set and communicate daily mobility goal to care team and patient/family/caregiver     - Collaborate with rehabilitation services on mobility goals if consulted  - Out of bed for toileting  - Record patient progress and toleration of activity level   Outcome: Progressing  Goal: Patient will remain free of falls  Description: INTERVENTIONS:  - Educate patient/family on patient safety including physical limitations  - Instruct patient to call for assistance with activity   - Consult OT/PT to assist with strengthening/mobility   - Keep Call bell within reach  - Keep bed low and locked with side rails adjusted as appropriate  - Keep care items and personal belongings within reach  - Initiate and maintain comfort rounds  - Make Fall Risk Sign visible to staff  - Apply yellow socks and bracelet for high fall risk patients  - Consider moving patient to room near nurses station  Outcome: Progressing     Problem: DISCHARGE PLANNING  Goal: Discharge to home or other facility with appropriate resources  Description: INTERVENTIONS:  - Identify barriers to discharge w/patient and caregiver  - Arrange for needed discharge resources and transportation as appropriate  - Identify discharge learning needs (meds, wound care, etc )  - Arrange for interpretive services to assist at discharge as needed  - Refer to Case Management Department for coordinating discharge planning if the patient needs post-hospital services based on physician/advanced practitioner order or complex needs related to functional status, cognitive ability, or social support system  Outcome: Progressing     Problem: Knowledge Deficit  Goal: Patient/family/caregiver demonstrates understanding of disease process, treatment plan, medications, and discharge instructions  Description: Complete learning assessment and assess knowledge base    Interventions:  - Provide teaching at level of understanding  - Provide teaching via preferred learning methods  Outcome: Progressing     Problem: NEUROSENSORY - ADULT  Goal: Achieves stable or improved neurological status  Description: INTERVENTIONS  - Monitor and report changes in neurological status  - Monitor vital signs such as temperature, blood pressure, glucose, and any other labs ordered   - Initiate measures to prevent increased intracranial pressure  - Monitor for seizure activity and implement precautions if appropriate      Outcome: Progressing  Goal: Remains free of injury related to seizures activity  Description: INTERVENTIONS  - Maintain airway, patient safety  and administer oxygen as ordered  - Monitor patient for seizure activity, document and report duration and description of seizure to physician/advanced practitioner  - If seizure occurs,  ensure patient safety during seizure  - Reorient patient post seizure  - Seizure pads on all 4 side rails  - Instruct patient/family to notify RN of any seizure activity including if an aura is experienced  - Instruct patient/family to call for assistance with activity based on nursing assessment  - Administer anti-seizure medications if ordered    Outcome: Progressing  Goal: Achieves maximal functionality and self care  Description: INTERVENTIONS  - Monitor swallowing and airway patency with patient fatigue and changes in neurological status  - Encourage and assist patient to increase activity and self care     - Encourage visually impaired, hearing impaired and aphasic patients to use assistive/communication devices  Outcome: Progressing     Problem: CARDIOVASCULAR - ADULT  Goal: Maintains optimal cardiac output and hemodynamic stability  Description: INTERVENTIONS:  - Monitor I/O, vital signs and rhythm  - Monitor for S/S and trends of decreased cardiac output  - Administer and titrate ordered vasoactive medications to optimize hemodynamic stability  - Assess quality of pulses, skin color and temperature  - Assess for signs of decreased coronary artery perfusion  - Instruct patient to report change in severity of symptoms  Outcome: Progressing  Goal: Absence of cardiac dysrhythmias or at baseline rhythm  Description: INTERVENTIONS:  - Continuous cardiac monitoring, vital signs, obtain 12 lead EKG if ordered  - Administer antiarrhythmic and heart rate control medications as ordered  - Monitor electrolytes and administer replacement therapy as ordered  Outcome: Progressing     Problem: RESPIRATORY - ADULT  Goal: Achieves optimal ventilation and oxygenation  Description: INTERVENTIONS:  - Assess for changes in respiratory status  - Assess for changes in mentation and behavior  - Position to facilitate oxygenation and minimize respiratory effort  - Oxygen administered by appropriate delivery if ordered  - Initiate smoking cessation education as indicated  - Encourage broncho-pulmonary hygiene including cough, deep breathe, Incentive Spirometry  - Assess the need for suctioning and aspirate as needed  - Assess and instruct to report SOB or any respiratory difficulty  - Respiratory Therapy support as indicated  Outcome: Progressing     Problem: GASTROINTESTINAL - ADULT  Goal: Minimal or absence of nausea and/or vomiting  Description: INTERVENTIONS:  - Administer IV fluids if ordered to ensure adequate hydration  - Maintain NPO status until nausea and vomiting are resolved  - Nasogastric tube if ordered  - Administer ordered antiemetic medications as needed  - Provide nonpharmacologic comfort measures as appropriate  - Advance diet as tolerated, if ordered  - Consider nutrition services referral to assist patient with adequate nutrition and appropriate food choices  Outcome: Progressing  Goal: Maintains or returns to baseline bowel function  Description: INTERVENTIONS:  - Assess bowel function  - Encourage oral fluids to ensure adequate hydration  - Administer IV fluids if ordered to ensure adequate hydration  - Administer ordered medications as needed  - Encourage mobilization and activity  - Consider nutritional services referral to assist patient with adequate nutrition and appropriate food choices  Outcome: Progressing  Goal: Maintains adequate nutritional intake  Description: INTERVENTIONS:  - Monitor percentage of each meal consumed  - Identify factors contributing to decreased intake, treat as appropriate  - Assist with meals as needed  - Monitor I&O, weight, and lab values if indicated  - Obtain nutrition services referral as needed  Outcome: Progressing  Goal: Oral mucous membranes remain intact  Description: INTERVENTIONS  - Assess oral mucosa and hygiene practices  - Implement preventative oral hygiene regimen  - Implement oral medicated treatments as ordered  - Initiate Nutrition services referral as needed  Outcome: Progressing     Problem: GENITOURINARY - ADULT  Goal: Maintains or returns to baseline urinary function  Description: INTERVENTIONS:  - Assess urinary function  - Encourage oral fluids to ensure adequate hydration if ordered  - Administer IV fluids as ordered to ensure adequate hydration  - Administer ordered medications as needed  - Offer frequent toileting  - Follow urinary retention protocol if ordered  Outcome: Progressing  Goal: Absence of urinary retention  Description: INTERVENTIONS:  - Assess patients ability to void and empty bladder  - Monitor I/O  - Bladder scan as needed  - Discuss with physician/AP medications to alleviate retention as needed  - Discuss catheterization for long term situations as appropriate  Outcome: Progressing  Goal: Urinary catheter remains patent  Description: INTERVENTIONS:  - Assess patency of urinary catheter  - If patient has a chronic vazquez, consider changing catheter if non-functioning  - Follow guidelines for intermittent irrigation of non-functioning urinary catheter  Outcome: Progressing     Problem: METABOLIC, FLUID AND ELECTROLYTES - ADULT  Goal: Electrolytes maintained within normal limits  Description: INTERVENTIONS:  - Monitor labs and assess patient for signs and symptoms of electrolyte imbalances  - Administer electrolyte replacement as ordered  - Monitor response to electrolyte replacements, including repeat lab results as appropriate  - Instruct patient on fluid and nutrition as appropriate  Outcome: Progressing  Goal: Fluid balance maintained  Description: INTERVENTIONS:  - Monitor labs   - Monitor I/O and WT  - Instruct patient on fluid and nutrition as appropriate  - Assess for signs & symptoms of volume excess or deficit  Outcome: Progressing

## 2022-04-22 LAB
ANION GAP SERPL CALCULATED.3IONS-SCNC: 9 MMOL/L (ref 4–13)
BUN SERPL-MCNC: 10 MG/DL (ref 5–25)
CALCIUM SERPL-MCNC: 8.8 MG/DL (ref 8.3–10.1)
CHLORIDE SERPL-SCNC: 108 MMOL/L (ref 100–108)
CO2 SERPL-SCNC: 27 MMOL/L (ref 21–32)
CREAT SERPL-MCNC: 0.54 MG/DL (ref 0.6–1.3)
ERYTHROCYTE [DISTWIDTH] IN BLOOD BY AUTOMATED COUNT: 12.2 % (ref 11.6–15.1)
GFR SERPL CREATININE-BSD FRML MDRD: 104 ML/MIN/1.73SQ M
GLUCOSE SERPL-MCNC: 101 MG/DL (ref 65–140)
GLUCOSE SERPL-MCNC: 110 MG/DL (ref 65–140)
GLUCOSE SERPL-MCNC: 114 MG/DL (ref 65–140)
GLUCOSE SERPL-MCNC: 126 MG/DL (ref 65–140)
GLUCOSE SERPL-MCNC: 132 MG/DL (ref 65–140)
HCT VFR BLD AUTO: 28.2 % (ref 34.8–46.1)
HGB BLD-MCNC: 9.1 G/DL (ref 11.5–15.4)
MCH RBC QN AUTO: 33.1 PG (ref 26.8–34.3)
MCHC RBC AUTO-ENTMCNC: 32.3 G/DL (ref 31.4–37.4)
MCV RBC AUTO: 103 FL (ref 82–98)
PLATELET # BLD AUTO: 538 THOUSANDS/UL (ref 149–390)
PMV BLD AUTO: 9.5 FL (ref 8.9–12.7)
POTASSIUM SERPL-SCNC: 3.6 MMOL/L (ref 3.5–5.3)
RBC # BLD AUTO: 2.75 MILLION/UL (ref 3.81–5.12)
SODIUM SERPL-SCNC: 144 MMOL/L (ref 136–145)
WBC # BLD AUTO: 11.28 THOUSAND/UL (ref 4.31–10.16)

## 2022-04-22 PROCEDURE — 99232 SBSQ HOSP IP/OBS MODERATE 35: CPT | Performed by: HOSPITALIST

## 2022-04-22 PROCEDURE — 80048 BASIC METABOLIC PNL TOTAL CA: CPT

## 2022-04-22 PROCEDURE — 99232 SBSQ HOSP IP/OBS MODERATE 35: CPT | Performed by: INTERNAL MEDICINE

## 2022-04-22 PROCEDURE — 82948 REAGENT STRIP/BLOOD GLUCOSE: CPT

## 2022-04-22 PROCEDURE — 85027 COMPLETE CBC AUTOMATED: CPT

## 2022-04-22 RX ORDER — LORAZEPAM 1 MG/1
1 TABLET ORAL EVERY 8 HOURS PRN
Status: DISCONTINUED | OUTPATIENT
Start: 2022-04-22 | End: 2022-04-24 | Stop reason: HOSPADM

## 2022-04-22 RX ORDER — ACETAMINOPHEN 325 MG/1
650 TABLET ORAL EVERY 6 HOURS PRN
Status: DISCONTINUED | OUTPATIENT
Start: 2022-04-22 | End: 2022-04-24 | Stop reason: HOSPADM

## 2022-04-22 RX ORDER — ONDANSETRON 4 MG/1
4 TABLET, ORALLY DISINTEGRATING ORAL EVERY 6 HOURS PRN
Status: DISCONTINUED | OUTPATIENT
Start: 2022-04-22 | End: 2022-04-24 | Stop reason: HOSPADM

## 2022-04-22 RX ADMIN — Medication 1 TABLET: at 09:11

## 2022-04-22 RX ADMIN — FOLIC ACID 1 MG: 1 TABLET ORAL at 09:11

## 2022-04-22 RX ADMIN — CLONIDINE HYDROCHLORIDE 0.2 MG: 0.1 TABLET ORAL at 05:46

## 2022-04-22 RX ADMIN — ENOXAPARIN SODIUM 40 MG: 40 INJECTION SUBCUTANEOUS at 09:11

## 2022-04-22 RX ADMIN — QUETIAPINE FUMARATE 25 MG: 25 TABLET ORAL at 22:42

## 2022-04-22 RX ADMIN — Medication 1 PATCH: at 09:12

## 2022-04-22 RX ADMIN — LIDOCAINE 1 PATCH: 50 PATCH CUTANEOUS at 11:21

## 2022-04-22 RX ADMIN — CLONIDINE HYDROCHLORIDE 0.2 MG: 0.1 TABLET ORAL at 14:23

## 2022-04-22 RX ADMIN — PANTOPRAZOLE SODIUM 40 MG: 40 TABLET, DELAYED RELEASE ORAL at 05:46

## 2022-04-22 RX ADMIN — CLONIDINE HYDROCHLORIDE 0.2 MG: 0.1 TABLET ORAL at 22:42

## 2022-04-22 RX ADMIN — THIAMINE HCL TAB 100 MG 100 MG: 100 TAB at 09:11

## 2022-04-22 NOTE — PLAN OF CARE
Problem: MOBILITY - ADULT  Goal: Maintain or return to baseline ADL function  Description: INTERVENTIONS:  -  Assess patient's ability to carry out ADLs; assess patient's baseline for ADL function and identify physical deficits which impact ability to perform ADLs (bathing, care of mouth/teeth, toileting, grooming, dressing, etc )  - Assess/evaluate cause of self-care deficits   - Assess range of motion  - Assess patient's mobility; develop plan if impaired  - Assess patient's need for assistive devices and provide as appropriate  - Encourage maximum independence but intervene and supervise when necessary  - Involve family in performance of ADLs  - Assess for home care needs following discharge   - Consider OT consult to assist with ADL evaluation and planning for discharge  - Provide patient education as appropriate  Outcome: Progressing  Goal: Maintains/Returns to pre admission functional level  Description: INTERVENTIONS:  - Perform BMAT or MOVE assessment daily    - Set and communicate daily mobility goal to care team and patient/family/caregiver  - Collaborate with rehabilitation services on mobility goals if consulted  - Perform Range of Motion 3 times a day  - Reposition patient every 2 hours    - Ambulate patient 3 times a day  - Out of bed to chair 3 times a day   - Out of bed for meals 3 times a day  - Out of bed for toileting  - Record patient progress and toleration of activity level   Outcome: Progressing     Problem: Potential for Falls  Goal: Patient will remain free of falls  Description: INTERVENTIONS:  - Educate patient/family on patient safety including physical limitations  - Instruct patient to call for assistance with activity   - Consult OT/PT to assist with strengthening/mobility   - Keep Call bell within reach  - Keep bed low and locked with side rails adjusted as appropriate  - Keep care items and personal belongings within reach  - Initiate and maintain comfort rounds  - Make Fall Risk Sign visible to staff  - Offer Toileting every 2 Hours, in advance of need  - Initiate/Maintain bed alarm  - Apply yellow socks and bracelet for high fall risk patients  - Consider moving patient to room near nurses station  Outcome: Progressing     Problem: Prexisting or High Potential for Compromised Skin Integrity  Goal: Skin integrity is maintained or improved  Description: INTERVENTIONS:  - Identify patients at risk for skin breakdown  - Assess and monitor skin integrity  - Assess and monitor nutrition and hydration status  - Monitor labs   - Assess for incontinence   - Turn and reposition patient  - Assist with mobility/ambulation  - Relieve pressure over bony prominences  - Avoid friction and shearing  - Provide appropriate hygiene as needed including keeping skin clean and dry  - Evaluate need for skin moisturizer/barrier cream  - Collaborate with interdisciplinary team   - Patient/family teaching  - Consider wound care consult   Outcome: Progressing     Problem: DISCHARGE PLANNING - CARE MANAGEMENT  Goal: Discharge to post-acute care or home with appropriate resources  Description: INTERVENTIONS:  - Conduct assessment to determine patient/family and health care team treatment goals, and need for post-acute services based on payer coverage, community resources, and patient preferences, and barriers to discharge  - Address psychosocial, clinical, and financial barriers to discharge as identified in assessment in conjunction with the patient/family and health care team  - Arrange appropriate level of post-acute services according to patients   needs and preference and payer coverage in collaboration with the physician and health care team  - Communicate with and update the patient/family, physician, and health care team regarding progress on the discharge plan  - Arrange appropriate transportation to post-acute venues  Outcome: Progressing     Problem: SUBSTANCE USE/ABUSE  Goal: By discharge, will develop insight into their chemical dependency and sustain motivation to continue in recovery  Description: INTERVENTIONS:  - Attends all daily group sessions and scheduled AA groups  - Actively practices coping skills through participation in the therapeutic community and adherence to program rules  - Reviews and completes assignments from individual treatment plan  - Assist patient development of understanding of their personal cycle of addiction and relapse triggers  Outcome: Progressing  Goal: By discharge, patient will have ongoing treatment plan addressing chemical dependency  Description: INTERVENTIONS:  - Assist patient with resources and/or appointments for ongoing recovery based living  Outcome: Progressing     Problem: Nutrition/Hydration-ADULT  Goal: Nutrient/Hydration intake appropriate for improving, restoring or maintaining nutritional needs  Description: Monitor and assess patient's nutrition/hydration status for malnutrition  Collaborate with interdisciplinary team and initiate plan and interventions as ordered  Monitor patient's weight and dietary intake as ordered or per policy  Utilize nutrition screening tool and intervene as necessary  Determine patient's food preferences and provide high-protein, high-caloric foods as appropriate       INTERVENTIONS:  - Monitor oral intake, urinary output, labs, and treatment plans  - Assess nutrition and hydration status and recommend course of action  - Evaluate amount of meals eaten  - Assist patient with eating if necessary   - Allow adequate time for meals  - Recommend/ encourage appropriate diets, oral nutritional supplements, and vitamin/mineral supplements  - Order, calculate, and assess calorie counts as needed  - Recommend, monitor, and adjust tube feedings and TPN/PPN based on assessed needs  - Assess need for intravenous fluids  - Provide specific nutrition/hydration education as appropriate  - Include patient/family/caregiver in decisions related to nutrition  Outcome: Progressing     Problem: PAIN - ADULT  Goal: Verbalizes/displays adequate comfort level or baseline comfort level  Description: Interventions:  - Encourage patient to monitor pain and request assistance  - Assess pain using appropriate pain scale  - Administer analgesics based on type and severity of pain and evaluate response  - Implement non-pharmacological measures as appropriate and evaluate response  - Consider cultural and social influences on pain and pain management  - Notify physician/advanced practitioner if interventions unsuccessful or patient reports new pain  Outcome: Progressing     Problem: INFECTION - ADULT  Goal: Absence or prevention of progression during hospitalization  Description: INTERVENTIONS:  - Assess and monitor for signs and symptoms of infection  - Monitor lab/diagnostic results  - Monitor all insertion sites, i e  indwelling lines, tubes, and drains  - Monitor endotracheal if appropriate and nasal secretions for changes in amount and color  - [place] appropriate cooling/warming therapies per order  - Administer medications as ordered  - Instruct and encourage patient and family to use good hand hygiene technique  - Identify and instruct in appropriate isolation precautions for identified infection/condition  Outcome: Progressing     Problem: SAFETY ADULT  Goal: Maintain or return to baseline ADL function  Description: INTERVENTIONS:  -  Assess patient's ability to carry out ADLs; assess patient's baseline for ADL function and identify physical deficits which impact ability to perform ADLs (bathing, care of mouth/teeth, toileting, grooming, dressing, etc )  - Assess/evaluate cause of self-care deficits   - Assess range of motion  - Assess patient's mobility; develop plan if impaired  - Assess patient's need for assistive devices and provide as appropriate  - Encourage maximum independence but intervene and supervise when necessary  - Involve family in performance of ADLs  - Assess for home care needs following discharge   - Consider OT consult to assist with ADL evaluation and planning for discharge  - Provide patient education as appropriate  Outcome: Progressing  Goal: Maintains/Returns to pre admission functional level  Description: INTERVENTIONS:  - Perform BMAT or MOVE assessment daily    - Set and communicate daily mobility goal to care team and patient/family/caregiver  - Collaborate with rehabilitation services on mobility goals if consulted  - Perform Range of Motion 3 times a day  - Reposition patient every 2 hours    - Dangle patient 3 times a day  - Stand patient 3 times a day  - Ambulate patient 3 times a day  - Out of bed to chair 3 times a day   - Out of bed for meals 3 times a day  - Out of bed for toileting  - Record patient progress and toleration of activity level   Outcome: Progressing  Goal: Patient will remain free of falls  Description: INTERVENTIONS:  - Educate patient/family on patient safety including physical limitations  - Instruct patient to call for assistance with activity   - Consult OT/PT to assist with strengthening/mobility   - Keep Call bell within reach  - Keep bed low and locked with side rails adjusted as appropriate  - Keep care items and personal belongings within reach  - Initiate and maintain comfort rounds  - Make Fall Risk Sign visible to staff  - Offer Toileting every 2 Hours, in advance of need  - Initiate/Maintain bed alarm  - Apply yellow socks and bracelet for high fall risk patients  - Consider moving patient to room near nurses station  Outcome: Progressing     Problem: DISCHARGE PLANNING  Goal: Discharge to home or other facility with appropriate resources  Description: INTERVENTIONS:  - Identify barriers to discharge w/patient and caregiver  - Arrange for needed discharge resources and transportation as appropriate  - Identify discharge learning needs (meds, wound care, etc )  - Arrange for interpretive services to assist at discharge as needed  - Refer to Case Management Department for coordinating discharge planning if the patient needs post-hospital services based on physician/advanced practitioner order or complex needs related to functional status, cognitive ability, or social support system  Outcome: Progressing     Problem: Knowledge Deficit  Goal: Patient/family/caregiver demonstrates understanding of disease process, treatment plan, medications, and discharge instructions  Description: Complete learning assessment and assess knowledge base    Interventions:  - Provide teaching at level of understanding  - Provide teaching via preferred learning methods  Outcome: Progressing     Problem: NEUROSENSORY - ADULT  Goal: Achieves stable or improved neurological status  Description: INTERVENTIONS  - Monitor and report changes in neurological status  - Monitor vital signs such as temperature, blood pressure, glucose, and any other labs ordered   - Initiate measures to prevent increased intracranial pressure  - Monitor for seizure activity and implement precautions if appropriate      Outcome: Progressing  Goal: Remains free of injury related to seizures activity  Description: INTERVENTIONS  - Maintain airway, patient safety  and administer oxygen as ordered  - Monitor patient for seizure activity, document and report duration and description of seizure to physician/advanced practitioner  - If seizure occurs,  ensure patient safety during seizure  - Reorient patient post seizure  - Seizure pads on all 4 side rails  - Instruct patient/family to notify RN of any seizure activity including if an aura is experienced  - Instruct patient/family to call for assistance with activity based on nursing assessment  - Administer anti-seizure medications if ordered    Outcome: Progressing  Goal: Achieves maximal functionality and self care  Description: INTERVENTIONS  - Monitor swallowing and airway patency with patient fatigue and changes in neurological status  - Encourage and assist patient to increase activity and self care     - Encourage visually impaired, hearing impaired and aphasic patients to use assistive/communication devices  Outcome: Progressing     Problem: CARDIOVASCULAR - ADULT  Goal: Maintains optimal cardiac output and hemodynamic stability  Description: INTERVENTIONS:  - Monitor I/O, vital signs and rhythm  - Monitor for S/S and trends of decreased cardiac output  - Administer and titrate ordered vasoactive medications to optimize hemodynamic stability  - Assess quality of pulses, skin color and temperature  - Assess for signs of decreased coronary artery perfusion  - Instruct patient to report change in severity of symptoms  Outcome: Progressing  Goal: Absence of cardiac dysrhythmias or at baseline rhythm  Description: INTERVENTIONS:  - Continuous cardiac monitoring, vital signs, obtain 12 lead EKG if ordered  - Administer antiarrhythmic and heart rate control medications as ordered  - Monitor electrolytes and administer replacement therapy as ordered  Outcome: Progressing     Problem: RESPIRATORY - ADULT  Goal: Achieves optimal ventilation and oxygenation  Description: INTERVENTIONS:  - Assess for changes in respiratory status  - Assess for changes in mentation and behavior  - Position to facilitate oxygenation and minimize respiratory effort  - Oxygen administered by appropriate delivery if ordered  - Initiate smoking cessation education as indicated  - Encourage broncho-pulmonary hygiene including cough, deep breathe, Incentive Spirometry  - Assess the need for suctioning and aspirate as needed  - Assess and instruct to report SOB or any respiratory difficulty  - Respiratory Therapy support as indicated  Outcome: Progressing     Problem: GASTROINTESTINAL - ADULT  Goal: Minimal or absence of nausea and/or vomiting  Description: INTERVENTIONS:  - Administer IV fluids if ordered to ensure adequate hydration  - Maintain NPO status until nausea and vomiting are resolved  - Nasogastric tube if ordered  - Administer ordered antiemetic medications as needed  - Provide nonpharmacologic comfort measures as appropriate  - Advance diet as tolerated, if ordered  - Consider nutrition services referral to assist patient with adequate nutrition and appropriate food choices  Outcome: Progressing  Goal: Maintains or returns to baseline bowel function  Description: INTERVENTIONS:  - Assess bowel function  - Encourage oral fluids to ensure adequate hydration  - Administer IV fluids if ordered to ensure adequate hydration  - Administer ordered medications as needed  - Encourage mobilization and activity  - Consider nutritional services referral to assist patient with adequate nutrition and appropriate food choices  Outcome: Progressing  Goal: Maintains adequate nutritional intake  Description: INTERVENTIONS:  - Monitor percentage of each meal consumed  - Identify factors contributing to decreased intake, treat as appropriate  - Assist with meals as needed  - Monitor I&O, weight, and lab values if indicated  - Obtain nutrition services referral as needed  Outcome: Progressing  Goal: Oral mucous membranes remain intact  Description: INTERVENTIONS  - Assess oral mucosa and hygiene practices  - Implement preventative oral hygiene regimen  - Implement oral medicated treatments as ordered  - Initiate Nutrition services referral as needed  Outcome: Progressing     Problem: GENITOURINARY - ADULT  Goal: Maintains or returns to baseline urinary function  Description: INTERVENTIONS:  - Assess urinary function  - Encourage oral fluids to ensure adequate hydration if ordered  - Administer IV fluids as ordered to ensure adequate hydration  - Administer ordered medications as needed  - Offer frequent toileting  - Follow urinary retention protocol if ordered  Outcome: Progressing  Goal: Absence of urinary retention  Description: INTERVENTIONS:  - Assess patients ability to void and empty bladder  - Monitor I/O  - Bladder scan as needed  - Discuss with physician/AP medications to alleviate retention as needed  - Discuss catheterization for long term situations as appropriate  Outcome: Progressing  Goal: Urinary catheter remains patent  Description: INTERVENTIONS:  - Assess patency of urinary catheter  - If patient has a chronic vazquez, consider changing catheter if non-functioning  - Follow guidelines for intermittent irrigation of non-functioning urinary catheter  Outcome: Progressing     Problem: METABOLIC, FLUID AND ELECTROLYTES - ADULT  Goal: Electrolytes maintained within normal limits  Description: INTERVENTIONS:  - Monitor labs and assess patient for signs and symptoms of electrolyte imbalances  - Administer electrolyte replacement as ordered  - Monitor response to electrolyte replacements, including repeat lab results as appropriate  - Instruct patient on fluid and nutrition as appropriate  Outcome: Progressing  Goal: Fluid balance maintained  Description: INTERVENTIONS:  - Monitor labs   - Monitor I/O and WT  - Instruct patient on fluid and nutrition as appropriate  - Assess for signs & symptoms of volume excess or deficit  Outcome: Progressing     Problem: SKIN/TISSUE INTEGRITY - ADULT  Goal: Skin Integrity remains intact(Skin Breakdown Prevention)  Description: Assess:  -Perform Davey assessment every shift  -Clean and moisturize skin every shift  -Inspect skin when repositioning, toileting, and assisting with ADLS  -Assess extremities for adequate circulation and sensation     Bed Management:  -Have minimal linens on bed & keep smooth, unwrinkled  -Change linens as needed when moist or perspiring  -Avoid sitting or lying in one position for more than 2 hours while in bed  -Keep HOB at 30 degrees     Toileting:  -Offer bedside commode  -Assess for incontinence every shift    Activity:  -Mobilize patient 3 times a day  -Encourage activity and walks on unit  -Encourage or provide ROM exercises   -Turn and reposition patient every 2 Hours  -Use appropriate equipment to lift or move patient in bed  -Consider limitation of chair time 2 hour intervals    Skin Care:  -Avoid use of baby powder, tape, friction and shearing, hot water or constrictive clothing  -Do not massage red bony areas    Outcome: Progressing

## 2022-04-22 NOTE — ASSESSMENT & PLAN NOTE
Patient acute hypoxic on admission, was given dose of Lasix with marked improvement  However, this morning patient started to become more agitated, hypoxic and tachycardic unable to tolerate BiPAP, patient was intubated for airway protection in the setting of encephalopathy and potential DTs      Patient was extubated on 4/14, however failed BiPAP, eventually requiring re-intubation  The patient cleared significant amount of secretions while she was extubated  Patient had repeat bronchoscopy 4/15 which showed decrease secretions  Successfully extubated to 15L/min mid flow on 4/19  Receiving 40L/min HFNC overnight due to desats to mid 80s        · Continue monitor patient's secretions   · Weaned to room air 4/22   · CXR appears improved, less right hilar region consolidation  · Recheck CT C/A/P with contrast on 4/18 showed 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

## 2022-04-22 NOTE — PLAN OF CARE
Problem: MOBILITY - ADULT  Goal: Maintain or return to baseline ADL function  Description: INTERVENTIONS:  -  Assess patient's ability to carry out ADLs; assess patient's baseline for ADL function and identify physical deficits which impact ability to perform ADLs (bathing, care of mouth/teeth, toileting, grooming, dressing, etc )  - Assess/evaluate cause of self-care deficits   - Assess range of motion  - Assess patient's mobility; develop plan if impaired  - Assess patient's need for assistive devices and provide as appropriate  - Encourage maximum independence but intervene and supervise when necessary  - Involve family in performance of ADLs  - Assess for home care needs following discharge   - Consider OT consult to assist with ADL evaluation and planning for discharge  - Provide patient education as appropriate  Outcome: Progressing  Goal: Maintains/Returns to pre admission functional level  Description: INTERVENTIONS:  - Perform BMAT or MOVE assessment daily    - Set and communicate daily mobility goal to care team and patient/family/caregiver  - Collaborate with rehabilitation services on mobility goals if consulted  - Perform Range of Motion 4 times a day  - Reposition patient every 2 hours    - Dangle patient 4 times a day  - Stand patient 4 times a day  - Ambulate patient 4 times a day  - Out of bed to chair 4 times a day   - Out of bed for meals 4 times a day  - Out of bed for toileting  - Record patient progress and toleration of activity level   Outcome: Progressing     Problem: Potential for Falls  Goal: Patient will remain free of falls  Description: INTERVENTIONS:  - Educate patient/family on patient safety including physical limitations  - Instruct patient to call for assistance with activity   - Consult OT/PT to assist with strengthening/mobility   - Keep Call bell within reach  - Keep bed low and locked with side rails adjusted as appropriate  - Keep care items and personal belongings within reach  - Initiate and maintain comfort rounds  - Make Fall Risk Sign visible to staff  - Offer Toileting every 2 Hours, in advance of need  - Initiate/Maintain bed alarm  - Obtain necessary fall risk management equipment: alarms  - Apply yellow socks and bracelet for high fall risk patients  - Consider moving patient to room near nurses station  Outcome: Progressing     Problem: Prexisting or High Potential for Compromised Skin Integrity  Goal: Skin integrity is maintained or improved  Description: INTERVENTIONS:  - Identify patients at risk for skin breakdown  - Assess and monitor skin integrity  - Assess and monitor nutrition and hydration status  - Monitor labs   - Assess for incontinence   - Turn and reposition patient  - Assist with mobility/ambulation  - Relieve pressure over bony prominences  - Avoid friction and shearing  - Provide appropriate hygiene as needed including keeping skin clean and dry  - Evaluate need for skin moisturizer/barrier cream  - Collaborate with interdisciplinary team   - Patient/family teaching  - Consider wound care consult   Outcome: Progressing     Problem: DISCHARGE PLANNING - CARE MANAGEMENT  Goal: Discharge to post-acute care or home with appropriate resources  Description: INTERVENTIONS:  - Conduct assessment to determine patient/family and health care team treatment goals, and need for post-acute services based on payer coverage, community resources, and patient preferences, and barriers to discharge  - Address psychosocial, clinical, and financial barriers to discharge as identified in assessment in conjunction with the patient/family and health care team  - Arrange appropriate level of post-acute services according to patients   needs and preference and payer coverage in collaboration with the physician and health care team  - Communicate with and update the patient/family, physician, and health care team regarding progress on the discharge plan  - Arrange appropriate transportation to post-acute venues  Outcome: Progressing     Problem: SUBSTANCE USE/ABUSE  Goal: By discharge, will develop insight into their chemical dependency and sustain motivation to continue in recovery  Description: INTERVENTIONS:  - Attends all daily group sessions and scheduled AA groups  - Actively practices coping skills through participation in the therapeutic community and adherence to program rules  - Reviews and completes assignments from individual treatment plan  - Assist patient development of understanding of their personal cycle of addiction and relapse triggers  Outcome: Progressing  Goal: By discharge, patient will have ongoing treatment plan addressing chemical dependency  Description: INTERVENTIONS:  - Assist patient with resources and/or appointments for ongoing recovery based living  Outcome: Progressing     Problem: Nutrition/Hydration-ADULT  Goal: Nutrient/Hydration intake appropriate for improving, restoring or maintaining nutritional needs  Description: Monitor and assess patient's nutrition/hydration status for malnutrition  Collaborate with interdisciplinary team and initiate plan and interventions as ordered  Monitor patient's weight and dietary intake as ordered or per policy  Utilize nutrition screening tool and intervene as necessary  Determine patient's food preferences and provide high-protein, high-caloric foods as appropriate       INTERVENTIONS:  - Monitor oral intake, urinary output, labs, and treatment plans  - Assess nutrition and hydration status and recommend course of action  - Evaluate amount of meals eaten  - Assist patient with eating if necessary   - Allow adequate time for meals  - Recommend/ encourage appropriate diets, oral nutritional supplements, and vitamin/mineral supplements  - Order, calculate, and assess calorie counts as needed  - Recommend, monitor, and adjust tube feedings and TPN/PPN based on assessed needs  - Assess need for intravenous fluids  - Provide specific nutrition/hydration education as appropriate  - Include patient/family/caregiver in decisions related to nutrition  Outcome: Progressing     Problem: PAIN - ADULT  Goal: Verbalizes/displays adequate comfort level or baseline comfort level  Description: Interventions:  - Encourage patient to monitor pain and request assistance  - Assess pain using appropriate pain scale  - Administer analgesics based on type and severity of pain and evaluate response  - Implement non-pharmacological measures as appropriate and evaluate response  - Consider cultural and social influences on pain and pain management  - Notify physician/advanced practitioner if interventions unsuccessful or patient reports new pain  Outcome: Progressing     Problem: INFECTION - ADULT  Goal: Absence or prevention of progression during hospitalization  Description: INTERVENTIONS:  - Assess and monitor for signs and symptoms of infection  - Monitor lab/diagnostic results  - Monitor all insertion sites, i e  indwelling lines, tubes, and drains  - Monitor endotracheal if appropriate and nasal secretions for changes in amount and color  - [place] appropriate cooling/warming therapies per order  - Administer medications as ordered  - Instruct and encourage patient and family to use good hand hygiene technique  - Identify and instruct in appropriate isolation precautions for identified infection/condition  Outcome: Progressing     Problem: SAFETY ADULT  Goal: Maintain or return to baseline ADL function  Description: INTERVENTIONS:  -  Assess patient's ability to carry out ADLs; assess patient's baseline for ADL function and identify physical deficits which impact ability to perform ADLs (bathing, care of mouth/teeth, toileting, grooming, dressing, etc )  - Assess/evaluate cause of self-care deficits   - Assess range of motion  - Assess patient's mobility; develop plan if impaired  - Assess patient's need for assistive devices and provide as appropriate  - Encourage maximum independence but intervene and supervise when necessary  - Involve family in performance of ADLs  - Assess for home care needs following discharge   - Consider OT consult to assist with ADL evaluation and planning for discharge  - Provide patient education as appropriate  Outcome: Progressing  Goal: Maintains/Returns to pre admission functional level  Description: INTERVENTIONS:  - Perform BMAT or MOVE assessment daily    - Set and communicate daily mobility goal to care team and patient/family/caregiver  - Collaborate with rehabilitation services on mobility goals if consulted  - Perform Range of Motion 4 times a day  - Reposition patient every 2 hours    - Dangle patient 4 times a day  - Stand patient 4 times a day  - Ambulate patient 4 times a day  - Out of bed to chair 4 times a day   - Out of bed for meals 4 times a day  - Out of bed for toileting  - Record patient progress and toleration of activity level   Outcome: Progressing  Goal: Patient will remain free of falls  Description: INTERVENTIONS:  - Educate patient/family on patient safety including physical limitations  - Instruct patient to call for assistance with activity   - Consult OT/PT to assist with strengthening/mobility   - Keep Call bell within reach  - Keep bed low and locked with side rails adjusted as appropriate  - Keep care items and personal belongings within reach  - Initiate and maintain comfort rounds  - Make Fall Risk Sign visible to staff  - Offer Toileting every  Hours, in advance of need  - Initiate/Maintain alarm  - Obtain necessary fall risk management equipment:   - Apply yellow socks and bracelet for high fall risk patients  - Consider moving patient to room near nurses station  Outcome: Progressing     Problem: DISCHARGE PLANNING  Goal: Discharge to home or other facility with appropriate resources  Description: INTERVENTIONS:  - Identify barriers to discharge w/patient and caregiver  - Arrange for needed discharge resources and transportation as appropriate  - Identify discharge learning needs (meds, wound care, etc )  - Arrange for interpretive services to assist at discharge as needed  - Refer to Case Management Department for coordinating discharge planning if the patient needs post-hospital services based on physician/advanced practitioner order or complex needs related to functional status, cognitive ability, or social support system  Outcome: Progressing     Problem: Knowledge Deficit  Goal: Patient/family/caregiver demonstrates understanding of disease process, treatment plan, medications, and discharge instructions  Description: Complete learning assessment and assess knowledge base    Interventions:  - Provide teaching at level of understanding  - Provide teaching via preferred learning methods  Outcome: Progressing     Problem: NEUROSENSORY - ADULT  Goal: Achieves stable or improved neurological status  Description: INTERVENTIONS  - Monitor and report changes in neurological status  - Monitor vital signs such as temperature, blood pressure, glucose, and any other labs ordered   - Initiate measures to prevent increased intracranial pressure  - Monitor for seizure activity and implement precautions if appropriate      Outcome: Progressing  Goal: Remains free of injury related to seizures activity  Description: INTERVENTIONS  - Maintain airway, patient safety  and administer oxygen as ordered  - Monitor patient for seizure activity, document and report duration and description of seizure to physician/advanced practitioner  - If seizure occurs,  ensure patient safety during seizure  - Reorient patient post seizure  - Seizure pads on all 4 side rails  - Instruct patient/family to notify RN of any seizure activity including if an aura is experienced  - Instruct patient/family to call for assistance with activity based on nursing assessment  - Administer anti-seizure medications if ordered    Outcome: Progressing  Goal: Achieves maximal functionality and self care  Description: INTERVENTIONS  - Monitor swallowing and airway patency with patient fatigue and changes in neurological status  - Encourage and assist patient to increase activity and self care     - Encourage visually impaired, hearing impaired and aphasic patients to use assistive/communication devices  Outcome: Progressing     Problem: RESPIRATORY - ADULT  Goal: Achieves optimal ventilation and oxygenation  Description: INTERVENTIONS:  - Assess for changes in respiratory status  - Assess for changes in mentation and behavior  - Position to facilitate oxygenation and minimize respiratory effort  - Oxygen administered by appropriate delivery if ordered  - Initiate smoking cessation education as indicated  - Encourage broncho-pulmonary hygiene including cough, deep breathe, Incentive Spirometry  - Assess the need for suctioning and aspirate as needed  - Assess and instruct to report SOB or any respiratory difficulty  - Respiratory Therapy support as indicated  Outcome: Progressing     Problem: GASTROINTESTINAL - ADULT  Goal: Minimal or absence of nausea and/or vomiting  Description: INTERVENTIONS:  - Administer IV fluids if ordered to ensure adequate hydration  - Maintain NPO status until nausea and vomiting are resolved  - Nasogastric tube if ordered  - Administer ordered antiemetic medications as needed  - Provide nonpharmacologic comfort measures as appropriate  - Advance diet as tolerated, if ordered  - Consider nutrition services referral to assist patient with adequate nutrition and appropriate food choices  Outcome: Progressing  Goal: Maintains or returns to baseline bowel function  Description: INTERVENTIONS:  - Assess bowel function  - Encourage oral fluids to ensure adequate hydration  - Administer IV fluids if ordered to ensure adequate hydration  - Administer ordered medications as needed  - Encourage mobilization and activity  - Consider nutritional services referral to assist patient with adequate nutrition and appropriate food choices  Outcome: Progressing  Goal: Maintains adequate nutritional intake  Description: INTERVENTIONS:  - Monitor percentage of each meal consumed  - Identify factors contributing to decreased intake, treat as appropriate  - Assist with meals as needed  - Monitor I&O, weight, and lab values if indicated  - Obtain nutrition services referral as needed  Outcome: Progressing  Goal: Oral mucous membranes remain intact  Description: INTERVENTIONS  - Assess oral mucosa and hygiene practices  - Implement preventative oral hygiene regimen  - Implement oral medicated treatments as ordered  - Initiate Nutrition services referral as needed  Outcome: Progressing     Problem: GENITOURINARY - ADULT  Goal: Maintains or returns to baseline urinary function  Description: INTERVENTIONS:  - Assess urinary function  - Encourage oral fluids to ensure adequate hydration if ordered  - Administer IV fluids as ordered to ensure adequate hydration  - Administer ordered medications as needed  - Offer frequent toileting  - Follow urinary retention protocol if ordered  Outcome: Progressing     Problem: METABOLIC, FLUID AND ELECTROLYTES - ADULT  Goal: Electrolytes maintained within normal limits  Description: INTERVENTIONS:  - Monitor labs and assess patient for signs and symptoms of electrolyte imbalances  - Administer electrolyte replacement as ordered  - Monitor response to electrolyte replacements, including repeat lab results as appropriate  - Instruct patient on fluid and nutrition as appropriate  Outcome: Progressing  Goal: Fluid balance maintained  Description: INTERVENTIONS:  - Monitor labs   - Monitor I/O and WT  - Instruct patient on fluid and nutrition as appropriate  - Assess for signs & symptoms of volume excess or deficit  Outcome: Progressing     Problem: SKIN/TISSUE INTEGRITY - ADULT  Goal: Skin Integrity remains intact(Skin Breakdown Prevention)  Description: Assess:  -Perform Davey assessment every   -Clean and moisturize skin every   -Inspect skin when repositioning, toileting, and assisting with ADLS  -Assess under medical devices such as  every   -Assess extremities for adequate circulation and sensation     Bed Management:  -Have minimal linens on bed & keep smooth, unwrinkled  -Change linens as needed when moist or perspiring  -Avoid sitting or lying in one position for more than  hours while in bed  -Keep HOB at degrees     Toileting:  -Offer bedside commode  -Assess for incontinence every   -Use incontinent care products after each incontinent episode such as     Activity:  -Mobilize patient  times a day  -Encourage activity and walks on unit  -Encourage or provide ROM exercises   -Turn and reposition patient every  Hours  -Use appropriate equipment to lift or move patient in bed  -Instruct/ Assist with weight shifting every  when out of bed in chair  -Consider limitation of chair time  hour intervals    Skin Care:  -Avoid use of baby powder, tape, friction and shearing, hot water or constrictive clothing  -Relieve pressure over bony prominences using   -Do not massage red bony areas    Next Steps:  -Teach patient strategies to minimize risks such as    -Consider consults to  interdisciplinary teams such as  Outcome: Progressing     Problem: CARDIOVASCULAR - ADULT  Goal: Maintains optimal cardiac output and hemodynamic stability  Description: INTERVENTIONS:  - Monitor I/O, vital signs and rhythm  - Monitor for S/S and trends of decreased cardiac output  - Administer and titrate ordered vasoactive medications to optimize hemodynamic stability  - Assess quality of pulses, skin color and temperature  - Assess for signs of decreased coronary artery perfusion  - Instruct patient to report change in severity of symptoms  Outcome: Completed  Goal: Absence of cardiac dysrhythmias or at baseline rhythm  Description: INTERVENTIONS:  - Continuous cardiac monitoring, vital signs, obtain 12 lead EKG if ordered  - Administer antiarrhythmic and heart rate control medications as ordered  - Monitor electrolytes and administer replacement therapy as ordered  Outcome: Completed     Problem: GENITOURINARY - ADULT  Goal: Absence of urinary retention  Description: INTERVENTIONS:  - Assess patients ability to void and empty bladder  - Monitor I/O  - Bladder scan as needed  - Discuss with physician/AP medications to alleviate retention as needed  - Discuss catheterization for long term situations as appropriate  Outcome: Completed  Goal: Urinary catheter remains patent  Description: INTERVENTIONS:  - Assess patency of urinary catheter  - If patient has a chronic vazquez, consider changing catheter if non-functioning  - Follow guidelines for intermittent irrigation of non-functioning urinary catheter  Outcome: Completed

## 2022-04-22 NOTE — PROGRESS NOTES
[facility]  Progress Note - Francisca Ziegler 1965, 60 y o  female MRN: [id number]  Unit/Bed#: [address] 220-01 Encounter: [id number]  Primary Care Provider: Dottie Moon DO   Date and time admitted to hospital: 4/8/2022  3:55 PM    Edema of upper extremity  Assessment & Plan  PICC line inserted on Rt brachial vein on 4/18/22 for upper extremities swelling due to poor peripheral venous access  SIRS (systemic inflammatory response syndrome) ([facility] )  Assessment & Plan  Patient had elevated temp at 1:03 a m , tachycardia and hypotension yesterday requiring IV fluids and eventually pressors  Patient was also started on broad-spectrum antibiotics and started on Precedex for tachycardia      Patient's presentation is unlikely to be secondary to an infectious process given down trending leukocytosis, stable procalcitonin, cool extremities, and no identifiable source  SIRS response likely secondary to bronchoscopy versus decreased ECF        · Patient is off pressors  · Completed 5 days of cefepime (04/13/22-4/17/22) and  3 days of vancomycin ( 04/13/22-04/15/22)  · Procalcitonin is down trending  · Cultures are still negative to date  · No objective fevers  Off abx per ID  Leukocytosis improved  Ataxia  Assessment & Plan  On admission patient was reported to have ataxia as well as finger-to-nose ataxia  Patient was started on high-dose thiamine for Wernicke's encephalopathy     · MRI Brain came back negative for any acute or chronic pathologies    For short term rehab after hospitalization   Suspect related to hospitalization, alcohol abuse etc     Gastroesophageal reflux disease without esophagitis  Assessment & Plan  · Continue Pantoprazole 40 mg daily         Alcohol use disorder, severe, dependence ([facility] )  Assessment & Plan  · Case management consult in place  · Encouraged cessation   · Continue thiamine and folic acid         Hypertension  Assessment & Plan  Home lisinopril on hold      Delirium tremens [facility])  Assessment & Plan  Patient drinks roughly 8-12 shots of whiskey daily  Patient's last drink was April 1, 2022  EtoH level on admission was 54  Has never had any history withdrawals or seizures  Patient was initially admitted for alcoholic pancreatitis, however, patient became encephalopathic and agitated requiring transfer to detox Unit  No signs of DT today  Alcohol-induced acute pancreatitis  Assessment & Plan  · Patient initially presented with a lipase of 53,000  Patient's acute pancreatitis is likely secondary to alcohol  · No surgical intervention of the pancreas  · Monitor TGL      * Acute respiratory failure with hypoxia [facility])  Assessment & Plan  Patient acute hypoxic on admission, was given dose of Lasix with marked improvement  However, this morning patient started to become more agitated, hypoxic and tachycardic unable to tolerate BiPAP, patient was intubated for airway protection in the setting of encephalopathy and potential DTs      Patient was extubated on 4/14, however failed BiPAP, eventually requiring re-intubation  The patient cleared significant amount of secretions while she was extubated  Patient had repeat bronchoscopy 4/15 which showed decrease secretions  Successfully extubated to 15L/min mid flow on 4/19  Receiving 40L/min HFNC overnight due to desats to mid 80s        · Continue monitor patient's secretions   · Weaned to room air 4/22   · CXR appears improved, less right hilar region consolidation  · Recheck CT C/A/P with contrast on 4/18 showed 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           VTE  Prophylaxis:   Pharmacologic: in place    Patient Centered Rounds: I have performed bedside rounds with nursing staff today  Discussions with Specialists or Other Care Team Provider: case management    Education and Discussions with Family / Patient: pt      Current Length of Stay: 14 day(s)    Current Patient Status: Inpatient        Code Status: Level 1 - Full Code      Subjective:   Reports decreased appetite  No abd pain  Walking around   Mild cough reported  Weaned off o2 feels overall improved    Patient is seen and examined at bedside  All other ROS are negative  Objective:     Vitals:   Temp (24hrs), Av °F (37 2 °C), Min:97 7 °F (36 5 °C), Max:99 9 °F (37 7 °C)    Temp:  [97 7 °F (36 5 °C)-99 9 °F (37 7 °C)] 99 6 °F (37 6 °C)  HR:  [] 104  Resp:  [16-34] 18  BP: (102-140)/(60-87) 119/72  SpO2:  [91 %-99 %] 93 %  Body mass index is 27 89 kg/m²  Input and Output Summary (last 24 hours): Intake/Output Summary (Last 24 hours) at 2022 1609  Last data filed at 2022 1039  Gross per 24 hour   Intake --   Output 1250 ml   Net -1250 ml       Physical Exam:       GEN: No acute distress, comfortable  HEEENT: No JVD, PERRLA, no scleral icterus  RESP: Lungs clear to auscultation bilaterally  CV: RRR, +s1/s2   ABD: SOFT NON TENDER, POSITIVE BOWEL SOUNDS, NO DISTENTION  PSYCH: CALM  NEURO: A X O X 3, NO FOCAL DEFICITS  SKIN: NO RASH  EXTREM: NO EDEMA    Additional Data:     Labs:    Results from last 7 days   Lab Units 22  0458 22  0551 22  0353   WBC Thousand/uL 11 28*   < > 11 46*   HEMOGLOBIN g/dL 9 1*   < > 9 7*   HEMATOCRIT % 28 2*   < > 29 0*   PLATELETS Thousands/uL 538*   < > 420*   NEUTROS PCT %  --   --  73   LYMPHS PCT %  --   --  17   MONOS PCT %  --   --  7   EOS PCT %  --   --  1    < > = values in this interval not displayed       Results from last 7 days   Lab Units 22  0458 22  0503 22  0503   SODIUM mmol/L 144   < > 141   POTASSIUM mmol/L 3 6   < > 3 9   CHLORIDE mmol/L 108   < > 106   CO2 mmol/L 27   < > 27   BUN mg/dL 10   < > 11 CREATININE mg/dL 0 54*   < > 0 56*   ANION GAP mmol/L 9   < > 8   CALCIUM mg/dL 8 8   < > 9 0   ALBUMIN g/dL  --   --  2 7*   TOTAL BILIRUBIN mg/dL  --   --  0 58   ALK PHOS U/L  --   --  133*   ALT U/L  --   --  118*   AST U/L  --   --  39   GLUCOSE RANDOM mg/dL 114   < > 118    < > = values in this interval not displayed  Results from last 7 days   Lab Units 04/22/22  1602 04/22/22  0544 04/22/22  0030 04/21/22  1726 04/21/22  1221 04/21/22  0512 04/20/22  2349 04/20/22  1713 04/20/22  1359 04/20/22  0526 04/19/22  2351 04/19/22  1741   POC GLUCOSE mg/dl 110 126 132 136 115 109 121 121 128 114 116 125         Results from last 7 days   Lab Units 04/21/22  0503 04/20/22  0442 04/18/22  0353   PROCALCITONIN ng/ml 0 11 0 27* 0 18           * I Have Reviewed All Lab Data Listed Above  Imaging:     Results for orders placed during the hospital encounter of 04/08/22    XR chest portable    Narrative  CHEST    INDICATION:   FU, bibasilar atelectasis, on high peep all night  COMPARISON:  Chest radiograph from 4/14/2022 and chest CT from 4/9/2022  EXAM PERFORMED/VIEWS:  XR CHEST PORTABLE      FINDINGS:  ET tube 3 5 cm above the diaphragm  NG tube below the diaphragm  Cardiomediastinal silhouette appears unremarkable  Improving bilateral consolidation, greater on the right ureter trace right effusion  No pneumothorax  Osseous structures appear within normal limits for patient age  Impression  Moderate bibasilar consolidation, greater on the right, slightly improved  Workstation performed: [id number]    No results found for this or any previous visit  *I have reviewed all imaging reports listed above      Recent Cultures (last 7 days):     Results from last 7 days   Lab Units 04/18/22  1817 04/18/22  1027   BLOOD CULTURE  No Growth at 72 hrs    No Growth at 72 hrs   --    SPUTUM CULTURE   --  2+ Growth of    GRAM STAIN RESULT   --  1+ Polys*  2+ Gram negative rods* Rare Gram positive cocci in pairs*       Last 24 Hours Medication List:   Current Facility-Administered Medications   Medication Dose Route Frequency Provider Last Rate    acetaminophen  650 mg Oral Q6H PRN May Thu Edie Collier MD      cloNIDine  0 2 mg Oral [facility] [address] May Thu MD Ede      enoxaparin  40 mg Subcutaneous Daily May Thu Edie Collier MD      folic acid  1 mg Oral Daily May Thu Edie Collier MD      insulin lispro  1-6 Units Subcutaneous TID [facility] Evelyn Gregorio MD      levalbuterol  1 25 mg Nebulization Q6H PRN May Thu Edie Collier MD      lidocaine  1 patch Topical Q24H May Thu MD Ede      LORazepam  1 mg Oral Q8H PRN May Thu MD Ede      multivitamin-minerals  1 tablet Oral Daily May Thu Edie Collier MD      nicotine  1 patch Transdermal Daily May Thu Edie Collier MD      ondansetron  4 mg Oral Q6H PRN May Thu Edie Collier MD      pantoprazole  40 mg Oral Early Morning May Thu Edie Collier MD      QUEtiapine  25 mg Oral HS May Thu Edie Collier MD      thiamine  100 mg Oral Daily May Thu Edie Collier MD          Today, Patient Was Seen By: Evelyn Gregorio MD    ** Please Note: Dictation voice to text software may have been used in the creation of this document   **

## 2022-04-22 NOTE — ASSESSMENT & PLAN NOTE
On admission patient was reported to have ataxia as well as finger-to-nose ataxia  Patient was started on high-dose thiamine for Wernicke's encephalopathy     · MRI Brain came back negative for any acute or chronic pathologies    For short term rehab after hospitalization   Suspect related to hospitalization, alcohol abuse etc

## 2022-04-22 NOTE — PROGRESS NOTES
Progress Note - Infectious Disease   Francisca Cordova 60 y o  female MRN: [id number]  Unit/Bed#: [address] 220-01 Encounter: [id number]    Impression/Plan:  1  SIRS vs sepsis  Evolving since admission  Fever, tachycardia, tachypnea, leukocytosis  Concern this may be related to a developing peripancreatic abscess as worsening fluid and debris are noted on CT imaging  Consider respiratory source as patient has had significant secretions before successful extubation  Cultures obtained during 2 bronchoscopies only grew mixed maia  Consider possibility of Precedex fevers vs other drug fevers  No other clear source appreciated  Previous blood cultures were negative  New blood cultures are negative so far  UA was unremarkable  Recent SHERWIN was a technically difficult study but showed no evidence of valvular vegetation  At this time the patient is clinically stable and does not require pressor support  She has been extubated  IR has evaluated collection and believe it is more of a phlegmon and not drainable  This morning she is afebrile  Procalcitonin has normalized  She has been transitioned out of the ICU to [facility] level care  -monitor patient off antibiotics  -monitor CBC and CMP  -follow up blood cultures  -monitor vitals  -supportive care     2  Alcohol induced pancreatitis  With concern for developing peripancreatic abscess  Patient admits to heavy alcohol use  Lipase upon admission was 53,700  Abdominal CT and ultrasound imaging showing peripancreatic inflammation with some fluid tracking posteriorly to the spleen  Now with new CT imaging is concerning for ongoing necrosis and increased fluid/debris suggesting phlegmon/abscess  General surgery is following  IR has evaluated and area is likely not amenable to drainage as it is more representative of phlegmon   -monitor patient off antibiotics  -monitor CBC and CMP  -serial abdominal exams  -monitor GI symptoms  -continue follow up with general surgery     3  Acute hypoxic respiratory failure  In setting of recent encephalopathy during alcohol detox, and volume overload  Patient with some lung base collapse on previous chest imaging  She's had significant secretions  Most recent chest xray showed a stable right perihilar infiltrate, a left lower lobs consolidation vs effusion, and resolution of previous small effusions  She has undergone bronchoscopy x2  While significant secretions were noted she only grew mixed maia on her cultures  She completed 5-days of broad spectrum antibiotic treatment  The patient was successfully extubated and her O2 saturation is stable today on regular nasal cannula O2     -monitor vitals  -monitor respiratory status  -O2 support per primary service     4  Alcohol abuse  Patient previously on the detox unit  She experienced delirium tremens  Now extubated and feeling anxious  Recommend close monitoring by medical toxicology team  Consider transition back to detox unit as needed if medically stable  Recommend offering support/resources before discharge for ongoing care  -monitor for withdraw  -recommend ongoing follow up by medical toxicology     Above plan was discussed in detail with patient at the bedside  Above plan was discussed in detail with SLIM  Antibiotics:  No current antibiotic use    Subjective:  Patient reports she is tired today but is otherwise feeling pretty good  She tells me she has a frequent cough which is sometimes productive for thin phlegm  She feels like there is more that she needs to get out  She denies difficulty breathing, chest pain, and shortness of breath  She has no fever, chills, sweats, shakes; no nausea, vomiting, abdominal pain, or diarrhea  No new symptoms      Objective:  Vitals:  Temp:  [97 1 °F (36 2 °C)-98 7 °F (37 1 °C)] 97 7 °F (36 5 °C)  HR:  [78-96] 92  Resp:  [17-34] 27  BP: (101-140)/(56-87) 132/83  SpO2:  [89 %-99 %] 91 %  Temp (24hrs), Av 9 °F (36 6 °C), Min:97 1 °F (36 2 °C), Max:98 7 °F (37 1 °C)  Current: Temperature: 97 7 °F (36 5 °C)    Physical Exam:   General Appearance:  Alert, interactive, nontoxic, no acute distress  She appears comfortable sitting up in bed having breakfast    Throat: Oropharynx moist without lesions  Lungs:   Scattered rhonchi to auscultation bilaterally; no wheezing or rales; respirations unlabored on nasal cannula O2  Heart:  Tachycardic, regular; no murmur, rub or gallop  Abdomen:   Soft, non-tender, non-distended, positive bowel sounds  Extremities: No clubbing or cyanosis, no edema  Skin: No new rashes or lesions noted on exposed skin  Labs, Imaging, & Other studies:   All pertinent labs and imaging studies were personally reviewed  Results from last 7 days   Lab Units 04/22/22 0458 04/21/22 0503 04/20/22  0442   WBC Thousand/uL 11 28* 13 20* 13 17*   HEMOGLOBIN g/dL 9 1* 9 1* 8 6*   PLATELETS Thousands/uL 538* 580* 565*     Results from last 7 days   Lab Units 04/22/22  0458 04/21/22  0503 04/21/22  0503 04/20/22  0442 04/19/22  0551   POTASSIUM mmol/L 3 6   < > 3 9   < > 3 8   CHLORIDE mmol/L 108   < > 106   < > 102   CO2 mmol/L 27   < > 27   < > 25   BUN mg/dL 10   < > 11   < > 16   CREATININE mg/dL 0 54*   < > 0 56*   < > 0 57*   EGFR ml/min/1 73sq m 104   < > 103   < > 102   CALCIUM mg/dL 8 8   < > 9 0   < > 8 0*   AST U/L  --   --  39  --  60*   ALT U/L  --   --  118*  --  190*   ALK PHOS U/L  --   --  133*  --  153*    < > = values in this interval not displayed  Results from last 7 days   Lab Units 04/18/22  1817 04/18/22  1027   BLOOD CULTURE  No Growth at 72 hrs    No Growth at 72 hrs   --    SPUTUM CULTURE   --  2+ Growth of    GRAM STAIN RESULT   --  1+ Polys*  2+ Gram negative rods*  Rare Gram positive cocci in pairs*     Results from last 7 days   Lab Units 04/21/22  0503 04/20/22 0442 04/18/22  0353   PROCALCITONIN ng/ml 0 11 0 27* 0 18

## 2022-04-22 NOTE — ASSESSMENT & PLAN NOTE
Patient had elevated temp at 1:03 a m , tachycardia and hypotension yesterday requiring IV fluids and eventually pressors  Patient was also started on broad-spectrum antibiotics and started on Precedex for tachycardia      Patient's presentation is unlikely to be secondary to an infectious process given down trending leukocytosis, stable procalcitonin, cool extremities, and no identifiable source  SIRS response likely secondary to bronchoscopy versus decreased ECF        · Patient is off pressors  · Completed 5 days of cefepime (04/13/22-4/17/22) and  3 days of vancomycin ( 04/13/22-04/15/22)  · Procalcitonin is down trending  · Cultures are still negative to date  · No objective fevers  Off abx per ID  Leukocytosis improved

## 2022-04-22 NOTE — ASSESSMENT & PLAN NOTE
Patient drinks roughly 8-12 shots of whiskey daily  Patient's last drink was April 1, 2022  EtoH level on admission was 54  Has never had any history withdrawals or seizures  Patient was initially admitted for alcoholic pancreatitis, however, patient became encephalopathic and agitated requiring transfer to detox Unit  No signs of DT today

## 2022-04-23 LAB
ALBUMIN SERPL BCP-MCNC: 2.8 G/DL (ref 3.5–5)
ALP SERPL-CCNC: 113 U/L (ref 46–116)
ALT SERPL W P-5'-P-CCNC: 95 U/L (ref 12–78)
ANION GAP SERPL CALCULATED.3IONS-SCNC: 10 MMOL/L (ref 4–13)
AST SERPL W P-5'-P-CCNC: 40 U/L (ref 5–45)
BACTERIA BLD CULT: NORMAL
BACTERIA BLD CULT: NORMAL
BASOPHILS # BLD AUTO: 0.05 THOUSANDS/ΜL (ref 0–0.1)
BASOPHILS NFR BLD AUTO: 1 % (ref 0–1)
BILIRUB SERPL-MCNC: 0.36 MG/DL (ref 0.2–1)
BUN SERPL-MCNC: 9 MG/DL (ref 5–25)
CALCIUM ALBUM COR SERPL-MCNC: 10.1 MG/DL (ref 8.3–10.1)
CALCIUM SERPL-MCNC: 9.1 MG/DL (ref 8.3–10.1)
CHLORIDE SERPL-SCNC: 108 MMOL/L (ref 100–108)
CO2 SERPL-SCNC: 27 MMOL/L (ref 21–32)
CREAT SERPL-MCNC: 0.6 MG/DL (ref 0.6–1.3)
EOSINOPHIL # BLD AUTO: 0.34 THOUSAND/ΜL (ref 0–0.61)
EOSINOPHIL NFR BLD AUTO: 4 % (ref 0–6)
ERYTHROCYTE [DISTWIDTH] IN BLOOD BY AUTOMATED COUNT: 12.2 % (ref 11.6–15.1)
GFR SERPL CREATININE-BSD FRML MDRD: 100 ML/MIN/1.73SQ M
GLUCOSE SERPL-MCNC: 106 MG/DL (ref 65–140)
GLUCOSE SERPL-MCNC: 109 MG/DL (ref 65–140)
GLUCOSE SERPL-MCNC: 113 MG/DL (ref 65–140)
GLUCOSE SERPL-MCNC: 143 MG/DL (ref 65–140)
HCT VFR BLD AUTO: 28 % (ref 34.8–46.1)
HGB BLD-MCNC: 8.8 G/DL (ref 11.5–15.4)
IMM GRANULOCYTES # BLD AUTO: 0.15 THOUSAND/UL (ref 0–0.2)
IMM GRANULOCYTES NFR BLD AUTO: 2 % (ref 0–2)
LYMPHOCYTES # BLD AUTO: 2.22 THOUSANDS/ΜL (ref 0.6–4.47)
LYMPHOCYTES NFR BLD AUTO: 28 % (ref 14–44)
MCH RBC QN AUTO: 31.8 PG (ref 26.8–34.3)
MCHC RBC AUTO-ENTMCNC: 31.4 G/DL (ref 31.4–37.4)
MCV RBC AUTO: 101 FL (ref 82–98)
MONOCYTES # BLD AUTO: 0.93 THOUSAND/ΜL (ref 0.17–1.22)
MONOCYTES NFR BLD AUTO: 12 % (ref 4–12)
NEUTROPHILS # BLD AUTO: 4.36 THOUSANDS/ΜL (ref 1.85–7.62)
NEUTS SEG NFR BLD AUTO: 53 % (ref 43–75)
NRBC BLD AUTO-RTO: 0 /100 WBCS
PLATELET # BLD AUTO: 531 THOUSANDS/UL (ref 149–390)
PMV BLD AUTO: 9.8 FL (ref 8.9–12.7)
POTASSIUM SERPL-SCNC: 3.4 MMOL/L (ref 3.5–5.3)
PROT SERPL-MCNC: 6.5 G/DL (ref 6.4–8.2)
RBC # BLD AUTO: 2.77 MILLION/UL (ref 3.81–5.12)
SODIUM SERPL-SCNC: 145 MMOL/L (ref 136–145)
WBC # BLD AUTO: 8.05 THOUSAND/UL (ref 4.31–10.16)

## 2022-04-23 PROCEDURE — 80053 COMPREHEN METABOLIC PANEL: CPT | Performed by: HOSPITALIST

## 2022-04-23 PROCEDURE — 99232 SBSQ HOSP IP/OBS MODERATE 35: CPT | Performed by: INTERNAL MEDICINE

## 2022-04-23 PROCEDURE — 99232 SBSQ HOSP IP/OBS MODERATE 35: CPT | Performed by: STUDENT IN AN ORGANIZED HEALTH CARE EDUCATION/TRAINING PROGRAM

## 2022-04-23 PROCEDURE — 85025 COMPLETE CBC W/AUTO DIFF WBC: CPT | Performed by: HOSPITALIST

## 2022-04-23 PROCEDURE — 82948 REAGENT STRIP/BLOOD GLUCOSE: CPT

## 2022-04-23 RX ORDER — CLONIDINE HYDROCHLORIDE 0.1 MG/1
0.1 TABLET ORAL ONCE
Status: COMPLETED | OUTPATIENT
Start: 2022-04-24 | End: 2022-04-24

## 2022-04-23 RX ORDER — QUETIAPINE FUMARATE 25 MG/1
12.5 TABLET, FILM COATED ORAL
Status: DISCONTINUED | OUTPATIENT
Start: 2022-04-23 | End: 2022-04-24 | Stop reason: HOSPADM

## 2022-04-23 RX ORDER — CLONIDINE HYDROCHLORIDE 0.1 MG/1
0.2 TABLET ORAL ONCE
Status: DISCONTINUED | OUTPATIENT
Start: 2022-04-24 | End: 2022-04-23

## 2022-04-23 RX ADMIN — QUETIAPINE FUMARATE 12.5 MG: 25 TABLET ORAL at 22:41

## 2022-04-23 RX ADMIN — FOLIC ACID 1 MG: 1 TABLET ORAL at 08:31

## 2022-04-23 RX ADMIN — THIAMINE HCL TAB 100 MG 100 MG: 100 TAB at 08:31

## 2022-04-23 RX ADMIN — Medication 1 PATCH: at 08:35

## 2022-04-23 RX ADMIN — CLONIDINE HYDROCHLORIDE 0.2 MG: 0.1 TABLET ORAL at 14:57

## 2022-04-23 RX ADMIN — PANTOPRAZOLE SODIUM 40 MG: 40 TABLET, DELAYED RELEASE ORAL at 05:01

## 2022-04-23 RX ADMIN — CLONIDINE HYDROCHLORIDE 0.2 MG: 0.1 TABLET ORAL at 05:01

## 2022-04-23 RX ADMIN — Medication 1 TABLET: at 08:31

## 2022-04-23 RX ADMIN — ENOXAPARIN SODIUM 40 MG: 40 INJECTION SUBCUTANEOUS at 08:31

## 2022-04-23 NOTE — ASSESSMENT & PLAN NOTE
49-year-old female presenting with acute respiratory failure with hypoxia  Possibly as a result of severe alcohol withdrawal   She required ICU level of care intubation / extubation  - currently on room air  - home oxygen evaluation  - ID recommendations appreciated  Monitor off antibiotics

## 2022-04-23 NOTE — ASSESSMENT & PLAN NOTE
Counseled  - Continue thiamine and folic acid  - Offered assistance  Patient and family was able to make arrangements for her on an outpatient basis     - Encouraged cessation

## 2022-04-23 NOTE — PROGRESS NOTES
[address] [facility]  Progress Note - Francisca Jansen 1965, 60 y o  female MRN: [id number]  Unit/Bed#: [address] 220-01 Encounter: [id number]  Primary Care Provider: Sanna Jc DO   Date and time admitted to hospital: 4/8/2022  3:55 PM    * Acute respiratory failure with hypoxia [facility])  Assessment & Plan  41-year-old female presenting with acute respiratory failure with hypoxia  Possibly as a result of severe alcohol withdrawal   She required ICU level of care intubation / extubation  - currently on room air  - home oxygen evaluation  - ID recommendations appreciated  Monitor off antibiotics  Gastroesophageal reflux disease without esophagitis  Assessment & Plan  Continue PPI    Alcohol use disorder, severe, dependence (HCC)  Assessment & Plan  Counseled  - Continue thiamine and folic acid  - Offered assistance  Patient and family was able to make arrangements for her on an outpatient basis  - Encouraged cessation      Hypertension  Assessment & Plan  Home lisinopril on hold      Delirium tremens [facility])  Assessment & Plan  Takes 8-12 shots of whiskey delay  Resolved  No evidence of DT at this time    Alcohol-induced acute pancreatitis  Assessment & Plan  Likely alcohol induced acute pancreatitis  Resolved  - No surgical intervention needed at this time  - Continue supportive care  VTE Pharmacologic Prophylaxis:   Pharmacologic: Enoxaparin (Lovenox)  Mechanical VTE Prophylaxis in Place: Yes    Discussions with Specialists or Other Care Team Provider: nursing    Education and Discussions with Family / Patient: patient, daughter    Time Spent for Care: 30 minutes  More than 50% of total time spent on counseling and coordination of care as described above      Current Length of Stay: 15 day(s)    Current Patient Status: Inpatient   Certification Statement: The patient will continue to require additional inpatient hospital stay due to vna setup since patient declined placement    Discharge Plan: 24 hours    Code Status: Level 1 - Full Code      Subjective:   Patient seen and examined at bedside  Spoke to her and her family at bedside  Patient was reluctant to go to rehab  She prefers to be discharged to home with VNA/PT  Currently without any tremors, withdrawal signs, or any acute complaints  Objective:     Vitals:   Temp (24hrs), Av 2 °F (36 8 °C), Min:97 6 °F (36 4 °C), Max:98 7 °F (37 1 °C)    Temp:  [97 6 °F (36 4 °C)-98 7 °F (37 1 °C)] 97 6 °F (36 4 °C)  HR:  [78-92] 89  Resp:  [18] 18  BP: (117-125)/(72-77) 117/76  SpO2:  [91 %-95 %] 94 %  Body mass index is 27 89 kg/m²  Input and Output Summary (last 24 hours): Intake/Output Summary (Last 24 hours) at 2022 1624  Last data filed at 2022 0691  Gross per 24 hour   Intake --   Output 1050 ml   Net -1050 ml       Physical Exam:     Physical Exam  Vitals reviewed  Constitutional:       General: She is not in acute distress  HENT:      Head: Normocephalic  Nose: Nose normal       Mouth/Throat:      Mouth: Mucous membranes are moist    Eyes:      General: No scleral icterus  Cardiovascular:      Rate and Rhythm: Normal rate  Pulmonary:      Effort: Pulmonary effort is normal  No respiratory distress  Abdominal:      Palpations: Abdomen is soft  Tenderness: There is no abdominal tenderness  Skin:     General: Skin is warm  Neurological:      Mental Status: She is alert  Mental status is at baseline     Psychiatric:         Mood and Affect: Mood normal          Behavior: Behavior normal        Additional Data:     Labs:    Results from last 7 days   Lab Units 22  0508   WBC Thousand/uL 8 05   HEMOGLOBIN g/dL 8 8*   HEMATOCRIT % 28 0*   PLATELETS Thousands/uL 531*   NEUTROS PCT % 53   LYMPHS PCT % 28   MONOS PCT % 12   EOS PCT % 4     Results from last 7 days   Lab Units 22  0508   SODIUM mmol/L 145   POTASSIUM mmol/L 3 4*   CHLORIDE mmol/L 108   CO2 mmol/L 27   BUN mg/dL 9   CREATININE mg/dL 0 60   ANION GAP mmol/L 10   CALCIUM mg/dL 9 1   ALBUMIN g/dL 2 8*   TOTAL BILIRUBIN mg/dL 0 36   ALK PHOS U/L 113   ALT U/L 95*   AST U/L 40   GLUCOSE RANDOM mg/dL 113         Results from last 7 days   Lab Units 04/23/22  1617 04/23/22  1117 04/23/22  0729 04/22/22  2121 04/22/22  1602 04/22/22  0544 04/22/22  0030 04/21/22  1726 04/21/22  1221 04/21/22  0512 04/20/22  2349 04/20/22  1713   POC GLUCOSE mg/dl 106 109 143* 101 110 126 132 136 115 109 121 121         Results from last 7 days   Lab Units 04/21/22  0503 04/20/22  0442 04/18/22  0353   PROCALCITONIN ng/ml 0 11 0 27* 0 18           * I Have Reviewed All Lab Data Listed Above  * Additional Pertinent Lab Tests Reviewed: Faith 64 Admission Reviewed    Imaging:    Imaging Reports Reviewed Today Include: imaging since admission    Recent Cultures (last 7 days):     Results from last 7 days   Lab Units 04/18/22  1817 04/18/22  1027   BLOOD CULTURE  No Growth After 4 Days  No Growth After 4 Days    --    SPUTUM CULTURE   --  2+ Growth of    GRAM STAIN RESULT   --  1+ Polys*  2+ Gram negative rods*  Rare Gram positive cocci in pairs*       Last 24 Hours Medication List:   Current Facility-Administered Medications   Medication Dose Route Frequency Provider Last Rate    acetaminophen  650 mg Oral Q6H PRN May Thu MD Nam Tabor ON 4/24/2022] cloNIDine  0 1 mg Oral Once Fercho Alston MD      enoxaparin  40 mg Subcutaneous Daily May Lilli Mera MD      folic acid  1 mg Oral Daily May Thu Lanre Beck MD      insulin lispro  1-6 Units Subcutaneous TID [facility] Neal Solomon MD      levalbuterol  1 25 mg Nebulization Q6H PRN May Thu Lanre Beck MD      lidocaine  1 patch Topical Q24H May Lilli Mera MD      LORazepam  1 mg Oral Q8H PRN May Thu Lanre Beck MD      multivitamin-minerals  1 tablet Oral Daily May Thu Lanre Beck MD      nicotine  1 patch Transdermal Daily May Lilli Mera MD      ondansetron  4 mg Oral Q6H PRN May Lilli Beck MD  pantoprazole  40 mg Oral Early Morning May Thu Bubba Conn MD      QUEtiapine  25 mg Oral HS May Thu Bubba Conn MD      thiamine  100 mg Oral Daily May Thdago Conn MD          Today, Patient Was Seen By: Reno Schneider MD    ** Please Note: Dictation voice to text software may have been used in the creation of this document   **

## 2022-04-23 NOTE — PROGRESS NOTES
Progress Note - Infectious Disease   Francisca Jansen 60 y o  female MRN: [id number]  Unit/Bed#: [facility] 2 [facility] 87 [id number] Encounter: [id number]      Impression/Plan:   1  Systemic inflammatory response syndrome-evolving since admission   Consideration for the possibility of sepsis  Consideration for the possibility of pneumonia   Consideration for the possibility of an infected peripancreatic fluid collection   Possibly all secondary to noninfectious issues such as pancreatitis or withdrawal  Consideration for the possibility of drug fever   Less likely a catheter related bacteremia is the PICC line has just been placed  Fortunately the patient remains hemodynamically stable despite her systemic illness   Procalcitonin level has normalized  Patient remains stable off all antibiotics   Procalcitonin level has normalized  She has had no recurrence of the fever  The white cell count has now normalized  -monitor off all antibiotics for now  -monitor CBC with diff and CMP  -treatment of withdrawal  -treatment of pancreatitis  -supportive care    2  Alcohol induced pancreatitis  With concern for developing peripancreatic abscess  Patient admits to heavy alcohol use  Lipase upon admission was 53,700  Abdominal CT and ultrasound imaging showing peripancreatic inflammation with some fluid tracking posteriorly to the spleen  Now with new CT imaging is concerning for ongoing necrosis and increased fluid/debris suggesting phlegmon/abscess  General surgery is following   IR has evaluated and area is likely not amenable to drainage as it is more representative of phlegmon   -monitor patient off antibiotics  -monitor CBC and CMP  -serial abdominal exams  -monitor GI symptoms  -continue follow up with general surgery     3  Acute hypoxic respiratory failure  In setting of recent encephalopathy during alcohol detox, and volume overload  Patient with some lung base collapse on previous chest imaging   She's had significant secretions  Most recent chest xray showed a stable right perihilar infiltrate, a left lower lobs consolidation vs effusion, and resolution of previous small effusions  She has undergone bronchoscopy x2  While significant secretions were noted she only grew mixed maia on her cultures  She completed 5-days of broad spectrum antibiotic treatment  The patient was successfully extubated and her O2 saturation is stable today on regular nasal cannula O2     -monitor vitals  -monitor respiratory status  -O2 support per primary service     4  Alcohol abuse  Patient previously on the detox unit  She experienced delirium tremens  Now extubated and feeling anxious  Recommend close monitoring by medical toxicology team  Consider transition back to detox unit as needed if medically stable  Recommend offering support/resources before discharge for ongoing care  -monitor for withdraw  -recommend ongoing follow up by medical toxicology     Discussed the above management plan with the primary service    Will see the patient again 2022 if not discharged  Please call if questions    Antibiotics:  Off antibiotics    Subjective:  Patient has no fever, chills, sweats; no nausea, vomiting, diarrhea; no cough, shortness of breath; no pain  No new symptoms  Patient remains hoarse    Objective:  Vitals:  Temp:  [98 3 °F (36 8 °C)-99 6 °F (37 6 °C)] 98 7 °F (37 1 °C)  HR:  [] 78  Resp:  [18] 18  BP: (119-125)/(72-77) 121/76  SpO2:  [91 %-95 %] 95 %  Temp (24hrs), Av 9 °F (37 2 °C), Min:98 3 °F (36 8 °C), Max:99 6 °F (37 6 °C)  Current: Temperature: 98 7 °F (37 1 °C)    Physical Exam:   General Appearance:  Alert, interactive, nontoxic, no acute distress  Throat: Oropharynx moist without lesions  Lungs:   Decreased breath sounds bilaterally; no wheezes, rhonchi or rales; respirations unlabored   Heart:  RRR; no murmur, rub or gallop   Abdomen:   Soft, non-tender, non-distended, positive bowel sounds       Extremities: No clubbing, cyanosis or edema   Skin: No new rashes or lesions  No draining wounds noted  Labs, Imaging, & Other studies:   All pertinent labs and imaging studies were personally reviewed  Results from last 7 days   Lab Units 04/23/22  0508 04/22/22  0458 04/21/22  0503   WBC Thousand/uL 8 05 11 28* 13 20*   HEMOGLOBIN g/dL 8 8* 9 1* 9 1*   PLATELETS Thousands/uL 531* 538* 580*     Results from last 7 days   Lab Units 04/23/22  0508 04/22/22  0458 04/22/22  0458 04/21/22  0503 04/21/22  0503 04/20/22  0442 04/19/22  0551   SODIUM mmol/L 145  --  144  --  141   < > 138   POTASSIUM mmol/L 3 4*   < > 3 6   < > 3 9   < > 3 8   CHLORIDE mmol/L 108   < > 108   < > 106   < > 102   CO2 mmol/L 27   < > 27   < > 27   < > 25   BUN mg/dL 9   < > 10   < > 11   < > 16   CREATININE mg/dL 0 60   < > 0 54*   < > 0 56*   < > 0 57*   EGFR ml/min/1 73sq m 100   < > 104   < > 103   < > 102   CALCIUM mg/dL 9 1   < > 8 8   < > 9 0   < > 8 0*   AST U/L 40  --   --   --  39  --  60*   ALT U/L 95*  --   --    < > 118*  --  190*   ALK PHOS U/L 113  --   --    < > 133*  --  153*    < > = values in this interval not displayed  Results from last 7 days   Lab Units 04/18/22  1817 04/18/22  1027   BLOOD CULTURE  No Growth After 4 Days  No Growth After 4 Days    --    SPUTUM CULTURE   --  2+ Growth of    GRAM STAIN RESULT   --  1+ Polys*  2+ Gram negative rods*  Rare Gram positive cocci in pairs*     Results from last 7 days   Lab Units 04/21/22  0503 04/20/22  0442 04/18/22  0353   PROCALCITONIN ng/ml 0 11 0 27* 0 18

## 2022-04-23 NOTE — ASSESSMENT & PLAN NOTE
Likely alcohol induced acute pancreatitis  Resolved  - No surgical intervention needed at this time  - Continue supportive care

## 2022-04-23 NOTE — PLAN OF CARE
Problem: MOBILITY - ADULT  Goal: Maintain or return to baseline ADL function  Description: INTERVENTIONS:  -  Assess patient's ability to carry out ADLs; assess patient's baseline for ADL function and identify physical deficits which impact ability to perform ADLs (bathing, care of mouth/teeth, toileting, grooming, dressing, etc )  - Assess/evaluate cause of self-care deficits   - Assess range of motion  - Assess patient's mobility; develop plan if impaired  - Assess patient's need for assistive devices and provide as appropriate  - Encourage maximum independence but intervene and supervise when necessary  - Involve family in performance of ADLs  - Assess for home care needs following discharge   - Consider OT consult to assist with ADL evaluation and planning for discharge  - Provide patient education as appropriate  Outcome: Progressing  Goal: Maintains/Returns to pre admission functional level  Description: INTERVENTIONS:  - Perform BMAT or MOVE assessment daily    - Set and communicate daily mobility goal to care team and patient/family/caregiver  - Collaborate with rehabilitation services on mobility goals if consulted  - Perform Range of Motion 3 times a day  - Reposition patient every 2 hours    - Dangle patient 3 times a day  - Stand patient 3 times a day  - Ambulate patient 3 times a day  - Out of bed to chair 3 times a day   - Out of bed for meals 3 times a day  - Out of bed for toileting  - Record patient progress and toleration of activity level   Outcome: Progressing     Problem: Potential for Falls  Goal: Patient will remain free of falls  Description: INTERVENTIONS:  - Educate patient/family on patient safety including physical limitations  - Instruct patient to call for assistance with activity   - Consult OT/PT to assist with strengthening/mobility   - Keep Call bell within reach  - Keep bed low and locked with side rails adjusted as appropriate  - Keep care items and personal belongings within reach  - Initiate and maintain comfort rounds  - Make Fall Risk Sign visible to staff  - Offer Toileting every 2 Hours, in advance of need  - Initiate/Maintain bed alarm  - Obtain necessary fall risk management equipment:   - Apply yellow socks and bracelet for high fall risk patients  - Consider moving patient to room near nurses station  Outcome: Progressing     Problem: Prexisting or High Potential for Compromised Skin Integrity  Goal: Skin integrity is maintained or improved  Description: INTERVENTIONS:  - Identify patients at risk for skin breakdown  - Assess and monitor skin integrity  - Assess and monitor nutrition and hydration status  - Monitor labs   - Assess for incontinence   - Turn and reposition patient  - Assist with mobility/ambulation  - Relieve pressure over bony prominences  - Avoid friction and shearing  - Provide appropriate hygiene as needed including keeping skin clean and dry  - Evaluate need for skin moisturizer/barrier cream  - Collaborate with interdisciplinary team   - Patient/family teaching  - Consider wound care consult   Outcome: Progressing     Problem: DISCHARGE PLANNING - CARE MANAGEMENT  Goal: Discharge to post-acute care or home with appropriate resources  Description: INTERVENTIONS:  - Conduct assessment to determine patient/family and health care team treatment goals, and need for post-acute services based on payer coverage, community resources, and patient preferences, and barriers to discharge  - Address psychosocial, clinical, and financial barriers to discharge as identified in assessment in conjunction with the patient/family and health care team  - Arrange appropriate level of post-acute services according to patients   needs and preference and payer coverage in collaboration with the physician and health care team  - Communicate with and update the patient/family, physician, and health care team regarding progress on the discharge plan  - Arrange appropriate transportation to post-acute venues  Outcome: Progressing     Problem: SUBSTANCE USE/ABUSE  Goal: By discharge, will develop insight into their chemical dependency and sustain motivation to continue in recovery  Description: INTERVENTIONS:  - Attends all daily group sessions and scheduled AA groups  - Actively practices coping skills through participation in the therapeutic community and adherence to program rules  - Reviews and completes assignments from individual treatment plan  - Assist patient development of understanding of their personal cycle of addiction and relapse triggers  Outcome: Progressing  Goal: By discharge, patient will have ongoing treatment plan addressing chemical dependency  Description: INTERVENTIONS:  - Assist patient with resources and/or appointments for ongoing recovery based living  Outcome: Progressing     Problem: Nutrition/Hydration-ADULT  Goal: Nutrient/Hydration intake appropriate for improving, restoring or maintaining nutritional needs  Description: Monitor and assess patient's nutrition/hydration status for malnutrition  Collaborate with interdisciplinary team and initiate plan and interventions as ordered  Monitor patient's weight and dietary intake as ordered or per policy  Utilize nutrition screening tool and intervene as necessary  Determine patient's food preferences and provide high-protein, high-caloric foods as appropriate       INTERVENTIONS:  - Monitor oral intake, urinary output, labs, and treatment plans  - Assess nutrition and hydration status and recommend course of action  - Evaluate amount of meals eaten  - Assist patient with eating if necessary   - Allow adequate time for meals  - Recommend/ encourage appropriate diets, oral nutritional supplements, and vitamin/mineral supplements  - Order, calculate, and assess calorie counts as needed  - Recommend, monitor, and adjust tube feedings and TPN/PPN based on assessed needs  - Assess need for intravenous fluids  - Provide specific nutrition/hydration education as appropriate  - Include patient/family/caregiver in decisions related to nutrition  Outcome: Progressing     Problem: PAIN - ADULT  Goal: Verbalizes/displays adequate comfort level or baseline comfort level  Description: Interventions:  - Encourage patient to monitor pain and request assistance  - Assess pain using appropriate pain scale  - Administer analgesics based on type and severity of pain and evaluate response  - Implement non-pharmacological measures as appropriate and evaluate response  - Consider cultural and social influences on pain and pain management  - Notify physician/advanced practitioner if interventions unsuccessful or patient reports new pain  Outcome: Progressing     Problem: INFECTION - ADULT  Goal: Absence or prevention of progression during hospitalization  Description: INTERVENTIONS:  - Assess and monitor for signs and symptoms of infection  - Monitor lab/diagnostic results  - Monitor all insertion sites, i e  indwelling lines, tubes, and drains  - Monitor endotracheal if appropriate and nasal secretions for changes in amount and color  - [place] appropriate cooling/warming therapies per order  - Administer medications as ordered  - Instruct and encourage patient and family to use good hand hygiene technique  - Identify and instruct in appropriate isolation precautions for identified infection/condition  Outcome: Progressing     Problem: SAFETY ADULT  Goal: Maintain or return to baseline ADL function  Description: INTERVENTIONS:  -  Assess patient's ability to carry out ADLs; assess patient's baseline for ADL function and identify physical deficits which impact ability to perform ADLs (bathing, care of mouth/teeth, toileting, grooming, dressing, etc )  - Assess/evaluate cause of self-care deficits   - Assess range of motion  - Assess patient's mobility; develop plan if impaired  - Assess patient's need for assistive devices and provide as appropriate  - Encourage maximum independence but intervene and supervise when necessary  - Involve family in performance of ADLs  - Assess for home care needs following discharge   - Consider OT consult to assist with ADL evaluation and planning for discharge  - Provide patient education as appropriate  Outcome: Progressing  Goal: Maintains/Returns to pre admission functional level  Description: INTERVENTIONS:  - Perform BMAT or MOVE assessment daily    - Set and communicate daily mobility goal to care team and patient/family/caregiver  - Collaborate with rehabilitation services on mobility goals if consulted  - Perform Range of Motion 3 times a day  - Reposition patient every 2 hours    - Dangle patient 3 times a day  - Stand patient 3 times a day  - Ambulate patient 3 times a day  - Out of bed to chair 3 times a day   - Out of bed for meals 3 times a day  - Out of bed for toileting  - Record patient progress and toleration of activity level   Outcome: Progressing  Goal: Patient will remain free of falls  Description: INTERVENTIONS:  - Educate patient/family on patient safety including physical limitations  - Instruct patient to call for assistance with activity   - Consult OT/PT to assist with strengthening/mobility   - Keep Call bell within reach  - Keep bed low and locked with side rails adjusted as appropriate  - Keep care items and personal belongings within reach  - Initiate and maintain comfort rounds  - Make Fall Risk Sign visible to staff  - Offer Toileting every 2 Hours, in advance of need  - Initiate/Maintain bed alarm  - Apply yellow socks and bracelet for high fall risk patients  - Consider moving patient to room near nurses station  Outcome: Progressing     Problem: DISCHARGE PLANNING  Goal: Discharge to home or other facility with appropriate resources  Description: INTERVENTIONS:  - Identify barriers to discharge w/patient and caregiver  - Arrange for needed discharge resources and transportation as appropriate  - Identify discharge learning needs (meds, wound care, etc )  - Arrange for interpretive services to assist at discharge as needed  - Refer to Case Management Department for coordinating discharge planning if the patient needs post-hospital services based on physician/advanced practitioner order or complex needs related to functional status, cognitive ability, or social support system  Outcome: Progressing     Problem: Knowledge Deficit  Goal: Patient/family/caregiver demonstrates understanding of disease process, treatment plan, medications, and discharge instructions  Description: Complete learning assessment and assess knowledge base    Interventions:  - Provide teaching at level of understanding  - Provide teaching via preferred learning methods  Outcome: Progressing     Problem: NEUROSENSORY - ADULT  Goal: Achieves stable or improved neurological status  Description: INTERVENTIONS  - Monitor and report changes in neurological status  - Monitor vital signs such as temperature, blood pressure, glucose, and any other labs ordered   - Initiate measures to prevent increased intracranial pressure  - Monitor for seizure activity and implement precautions if appropriate      Outcome: Progressing  Goal: Remains free of injury related to seizures activity  Description: INTERVENTIONS  - Maintain airway, patient safety  and administer oxygen as ordered  - Monitor patient for seizure activity, document and report duration and description of seizure to physician/advanced practitioner  - If seizure occurs,  ensure patient safety during seizure  - Reorient patient post seizure  - Seizure pads on all 4 side rails  - Instruct patient/family to notify RN of any seizure activity including if an aura is experienced  - Instruct patient/family to call for assistance with activity based on nursing assessment  - Administer anti-seizure medications if ordered    Outcome: Progressing  Goal: Achieves maximal functionality and self care  Description: INTERVENTIONS  - Monitor swallowing and airway patency with patient fatigue and changes in neurological status  - Encourage and assist patient to increase activity and self care     - Encourage visually impaired, hearing impaired and aphasic patients to use assistive/communication devices  Outcome: Progressing     Problem: RESPIRATORY - ADULT  Goal: Achieves optimal ventilation and oxygenation  Description: INTERVENTIONS:  - Assess for changes in respiratory status  - Assess for changes in mentation and behavior  - Position to facilitate oxygenation and minimize respiratory effort  - Oxygen administered by appropriate delivery if ordered  - Initiate smoking cessation education as indicated  - Encourage broncho-pulmonary hygiene including cough, deep breathe, Incentive Spirometry  - Assess the need for suctioning and aspirate as needed  - Assess and instruct to report SOB or any respiratory difficulty  - Respiratory Therapy support as indicated  Outcome: Progressing     Problem: GASTROINTESTINAL - ADULT  Goal: Minimal or absence of nausea and/or vomiting  Description: INTERVENTIONS:  - Administer IV fluids if ordered to ensure adequate hydration  - Maintain NPO status until nausea and vomiting are resolved  - Nasogastric tube if ordered  - Administer ordered antiemetic medications as needed  - Provide nonpharmacologic comfort measures as appropriate  - Advance diet as tolerated, if ordered  - Consider nutrition services referral to assist patient with adequate nutrition and appropriate food choices  Outcome: Progressing  Goal: Maintains or returns to baseline bowel function  Description: INTERVENTIONS:  - Assess bowel function  - Encourage oral fluids to ensure adequate hydration  - Administer IV fluids if ordered to ensure adequate hydration  - Administer ordered medications as needed  - Encourage mobilization and activity  - Consider nutritional services referral to assist patient with adequate nutrition and appropriate food choices  Outcome: Progressing  Goal: Maintains adequate nutritional intake  Description: INTERVENTIONS:  - Monitor percentage of each meal consumed  - Identify factors contributing to decreased intake, treat as appropriate  - Assist with meals as needed  - Monitor I&O, weight, and lab values if indicated  - Obtain nutrition services referral as needed  Outcome: Progressing  Goal: Oral mucous membranes remain intact  Description: INTERVENTIONS  - Assess oral mucosa and hygiene practices  - Implement preventative oral hygiene regimen  - Implement oral medicated treatments as ordered  - Initiate Nutrition services referral as needed  Outcome: Progressing     Problem: GENITOURINARY - ADULT  Goal: Maintains or returns to baseline urinary function  Description: INTERVENTIONS:  - Assess urinary function  - Encourage oral fluids to ensure adequate hydration if ordered  - Administer IV fluids as ordered to ensure adequate hydration  - Administer ordered medications as needed  - Offer frequent toileting  - Follow urinary retention protocol if ordered  Outcome: Progressing     Problem: METABOLIC, FLUID AND ELECTROLYTES - ADULT  Goal: Electrolytes maintained within normal limits  Description: INTERVENTIONS:  - Monitor labs and assess patient for signs and symptoms of electrolyte imbalances  - Administer electrolyte replacement as ordered  - Monitor response to electrolyte replacements, including repeat lab results as appropriate  - Instruct patient on fluid and nutrition as appropriate  Outcome: Progressing  Goal: Fluid balance maintained  Description: INTERVENTIONS:  - Monitor labs   - Monitor I/O and WT  - Instruct patient on fluid and nutrition as appropriate  - Assess for signs & symptoms of volume excess or deficit  Outcome: Progressing     Problem: SKIN/TISSUE INTEGRITY - ADULT  Goal: Skin Integrity remains intact(Skin Breakdown Prevention)  Description: Assess:  -Perform Davey assessment every shift  -Clean and moisturize skin every shift  -Inspect skin when repositioning, toileting, and assisting with ADLS  -Assess extremities for adequate circulation and sensation     Bed Management:  -Have minimal linens on bed & keep smooth, unwrinkled  -Change linens as needed when moist or perspiring  -Avoid sitting or lying in one position for more than 2 hours while in bed  -Keep HOB at 30 degrees     Toileting:  -Offer bedside commode  -Assess for incontinence every 2      Activity:  -Mobilize patient 3 times a day  -Encourage activity and walks on unit  -Encourage or provide ROM exercises   -Turn and reposition patient every 2 Hours  -Use appropriate equipment to lift or move patient in bed  -Instruct/ Assist with weight shifting every 15 when out of bed in chair  -Consider limitation of chair time 2 hour intervals    Skin Care:  -Avoid use of baby powder, tape, friction and shearing, hot water or constrictive clothing  -Relieve pressure over bony prominences using wedges  -Do not massage red bony areas    Next Steps:    Outcome: Progressing

## 2022-04-23 NOTE — CASE MANAGEMENT
Case Management Progress Note    Patient name Francisca Genao  Location [place] [facility] Beckie Ashton* MRN [id number]  : 1965 Date 2022       LOS (days): 15  Geometric Mean LOS (GMLOS) (days):   Days to GMLOS:        OBJECTIVE:        Current admission status: Inpatient  Preferred Pharmacy:   [id number] [facility] [address], [place] [address]  Phone: [phone] Fax: [phone]    Primary Care Provider: Hakeem Dennis DO    Primary Insurance: BLUE CROSS  Secondary Insurance:     PROGRESS NOTE: Per Dr Melodie Cobos, pt is ready for transfer to facility  Interested facilities, updated with same information via [facility]  Awaiting facility acceptance  1555 - CM met w/ Dr Melodie Cobos, who states that pt is now looking for VNA  CM and Dr Melodie Cobos in to speak with pt who would like [facility]  Referral placed for same and additional sites  Will follow up with pt when accepted

## 2022-04-23 NOTE — PLAN OF CARE
Problem: MOBILITY - ADULT  Goal: Maintain or return to baseline ADL function  Description: INTERVENTIONS:  -  Assess patient's ability to carry out ADLs; assess patient's baseline for ADL function and identify physical deficits which impact ability to perform ADLs (bathing, care of mouth/teeth, toileting, grooming, dressing, etc )  - Assess/evaluate cause of self-care deficits   - Assess range of motion  - Assess patient's mobility; develop plan if impaired  - Assess patient's need for assistive devices and provide as appropriate  - Encourage maximum independence but intervene and supervise when necessary  - Involve family in performance of ADLs  - Assess for home care needs following discharge   - Consider OT consult to assist with ADL evaluation and planning for discharge  - Provide patient education as appropriate  Outcome: Progressing  Goal: Maintains/Returns to pre admission functional level  Description: INTERVENTIONS:  - Perform BMAT or MOVE assessment daily    - Set and communicate daily mobility goal to care team and patient/family/caregiver  - Collaborate with rehabilitation services on mobility goals if consulted  - Perform Range of Motion 4 times a day  - Reposition patient every 2 hours    - Dangle patient 4 times a day  - Stand patient 4 times a day  - Ambulate patient 4 times a day  - Out of bed to chair 4 times a day   - Out of bed for meals 4 times a day  - Out of bed for toileting  - Record patient progress and toleration of activity level   Outcome: Progressing     Problem: Potential for Falls  Goal: Patient will remain free of falls  Description: INTERVENTIONS:  - Educate patient/family on patient safety including physical limitations  - Instruct patient to call for assistance with activity   - Consult OT/PT to assist with strengthening/mobility   - Keep Call bell within reach  - Keep bed low and locked with side rails adjusted as appropriate  - Keep care items and personal belongings within reach  - Initiate and maintain comfort rounds  - Make Fall Risk Sign visible to staff  - Offer Toileting every 2 Hours, in advance of need  - Initiate/Maintain alarm  - Obtain necessary fall risk management equipment:   - Apply yellow socks and bracelet for high fall risk patients  - Consider moving patient to room near nurses station  Outcome: Progressing     Problem: Prexisting or High Potential for Compromised Skin Integrity  Goal: Skin integrity is maintained or improved  Description: INTERVENTIONS:  - Identify patients at risk for skin breakdown  - Assess and monitor skin integrity  - Assess and monitor nutrition and hydration status  - Monitor labs   - Assess for incontinence   - Turn and reposition patient  - Assist with mobility/ambulation  - Relieve pressure over bony prominences  - Avoid friction and shearing  - Provide appropriate hygiene as needed including keeping skin clean and dry  - Evaluate need for skin moisturizer/barrier cream  - Collaborate with interdisciplinary team   - Patient/family teaching  - Consider wound care consult   Outcome: Progressing     Problem: DISCHARGE PLANNING - CARE MANAGEMENT  Goal: Discharge to post-acute care or home with appropriate resources  Description: INTERVENTIONS:  - Conduct assessment to determine patient/family and health care team treatment goals, and need for post-acute services based on payer coverage, community resources, and patient preferences, and barriers to discharge  - Address psychosocial, clinical, and financial barriers to discharge as identified in assessment in conjunction with the patient/family and health care team  - Arrange appropriate level of post-acute services according to patients   needs and preference and payer coverage in collaboration with the physician and health care team  - Communicate with and update the patient/family, physician, and health care team regarding progress on the discharge plan  - Arrange appropriate transportation to post-acute venues  Outcome: Progressing     Problem: SUBSTANCE USE/ABUSE  Goal: By discharge, will develop insight into their chemical dependency and sustain motivation to continue in recovery  Description: INTERVENTIONS:  - Attends all daily group sessions and scheduled AA groups  - Actively practices coping skills through participation in the therapeutic community and adherence to program rules  - Reviews and completes assignments from individual treatment plan  - Assist patient development of understanding of their personal cycle of addiction and relapse triggers  Outcome: Progressing  Goal: By discharge, patient will have ongoing treatment plan addressing chemical dependency  Description: INTERVENTIONS:  - Assist patient with resources and/or appointments for ongoing recovery based living  Outcome: Progressing     Problem: Nutrition/Hydration-ADULT  Goal: Nutrient/Hydration intake appropriate for improving, restoring or maintaining nutritional needs  Description: Monitor and assess patient's nutrition/hydration status for malnutrition  Collaborate with interdisciplinary team and initiate plan and interventions as ordered  Monitor patient's weight and dietary intake as ordered or per policy  Utilize nutrition screening tool and intervene as necessary  Determine patient's food preferences and provide high-protein, high-caloric foods as appropriate       INTERVENTIONS:  - Monitor oral intake, urinary output, labs, and treatment plans  - Assess nutrition and hydration status and recommend course of action  - Evaluate amount of meals eaten  - Assist patient with eating if necessary   - Allow adequate time for meals  - Recommend/ encourage appropriate diets, oral nutritional supplements, and vitamin/mineral supplements  - Order, calculate, and assess calorie counts as needed  - Recommend, monitor, and adjust tube feedings and TPN/PPN based on assessed needs  - Assess need for intravenous fluids  - Provide specific nutrition/hydration education as appropriate  - Include patient/family/caregiver in decisions related to nutrition  Outcome: Progressing     Problem: PAIN - ADULT  Goal: Verbalizes/displays adequate comfort level or baseline comfort level  Description: Interventions:  - Encourage patient to monitor pain and request assistance  - Assess pain using appropriate pain scale  - Administer analgesics based on type and severity of pain and evaluate response  - Implement non-pharmacological measures as appropriate and evaluate response  - Consider cultural and social influences on pain and pain management  - Notify physician/advanced practitioner if interventions unsuccessful or patient reports new pain  Outcome: Progressing     Problem: INFECTION - ADULT  Goal: Absence or prevention of progression during hospitalization  Description: INTERVENTIONS:  - Assess and monitor for signs and symptoms of infection  - Monitor lab/diagnostic results  - Monitor all insertion sites, i e  indwelling lines, tubes, and drains  - Monitor endotracheal if appropriate and nasal secretions for changes in amount and color  - [place] appropriate cooling/warming therapies per order  - Administer medications as ordered  - Instruct and encourage patient and family to use good hand hygiene technique  - Identify and instruct in appropriate isolation precautions for identified infection/condition  Outcome: Progressing     Problem: SAFETY ADULT  Goal: Maintain or return to baseline ADL function  Description: INTERVENTIONS:  -  Assess patient's ability to carry out ADLs; assess patient's baseline for ADL function and identify physical deficits which impact ability to perform ADLs (bathing, care of mouth/teeth, toileting, grooming, dressing, etc )  - Assess/evaluate cause of self-care deficits   - Assess range of motion  - Assess patient's mobility; develop plan if impaired  - Assess patient's need for assistive devices and provide as appropriate  - Encourage maximum independence but intervene and supervise when necessary  - Involve family in performance of ADLs  - Assess for home care needs following discharge   - Consider OT consult to assist with ADL evaluation and planning for discharge  - Provide patient education as appropriate  Outcome: Progressing  Goal: Maintains/Returns to pre admission functional level  Description: INTERVENTIONS:  - Perform BMAT or MOVE assessment daily    - Set and communicate daily mobility goal to care team and patient/family/caregiver  - Collaborate with rehabilitation services on mobility goals if consulted  - Perform Range of Motion  times a day  - Reposition patient every  hours    - Dangle patient  times a day  - Stand patient  times a day  - Ambulate patient  times a day  - Out of bed to chair  times a day   - Out of bed for meals  times a day  - Out of bed for toileting  - Record patient progress and toleration of activity level   Outcome: Progressing  Goal: Patient will remain free of falls  Description: INTERVENTIONS:  - Educate patient/family on patient safety including physical limitations  - Instruct patient to call for assistance with activity   - Consult OT/PT to assist with strengthening/mobility   - Keep Call bell within reach  - Keep bed low and locked with side rails adjusted as appropriate  - Keep care items and personal belongings within reach  - Initiate and maintain comfort rounds  - Make Fall Risk Sign visible to staff  - Offer Toileting every  Hours, in advance of need  - Initiate/Maintain alarm  - Obtain necessary fall risk management equipment:   - Apply yellow socks and bracelet for high fall risk patients  - Consider moving patient to room near nurses station  Outcome: Progressing     Problem: DISCHARGE PLANNING  Goal: Discharge to home or other facility with appropriate resources  Description: INTERVENTIONS:  - Identify barriers to discharge w/patient and caregiver  - Arrange for needed discharge resources and transportation as appropriate  - Identify discharge learning needs (meds, wound care, etc )  - Arrange for interpretive services to assist at discharge as needed  - Refer to Case Management Department for coordinating discharge planning if the patient needs post-hospital services based on physician/advanced practitioner order or complex needs related to functional status, cognitive ability, or social support system  Outcome: Progressing     Problem: Knowledge Deficit  Goal: Patient/family/caregiver demonstrates understanding of disease process, treatment plan, medications, and discharge instructions  Description: Complete learning assessment and assess knowledge base    Interventions:  - Provide teaching at level of understanding  - Provide teaching via preferred learning methods  Outcome: Progressing     Problem: NEUROSENSORY - ADULT  Goal: Achieves stable or improved neurological status  Description: INTERVENTIONS  - Monitor and report changes in neurological status  - Monitor vital signs such as temperature, blood pressure, glucose, and any other labs ordered   - Initiate measures to prevent increased intracranial pressure  - Monitor for seizure activity and implement precautions if appropriate      Outcome: Progressing  Goal: Remains free of injury related to seizures activity  Description: INTERVENTIONS  - Maintain airway, patient safety  and administer oxygen as ordered  - Monitor patient for seizure activity, document and report duration and description of seizure to physician/advanced practitioner  - If seizure occurs,  ensure patient safety during seizure  - Reorient patient post seizure  - Seizure pads on all 4 side rails  - Instruct patient/family to notify RN of any seizure activity including if an aura is experienced  - Instruct patient/family to call for assistance with activity based on nursing assessment  - Administer anti-seizure medications if ordered    Outcome: Progressing  Goal: Achieves maximal functionality and self care  Description: INTERVENTIONS  - Monitor swallowing and airway patency with patient fatigue and changes in neurological status  - Encourage and assist patient to increase activity and self care     - Encourage visually impaired, hearing impaired and aphasic patients to use assistive/communication devices  Outcome: Progressing     Problem: RESPIRATORY - ADULT  Goal: Achieves optimal ventilation and oxygenation  Description: INTERVENTIONS:  - Assess for changes in respiratory status  - Assess for changes in mentation and behavior  - Position to facilitate oxygenation and minimize respiratory effort  - Oxygen administered by appropriate delivery if ordered  - Initiate smoking cessation education as indicated  - Encourage broncho-pulmonary hygiene including cough, deep breathe, Incentive Spirometry  - Assess the need for suctioning and aspirate as needed  - Assess and instruct to report SOB or any respiratory difficulty  - Respiratory Therapy support as indicated  Outcome: Progressing     Problem: GASTROINTESTINAL - ADULT  Goal: Minimal or absence of nausea and/or vomiting  Description: INTERVENTIONS:  - Administer IV fluids if ordered to ensure adequate hydration  - Maintain NPO status until nausea and vomiting are resolved  - Nasogastric tube if ordered  - Administer ordered antiemetic medications as needed  - Provide nonpharmacologic comfort measures as appropriate  - Advance diet as tolerated, if ordered  - Consider nutrition services referral to assist patient with adequate nutrition and appropriate food choices  Outcome: Progressing  Goal: Maintains or returns to baseline bowel function  Description: INTERVENTIONS:  - Assess bowel function  - Encourage oral fluids to ensure adequate hydration  - Administer IV fluids if ordered to ensure adequate hydration  - Administer ordered medications as needed  - Encourage mobilization and activity  - Consider nutritional services referral to assist patient with adequate nutrition and appropriate food choices  Outcome: Progressing  Goal: Maintains adequate nutritional intake  Description: INTERVENTIONS:  - Monitor percentage of each meal consumed  - Identify factors contributing to decreased intake, treat as appropriate  - Assist with meals as needed  - Monitor I&O, weight, and lab values if indicated  - Obtain nutrition services referral as needed  Outcome: Progressing  Goal: Oral mucous membranes remain intact  Description: INTERVENTIONS  - Assess oral mucosa and hygiene practices  - Implement preventative oral hygiene regimen  - Implement oral medicated treatments as ordered  - Initiate Nutrition services referral as needed  Outcome: Progressing     Problem: GENITOURINARY - ADULT  Goal: Maintains or returns to baseline urinary function  Description: INTERVENTIONS:  - Assess urinary function  - Encourage oral fluids to ensure adequate hydration if ordered  - Administer IV fluids as ordered to ensure adequate hydration  - Administer ordered medications as needed  - Offer frequent toileting  - Follow urinary retention protocol if ordered  Outcome: Progressing     Problem: METABOLIC, FLUID AND ELECTROLYTES - ADULT  Goal: Electrolytes maintained within normal limits  Description: INTERVENTIONS:  - Monitor labs and assess patient for signs and symptoms of electrolyte imbalances  - Administer electrolyte replacement as ordered  - Monitor response to electrolyte replacements, including repeat lab results as appropriate  - Instruct patient on fluid and nutrition as appropriate  Outcome: Progressing  Goal: Fluid balance maintained  Description: INTERVENTIONS:  - Monitor labs   - Monitor I/O and WT  - Instruct patient on fluid and nutrition as appropriate  - Assess for signs & symptoms of volume excess or deficit  Outcome: Progressing     Problem: SKIN/TISSUE INTEGRITY - ADULT  Goal: Skin Integrity remains intact(Skin Breakdown Prevention)  Description: Assess:  -Perform [place] assessment every   -Clean and moisturize skin every   -Inspect skin when repositioning, toileting, and assisting with ADLS  -Assess under medical devices such as  every   -Assess extremities for adequate circulation and sensation     Bed Management:  -Have minimal linens on bed & keep smooth, unwrinkled  -Change linens as needed when moist or perspiring  -Avoid sitting or lying in one position for more than  hours while in bed  -Keep HOB at degrees     Toileting:  -Offer bedside commode  -Assess for incontinence every   -Use incontinent care products after each incontinent episode such as     Activity:  -Mobilize patient  times a day  -Encourage activity and walks on unit  -Encourage or provide ROM exercises   -Turn and reposition patient every  Hours  -Use appropriate equipment to lift or move patient in bed  -Instruct/ Assist with weight shifting every  when out of bed in chair  -Consider limitation of chair time  hour intervals    Skin Care:  -Avoid use of baby powder, tape, friction and shearing, hot water or constrictive clothing  -Relieve pressure over bony prominences using  -Do not massage red bony areas    Next Steps:  -Teach patient strategies to minimize risks such as   -Consider consults to  interdisciplinary teams such as   Outcome: Progressing

## 2022-04-24 VITALS
WEIGHT: 183.42 LBS | TEMPERATURE: 98.9 F | DIASTOLIC BLOOD PRESSURE: 78 MMHG | HEART RATE: 84 BPM | RESPIRATION RATE: 17 BRPM | BODY MASS INDEX: 27.8 KG/M2 | OXYGEN SATURATION: 94 % | HEIGHT: 68 IN | SYSTOLIC BLOOD PRESSURE: 115 MMHG

## 2022-04-24 LAB
ALBUMIN SERPL BCP-MCNC: 3 G/DL (ref 3.5–5)
ALP SERPL-CCNC: 114 U/L (ref 46–116)
ALT SERPL W P-5'-P-CCNC: 92 U/L (ref 12–78)
ANION GAP SERPL CALCULATED.3IONS-SCNC: 12 MMOL/L (ref 4–13)
AST SERPL W P-5'-P-CCNC: 35 U/L (ref 5–45)
BASOPHILS # BLD AUTO: 0.05 THOUSANDS/ΜL (ref 0–0.1)
BASOPHILS NFR BLD AUTO: 1 % (ref 0–1)
BILIRUB SERPL-MCNC: 0.33 MG/DL (ref 0.2–1)
BUN SERPL-MCNC: 9 MG/DL (ref 5–25)
CALCIUM ALBUM COR SERPL-MCNC: 10 MG/DL (ref 8.3–10.1)
CALCIUM SERPL-MCNC: 9.2 MG/DL (ref 8.3–10.1)
CHLORIDE SERPL-SCNC: 105 MMOL/L (ref 100–108)
CO2 SERPL-SCNC: 26 MMOL/L (ref 21–32)
CREAT SERPL-MCNC: 0.64 MG/DL (ref 0.6–1.3)
EOSINOPHIL # BLD AUTO: 0.33 THOUSAND/ΜL (ref 0–0.61)
EOSINOPHIL NFR BLD AUTO: 4 % (ref 0–6)
ERYTHROCYTE [DISTWIDTH] IN BLOOD BY AUTOMATED COUNT: 12.4 % (ref 11.6–15.1)
GFR SERPL CREATININE-BSD FRML MDRD: 98 ML/MIN/1.73SQ M
GLUCOSE SERPL-MCNC: 121 MG/DL (ref 65–140)
HCT VFR BLD AUTO: 28.8 % (ref 34.8–46.1)
HGB BLD-MCNC: 9.1 G/DL (ref 11.5–15.4)
IMM GRANULOCYTES # BLD AUTO: 0.09 THOUSAND/UL (ref 0–0.2)
IMM GRANULOCYTES NFR BLD AUTO: 1 % (ref 0–2)
LYMPHOCYTES # BLD AUTO: 2.05 THOUSANDS/ΜL (ref 0.6–4.47)
LYMPHOCYTES NFR BLD AUTO: 25 % (ref 14–44)
MAGNESIUM SERPL-MCNC: 1.5 MG/DL (ref 1.6–2.6)
MCH RBC QN AUTO: 31.8 PG (ref 26.8–34.3)
MCHC RBC AUTO-ENTMCNC: 31.6 G/DL (ref 31.4–37.4)
MCV RBC AUTO: 101 FL (ref 82–98)
MONOCYTES # BLD AUTO: 0.98 THOUSAND/ΜL (ref 0.17–1.22)
MONOCYTES NFR BLD AUTO: 12 % (ref 4–12)
NEUTROPHILS # BLD AUTO: 4.6 THOUSANDS/ΜL (ref 1.85–7.62)
NEUTS SEG NFR BLD AUTO: 57 % (ref 43–75)
NRBC BLD AUTO-RTO: 0 /100 WBCS
PLATELET # BLD AUTO: 516 THOUSANDS/UL (ref 149–390)
PMV BLD AUTO: 9.9 FL (ref 8.9–12.7)
POTASSIUM SERPL-SCNC: 3.7 MMOL/L (ref 3.5–5.3)
PROT SERPL-MCNC: 7 G/DL (ref 6.4–8.2)
RBC # BLD AUTO: 2.86 MILLION/UL (ref 3.81–5.12)
SODIUM SERPL-SCNC: 143 MMOL/L (ref 136–145)
WBC # BLD AUTO: 8.1 THOUSAND/UL (ref 4.31–10.16)

## 2022-04-24 PROCEDURE — 99239 HOSP IP/OBS DSCHRG MGMT >30: CPT | Performed by: STUDENT IN AN ORGANIZED HEALTH CARE EDUCATION/TRAINING PROGRAM

## 2022-04-24 PROCEDURE — 80053 COMPREHEN METABOLIC PANEL: CPT | Performed by: STUDENT IN AN ORGANIZED HEALTH CARE EDUCATION/TRAINING PROGRAM

## 2022-04-24 PROCEDURE — 94761 N-INVAS EAR/PLS OXIMETRY MLT: CPT

## 2022-04-24 PROCEDURE — 99232 SBSQ HOSP IP/OBS MODERATE 35: CPT | Performed by: INTERNAL MEDICINE

## 2022-04-24 PROCEDURE — 85025 COMPLETE CBC W/AUTO DIFF WBC: CPT | Performed by: STUDENT IN AN ORGANIZED HEALTH CARE EDUCATION/TRAINING PROGRAM

## 2022-04-24 PROCEDURE — 83735 ASSAY OF MAGNESIUM: CPT | Performed by: STUDENT IN AN ORGANIZED HEALTH CARE EDUCATION/TRAINING PROGRAM

## 2022-04-24 RX ORDER — LANOLIN ALCOHOL/MO/W.PET/CERES
100 CREAM (GRAM) TOPICAL DAILY
Qty: 30 TABLET | Refills: 0 | Status: SHIPPED | OUTPATIENT
Start: 2022-04-25 | End: 2022-05-25

## 2022-04-24 RX ORDER — FOLIC ACID 1 MG/1
1 TABLET ORAL DAILY
Qty: 30 TABLET | Refills: 0 | Status: SHIPPED | OUTPATIENT
Start: 2022-04-24 | End: 2022-05-24

## 2022-04-24 RX ADMIN — Medication 1 TABLET: at 08:24

## 2022-04-24 RX ADMIN — FOLIC ACID 1 MG: 1 TABLET ORAL at 08:23

## 2022-04-24 RX ADMIN — THIAMINE HCL TAB 100 MG 100 MG: 100 TAB at 08:24

## 2022-04-24 RX ADMIN — CLONIDINE HYDROCHLORIDE 0.1 MG: 0.1 TABLET ORAL at 08:24

## 2022-04-24 RX ADMIN — Medication 1 PATCH: at 08:24

## 2022-04-24 RX ADMIN — ENOXAPARIN SODIUM 40 MG: 40 INJECTION SUBCUTANEOUS at 08:24

## 2022-04-24 RX ADMIN — PANTOPRAZOLE SODIUM 40 MG: 40 TABLET, DELAYED RELEASE ORAL at 05:07

## 2022-04-24 NOTE — CASE MANAGEMENT
Case Management Progress Note    Patient name Francisca Cordova  Location [facility] [facility] Juan Mike* MRN [id number]  : 1965 Date 2022       LOS (days): 16  Geometric Mean LOS (GMLOS) (days):   Days to GMLOS:        OBJECTIVE:        Current admission status: Inpatient  Preferred Pharmacy:   [address] [place] [address]  Phone: [phone] Fax: [phone]    Primary Care Provider: Bob Mathias DO    Primary Insurance: BLUE CROSS  Secondary Insurance:     PROGRESS NOTE: Pt has chosen [facility] from providers offered in [facility] referral  [facility] aware  CM awaiting confirmation of acceptance by same pending insurance verification

## 2022-04-24 NOTE — INCIDENTAL FINDINGS
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

## 2022-04-24 NOTE — NURSING NOTE
All discharge instructions were given and reviewed with patient and her significant other who was present at the time of discharge  Her follow up appointments and new prescriptions were discussed  She understood the importance of abstaining from alcohol  Her PICC line was removed successfully  Sofía Bertrand was removed prior to patient leaving  She took all belongings with when discharged

## 2022-04-24 NOTE — PLAN OF CARE
Problem: MOBILITY - ADULT  Goal: Maintain or return to baseline ADL function  Description: INTERVENTIONS:  -  Assess patient's ability to carry out ADLs; assess patient's baseline for ADL function and identify physical deficits which impact ability to perform ADLs (bathing, care of mouth/teeth, toileting, grooming, dressing, etc )  - Assess/evaluate cause of self-care deficits   - Assess range of motion  - Assess patient's mobility; develop plan if impaired  - Assess patient's need for assistive devices and provide as appropriate  - Encourage maximum independence but intervene and supervise when necessary  - Involve family in performance of ADLs  - Assess for home care needs following discharge   - Consider OT consult to assist with ADL evaluation and planning for discharge  - Provide patient education as appropriate  Outcome: Progressing  Goal: Maintains/Returns to pre admission functional level  Description: INTERVENTIONS:  - Perform BMAT or MOVE assessment daily    - Set and communicate daily mobility goal to care team and patient/family/caregiver  - Collaborate with rehabilitation services on mobility goals if consulted  - Perform Range of Motion 4 times a day  - Reposition patient every 2 hours    - Dangle patient 4 times a day  - Stand patient 4 times a day  - Ambulate patient 4 times a day  - Out of bed to chair 4 times a day   - Out of bed for meals 4 times a day  - Out of bed for toileting  - Record patient progress and toleration of activity level   Outcome: Progressing     Problem: Potential for Falls  Goal: Patient will remain free of falls  Description: INTERVENTIONS:  - Educate patient/family on patient safety including physical limitations  - Instruct patient to call for assistance with activity   - Consult OT/PT to assist with strengthening/mobility   - Keep Call bell within reach  - Keep bed low and locked with side rails adjusted as appropriate  - Keep care items and personal belongings within reach  - Initiate and maintain comfort rounds  - Make Fall Risk Sign visible to staff  - Offer Toileting every  Hours, in advance of need  - Initiate/Maintain alarm  - Obtain necessary fall risk management equipment:   - Apply yellow socks and bracelet for high fall risk patients  - Consider moving patient to room near nurses station  Outcome: Progressing     Problem: Prexisting or High Potential for Compromised Skin Integrity  Goal: Skin integrity is maintained or improved  Description: INTERVENTIONS:  - Identify patients at risk for skin breakdown  - Assess and monitor skin integrity  - Assess and monitor nutrition and hydration status  - Monitor labs   - Assess for incontinence   - Turn and reposition patient  - Assist with mobility/ambulation  - Relieve pressure over bony prominences  - Avoid friction and shearing  - Provide appropriate hygiene as needed including keeping skin clean and dry  - Evaluate need for skin moisturizer/barrier cream  - Collaborate with interdisciplinary team   - Patient/family teaching  - Consider wound care consult   Outcome: Progressing     Problem: DISCHARGE PLANNING - CARE MANAGEMENT  Goal: Discharge to post-acute care or home with appropriate resources  Description: INTERVENTIONS:  - Conduct assessment to determine patient/family and health care team treatment goals, and need for post-acute services based on payer coverage, community resources, and patient preferences, and barriers to discharge  - Address psychosocial, clinical, and financial barriers to discharge as identified in assessment in conjunction with the patient/family and health care team  - Arrange appropriate level of post-acute services according to patients   needs and preference and payer coverage in collaboration with the physician and health care team  - Communicate with and update the patient/family, physician, and health care team regarding progress on the discharge plan  - Arrange appropriate transportation to post-acute venues  Outcome: Progressing     Problem: SUBSTANCE USE/ABUSE  Goal: By discharge, will develop insight into their chemical dependency and sustain motivation to continue in recovery  Description: INTERVENTIONS:  - Attends all daily group sessions and scheduled AA groups  - Actively practices coping skills through participation in the therapeutic community and adherence to program rules  - Reviews and completes assignments from individual treatment plan  - Assist patient development of understanding of their personal cycle of addiction and relapse triggers  Outcome: Progressing  Goal: By discharge, patient will have ongoing treatment plan addressing chemical dependency  Description: INTERVENTIONS:  - Assist patient with resources and/or appointments for ongoing recovery based living  Outcome: Progressing     Problem: Nutrition/Hydration-ADULT  Goal: Nutrient/Hydration intake appropriate for improving, restoring or maintaining nutritional needs  Description: Monitor and assess patient's nutrition/hydration status for malnutrition  Collaborate with interdisciplinary team and initiate plan and interventions as ordered  Monitor patient's weight and dietary intake as ordered or per policy  Utilize nutrition screening tool and intervene as necessary  Determine patient's food preferences and provide high-protein, high-caloric foods as appropriate       INTERVENTIONS:  - Monitor oral intake, urinary output, labs, and treatment plans  - Assess nutrition and hydration status and recommend course of action  - Evaluate amount of meals eaten  - Assist patient with eating if necessary   - Allow adequate time for meals  - Recommend/ encourage appropriate diets, oral nutritional supplements, and vitamin/mineral supplements  - Order, calculate, and assess calorie counts as needed  - Recommend, monitor, and adjust tube feedings and TPN/PPN based on assessed needs  - Assess need for intravenous fluids  - Provide specific nutrition/hydration education as appropriate  - Include patient/family/caregiver in decisions related to nutrition  Outcome: Progressing     Problem: PAIN - ADULT  Goal: Verbalizes/displays adequate comfort level or baseline comfort level  Description: Interventions:  - Encourage patient to monitor pain and request assistance  - Assess pain using appropriate pain scale  - Administer analgesics based on type and severity of pain and evaluate response  - Implement non-pharmacological measures as appropriate and evaluate response  - Consider cultural and social influences on pain and pain management  - Notify physician/advanced practitioner if interventions unsuccessful or patient reports new pain  Outcome: Progressing     Problem: INFECTION - ADULT  Goal: Absence or prevention of progression during hospitalization  Description: INTERVENTIONS:  - Assess and monitor for signs and symptoms of infection  - Monitor lab/diagnostic results  - Monitor all insertion sites, i e  indwelling lines, tubes, and drains  - Monitor endotracheal if appropriate and nasal secretions for changes in amount and color  - [place] appropriate cooling/warming therapies per order  - Administer medications as ordered  - Instruct and encourage patient and family to use good hand hygiene technique  - Identify and instruct in appropriate isolation precautions for identified infection/condition  Outcome: Progressing     Problem: SAFETY ADULT  Goal: Maintain or return to baseline ADL function  Description: INTERVENTIONS:  -  Assess patient's ability to carry out ADLs; assess patient's baseline for ADL function and identify physical deficits which impact ability to perform ADLs (bathing, care of mouth/teeth, toileting, grooming, dressing, etc )  - Assess/evaluate cause of self-care deficits   - Assess range of motion  - Assess patient's mobility; develop plan if impaired  - Assess patient's need for assistive devices and provide as appropriate  - Encourage maximum independence but intervene and supervise when necessary  - Involve family in performance of ADLs  - Assess for home care needs following discharge   - Consider OT consult to assist with ADL evaluation and planning for discharge  - Provide patient education as appropriate  Outcome: Progressing  Goal: Maintains/Returns to pre admission functional level  Description: INTERVENTIONS:  - Perform BMAT or MOVE assessment daily    - Set and communicate daily mobility goal to care team and patient/family/caregiver  - Collaborate with rehabilitation services on mobility goals if consulted  - Perform Range of Motion  times a day  - Reposition patient every  hours    - Dangle patient  times a day  - Stand patient  times a day  - Ambulate patient  times a day  - Out of bed to chair  times a day   - Out of bed for meals  times a day  - Out of bed for toileting  - Record patient progress and toleration of activity level   Outcome: Progressing  Goal: Patient will remain free of falls  Description: INTERVENTIONS:  - Educate patient/family on patient safety including physical limitations  - Instruct patient to call for assistance with activity   - Consult OT/PT to assist with strengthening/mobility   - Keep Call bell within reach  - Keep bed low and locked with side rails adjusted as appropriate  - Keep care items and personal belongings within reach  - Initiate and maintain comfort rounds  - Make Fall Risk Sign visible to staff  - Offer Toileting every  Hours, in advance of need  - Initiate/Maintain alarm  - Obtain necessary fall risk management equipment:   - Apply yellow socks and bracelet for high fall risk patients  - Consider moving patient to room near nurses station  Outcome: Progressing     Problem: DISCHARGE PLANNING  Goal: Discharge to home or other facility with appropriate resources  Description: INTERVENTIONS:  - Identify barriers to discharge w/patient and caregiver  - Arrange for needed discharge resources and transportation as appropriate  - Identify discharge learning needs (meds, wound care, etc )  - Arrange for interpretive services to assist at discharge as needed  - Refer to Case Management Department for coordinating discharge planning if the patient needs post-hospital services based on physician/advanced practitioner order or complex needs related to functional status, cognitive ability, or social support system  Outcome: Progressing     Problem: Knowledge Deficit  Goal: Patient/family/caregiver demonstrates understanding of disease process, treatment plan, medications, and discharge instructions  Description: Complete learning assessment and assess knowledge base    Interventions:  - Provide teaching at level of understanding  - Provide teaching via preferred learning methods  Outcome: Progressing     Problem: NEUROSENSORY - ADULT  Goal: Achieves stable or improved neurological status  Description: INTERVENTIONS  - Monitor and report changes in neurological status  - Monitor vital signs such as temperature, blood pressure, glucose, and any other labs ordered   - Initiate measures to prevent increased intracranial pressure  - Monitor for seizure activity and implement precautions if appropriate      Outcome: Progressing  Goal: Remains free of injury related to seizures activity  Description: INTERVENTIONS  - Maintain airway, patient safety  and administer oxygen as ordered  - Monitor patient for seizure activity, document and report duration and description of seizure to physician/advanced practitioner  - If seizure occurs,  ensure patient safety during seizure  - Reorient patient post seizure  - Seizure pads on all 4 side rails  - Instruct patient/family to notify RN of any seizure activity including if an aura is experienced  - Instruct patient/family to call for assistance with activity based on nursing assessment  - Administer anti-seizure medications if ordered    Outcome: Progressing  Goal: Achieves maximal functionality and self care  Description: INTERVENTIONS  - Monitor swallowing and airway patency with patient fatigue and changes in neurological status  - Encourage and assist patient to increase activity and self care     - Encourage visually impaired, hearing impaired and aphasic patients to use assistive/communication devices  Outcome: Progressing     Problem: RESPIRATORY - ADULT  Goal: Achieves optimal ventilation and oxygenation  Description: INTERVENTIONS:  - Assess for changes in respiratory status  - Assess for changes in mentation and behavior  - Position to facilitate oxygenation and minimize respiratory effort  - Oxygen administered by appropriate delivery if ordered  - Initiate smoking cessation education as indicated  - Encourage broncho-pulmonary hygiene including cough, deep breathe, Incentive Spirometry  - Assess the need for suctioning and aspirate as needed  - Assess and instruct to report SOB or any respiratory difficulty  - Respiratory Therapy support as indicated  Outcome: Progressing     Problem: GASTROINTESTINAL - ADULT  Goal: Minimal or absence of nausea and/or vomiting  Description: INTERVENTIONS:  - Administer IV fluids if ordered to ensure adequate hydration  - Maintain NPO status until nausea and vomiting are resolved  - Nasogastric tube if ordered  - Administer ordered antiemetic medications as needed  - Provide nonpharmacologic comfort measures as appropriate  - Advance diet as tolerated, if ordered  - Consider nutrition services referral to assist patient with adequate nutrition and appropriate food choices  Outcome: Progressing  Goal: Maintains or returns to baseline bowel function  Description: INTERVENTIONS:  - Assess bowel function  - Encourage oral fluids to ensure adequate hydration  - Administer IV fluids if ordered to ensure adequate hydration  - Administer ordered medications as needed  - Encourage mobilization and activity  - Consider nutritional services referral to assist patient with adequate nutrition and appropriate food choices  Outcome: Progressing  Goal: Maintains adequate nutritional intake  Description: INTERVENTIONS:  - Monitor percentage of each meal consumed  - Identify factors contributing to decreased intake, treat as appropriate  - Assist with meals as needed  - Monitor I&O, weight, and lab values if indicated  - Obtain nutrition services referral as needed  Outcome: Progressing  Goal: Oral mucous membranes remain intact  Description: INTERVENTIONS  - Assess oral mucosa and hygiene practices  - Implement preventative oral hygiene regimen  - Implement oral medicated treatments as ordered  - Initiate Nutrition services referral as needed  Outcome: Progressing     Problem: GENITOURINARY - ADULT  Goal: Maintains or returns to baseline urinary function  Description: INTERVENTIONS:  - Assess urinary function  - Encourage oral fluids to ensure adequate hydration if ordered  - Administer IV fluids as ordered to ensure adequate hydration  - Administer ordered medications as needed  - Offer frequent toileting  - Follow urinary retention protocol if ordered  Outcome: Progressing     Problem: METABOLIC, FLUID AND ELECTROLYTES - ADULT  Goal: Electrolytes maintained within normal limits  Description: INTERVENTIONS:  - Monitor labs and assess patient for signs and symptoms of electrolyte imbalances  - Administer electrolyte replacement as ordered  - Monitor response to electrolyte replacements, including repeat lab results as appropriate  - Instruct patient on fluid and nutrition as appropriate  Outcome: Progressing  Goal: Fluid balance maintained  Description: INTERVENTIONS:  - Monitor labs   - Monitor I/O and WT  - Instruct patient on fluid and nutrition as appropriate  - Assess for signs & symptoms of volume excess or deficit  Outcome: Progressing     Problem: SKIN/TISSUE INTEGRITY - ADULT  Goal: Skin Integrity remains intact(Skin Breakdown Prevention)  Description: Assess:  -Perform Davey assessment every   -Clean and moisturize skin every   -Inspect skin when repositioning, toileting, and assisting with ADLS  -Assess under medical devices such as  every   -Assess extremities for adequate circulation and sensation     Bed Management:  -Have minimal linens on bed & keep smooth, unwrinkled  -Change linens as needed when moist or perspiring  -Avoid sitting or lying in one position for more than  hours while in bed  -Keep HOB at degrees     Toileting:  -Offer bedside commode  -Assess for incontinence every   -Use incontinent care products after each incontinent episode such as     Activity:  -Mobilize patient  times a day  -Encourage activity and walks on unit  -Encourage or provide ROM exercises   -Turn and reposition patient every  Hours  -Use appropriate equipment to lift or move patient in bed  -Instruct/ Assist with weight shifting every  when out of bed in chair  -Consider limitation of chair time  hour intervals    Skin Care:  -Avoid use of baby powder, tape, friction and shearing, hot water or constrictive clothing  -Relieve pressure over bony prominences using   -Do not massage red bony areas    Next Steps:  -Teach patient strategies to minimize risks such as    -Consider consults to  interdisciplinary teams such as   Outcome: Progressing

## 2022-04-24 NOTE — ASSESSMENT & PLAN NOTE
Likely alcohol induced acute pancreatitis  Resolved  - No surgical intervention needed at this time  - Continue supportive care  - Due to transaminitis, will continue to hold statin on discharge   Defer to her PCP when it would be appropriate to restart this

## 2022-04-24 NOTE — DISCHARGE SUMMARY
[facility]  Discharge- Francisca León Emms 1965, 60 y o  female MRN: [id number]  Unit/Bed#: [facility] 87 220-01 Encounter: [id number]  Primary Care Provider: Keria Lopez DO   Date and time admitted to hospital: 4/8/2022  3:55 PM    * Acute respiratory failure with hypoxia [facility])  Assessment & Plan  53-year-old female presenting with acute respiratory failure with hypoxia  Possibly as a result of severe alcohol withdrawal   She required ICU level of care intubation / extubation  - currently on room air  She does not require oxygen on discharge per respiratory therapy  - ID recommendations appreciated  No further antibiotics needed at this time  - patient declined post acute rehabilitation services, she prefers to go home with home PT  Gastroesophageal reflux disease without esophagitis  Assessment & Plan  Continue PPI    Alcohol use disorder, severe, dependence (HCC)  Assessment & Plan  Counseled  - Continue thiamine and folic acid  - Offered assistance  Patient and family was able to make arrangements for her on an outpatient basis  - Encouraged cessation      Hypertension  Assessment & Plan  Restart home medications on discharge    Delirium tremens [facility])  Assessment & Plan  Takes 8-12 shots of whiskey delay  Resolved  No evidence of DT at this time    Alcohol-induced acute pancreatitis  Assessment & Plan  Likely alcohol induced acute pancreatitis  Resolved  - No surgical intervention needed at this time  - Continue supportive care        Discharging Physician / Practitioner: Ana Vidal MD  PCP: Keira Lopez DO  Admission Date:   Admission Orders (From admission, onward)     Ordered        04/08/22 1605  Inpatient Admission  Once                      Discharge Date: 04/24/22    Medical Problems             Resolved Problems  Date Reviewed: 4/24/2022          Resolved    Hypokalemia 4/10/2022     Resolved by  TETE Vásquez                Consultations During Hospital Stay:  · GI, surgery, ID, nutrition, medical toxicology    Procedures Performed:   · [place] insertion  · Intubation  · PICC line insertion  · Bronchoscopy    Significant Findings / Test Results:   · SEE Epic for the rest of her imaging findings  Patient has a prolonged hospital stay  · CT abdomen pelvis:    Acute pancreatitis, as described above  Please see discussion  No evidence of pancreatic pseudocyst   Follow-up after treatment is recommended      Mild hepatic steatosis      Atherosclerosis  · US RUQ    1  Moderate hepatic steatosis with mild perihepatic ascites      2   Slight gallbladder wall thickening, likely reactive  No stones or Lauren's sign  · CT CAP    1  Worsening acute pancreatitis, with new necrosis of a portion of the pancreatic neck, and with worsening peripancreatic edema  No organized peripancreatic collection or evidence of vascular complications      2  Moderate bilateral pleural effusions and complete collapse of both lower lobes, new since 4/5/2022      3  ET tube with tip approximately 1 cm above the antonietta  Recommend retraction by 2 cm        · VAS upper limb    RIGHT UPPER LIMB:  No evidence of acute or chronic deep vein thrombosis  There is acute occlusive superficial thrombosis in the cephalic vein  The  thrombus extends from the antecubital fossa to the distal forearm  No evidence of acute or chronic superficial thrombosis in the basilic vein  Doppler evaluation shows a normal response to augmentation maneuvers  LEFT UPPER LIMB:  No evidence of acute or chronic deep vein thrombosis  No evidence of superficial thrombophlebitis noted  Doppler evaluation shows a normal response to augmentation maneuvers      · CT a/p    Evolving walled off necrosis in the peripancreatic region with the evolving encapsulation of the peripancreatic fluid and debris which is seen extending into the lesser sac, paracolic gutter, and posterior aspect of the left perinephric space     There is no well encapsulated fluid collection with air-fluid level  There is no focus of air in the peripancreatic inflammatory changes     Focal area of pancreatic necrosis in the neck of the pancreas, stable     Celiac trunk, SMA, portal vein and splenic vein remain patent     No worsening seen on the CT     Incidental Findings:   · As written above   · Acute pancreatitis  · Mild hepatic steatosis  · Moderate bilateral pleural effusions and complete collapse of both lower lobes, new since 4/5/2022  · VAS upper limb: RIGHT UPPER LIMB: There is acute occlusive superficial thrombosis in the cephalic vein  · Evolving walled off necrosis in the peripancreatic region with the evolving encapsulation of the peripancreatic fluid and debris which is seen extending into the lesser sac, paracolic gutter, and posterior aspect of the left perinephric space  · Focal area of pancreatic necrosis in the neck of the pancreas, stable     Test Results Pending at Discharge (will require follow up):   · Pneumocystis  · Legionella     Outpatient Tests Requested:  PCP follow-up  CBC, CMP    Complications:  None    Reason for Admission: acute respiratory failure with hypoxia    Hospital Course:     Francisca Randolph is a 60 y o  female patient who originally presented to the hospital on 4/8/2022 due to "Sedated / intubated "    Patient is a 49-year-old female who presented to our institution initially due to severe abdominal pain and multiple episodes of nonbloody nonbilious vomiting  She had elevated lipase levels to [address] and imaging was consistent with acute pancreatitis  Etiology likely secondary to alcohol abuse  Patient was transferred to the detox Unit at [facility]  Unfortunately, while being treated for delirium tremens she started to become hypoxic and increased work of breathing  She [facility] [place] [address] [facility] for higher level of care    She required bronchoscopy which showed collapse and mucus plugging at the lower lobes, this was cleaned  She was given IV antibiotic therapy  She require continued ICU level of care and repeat imaging  Please see epic for further details  Surgery and gastroenterology were both consulted and assisted in his management  Infectious Disease was consulted for antibiotic management  No further antibiotics necessary  See Dr Mervin Davis note on 04/24/2022 for further details  Yesterday, I encouraged the patient to proceed with post acute rehabilitation services  Unfortunately, she declined  She would like home with home PT instead  Her family also made arrangements for her alcohol abuse where she will go to an outpatient facility to help her manage this  Patient agrees to follow-up with her providers as scheduled and to take her medications as prescribed  All questions were addressed  she understood the need to seek immediate medical attention should she develop any chest pain, shortness of breath, severe pain, fever, chills, or any other concerning symptoms  Please see above list of diagnoses and related plan for additional information  Condition at Discharge: fair     Discharge Day Visit / Exam:     Subjective:  Patient seen and examined at bedside  Comfortable  No acute issues overnight  Vitals: Blood Pressure: 115/78 (04/24/22 0716)  Pulse: 84 (04/24/22 0716)  Temperature: 98 9 °F (37 2 °C) (04/24/22 0716)  Temp Source: Oral (04/23/22 1455)  Respirations: 17 (04/24/22 0716)  Height: 5' 8" (172 7 cm) (04/18/22 0600)  Weight - Scale: 83 2 kg (183 lb 6 8 oz) (04/22/22 0549)  SpO2: 94 % (04/24/22 0716)  Exam:   Physical Exam  Vitals reviewed  Constitutional:       General: She is not in acute distress  HENT:      Head: Normocephalic  Nose: Nose normal       Mouth/Throat:      Mouth: Mucous membranes are moist    Eyes:      General: No scleral icterus  Cardiovascular:      Rate and Rhythm: Normal rate and regular rhythm  Pulmonary:      Effort: Pulmonary effort is normal  No respiratory distress  Breath sounds: No wheezing or rales  Abdominal:      General: There is no distension  Palpations: Abdomen is soft  Tenderness: There is no abdominal tenderness  Musculoskeletal:      Right lower leg: No edema  Left lower leg: No edema  Skin:     General: Skin is warm  Neurological:      Mental Status: She is alert and oriented to person, place, and time  Psychiatric:         Mood and Affect: Mood normal          Behavior: Behavior normal        Discharge instructions/Information to patient and family:   See after visit summary for information provided to patient and family  Provisions for Follow-Up Care:  See after visit summary for information related to follow-up care and any pertinent home health orders  Disposition:     Home with VNA Services (Reminder: Complete face to face encounter)    For Discharges to [facility] SNF:   · Not Applicable to this Patient - Not Applicable to this Patient    Planned Readmission: No     Discharge Statement:  I spent 37 minutes discharging the patient  This time was spent on the day of discharge  I had direct contact with the patient on the day of discharge  Greater than 50% of the total time was spent examining patient, answering all patient questions, arranging and discussing plan of care with patient as well as directly providing post-discharge instructions  Additional time then spent on discharge activities  Discharge Medications:  See after visit summary for reconciled discharge medications provided to patient and family        ** Please Note: This note has been constructed using a voice recognition system **

## 2022-04-24 NOTE — DISCHARGE INSTRUCTIONS
Abuse of Alcohol   WHAT YOU NEED TO KNOW:   Alcohol abuse means you drink more than the recommended daily or weekly limits  You may be drinking alcohol regularly or drinking large amounts in a short period of time (binge drinking)  You continue to drink even when it causes legal, work, or relationship problems  DISCHARGE INSTRUCTIONS:   Call your local emergency number (911 in the [address]), or have someone call if:   · You have sudden chest pain or trouble breathing  · You want to harm yourself or others  · You have a seizure  Seek care immediately if:   · You cannot stop vomiting or you vomit blood  Call your doctor if:   · You have hallucinations (you see or hear things that are not real)  · You have questions or concerns about your condition or care  Medicines:   · Vitamin supplements  may be given to treat low vitamin levels  Alcohol can make it hard for your body to absorb enough vitamins such as B1  Vitamin supplements may also be given to prevent alcohol-related brain damage  · Take your medicine as directed  Contact your healthcare provider if you think your medicine is not helping or if you have side effects  Tell him or her if you are allergic to any medicine  Keep a list of the medicines, vitamins, and herbs you take  Include the amounts, and when and why you take them  Bring the list or the pill bottles to follow-up visits  Carry your medicine list with you in case of an emergency  Recommended alcohol limits:  One drink is defined as 12 ounces (oz) of beer, 5 oz of wine, or 1 5 oz of liquor such as whiskey  · Men 21 to 64 years  should limit alcohol to 2 drinks a day  Do not have more than 4 drinks in 1 day or more than 14 in 1 week  · All women, and men 72 or older  should limit alcohol to 1 drink in a day  Do not have more than 3 drinks in 1 day or more than 7 in 1 week  Do not drink alcohol if you are pregnant      Health problems alcohol abuse can cause:   · Cancer in your liver, pancreas, stomach, colon, kidney, or breast    · Stroke or a heart attack    · Liver, kidney, or lung disease    · Blackouts, memory loss, brain damage, or dementia    · Diabetes, immune system problems, or thiamine (vitamin B1) deficiency    · Problems for you and your baby if you drink while pregnant    Manage alcohol use:   · Work with healthcare providers on goals to drink less  This can help prevent health problems  For example, you may start by planning your weekly alcohol use  It will be easier to have fewer drinks if you plan ahead  · Have food when you drink alcohol  Food will prevent alcohol from getting into your system too quickly  Eat before you have your first alcohol drink  · Time your drinks carefully  Have no more than 1 drink in an hour  Have a liquid such as water, coffee, or a soft drink between alcohol drinks  · Do not drive if you have had alcohol  Plan ahead so you have a safe ride home  Make sure someone who has not been drinking can help you get home safely  Plan to use a taxi or other ride service, if needed  · Do not drink alcohol if you are taking medicine  Alcohol is dangerous when you combine it with certain medicines, such as acetaminophen or blood pressure medicine  Talk to your healthcare provider about all the medicines you currently take  Follow up with your doctor as directed:  Write down your questions so you remember to ask them during your visits  For support and more information:   · Alcoholics Anonymous  Web Address: http://www saez info/    · Substance [facility] and [facility] [address] [place]  Web Address: https://Foldees/    © [facility] 2022 Information is for End User's use only and may not be sold, redistributed or otherwise used for commercial purposes   All illustrations and images included in CareNotes® are the copyrighted property of A D A M , Inc  or Elis Carey  [facility] above information is an  only  It is not intended as medical advice for individual conditions or treatments  Talk to your doctor, nurse or pharmacist before following any medical regimen to see if it is safe and effective for you

## 2022-04-24 NOTE — PLAN OF CARE
Problem: MOBILITY - ADULT  Goal: Maintain or return to baseline ADL function  Description: INTERVENTIONS:  -  Assess patient's ability to carry out ADLs; assess patient's baseline for ADL function and identify physical deficits which impact ability to perform ADLs (bathing, care of mouth/teeth, toileting, grooming, dressing, etc )  - Assess/evaluate cause of self-care deficits   - Assess range of motion  - Assess patient's mobility; develop plan if impaired  - Assess patient's need for assistive devices and provide as appropriate  - Encourage maximum independence but intervene and supervise when necessary  - Involve family in performance of ADLs  - Assess for home care needs following discharge   - Consider OT consult to assist with ADL evaluation and planning for discharge  - Provide patient education as appropriate  Outcome: Progressing  Goal: Maintains/Returns to pre admission functional level  Description: INTERVENTIONS:  - Perform BMAT or MOVE assessment daily    - Set and communicate daily mobility goal to care team and patient/family/caregiver  - Collaborate with rehabilitation services on mobility goals if consulted  - Perform Range of Motion 3 times a day  - Reposition patient every 2 hours    - Dangle patient 3 times a day  - Stand patient 3 times a day  - Ambulate patient 3 times a day  - Out of bed to chair 3 times a day   - Out of bed for meals 3 times a day  - Out of bed for toileting  - Record patient progress and toleration of activity level   Outcome: Progressing     Problem: Potential for Falls  Goal: Patient will remain free of falls  Description: INTERVENTIONS:  - Educate patient/family on patient safety including physical limitations  - Instruct patient to call for assistance with activity   - Consult OT/PT to assist with strengthening/mobility   - Keep Call bell within reach  - Keep bed low and locked with side rails adjusted as appropriate  - Keep care items and personal belongings within reach  - Initiate and maintain comfort rounds  - Make Fall Risk Sign visible to staff  - Offer Toileting every 2 Hours, in advance of need  - Apply yellow socks and bracelet for high fall risk patients  - Consider moving patient to room near nurses station  Outcome: Progressing     Problem: Prexisting or High Potential for Compromised Skin Integrity  Goal: Skin integrity is maintained or improved  Description: INTERVENTIONS:  - Identify patients at risk for skin breakdown  - Assess and monitor skin integrity  - Assess and monitor nutrition and hydration status  - Monitor labs   - Assess for incontinence   - Turn and reposition patient  - Assist with mobility/ambulation  - Relieve pressure over bony prominences  - Avoid friction and shearing  - Provide appropriate hygiene as needed including keeping skin clean and dry  - Evaluate need for skin moisturizer/barrier cream  - Collaborate with interdisciplinary team   - Patient/family teaching  - Consider wound care consult   Outcome: Progressing     Problem: DISCHARGE PLANNING - CARE MANAGEMENT  Goal: Discharge to post-acute care or home with appropriate resources  Description: INTERVENTIONS:  - Conduct assessment to determine patient/family and health care team treatment goals, and need for post-acute services based on payer coverage, community resources, and patient preferences, and barriers to discharge  - Address psychosocial, clinical, and financial barriers to discharge as identified in assessment in conjunction with the patient/family and health care team  - Arrange appropriate level of post-acute services according to patients   needs and preference and payer coverage in collaboration with the physician and health care team  - Communicate with and update the patient/family, physician, and health care team regarding progress on the discharge plan  - Arrange appropriate transportation to post-acute venues  Outcome: Progressing     Problem: SUBSTANCE USE/ABUSE  Goal: By discharge, will develop insight into their chemical dependency and sustain motivation to continue in recovery  Description: INTERVENTIONS:  - Attends all daily group sessions and scheduled AA groups  - Actively practices coping skills through participation in the therapeutic community and adherence to program rules  - Reviews and completes assignments from individual treatment plan  - Assist patient development of understanding of their personal cycle of addiction and relapse triggers  Outcome: Progressing  Goal: By discharge, patient will have ongoing treatment plan addressing chemical dependency  Description: INTERVENTIONS:  - Assist patient with resources and/or appointments for ongoing recovery based living  Outcome: Progressing     Problem: Nutrition/Hydration-ADULT  Goal: Nutrient/Hydration intake appropriate for improving, restoring or maintaining nutritional needs  Description: Monitor and assess patient's nutrition/hydration status for malnutrition  Collaborate with interdisciplinary team and initiate plan and interventions as ordered  Monitor patient's weight and dietary intake as ordered or per policy  Utilize nutrition screening tool and intervene as necessary  Determine patient's food preferences and provide high-protein, high-caloric foods as appropriate       INTERVENTIONS:  - Monitor oral intake, urinary output, labs, and treatment plans  - Assess nutrition and hydration status and recommend course of action  - Evaluate amount of meals eaten  - Assist patient with eating if necessary   - Allow adequate time for meals  - Recommend/ encourage appropriate diets, oral nutritional supplements, and vitamin/mineral supplements  - Order, calculate, and assess calorie counts as needed  - Recommend, monitor, and adjust tube feedings and TPN/PPN based on assessed needs  - Assess need for intravenous fluids  - Provide specific nutrition/hydration education as appropriate  - Include patient/family/caregiver in decisions related to nutrition  Outcome: Progressing     Problem: PAIN - ADULT  Goal: Verbalizes/displays adequate comfort level or baseline comfort level  Description: Interventions:  - Encourage patient to monitor pain and request assistance  - Assess pain using appropriate pain scale  - Administer analgesics based on type and severity of pain and evaluate response  - Implement non-pharmacological measures as appropriate and evaluate response  - Consider cultural and social influences on pain and pain management  - Notify physician/advanced practitioner if interventions unsuccessful or patient reports new pain  Outcome: Progressing     Problem: INFECTION - ADULT  Goal: Absence or prevention of progression during hospitalization  Description: INTERVENTIONS:  - Assess and monitor for signs and symptoms of infection  - Monitor lab/diagnostic results  - Monitor all insertion sites, i e  indwelling lines, tubes, and drains  - Monitor endotracheal if appropriate and nasal secretions for changes in amount and color  - [place] appropriate cooling/warming therapies per order  - Administer medications as ordered  - Instruct and encourage patient and family to use good hand hygiene technique  - Identify and instruct in appropriate isolation precautions for identified infection/condition  Outcome: Progressing     Problem: SAFETY ADULT  Goal: Maintain or return to baseline ADL function  Description: INTERVENTIONS:  -  Assess patient's ability to carry out ADLs; assess patient's baseline for ADL function and identify physical deficits which impact ability to perform ADLs (bathing, care of mouth/teeth, toileting, grooming, dressing, etc )  - Assess/evaluate cause of self-care deficits   - Assess range of motion  - Assess patient's mobility; develop plan if impaired  - Assess patient's need for assistive devices and provide as appropriate  - Encourage maximum independence but intervene and supervise when necessary  - Involve family in performance of ADLs  - Assess for home care needs following discharge   - Consider OT consult to assist with ADL evaluation and planning for discharge  - Provide patient education as appropriate  Outcome: Progressing  Goal: Maintains/Returns to pre admission functional level  Description: INTERVENTIONS:  - Perform BMAT or MOVE assessment daily    - Set and communicate daily mobility goal to care team and patient/family/caregiver  - Collaborate with rehabilitation services on mobility goals if consulted  - Perform Range of Motion 3 times a day  - Reposition patient every 2 hours    - Dangle patient 3 times a day  - Stand patient 3 times a day  - Ambulate patient 3 times a day  - Out of bed to chair 3 times a day   - Out of bed for meals 3 times a day  - Out of bed for toileting  - Record patient progress and toleration of activity level   Outcome: Progressing  Goal: Patient will remain free of falls  Description: INTERVENTIONS:  - Educate patient/family on patient safety including physical limitations  - Instruct patient to call for assistance with activity   - Consult OT/PT to assist with strengthening/mobility   - Keep Call bell within reach  - Keep bed low and locked with side rails adjusted as appropriate  - Keep care items and personal belongings within reach  - Initiate and maintain comfort rounds  - Make Fall Risk Sign visible to staff  - Offer Toileting every 2 Hours, in advance of need  - Apply yellow socks and bracelet for high fall risk patients  - Consider moving patient to room near nurses station  Outcome: Progressing     Problem: DISCHARGE PLANNING  Goal: Discharge to home or other facility with appropriate resources  Description: INTERVENTIONS:  - Identify barriers to discharge w/patient and caregiver  - Arrange for needed discharge resources and transportation as appropriate  - Identify discharge learning needs (meds, wound care, etc )  - Arrange for interpretive services to assist at discharge as needed  - Refer to Case Management Department for coordinating discharge planning if the patient needs post-hospital services based on physician/advanced practitioner order or complex needs related to functional status, cognitive ability, or social support system  Outcome: Progressing     Problem: Knowledge Deficit  Goal: Patient/family/caregiver demonstrates understanding of disease process, treatment plan, medications, and discharge instructions  Description: Complete learning assessment and assess knowledge base    Interventions:  - Provide teaching at level of understanding  - Provide teaching via preferred learning methods  Outcome: Progressing     Problem: NEUROSENSORY - ADULT  Goal: Achieves stable or improved neurological status  Description: INTERVENTIONS  - Monitor and report changes in neurological status  - Monitor vital signs such as temperature, blood pressure, glucose, and any other labs ordered   - Initiate measures to prevent increased intracranial pressure  - Monitor for seizure activity and implement precautions if appropriate      Outcome: Progressing  Goal: Remains free of injury related to seizures activity  Description: INTERVENTIONS  - Maintain airway, patient safety  and administer oxygen as ordered  - Monitor patient for seizure activity, document and report duration and description of seizure to physician/advanced practitioner  - If seizure occurs,  ensure patient safety during seizure  - Reorient patient post seizure  - Seizure pads on all 4 side rails  - Instruct patient/family to notify RN of any seizure activity including if an aura is experienced  - Instruct patient/family to call for assistance with activity based on nursing assessment  - Administer anti-seizure medications if ordered    Outcome: Progressing  Goal: Achieves maximal functionality and self care  Description: INTERVENTIONS  - Monitor swallowing and airway patency with patient fatigue and changes in neurological status  - Encourage and assist patient to increase activity and self care     - Encourage visually impaired, hearing impaired and aphasic patients to use assistive/communication devices  Outcome: Progressing     Problem: RESPIRATORY - ADULT  Goal: Achieves optimal ventilation and oxygenation  Description: INTERVENTIONS:  - Assess for changes in respiratory status  - Assess for changes in mentation and behavior  - Position to facilitate oxygenation and minimize respiratory effort  - Oxygen administered by appropriate delivery if ordered  - Initiate smoking cessation education as indicated  - Encourage broncho-pulmonary hygiene including cough, deep breathe, Incentive Spirometry  - Assess the need for suctioning and aspirate as needed  - Assess and instruct to report SOB or any respiratory difficulty  - Respiratory Therapy support as indicated  Outcome: Progressing     Problem: GASTROINTESTINAL - ADULT  Goal: Minimal or absence of nausea and/or vomiting  Description: INTERVENTIONS:  - Administer IV fluids if ordered to ensure adequate hydration  - Maintain NPO status until nausea and vomiting are resolved  - Nasogastric tube if ordered  - Administer ordered antiemetic medications as needed  - Provide nonpharmacologic comfort measures as appropriate  - Advance diet as tolerated, if ordered  - Consider nutrition services referral to assist patient with adequate nutrition and appropriate food choices  Outcome: Progressing  Goal: Maintains or returns to baseline bowel function  Description: INTERVENTIONS:  - Assess bowel function  - Encourage oral fluids to ensure adequate hydration  - Administer IV fluids if ordered to ensure adequate hydration  - Administer ordered medications as needed  - Encourage mobilization and activity  - Consider nutritional services referral to assist patient with adequate nutrition and appropriate food choices  Outcome: Progressing  Goal: Maintains adequate nutritional intake  Description: INTERVENTIONS:  - Monitor percentage of each meal consumed  - Identify factors contributing to decreased intake, treat as appropriate  - Assist with meals as needed  - Monitor I&O, weight, and lab values if indicated  - Obtain nutrition services referral as needed  Outcome: Progressing  Goal: Oral mucous membranes remain intact  Description: INTERVENTIONS  - Assess oral mucosa and hygiene practices  - Implement preventative oral hygiene regimen  - Implement oral medicated treatments as ordered  - Initiate Nutrition services referral as needed  Outcome: Progressing     Problem: GENITOURINARY - ADULT  Goal: Maintains or returns to baseline urinary function  Description: INTERVENTIONS:  - Assess urinary function  - Encourage oral fluids to ensure adequate hydration if ordered  - Administer IV fluids as ordered to ensure adequate hydration  - Administer ordered medications as needed  - Offer frequent toileting  - Follow urinary retention protocol if ordered  Outcome: Progressing     Problem: METABOLIC, FLUID AND ELECTROLYTES - ADULT  Goal: Electrolytes maintained within normal limits  Description: INTERVENTIONS:  - Monitor labs and assess patient for signs and symptoms of electrolyte imbalances  - Administer electrolyte replacement as ordered  - Monitor response to electrolyte replacements, including repeat lab results as appropriate  - Instruct patient on fluid and nutrition as appropriate  Outcome: Progressing  Goal: Fluid balance maintained  Description: INTERVENTIONS:  - Monitor labs   - Monitor I/O and WT  - Instruct patient on fluid and nutrition as appropriate  - Assess for signs & symptoms of volume excess or deficit  Outcome: Progressing     Problem: SKIN/TISSUE INTEGRITY - ADULT  Goal: Skin Integrity remains intact(Skin Breakdown Prevention)  Description: Assess:  -Perform [place] assessment every shift  -Clean and moisturize skin every shift  -Inspect skin when repositioning, toileting, and assisting with ADLS  -Assess extremities for adequate circulation and sensation     Bed Management:  -Have minimal linens on bed & keep smooth, unwrinkled  -Change linens as needed when moist or perspiring  -Avoid sitting or lying in one position for more than 2 hours while in bed  -Keep HOB at 30 degrees     Toileting:  -Offer bedside commode  -Assess for incontinence every 2    Activity:  -Mobilize patient 3 times a day  -Encourage activity and walks on unit  -Encourage or provide ROM exercises   -Turn and reposition patient every 2 Hours  -Use appropriate equipment to lift or move patient in bed  -Instruct/ Assist with weight shifting every 15 when out of bed in chair  -Consider limitation of chair time 2 hour intervals    Skin Care:  -Avoid use of baby powder, tape, friction and shearing, hot water or constrictive clothing  -Relieve pressure over bony prominences using cusions    -Do not massage red bony areas    Outcome: Progressing

## 2022-04-24 NOTE — PLAN OF CARE
Problem: MOBILITY - ADULT  Goal: Maintain or return to baseline ADL function  Description: INTERVENTIONS:  -  Assess patient's ability to carry out ADLs; assess patient's baseline for ADL function and identify physical deficits which impact ability to perform ADLs (bathing, care of mouth/teeth, toileting, grooming, dressing, etc )  - Assess/evaluate cause of self-care deficits   - Assess range of motion  - Assess patient's mobility; develop plan if impaired  - Assess patient's need for assistive devices and provide as appropriate  - Encourage maximum independence but intervene and supervise when necessary  - Involve family in performance of ADLs  - Assess for home care needs following discharge   - Consider OT consult to assist with ADL evaluation and planning for discharge  - Provide patient education as appropriate  4/24/2022 1218 by Jabier Cogan, RN  Outcome: Completed  4/24/2022 1027 by Jabier Cogan, RN  Outcome: Progressing  Goal: Maintains/Returns to pre admission functional level  Description: INTERVENTIONS:  - Perform BMAT or MOVE assessment daily    - Set and communicate daily mobility goal to care team and patient/family/caregiver  - Collaborate with rehabilitation services on mobility goals if consulted  - Perform Range of Motion  times a day  - Reposition patient every  hours    - Dangle patient  times a day  - Stand patient  times a day  - Ambulate patient  times a day  - Out of bed to chair  times a day   - Out of bed for meals  times a day  - Out of bed for toileting  - Record patient progress and toleration of activity level   4/24/2022 1218 by Jabier Cogan, RN  Outcome: Completed  4/24/2022 1027 by Jabier Cogan, RN  Outcome: Progressing     Problem: Potential for Falls  Goal: Patient will remain free of falls  Description: INTERVENTIONS:  - Educate patient/family on patient safety including physical limitations  - Instruct patient to call for assistance with activity   - Consult OT/PT to assist with strengthening/mobility   - Keep Call bell within reach  - Keep bed low and locked with side rails adjusted as appropriate  - Keep care items and personal belongings within reach  - Initiate and maintain comfort rounds  - Make Fall Risk Sign visible to staff  - Offer Toileting every  Hours, in advance of need  - Initiate/Maintain alarm  - Obtain necessary fall risk management equipment:   - Apply yellow socks and bracelet for high fall risk patients  - Consider moving patient to room near nurses station  4/24/2022 1218 by Jb Hi RN  Outcome: Completed  4/24/2022 1027 by Jb Hi RN  Outcome: Progressing     Problem: Prexisting or High Potential for Compromised Skin Integrity  Goal: Skin integrity is maintained or improved  Description: INTERVENTIONS:  - Identify patients at risk for skin breakdown  - Assess and monitor skin integrity  - Assess and monitor nutrition and hydration status  - Monitor labs   - Assess for incontinence   - Turn and reposition patient  - Assist with mobility/ambulation  - Relieve pressure over bony prominences  - Avoid friction and shearing  - Provide appropriate hygiene as needed including keeping skin clean and dry  - Evaluate need for skin moisturizer/barrier cream  - Collaborate with interdisciplinary team   - Patient/family teaching  - Consider wound care consult   4/24/2022 1218 by Jb iH RN  Outcome: Completed  4/24/2022 1027 by Jb Hi RN  Outcome: Progressing     Problem: DISCHARGE PLANNING - CARE MANAGEMENT  Goal: Discharge to post-acute care or home with appropriate resources  Description: INTERVENTIONS:  - Conduct assessment to determine patient/family and health care team treatment goals, and need for post-acute services based on payer coverage, community resources, and patient preferences, and barriers to discharge  - Address psychosocial, clinical, and financial barriers to discharge as identified in assessment in conjunction with the patient/family and health care team  - Arrange appropriate level of post-acute services according to patients   needs and preference and payer coverage in collaboration with the physician and health care team  - Communicate with and update the patient/family, physician, and health care team regarding progress on the discharge plan  - Arrange appropriate transportation to post-acute venues  4/24/2022 1218 by Dima Sesay RN  Outcome: Completed  4/24/2022 1027 by Dima Sesay RN  Outcome: Progressing     Problem: SUBSTANCE USE/ABUSE  Goal: By discharge, will develop insight into their chemical dependency and sustain motivation to continue in recovery  Description: INTERVENTIONS:  - Attends all daily group sessions and scheduled AA groups  - Actively practices coping skills through participation in the therapeutic community and adherence to program rules  - Reviews and completes assignments from individual treatment plan  - Assist patient development of understanding of their personal cycle of addiction and relapse triggers  4/24/2022 1218 by Dima Sesay RN  Outcome: Completed  4/24/2022 1027 by Dima Sesay RN  Outcome: Progressing  Goal: By discharge, patient will have ongoing treatment plan addressing chemical dependency  Description: INTERVENTIONS:  - Assist patient with resources and/or appointments for ongoing recovery based living  4/24/2022 1218 by Dima Sesay RN  Outcome: Completed  4/24/2022 1027 by Dima Sesay RN  Outcome: Progressing     Problem: Nutrition/Hydration-ADULT  Goal: Nutrient/Hydration intake appropriate for improving, restoring or maintaining nutritional needs  Description: Monitor and assess patient's nutrition/hydration status for malnutrition  Collaborate with interdisciplinary team and initiate plan and interventions as ordered  Monitor patient's weight and dietary intake as ordered or per policy  Utilize nutrition screening tool and intervene as necessary   Determine patient's food preferences and provide high-protein, high-caloric foods as appropriate       INTERVENTIONS:  - Monitor oral intake, urinary output, labs, and treatment plans  - Assess nutrition and hydration status and recommend course of action  - Evaluate amount of meals eaten  - Assist patient with eating if necessary   - Allow adequate time for meals  - Recommend/ encourage appropriate diets, oral nutritional supplements, and vitamin/mineral supplements  - Order, calculate, and assess calorie counts as needed  - Recommend, monitor, and adjust tube feedings and TPN/PPN based on assessed needs  - Assess need for intravenous fluids  - Provide specific nutrition/hydration education as appropriate  - Include patient/family/caregiver in decisions related to nutrition  4/24/2022 1218 by Toni Atwood RN  Outcome: Completed  4/24/2022 1027 by Toni Atwood RN  Outcome: Progressing     Problem: PAIN - ADULT  Goal: Verbalizes/displays adequate comfort level or baseline comfort level  Description: Interventions:  - Encourage patient to monitor pain and request assistance  - Assess pain using appropriate pain scale  - Administer analgesics based on type and severity of pain and evaluate response  - Implement non-pharmacological measures as appropriate and evaluate response  - Consider cultural and social influences on pain and pain management  - Notify physician/advanced practitioner if interventions unsuccessful or patient reports new pain  4/24/2022 1218 by Toni Atwood RN  Outcome: Completed  4/24/2022 1027 by Toni Atwood RN  Outcome: Progressing     Problem: INFECTION - ADULT  Goal: Absence or prevention of progression during hospitalization  Description: INTERVENTIONS:  - Assess and monitor for signs and symptoms of infection  - Monitor lab/diagnostic results  - Monitor all insertion sites, i e  indwelling lines, tubes, and drains  - Monitor endotracheal if appropriate and nasal secretions for changes in amount and color  - Lissie appropriate cooling/warming therapies per order  - Administer medications as ordered  - Instruct and encourage patient and family to use good hand hygiene technique  - Identify and instruct in appropriate isolation precautions for identified infection/condition  4/24/2022 1218 by Eleni Barrera RN  Outcome: Completed  4/24/2022 1027 by Eleni Barrera RN  Outcome: Progressing     Problem: SAFETY ADULT  Goal: Maintain or return to baseline ADL function  Description: INTERVENTIONS:  -  Assess patient's ability to carry out ADLs; assess patient's baseline for ADL function and identify physical deficits which impact ability to perform ADLs (bathing, care of mouth/teeth, toileting, grooming, dressing, etc )  - Assess/evaluate cause of self-care deficits   - Assess range of motion  - Assess patient's mobility; develop plan if impaired  - Assess patient's need for assistive devices and provide as appropriate  - Encourage maximum independence but intervene and supervise when necessary  - Involve family in performance of ADLs  - Assess for home care needs following discharge   - Consider OT consult to assist with ADL evaluation and planning for discharge  - Provide patient education as appropriate  4/24/2022 1218 by Eleni Barrera RN  Outcome: Completed  4/24/2022 1027 by Eleni Barrera RN  Outcome: Progressing  Goal: Maintains/Returns to pre admission functional level  Description: INTERVENTIONS:  - Perform BMAT or MOVE assessment daily    - Set and communicate daily mobility goal to care team and patient/family/caregiver  - Collaborate with rehabilitation services on mobility goals if consulted  - Perform Range of Motion  times a day  - Reposition patient every hours    - Dangle patient  times a day  - Stand patient  times a day  - Ambulate patient  times a day  - Out of bed to chair  times a day   - Out of bed for meals  times a day  - Out of bed for toileting  - Record patient progress and toleration of activity level   4/24/2022 1218 by Archana Quijano RN  Outcome: Completed  4/24/2022 1027 by Archana Quijano RN  Outcome: Progressing  Goal: Patient will remain free of falls  Description: INTERVENTIONS:  - Educate patient/family on patient safety including physical limitations  - Instruct patient to call for assistance with activity   - Consult OT/PT to assist with strengthening/mobility   - Keep Call bell within reach  - Keep bed low and locked with side rails adjusted as appropriate  - Keep care items and personal belongings within reach  - Initiate and maintain comfort rounds  - Make Fall Risk Sign visible to staff  - Offer Toileting every  Hours, in advance of need  - Initiate/Maintain alarm  - Obtain necessary fall risk management equipment:   - Apply yellow socks and bracelet for high fall risk patients  - Consider moving patient to room near nurses station  4/24/2022 1218 by Archana Quijano RN  Outcome: Completed  4/24/2022 1027 by Archana Quijano RN  Outcome: Progressing     Problem: DISCHARGE PLANNING  Goal: Discharge to home or other facility with appropriate resources  Description: INTERVENTIONS:  - Identify barriers to discharge w/patient and caregiver  - Arrange for needed discharge resources and transportation as appropriate  - Identify discharge learning needs (meds, wound care, etc )  - Arrange for interpretive services to assist at discharge as needed  - Refer to Case Management Department for coordinating discharge planning if the patient needs post-hospital services based on physician/advanced practitioner order or complex needs related to functional status, cognitive ability, or social support system  4/24/2022 1218 by Archana Quijano RN  Outcome: Completed  4/24/2022 1027 by Archana Quijano RN  Outcome: Progressing     Problem: Knowledge Deficit  Goal: Patient/family/caregiver demonstrates understanding of disease process, treatment plan, medications, and discharge instructions  Description: Complete learning assessment and assess knowledge base   Interventions:  - Provide teaching at level of understanding  - Provide teaching via preferred learning methods  4/24/2022 1218 by Serena Vuong RN  Outcome: Completed  4/24/2022 1027 by Serena Vuong RN  Outcome: Progressing     Problem: NEUROSENSORY - ADULT  Goal: Achieves stable or improved neurological status  Description: INTERVENTIONS  - Monitor and report changes in neurological status  - Monitor vital signs such as temperature, blood pressure, glucose, and any other labs ordered   - Initiate measures to prevent increased intracranial pressure  - Monitor for seizure activity and implement precautions if appropriate      4/24/2022 1218 by Serena Vuong RN  Outcome: Completed  4/24/2022 1027 by Serena Vuong RN  Outcome: Progressing  Goal: Remains free of injury related to seizures activity  Description: INTERVENTIONS  - Maintain airway, patient safety  and administer oxygen as ordered  - Monitor patient for seizure activity, document and report duration and description of seizure to physician/advanced practitioner  - If seizure occurs,  ensure patient safety during seizure  - Reorient patient post seizure  - Seizure pads on all 4 side rails  - Instruct patient/family to notify RN of any seizure activity including if an aura is experienced  - Instruct patient/family to call for assistance with activity based on nursing assessment  - Administer anti-seizure medications if ordered    4/24/2022 1218 by Serena Vuong RN  Outcome: Completed  4/24/2022 1027 by Serena Vuong RN  Outcome: Progressing  Goal: Achieves maximal functionality and self care  Description: INTERVENTIONS  - Monitor swallowing and airway patency with patient fatigue and changes in neurological status  - Encourage and assist patient to increase activity and self care     - Encourage visually impaired, hearing impaired and aphasic patients to use assistive/communication devices  4/24/2022 1218 by Serena Vuong RN  Outcome: Completed  4/24/2022 1027 by Archana Quijano RN  Outcome: Progressing     Problem: RESPIRATORY - ADULT  Goal: Achieves optimal ventilation and oxygenation  Description: INTERVENTIONS:  - Assess for changes in respiratory status  - Assess for changes in mentation and behavior  - Position to facilitate oxygenation and minimize respiratory effort  - Oxygen administered by appropriate delivery if ordered  - Initiate smoking cessation education as indicated  - Encourage broncho-pulmonary hygiene including cough, deep breathe, Incentive Spirometry  - Assess the need for suctioning and aspirate as needed  - Assess and instruct to report SOB or any respiratory difficulty  - Respiratory Therapy support as indicated  4/24/2022 1218 by Archana Quijano RN  Outcome: Completed  4/24/2022 1027 by Archana Quijano RN  Outcome: Progressing     Problem: GASTROINTESTINAL - ADULT  Goal: Minimal or absence of nausea and/or vomiting  Description: INTERVENTIONS:  - Administer IV fluids if ordered to ensure adequate hydration  - Maintain NPO status until nausea and vomiting are resolved  - Nasogastric tube if ordered  - Administer ordered antiemetic medications as needed  - Provide nonpharmacologic comfort measures as appropriate  - Advance diet as tolerated, if ordered  - Consider nutrition services referral to assist patient with adequate nutrition and appropriate food choices  4/24/2022 1218 by Archana Quijano RN  Outcome: Completed  4/24/2022 1027 by Archana Quijano RN  Outcome: Progressing  Goal: Maintains or returns to baseline bowel function  Description: INTERVENTIONS:  - Assess bowel function  - Encourage oral fluids to ensure adequate hydration  - Administer IV fluids if ordered to ensure adequate hydration  - Administer ordered medications as needed  - Encourage mobilization and activity  - Consider nutritional services referral to assist patient with adequate nutrition and appropriate food choices  4/24/2022 1218 by Archana Quijano RN  Outcome: Completed  4/24/2022 1027 by Aquiles Moreira RN  Outcome: Progressing  Goal: Maintains adequate nutritional intake  Description: INTERVENTIONS:  - Monitor percentage of each meal consumed  - Identify factors contributing to decreased intake, treat as appropriate  - Assist with meals as needed  - Monitor I&O, weight, and lab values if indicated  - Obtain nutrition services referral as needed  4/24/2022 1218 by Aquiles Moreira RN  Outcome: Completed  4/24/2022 1027 by Aquiles Moreira RN  Outcome: Progressing  Goal: Oral mucous membranes remain intact  Description: INTERVENTIONS  - Assess oral mucosa and hygiene practices  - Implement preventative oral hygiene regimen  - Implement oral medicated treatments as ordered  - Initiate Nutrition services referral as needed  4/24/2022 1218 by Aquiles Moreira RN  Outcome: Completed  4/24/2022 1027 by Aquiles Moreira RN  Outcome: Progressing     Problem: GENITOURINARY - ADULT  Goal: Maintains or returns to baseline urinary function  Description: INTERVENTIONS:  - Assess urinary function  - Encourage oral fluids to ensure adequate hydration if ordered  - Administer IV fluids as ordered to ensure adequate hydration  - Administer ordered medications as needed  - Offer frequent toileting  - Follow urinary retention protocol if ordered  4/24/2022 1218 by Aquiles Moreira RN  Outcome: Completed  4/24/2022 1027 by Aquiles Moreira RN  Outcome: Progressing     Problem: METABOLIC, FLUID AND ELECTROLYTES - ADULT  Goal: Electrolytes maintained within normal limits  Description: INTERVENTIONS:  - Monitor labs and assess patient for signs and symptoms of electrolyte imbalances  - Administer electrolyte replacement as ordered  - Monitor response to electrolyte replacements, including repeat lab results as appropriate  - Instruct patient on fluid and nutrition as appropriate  4/24/2022 1218 by Aquiles Moreira RN  Outcome: Completed  4/24/2022 1027 by Aquiles Moreira RN  Outcome: Progressing  Goal: Fluid balance maintained  Description: INTERVENTIONS:  - Monitor labs   - Monitor I/O and WT  - Instruct patient on fluid and nutrition as appropriate  - Assess for signs & symptoms of volume excess or deficit  4/24/2022 1218 by Char Reyna RN  Outcome: Completed  4/24/2022 1027 by Char Reyna RN  Outcome: Progressing     Problem: SKIN/TISSUE INTEGRITY - ADULT  Goal: Skin Integrity remains intact(Skin Breakdown Prevention)  Description: Assess:  -Perform Davey assessment every   -Clean and moisturize skin every   -Inspect skin when repositioning, toileting, and assisting with ADLS  -Assess under medical devices such as  every   -Assess extremities for adequate circulation and sensation     Bed Management:  -Have minimal linens on bed & keep smooth, unwrinkled  -Change linens as needed when moist or perspiring  -Avoid sitting or lying in one position for more than  hours while in bed  -Keep HOB at degrees     Toileting:  -Offer bedside commode  -Assess for incontinence every   -Use incontinent care products after each incontinent episode such as     Activity:  -Mobilize patient  times a day  -Encourage activity and walks on unit  -Encourage or provide ROM exercises   -Turn and reposition patient every  Hours  -Use appropriate equipment to lift or move patient in bed  -Instruct/ Assist with weight shifting every  when out of bed in chair  -Consider limitation of chair time  hour intervals    Skin Care:  -Avoid use of baby powder, tape, friction and shearing, hot water or constrictive clothing  -Relieve pressure over bony prominences using   -Do not massage red bony areas    Next Steps:  -Teach patient strategies to minimize risks such as    -Consider consults to  interdisciplinary teams such as   4/24/2022 1218 by Char Reyna RN  Outcome: Completed  4/24/2022 1027 by Char Reyna RN  Outcome: Progressing

## 2022-04-24 NOTE — RESPIRATORY THERAPY NOTE
Home Oxygen Qualifying Test     Patient name: Bradford Tyler        : 1965   Date of Test:  2022  Diagnosis:    Home Oxygen Test:    **Medicare Guidelines require item(s) 1-5 on all ambulatory patients or 1 and 2 on non-ambulatory patients  1  Baseline SPO2 on Room Air at rest 96%   a  If <= 88% on Room Air add O2 via NC to obtain SpO2 >=88%  If LPM needed, document LPM  needed to reach =>88%    2  SPO2 during exertion on Room Air 96%  a  During exertion monitor SPO2  If SPO2 increases >=89%, do not add supplemental oxygen    3  SPO2 on Oxygen at Rest N/A     4  SPO2 during exertion on Oxygen N/A     5  Test performed during exertion activity  Pt ambulated in hallway for 6 minutes without difficulty  []  Supplemental Home Oxygen is indicated  [x]  Client does not qualify for home oxygen  Respiratory Additional Notes- Pt denies shortness of breath with exertion      [address] [facility] [place],[address], [place]

## 2022-04-24 NOTE — ASSESSMENT & PLAN NOTE
57-year-old female presenting with acute respiratory failure with hypoxia  Possibly as a result of severe alcohol withdrawal   She required ICU level of care intubation / extubation  - currently on room air  She does not require oxygen on discharge per respiratory therapy  - ID recommendations appreciated  No further antibiotics needed at this time    - patient declined post acute rehabilitation services, she prefers to go home with home PT

## 2022-04-24 NOTE — PROGRESS NOTES
Progress Note - Infectious Disease   Francisca Johansen 60 y o  female MRN: [id number]  Unit/Bed#: [facility] 68 2 -01 Encounter: [id number]      Impression/Plan:   1  Systemic inflammatory response syndrome-evolving since admission   Consideration for the possibility of sepsis  Consideration for the possibility of pneumonia   Consideration for the possibility of an infected peripancreatic fluid collection   Possibly all secondary to noninfectious issues such as pancreatitis or withdrawal  Consideration for the possibility of drug fever   Less likely a catheter related bacteremia is the PICC line has just been placed  Fortunately the patient remains hemodynamically stable despite her systemic illness   Procalcitonin level has normalized  Patient remains stable off all antibiotics   Procalcitonin level has normalized   She has had no recurrence of the fever   The white cell count has now normalized  -monitor off all antibiotics for now  -treatment of withdrawal  -treatment of pancreatitis  -supportive care     2  Alcohol induced pancreatitis  With concern for developing peripancreatic abscess  Patient admits to heavy alcohol use  Lipase upon admission was 53,700  Abdominal CT and ultrasound imaging showing peripancreatic inflammation with some fluid tracking posteriorly to the spleen  Now with new CT imaging is concerning for ongoing necrosis and increased fluid/debris suggesting phlegmon/abscess  General surgery is following   IR has evaluated and area is likely not amenable to drainage as it is more representative of phlegmon   -monitor patient off antibiotics  -serial abdominal exams  -monitor GI symptoms  -continue follow up with general surgery     3  Acute hypoxic respiratory failure  In setting of recent encephalopathy during alcohol detox, and volume overload  Patient with some lung base collapse on previous chest imaging   She's had significant secretions  Most recent chest xray showed a stable right perihilar infiltrate, a left lower lobs consolidation vs effusion, and resolution of previous small effusions  She has undergone bronchoscopy x2  While significant secretions were noted she only grew mixed maia on her cultures  She completed 5-days of broad spectrum antibiotic treatment  She is no longer requiring oxygen support  -monitor vitals  -monitor respiratory status  -O2 support per primary service     4  Alcohol abuse  Patient previously on the detox unit  She experienced delirium tremens  Now extubated and feeling anxious  Recommend close monitoring by medical toxicology team  Consider transition back to detox unit as needed if medically stable  Recommend offering support/resources before discharge for ongoing care  -monitor for withdraw    No active infectious disease issues  We will sign off  Please call if questions  Antibiotics:  Off antibiotics    Subjective:  Patient has no fever, chills, sweats; no nausea, vomiting, diarrhea; no cough, shortness of breath; no pain  No new symptoms  Objective:  Vitals:  Temp:  [97 6 °F (36 4 °C)-98 9 °F (37 2 °C)] 98 9 °F (37 2 °C)  HR:  [84-89] 84  Resp:  [17-18] 17  BP: (115-117)/(76-79) 115/78  SpO2:  [94 %] 94 %  Temp (24hrs), Av 3 °F (36 8 °C), Min:97 6 °F (36 4 °C), Max:98 9 °F (37 2 °C)  Current: Temperature: 98 9 °F (37 2 °C)    Physical Exam:   General Appearance:  Alert, interactive, nontoxic, no acute distress  Throat: Oropharynx moist without lesions  Lungs:   Clear to auscultation bilaterally; no wheezes, rhonchi or rales; respirations unlabored   Heart:  RRR; no murmur, rub or gallop   Abdomen:   Soft, non-tender, non-distended, positive bowel sounds  Extremities: No clubbing, cyanosis or edema   Skin: No new rashes or lesions  No draining wounds noted         Labs, Imaging, & Other studies:   All pertinent labs and imaging studies were personally reviewed  Results from last 7 days   Lab Units 22  0505 22  6821 22  7354 WBC Thousand/uL 8 10 8 05 11 28*   HEMOGLOBIN g/dL 9 1* 8 8* 9 1*   PLATELETS Thousands/uL 516* 531* 538*     Results from last 7 days   Lab Units 04/24/22  0505 04/23/22  0508 04/23/22  0508 04/22/22  0458 04/22/22  0458 04/21/22  0503 04/21/22  0503   SODIUM mmol/L 143  --  145  --  144   < > 141   POTASSIUM mmol/L 3 7   < > 3 4*   < > 3 6   < > 3 9   CHLORIDE mmol/L 105   < > 108   < > 108   < > 106   CO2 mmol/L 26   < > 27   < > 27   < > 27   BUN mg/dL 9   < > 9   < > 10   < > 11   CREATININE mg/dL 0 64   < > 0 60   < > 0 54*   < > 0 56*   EGFR ml/min/1 73sq m 98   < > 100   < > 104   < > 103   CALCIUM mg/dL 9 2   < > 9 1   < > 8 8   < > 9 0   AST U/L 35  --  40  --   --   --  39   ALT U/L 92*   < > 95*  --   --    < > 118*   ALK PHOS U/L 114   < > 113  --   --    < > 133*    < > = values in this interval not displayed  Results from last 7 days   Lab Units 04/18/22  1817 04/18/22  1027   BLOOD CULTURE  No Growth After 5 Days  No Growth After 5 Days    --    SPUTUM CULTURE   --  2+ Growth of    GRAM STAIN RESULT   --  1+ Polys*  2+ Gram negative rods*  Rare Gram positive cocci in pairs*     Results from last 7 days   Lab Units 04/21/22  0503 04/20/22  0442 04/18/22  0353   PROCALCITONIN ng/ml 0 11 0 27* 0 18

## 2022-04-29 LAB — P JIROVECII AG SPEC QL IF: NORMAL
